# Patient Record
Sex: MALE | Race: WHITE | NOT HISPANIC OR LATINO | Employment: OTHER | URBAN - METROPOLITAN AREA
[De-identification: names, ages, dates, MRNs, and addresses within clinical notes are randomized per-mention and may not be internally consistent; named-entity substitution may affect disease eponyms.]

---

## 2017-05-16 ENCOUNTER — HOSPITAL ENCOUNTER (EMERGENCY)
Facility: HOSPITAL | Age: 67
Discharge: HOME | End: 2017-05-16
Attending: EMERGENCY MEDICINE
Payer: MEDICARE

## 2017-05-16 VITALS
SYSTOLIC BLOOD PRESSURE: 155 MMHG | HEART RATE: 80 BPM | RESPIRATION RATE: 12 BRPM | OXYGEN SATURATION: 97 % | TEMPERATURE: 100 F | DIASTOLIC BLOOD PRESSURE: 68 MMHG | BODY MASS INDEX: 23.4 KG/M2 | HEIGHT: 69 IN | WEIGHT: 158 LBS

## 2017-05-16 DIAGNOSIS — R07.81 RIB PAIN: ICD-10-CM

## 2017-05-16 DIAGNOSIS — R06.02 SHORTNESS OF BREATH: ICD-10-CM

## 2017-05-16 DIAGNOSIS — T14.90XA TRAUMA: ICD-10-CM

## 2017-05-16 PROCEDURE — 25000003 PHARM REV CODE 250: Performed by: EMERGENCY MEDICINE

## 2017-05-16 PROCEDURE — 99284 EMERGENCY DEPT VISIT MOD MDM: CPT

## 2017-05-16 RX ORDER — ACETAMINOPHEN 500 MG
1000 TABLET ORAL
Status: COMPLETED | OUTPATIENT
Start: 2017-05-16 | End: 2017-05-16

## 2017-05-16 RX ADMIN — ACETAMINOPHEN 1000 MG: 500 TABLET ORAL at 11:05

## 2017-05-16 NOTE — ED PROVIDER NOTES
SCRIBE #1 NOTE: I, Chepe Jarrell, am scribing for, and in the presence of, Clyde Haywood MD. I have scribed the entire note.        History      Chief Complaint   Patient presents with    Chest Pain     pt fell Sunday and now c/o pain to ribs on left side, unable to take a deep breath. + shortness of breath.        Review of patient's allergies indicates:   Allergen Reactions    Penicillins         HPI    5/16/2017, 11:26 AM   History obtained from the patient      History of Present Illness: Lionel Castillo is a 67 y.o. male patient who presents to the Emergency Department Evaluation after falling from losing balance which occurred 2 days ago. Pt states he hit his head. He c/o left posterior chest wall pain. Sx are constant and moderate in severity. Pain is worse with breathing. There are no other mitigating or exacerbating factors noted. Associated sx include abrasion to forehead.  Pt states he takes ASA regularly so he bruises easily. Pt denies any hip pain, knee pain, back pain, neck pain/stiffness, LOC, dizziness, abd pain, CP, SOB, weakness or numbness to extremities, and all other sx at this time. Pt is current smoker. No further complaints or concerns at this time.        Arrival mode: personal transportationEMS    PCP: Eddie Garcia MD       Past Medical History:  Past Medical History:   Diagnosis Date    Diabetes     HTN (hypertension)     Hyperlipidemia          Past Surgical History:  History reviewed. No pertinent surgical history.        Family History:  History reviewed. No pertinent family history.      Social History:  Social History     Social History Main Topics    Smoking status:  Smoker    Smokeless tobacco: unknown    Alcohol use No    Drug use: No    Sexual activity: Unknown         Review of Systems   Constitutional: Negative for chills and fever.        + fall   HENT: Negative for sore throat.    Eyes: Negative for visual disturbance.   Respiratory: Negative for  cough and shortness of breath.    Cardiovascular: Negative for leg swelling.   Gastrointestinal: Negative for abdominal pain, nausea and vomiting.   Genitourinary: Negative for dysuria.   Musculoskeletal: Negative for back pain, neck pain and neck stiffness.        - hip pain  - knee pain   Skin: Positive for wound (abrasion). Negative for rash.   Neurological: Negative for dizziness, syncope, speech difficulty, weakness, numbness and headaches.        - LOC  + head trauma   Hematological: Bruises/bleeds easily (ASA).   All other systems reviewed and are negative.      Physical Exam    Initial Vitals   BP Pulse Resp Temp SpO2   05/16/17 1115 05/16/17 1115 05/16/17 1115 05/16/17 1115 05/16/17 1115   145/76 114 24 99.8 °F (37.7 °C) 92 %     Physical Exam  Constitutional: Patient is in NAD. Awake and alert. Appropriate for age.   Head: superficial abrasion to forehead. Midface is stable. No Raccoon's eyes. No Mcgowan's sign.   Eyes: PERRL. EOM normal. Conjunctivae normal.   Ears: No hemotympanum.   Nose: No nasal deformity. No septal hematoma.   Mouth/Throat: Airway intact. No malocclusion. No dental trauma.   Neck: Trachea midline. No cervical bony tenderness, deformities, or step-offs.   Cardiovascular: Regular rate and rhythm. Heart sounds normal. Peripheral pulses are 2+ bilaterally in all extremities.   Pulmonary/Chest: Breath sounds are normal bilaterally. No decreased breath sounds. No respiratory distress. No external evidence of chest trauma based on inspection. Left posterior chest wall is tender to palpation. No crepitus. No asymmetric rise. No flail segment.   Abdominal: Soft and non-distended. No tenderness. No external evidence of abd trauma based on inspection.   Back: No midline bony tenderness, deformities, or step-offs of the T-spine or L-spine. No abrasions or ecchymosis.   Musculoskeletal: Full range of motion in bilateral extremities. Pelvis is stable. No obvious deformities.   Skin: Normal color.  "No abrasions. No lacerations.   Neurological: Alert and oriented x3. GCS 15. Strength is normal, equal, 5/5 in bilateral upper and lower extremities. Grossly NV intact. No sensory deficits to light touch. Non-focal neurological examination.   Psychiatric: Normal affect.      ED Course    Procedures  ED Vital Signs:  Vitals:    05/16/17 1115 05/16/17 1322   BP: (!) 145/76 (!) 155/68   Pulse: (!) 114 80   Resp: (!) 24 12   Temp: 99.8 °F (37.7 °C)    TempSrc: Oral    SpO2: (!) 92% 97%   Weight: 71.7 kg (158 lb)    Height: 5' 9" (1.753 m)        Abnormal Lab Results:  Labs Reviewed - No data to display       Imaging Results:  Imaging Results         CT Head Without Contrast (Final result) Result time:  05/16/17 12:35:02    Final result by Aylin Rosas MD (05/16/17 12:35:02)    Impression:       Mild to moderate generalized age-related atrophy. No acute findings.        All CT scans at this facility use dose modulation, iterative reconstruction and/or weight based dosing when appropriate to reduce radiation dose to as low as reasonably achievable.       Electronically signed by: AYLIN ROSAS MD  Date:     05/16/17  Time:    12:35     Narrative:    CT HEAD WITHOUT CONTRAST     History:  Head injury with head trauma with dizziness.  History of a fall.    Technique:  Noncontrast CT of the brain. No previous comparison.    Findings:  The ventricles are dilated consistent with generalized atrophy. Associated age-related white matter degeneration is present.   Prominent intracranial vascular calcification is noted.    No significant acute findings are noted.            X-Ray Chest Lateral Decubitus Left (Final result) Result time:  05/16/17 11:46:32    Final result by Aylin Rosas MD (05/16/17 11:46:32)    Impression:      Several right rib fractures.  Questionable minor right pleural effusion with minor right lung base volume loss.      Electronically signed by: AYLIN ROSAS MD  Date: "     05/16/17  Time:    11:46     Narrative:    Exam: XR CHEST LATERAL DECUBITUS LEFT,    Date:  05/16/17 11:28:28    History: Chest pain.    Comparison:  No prior relevant studies available    Findings: Heart size is normal.  The left lung is clear.  There are several right-sided rib fractures.  Age indeterminate.  Possible acute versus chronic rib fractures.    Volume loss with blunting of the right costophrenic angle may represent a minor right pleural effusion.  No pneumothorax.            X-Ray Chest AP Portable (Final result) Result time:  05/16/17 11:45:26    Final result by Aylin Rosas MD (05/16/17 11:45:26)    Impression:      No significant layering left pleural effusion.  Please see above.      Electronically signed by: AYLIN ROSAS MD  Date:     05/16/17  Time:    11:45     Narrative:    Exam: XR CHEST AP PORTABLE,    Date:  05/16/17 11:28:25    History: Chest pain    Comparison:  No prior relevant studies available    Findings:     Left side down decubitus chest x-ray.      Several right-sided rib fractures are noted.  Some of which appear chronic.  9th rib fracture may be acute to subacute.  Please correlate with clinical exam.  No pneumothorax.  Left side down decubitus view reveals no significant pleural effusion.                 The Emergency Provider reviewed the vital signs and test results, which are outlined above.    ED Discussions     1:16 PM: Reassessed pt at this time. Awake and alert. NAD. Hx long term smoking I feel O2 sat due to long term lung disease. No longer having pain with breathing. Pt states sx improved at this time. Discussed with pt all pertinent ED information and results. Discussed pt d and plan of tx. Gave pt all f/u and return to the ED instructions. All questions and concerns were addressed at this time. Pt expresses understanding of information and instructions, and is comfortable with plan to discharge. Pt is stable for discharge.      I have discussed with  patient and/or family/caretaker chest pain precautions, specifically to return for worsening chest pain, shortness of breath, fever, or any concern.  I have low suspicion for cardiopulmonary, vascular, infectious, respiratory, or other emergent medical condition based on my evaluation in the ED.         Medications given in the ED:  Medications   acetaminophen tablet 1,000 mg (1,000 mg Oral Given 5/16/17 1142)       Discharge Medications:  Discharge Medication List as of 5/16/2017  1:17 PM           Follow-up Instructions:        Medical Decision Making    Medical Decision Making:   Clinical Tests:   Radiological Study: Ordered and Reviewed           Scribe Attestation:   Scribe #1: I performed the above scribed service and the documentation accurately describes the services I performed. I attest to the accuracy of the note.    Attending:   Physician Attestation Statement for Scribe #1: I, Clyde Haywood MD, personally performed the services described in this documentation, as scribed by Chepe Jarrell in my presence, and it is both accurate and complete.          Clinical Impression       ICD-10-CM ICD-9-CM   1. Shortness of breath R06.02 786.05   2. Rib pain R07.81 786.50   3. Trauma T14.90 959.9       Disposition:   Disposition: Discharged  Condition: Stable         Clyde Haywood MD  05/16/17 2030

## 2017-05-16 NOTE — ED AVS SNAPSHOT
[facility]  [id number] [address] [facility] [facility] [id number]               Lionel Castillo   2017 11:16 AM   ED    Description:  Male : 1950   Department:  [facility]            Your Care was Coordinated By:     Provider Role From To    Lashanda Cabrera MD Attending Provider 17 1118 17 1120    Clyde Haywood MD Attending Provider 17 1120 --      Reason for Visit     Chest Pain           Diagnoses this Visit        Comments    Shortness of breath         Rib pain         Trauma           ED Disposition     None           To Do List           [facility]     [facility] Care [facility] -  Assistance  Unless otherwise directed by your provider, please contact [facility], our nurse care line that is available for  assistance.     Registered nurses in the [facility] provide: appointment scheduling, clinical advisement, health education, and other advisory services.  Call: [phone] (toll free)               Medications           Message regarding Medications     Verify the changes and/or additions to your medication regime listed below are the same as discussed with your clinician today.  If any of these changes or additions are incorrect, please notify your healthcare provider.        These medications were administered today        Dose Freq    acetaminophen tablet 1,000 mg 1,000 mg ED 1 Time    Sig: Take 2 tablets (1,000 mg total) by mouth ED 1 Time.    Class: Normal    Route: Oral           Verify that the below list of medications is an accurate representation of the medications you are currently taking.  If none reported, the list may be blank. If incorrect, please contact your healthcare provider. Carry this list with you in case of emergency.           Current Medications     amlodipine (NORVASC) 10 MG tablet Take 10 mg by mouth once daily.    atorvastatin (LIPITOR) 20 MG tablet Take  "20 mg by mouth once daily.    citalopram (CELEXA) 40 MG tablet Take 40 mg by mouth once daily.    losartan-hydrochlorothiazide 100-25 mg (HYZAAR) 100-25 mg per tablet Take 1 tablet by mouth once daily.    metformin (GLUCOPHAGE) 500 MG tablet Take 500 mg by mouth 2 (two) times daily with meals.           Clinical Reference Information           Your Vitals Were     BP Pulse Temp Resp Height Weight    145/76 (BP Location: Right arm, Patient Position: Sitting) 114 99.8 °F (37.7 °C) (Oral) 24 5' 9" (1.753 m) 71.7 kg (158 lb)    SpO2 BMI             92% 23.33 kg/m2         Allergies as of 5/16/2017        Reactions    Penicillins       Immunizations Administered on Date of Encounter - 5/16/2017     None      ED Micro, Lab, POCT     None      ED Imaging Orders     Start Ordered       Status Ordering Provider    05/16/17 1126 05/16/17 1125  CT Head Without Contrast  1 time imaging      Final result     05/16/17 1119 05/16/17 1119  X-Ray Chest AP Portable  1 time imaging      Final result     05/16/17 1119 05/16/17 1119  X-Ray Chest Lateral Decubitus Left  1 time imaging      Final result         Discharge Instructions         Noncardiac Chest Pain    Based on your visit today, the health care provider doesnt know what is causing your chest pain. In most cases, people who come to the emergency department with chest pain dont have a problem with their heart. Instead, the pain is caused by other conditions. These may be problems with the lungs, muscles, bones, digestive tract, nerves, or mental health.  Lung problems  · Inflammation around the lungs (pleurisy)  · Collapsed lung (pneumothorax)  · Fluid around the lungs (pleural effusion)  · Lung cancer. This is a rare cause of chest pain.  Muscle or bone problems  · Inflamed cartilage between the ribs (pleurisy)  · Fibromyalgia  · Rheumatoid arthritis  Digestive system problems  · Reflux  · Stomach ulcer  · Spasms of the esophagus  · Gall stones  · Gallbladder " inflammation  Mental health conditions  · Panic or anxiety attacks  · Emotional distress  Your condition doesnt seem serious and your pain doesnt appear to be coming from your heart. But sometimes the signs of a serious problem take more time to appear. Watch for the warning signs listed below.  Home care  Follow these guidelines when caring for yourself at home:  · Rest today and avoid strenuous activity.  · Take any prescribed medicine as directed.  Follow-up care  Follow up with your health care provider, or as advised, if you dont start to feel better within 24 hours.  When to seek medical advice  Call your health care provider right away if any of these occur:  · A change in the type of pain. Call if it feels different, becomes more serious, lasts longer, or begins to spread into your shoulder, arm, neck, jaw, or back.  · Shortness of breath  · You feel more pain when you breathe  · Cough with dark-colored mucus or blood  · Weakness, dizziness, or fainting  · Fever of 100.4ºF (38ºC) or higher, or as directed by your health care provider  · Swelling, pain, or redness in one leg  Date Last Reviewed: 11/24/2014  © 8318-1186 [employer]. [address] [place] [address]. All rights reserved. This information is not intended as a substitute for professional medical care. Always follow your healthcare professional's instructions.          Discharge References/Attachments     BONE BRUISE (BONE CONTUSION), TREATMENT FOR (ENGLISH)      [facility] Sign-Up     Activating your [facility] account is as easy as 1-2-3!     1) Visit [email or web address], select Sign Up Now, enter this activation code and your date of birth, then select Next.  [email or web address]  Expires: 6/30/2017  1:17 PM      2) Create a username and password to use when you visit [facility] in the future and select a security question in case you lose your password and select Next.    3) Enter your e-mail address and click Sign  Up!    Additional Information  If you have questions, please e-mail [email or web address] or call [phone] to talk to our [facility] staff. Remember, [facility] is NOT to be used for urgent needs. For medical emergencies, dial 911.          [facility] complies with applicable Federal civil rights laws and does not discriminate on the basis of race, color, national origin, age, disability, or sex.        Language Assistance Services     ATTENTION: Language assistance services are available, free of charge. Please call [phone].      ATENCIÓN: Si habla español, tiene a rodriguez disposición servicios gratuitos de asistencia lingüística. Llame al [phone].     CHÚ Ý: N?u b?n nói Ti?ng Vi?t, có các d?ch v? h? tr? ngôn ng? mi?n phí dành cho b?n. G?i s? [phone].

## 2017-05-16 NOTE — DISCHARGE INSTRUCTIONS
Noncardiac Chest Pain    Based on your visit today, the health care provider doesnt know what is causing your chest pain. In most cases, people who come to the emergency department with chest pain dont have a problem with their heart. Instead, the pain is caused by other conditions. These may be problems with the lungs, muscles, bones, digestive tract, nerves, or mental health.  Lung problems  · Inflammation around the lungs (pleurisy)  · Collapsed lung (pneumothorax)  · Fluid around the lungs (pleural effusion)  · Lung cancer. This is a rare cause of chest pain.  Muscle or bone problems  · Inflamed cartilage between the ribs (pleurisy)  · Fibromyalgia  · Rheumatoid arthritis  Digestive system problems  · Reflux  · Stomach ulcer  · Spasms of the esophagus  · Gall stones  · Gallbladder inflammation  Mental health conditions  · Panic or anxiety attacks  · Emotional distress  Your condition doesnt seem serious and your pain doesnt appear to be coming from your heart. But sometimes the signs of a serious problem take more time to appear. Watch for the warning signs listed below.  Home care  Follow these guidelines when caring for yourself at home:  · Rest today and avoid strenuous activity.  · Take any prescribed medicine as directed.  Follow-up care  Follow up with your health care provider, or as advised, if you dont start to feel better within 24 hours.  When to seek medical advice  Call your health care provider right away if any of these occur:  · A change in the type of pain. Call if it feels different, becomes more serious, lasts longer, or begins to spread into your shoulder, arm, neck, jaw, or back.  · Shortness of breath  · You feel more pain when you breathe  · Cough with dark-colored mucus or blood  · Weakness, dizziness, or fainting  · Fever of 100.4ºF (38ºC) or higher, or as directed by your health care provider  · Swelling, pain, or redness in one leg  Date Last Reviewed: 11/24/2014  © 4684-6286  [employer]. [address] [place] [address], [place] [address]. All rights reserved. This information is not intended as a substitute for professional medical care. Always follow your healthcare professional's instructions.

## 2017-06-05 ENCOUNTER — HOSPITAL ENCOUNTER (EMERGENCY)
Facility: HOSPITAL | Age: 67
Discharge: HOME | End: 2017-06-05
Payer: COMMERCIAL

## 2017-06-05 VITALS
TEMPERATURE: 98 F | SYSTOLIC BLOOD PRESSURE: 140 MMHG | WEIGHT: 155 LBS | DIASTOLIC BLOOD PRESSURE: 62 MMHG | RESPIRATION RATE: 16 BRPM | OXYGEN SATURATION: 95 % | BODY MASS INDEX: 22.96 KG/M2 | HEIGHT: 69 IN | HEART RATE: 90 BPM

## 2017-06-05 DIAGNOSIS — M25.512 ACUTE PAIN OF LEFT SHOULDER: ICD-10-CM

## 2017-06-05 DIAGNOSIS — R07.81 RIB PAIN ON LEFT SIDE: ICD-10-CM
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
## 2017-06-05 PROCEDURE — 25000003 PHARM REV CODE 250: Performed by: PHYSICIAN ASSISTANT

## 2017-06-05 PROCEDURE — 99283 EMERGENCY DEPT VISIT LOW MDM: CPT

## 2017-06-05 RX ORDER — NAPROXEN SODIUM 220 MG/1
81 TABLET, FILM COATED ORAL DAILY
COMMUNITY

## 2017-06-05 RX ORDER — ACETAMINOPHEN 500 MG
1000 TABLET ORAL
Status: COMPLETED | OUTPATIENT
Start: 2017-06-05 | End: 2017-06-05

## 2017-06-05 RX ORDER — MELOXICAM 7.5 MG/1
7.5 TABLET ORAL DAILY
Qty: 10 TABLET | Refills: 0 | Status: SHIPPED | OUTPATIENT
Start: 2017-06-05 | End: 2018-07-18

## 2017-06-05 RX ORDER — HYDROCODONE BITARTRATE AND ACETAMINOPHEN 10; 325 MG/1; MG/1
1 TABLET ORAL
Status: DISCONTINUED | OUTPATIENT
Start: 2017-06-05 | End: 2017-06-05

## 2017-06-05 RX ORDER — TEMAZEPAM 30 MG/1
30 CAPSULE ORAL NIGHTLY PRN
Status: ON HOLD | COMMUNITY
End: 2018-08-24 | Stop reason: SDUPTHER

## 2017-06-05 RX ADMIN — ACETAMINOPHEN 1000 MG: 500 TABLET ORAL at 12:06

## 2017-06-05 NOTE — ED PROVIDER NOTES
History      Chief Complaint   Patient presents with    Fall     fall friday and worsening left shoulder pain with left rib pain       Review of patient's allergies indicates:   Allergen Reactions    Penicillins         HPI   HPI    6/5/2017, 11:29 AM   History obtained from the patient      History of Present Illness: Lionel Castillo is a 67 y.o. male patient who presents to the Emergency Department for left shoulder pain and left rib pain that started after a fall three days ago.  He states that he fell getting out of bed.  He denies head injury, LOC, fever, vomiting, diarrhea.  He has been taking Tylenol PM at night and has sling on LUE.        Arrival mode: Personal vehicle    PCP: Eddie Garcia MD       Past Medical History:  Past Medical History:   Diagnosis Date    Diabetes     HTN (hypertension)     Hyperlipidemia        Past Surgical History:  Past Surgical History:   Procedure Laterality Date    ANKLE SURGERY Left     CHOLECYSTECTOMY           Family History:  Family History   Problem Relation Age of Onset    Heart disease Father     Miscarriages / Stillbirths Paternal Uncle     Heart disease Paternal Grandfather     Miscarriages / Stillbirths Paternal Grandfather        Social History:  Social History     Social History Main Topics    Smoking status: Current Every Day Smoker     Packs/day: 1.00    Smokeless tobacco: Not on file    Alcohol use Yes      Comment: 6 beers per day    Drug use: No    Sexual activity: Not on file       ROS   Review of Systems   Constitutional: Negative for chills and fever.   HENT: Negative for congestion and sneezing.    Respiratory: Negative for cough and wheezing.    Cardiovascular: Negative for palpitations and leg swelling. Chest pain: rib pain.   Gastrointestinal: Negative for diarrhea and vomiting.   Musculoskeletal: Positive for arthralgias and myalgias.       Physical Exam      Initial Vitals [06/05/17 1104]   BP Pulse Resp Temp SpO2   (!)  "156/84 109 18 97.8 °F (36.6 °C) 95 %      Physical Exam  Nursing Notes and Vital Signs Reviewed.  Constitutional: Patient is in no apparent distress. Awake and alert. Well-developed and well-nourished.  Head: Atraumatic. Normocephalic.  Eyes: PERRL. EOM intact. Conjunctivae are not pale. No scleral icterus.  ENT: Mucous membranes are moist. Oropharynx is clear and symmetric.    Neck: Supple. Full ROM. No lymphadenopathy.  Cardiovascular: Regular rate. Regular rhythm. No murmurs, rubs, or gallops.   Pulmonary/Chest: No respiratory distress. Clear to auscultation bilaterally. No wheezing, rales, or rhonchi.  TTP over left lateral ribs.  No bruising noted.   Abdominal: Soft and non-distended.  There is no tenderness.  No rebound, guarding, or rigidity. Good bowel sounds.  Musculoskeletal: Moves all extremities. No obvious deformities. No edema. No calf tenderness.  Left shoulder:  TTP superior left shoulder.  Pain with ROM, decreased ROM due to pain.    Skin: Warm and dry.  Neurological:  Alert, awake, and appropriate.  Normal speech.  No acute focal neurological deficits are appreciated.  Psychiatric: Normal affect. Good eye contact. Appropriate in content.    ED Course    Procedures  ED Vital Signs:  Vitals:    06/05/17 1104 06/05/17 1248   BP: (!) 156/84 (!) 140/62   Pulse: 109 90   Resp: 18 16   Temp: 97.8 °F (36.6 °C)    TempSrc: Oral    SpO2: 95% 95%   Weight: 70.3 kg (155 lb)    Height: 5' 9" (1.753 m)        Abnormal Lab Results:  Labs Reviewed - No data to display         Imaging Results:  Imaging Results          X-Ray Shoulder 2 or More Views Left (Final result)  Result time 06/05/17 12:00:30    Final result by RAFI Berkowitz Sr., MD (06/05/17 12:00:30)                 Impression:         Normal study.      Electronically signed by: RAFI BERKOWITZ MD  Date:     06/05/17  Time:    12:00              Narrative:    three-view x-ray of the left shoulder    Clinical History:     Pain in the left shoulder status " post fall    Findings:     There is no fracture. There is no dislocation.                             X-Ray Chest PA And Lateral (Final result)  Result time 06/05/17 12:02:54    Final result by Aylin Rosas MD (06/05/17 12:02:54)                 Impression:         No acute findings.  Old right rib fractures.  Probable right lung base atelectasis and/or scarring with pleural thickening/small pleural effusion.      Electronically signed by: AYLIN ROSAS MD  Date:     06/05/17  Time:    12:02              Narrative:    XR CHEST PA AND LATERAL, 06/05/17 11:51:25    Clinical indication: Chest pain,R07.81 Pleurodynia;    Findings:  Comparison with 05/16/2017.  Heart size is normal.  Aortic arch is calcified.    The left lung is clear.  There is probable scarring and pleural thickening at the right lateral lung base, similar to the previous exam.    Several right rib fractures are noted.                                      The Emergency Provider reviewed the vital signs and test results, which are outlined above.    ED Discussion     Discussed with pt all pertinent ED information and results. Discussed pt dx and plan of tx. Gave pt all f/u and return to the ED instructions. All questions and concerns were addressed at this time. Pt expresses understanding of information and instructions, and is comfortable with plan to discharge. Pt is stable for discharge.    Trauma precautions were discussed with patient and/or family/caretaker; I do not specifically detect any abdominal, thoracic, CNS, orthopedic, or other emergent or life threatening condition and that patient is safe to be discharged.  It was also discussed that despite an unrevealing examination and negative radiographic examination for serious or life threatening injury, these conditions may still exist.  As such, patient should return to ED immediately should they experience, severe or worsening pain, shortness of breath, abdominal pain, headache,  vomiting, or any other concern.  It was also discussed that not infrequently, injuries may not be diagnosed during the initial ED visit (such as fractures) and that if the patient discovers a new area of concern, a new area of injury that was not evaluated in the ED, they should return for evaluation as they may have an injury that requires treatment.    Pre-hypertension/Hypertension: The pt has been informed that they may have pre-hypertension or hypertension based on a blood pressure reading in the ED. I recommend that the pt call the PCP listed on their discharge instructions or a physician of their choice this week to arrange f/u for further evaluation of possible pre-hypertension or hypertension.       ED Medication(s):  Medications   acetaminophen tablet 1,000 mg (1,000 mg Oral Given 6/5/17 1210)       Discharge Medication List as of 6/5/2017 12:40 PM      START taking these medications    Details   meloxicam (MOBIC) 7.5 MG tablet Take 1 tablet (7.5 mg total) by mouth once daily., Starting Mon 6/5/2017, Print             Follow-up Information     Eddie Garcia MD in 2 days.    Specialty:  Internal Medicine  Contact information:  [address] [facility] [address] [phone]                     Medical Decision Making                  Clinical Impression       ICD-10-CM ICD-9-CM   1. Fall, initial encounter W19.XXXA E888.9   2. Rib pain on left side R07.81 786.50   3. Acute pain of left shoulder M25.512 719.41       Disposition:   Disposition: Discharged  Condition: Stable           Shira Pineda PA-C  06/05/17 5544

## 2017-06-05 NOTE — ED NOTES
Pt fell 4 days ago and injured his left arm. C/o pain behind left shoulder blade. Pt came from home with sling in place.

## 2017-08-21 ENCOUNTER — HOSPITAL ENCOUNTER (EMERGENCY)
Facility: HOSPITAL | Age: 67
Discharge: HOME | End: 2017-08-21
Attending: EMERGENCY MEDICINE
Payer: MEDICARE

## 2017-08-21 VITALS
WEIGHT: 145 LBS | OXYGEN SATURATION: 98 % | RESPIRATION RATE: 18 BRPM | HEART RATE: 116 BPM | SYSTOLIC BLOOD PRESSURE: 130 MMHG | BODY MASS INDEX: 21.98 KG/M2 | DIASTOLIC BLOOD PRESSURE: 69 MMHG | HEIGHT: 68 IN | TEMPERATURE: 99 F

## 2017-08-21 DIAGNOSIS — B95.8 STAPH SKIN INFECTION: Primary | ICD-10-CM

## 2017-08-21 DIAGNOSIS — L08.9 STAPH SKIN INFECTION: Primary | ICD-10-CM

## 2017-08-21 PROCEDURE — 99283 EMERGENCY DEPT VISIT LOW MDM: CPT

## 2017-08-21 RX ORDER — CLINDAMYCIN HYDROCHLORIDE 300 MG/1
300 CAPSULE ORAL 4 TIMES DAILY
Qty: 28 CAPSULE | Refills: 0 | Status: SHIPPED | OUTPATIENT
Start: 2017-08-21 | End: 2017-08-28

## 2017-08-21 RX ORDER — MUPIROCIN 20 MG/G
OINTMENT TOPICAL 3 TIMES DAILY
Qty: 30 G | Refills: 0 | Status: SHIPPED | OUTPATIENT
Start: 2017-08-21

## 2017-08-21 NOTE — ED PROVIDER NOTES
Encounter Date: 8/21/2017       History     Chief Complaint   Patient presents with    Abscess     abscess to left wrist     67 year old male with complaint of painful lesions to left arm X 10 days.  Reports worsening X 3 days.  No fever or chills. Mild pain.  Worse with touching.  No radiation of pain.  No alleviating factors.           Review of patient's allergies indicates:   Allergen Reactions    Penicillins      Past Medical History:   Diagnosis Date    Diabetes     HTN (hypertension)     Hyperlipidemia      Past Surgical History:   Procedure Laterality Date    ANKLE SURGERY Left     CHOLECYSTECTOMY       Family History   Problem Relation Age of Onset    Heart disease Father     Miscarriages / Stillbirths Paternal Uncle     Heart disease Paternal Grandfather     Miscarriages / Stillbirths Paternal Grandfather      Social History   Substance Use Topics    Smoking status: Current Every Day Smoker     Packs/day: 1.00    Smokeless tobacco: Not on file    Alcohol use Yes      Comment: 6 beers per day     Review of Systems   Constitutional: Negative for fever.   HENT: Negative for sore throat.    Respiratory: Negative for shortness of breath.    Cardiovascular: Negative for chest pain.   Gastrointestinal: Negative for nausea.   Genitourinary: Negative for dysuria.   Musculoskeletal: Negative for back pain.   Skin: Negative for rash.        Skin lesions   Neurological: Negative for weakness.   Hematological: Does not bruise/bleed easily.       Physical Exam     Initial Vitals [08/21/17 1036]   BP Pulse Resp Temp SpO2   130/69 (!) 116 18 99.2 °F (37.3 °C) 98 %      MAP       89.33         Physical Exam    Nursing note and vitals reviewed.  Constitutional: He appears well-developed and well-nourished.   HENT:   Head: Normocephalic and atraumatic.   Eyes: Conjunctivae are normal. Pupils are equal, round, and reactive to light.   Neck: Normal range of motion. Neck supple.   Cardiovascular: Normal rate,  regular rhythm, normal heart sounds and intact distal pulses.   Pulmonary/Chest: Breath sounds normal.   Abdominal: Soft. There is no rebound and no guarding.   Musculoskeletal: Normal range of motion.   Neurological: He is alert.   Skin: Skin is warm and dry.   3 mm area of erythema and induration left distal arm, 4 mm area of erythema and induration left mid and left proximal arm   Psychiatric: He has a normal mood and affect. His behavior is normal. Thought content normal.         ED Course   Procedures  Labs Reviewed - No data to display           10:52 AM  All areas of induration and erythema incised with 18 g needle with scant amount of pus drainage, no packing placed                 ED Course     Clinical Impression:   The encounter diagnosis was Staph skin infection.                           Robi Rothman NP  08/21/17 [id number]

## 2018-01-26 ENCOUNTER — HOSPITAL ENCOUNTER (EMERGENCY)
Facility: HOSPITAL | Age: 68
Discharge: HOME | End: 2018-01-26
Attending: EMERGENCY MEDICINE
Payer: COMMERCIAL

## 2018-01-26 VITALS
HEART RATE: 76 BPM | OXYGEN SATURATION: 96 % | RESPIRATION RATE: 18 BRPM | DIASTOLIC BLOOD PRESSURE: 91 MMHG | SYSTOLIC BLOOD PRESSURE: 204 MMHG | TEMPERATURE: 98 F
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
## 2018-01-26 DIAGNOSIS — S32.591A CLOSED FRACTURE OF MULTIPLE PUBIC RAMI, RIGHT, INITIAL ENCOUNTER: ICD-10-CM

## 2018-01-26 DIAGNOSIS — I10 ESSENTIAL HYPERTENSION: Primary | ICD-10-CM

## 2018-01-26 LAB
ALBUMIN SERPL BCP-MCNC: 2.9 G/DL
ALP SERPL-CCNC: 85 U/L
ALT SERPL W/O P-5'-P-CCNC: 9 U/L
ANION GAP SERPL CALC-SCNC: 13 MMOL/L
APTT BLDCRRT: 29.5 SEC
AST SERPL-CCNC: 18 U/L
BASOPHILS # BLD AUTO: 0.04 K/UL
BASOPHILS NFR BLD: 0.4 %
BILIRUB SERPL-MCNC: 0.8 MG/DL
BUN SERPL-MCNC: 30 MG/DL
CALCIUM SERPL-MCNC: 9.2 MG/DL
CHLORIDE SERPL-SCNC: 96 MMOL/L
CO2 SERPL-SCNC: 23 MMOL/L
CREAT SERPL-MCNC: 2.3 MG/DL
DIFFERENTIAL METHOD: ABNORMAL
EOSINOPHIL # BLD AUTO: 0 K/UL
EOSINOPHIL NFR BLD: 0.4 %
ERYTHROCYTE [DISTWIDTH] IN BLOOD BY AUTOMATED COUNT: 13.4 %
EST. GFR  (AFRICAN AMERICAN): 33 ML/MIN/1.73 M^2
EST. GFR  (NON AFRICAN AMERICAN): 29 ML/MIN/1.73 M^2
GLUCOSE SERPL-MCNC: 98 MG/DL
HCT VFR BLD AUTO: 36.1 %
HGB BLD-MCNC: 13 G/DL
INR PPP: 0.9
LYMPHOCYTES # BLD AUTO: 1 K/UL
LYMPHOCYTES NFR BLD: 11.1 %
MCH RBC QN AUTO: 35.4 PG
MCHC RBC AUTO-ENTMCNC: 36 G/DL
MCV RBC AUTO: 98 FL
MONOCYTES # BLD AUTO: 0.8 K/UL
MONOCYTES NFR BLD: 8.3 %
NEUTROPHILS # BLD AUTO: 7.2 K/UL
NEUTROPHILS NFR BLD: 79.8 %
PLATELET # BLD AUTO: 209 K/UL
PMV BLD AUTO: 8.6 FL
POTASSIUM SERPL-SCNC: 3 MMOL/L
PROT SERPL-MCNC: 6.5 G/DL
PROTHROMBIN TIME: 9.4 SEC
RBC # BLD AUTO: 3.67 M/UL
SODIUM SERPL-SCNC: 132 MMOL/L
WBC # BLD AUTO: 9.02 K/UL

## 2018-01-26 PROCEDURE — 80053 COMPREHEN METABOLIC PANEL: CPT

## 2018-01-26 PROCEDURE — 25000003 PHARM REV CODE 250: Performed by: EMERGENCY MEDICINE

## 2018-01-26 PROCEDURE — 85730 THROMBOPLASTIN TIME PARTIAL: CPT

## 2018-01-26 PROCEDURE — 85025 COMPLETE CBC W/AUTO DIFF WBC: CPT

## 2018-01-26 PROCEDURE — 99285 EMERGENCY DEPT VISIT HI MDM: CPT

## 2018-01-26 PROCEDURE — 85610 PROTHROMBIN TIME: CPT

## 2018-01-26 RX ORDER — POLYETHYLENE GLYCOL 3350 17 G/17G
17 POWDER, FOR SOLUTION ORAL DAILY
Qty: 289 G | Refills: 0 | Status: SHIPPED | OUTPATIENT
Start: 2018-01-26

## 2018-01-26 RX ORDER — DOCUSATE SODIUM 100 MG/1
100 CAPSULE, LIQUID FILLED ORAL 2 TIMES DAILY PRN
Qty: 30 CAPSULE | Refills: 0 | Status: SHIPPED | OUTPATIENT
Start: 2018-01-26

## 2018-01-26 RX ORDER — HYDROCODONE BITARTRATE AND ACETAMINOPHEN 10; 325 MG/1; MG/1
1 TABLET ORAL EVERY 6 HOURS PRN
Qty: 20 TABLET | Refills: 0 | Status: ON HOLD | OUTPATIENT
Start: 2018-01-26 | End: 2018-07-20

## 2018-01-26 RX ORDER — POTASSIUM CHLORIDE 20 MEQ/1
40 TABLET, EXTENDED RELEASE ORAL
Status: COMPLETED | OUTPATIENT
Start: 2018-01-26 | End: 2018-01-26

## 2018-01-26 RX ADMIN — POTASSIUM CHLORIDE 40 MEQ: 1500 TABLET, EXTENDED RELEASE ORAL at 10:01

## 2018-01-26 NOTE — ED PROVIDER NOTES
SCRIBE #1 NOTE: I, Mathew Dunham, am scribing for, and in the presence of, Daniel Gresham Jr., MD. I have scribed the entire note.      History      Chief Complaint   Patient presents with    Fall     right hip pain after fall       Review of patient's allergies indicates:   Allergen Reactions    Penicillins         HPI   HPI    1/26/2018, 9:13 AM   History obtained from the patient      History of Present Illness: Lionel Castillo is a 67 y.o. male patient who presents to the Emergency Department for R hip pain which onset suddenly yesterday after falling right outside his home. Symptoms are constant and moderate in severity. Sx are exacerbated with movement and relieved by nothing. Associated sxs include R groin pain, unable to bear weight on his R side, and R lower back pain. No other sxs reported. Patient denies any fever, N/V/D, chills, abd pain, weakness/numbness, saddle anesthesia, LOC, CP, SOB, lightheadedness, dizziness and all other sxs at this time. No further complaints or concerns at this time.     Arrival mode: [facility]    PCP: Eddie Garcia MD       Past Medical History:  Past Medical History:   Diagnosis Date    Diabetes     HTN (hypertension)     Hyperlipidemia        Past Surgical History:  Past Surgical History:   Procedure Laterality Date    ANKLE SURGERY Left     CHOLECYSTECTOMY           Family History:  Family History   Problem Relation Age of Onset    Heart disease Father     Miscarriages / Stillbirths Paternal Uncle     Heart disease Paternal Grandfather     Miscarriages / Stillbirths Paternal Grandfather        Social History:  Social History     Social History Main Topics    Smoking status: Current Every Day Smoker     Packs/day: 1.00    Smokeless tobacco: Not on file    Alcohol use Yes      Comment: 6 beers per day    Drug use: No    Sexual activity: Not on file       ROS   Review of Systems   Constitutional: Negative for chills and fever.   HENT: Negative for  congestion and sore throat.    Respiratory: Negative for chest tightness and shortness of breath.    Cardiovascular: Negative for chest pain.   Gastrointestinal: Negative for abdominal pain, nausea and vomiting.   Genitourinary: Negative for difficulty urinating and dysuria.   Musculoskeletal: Positive for arthralgias (R hip pain), back pain (R lower) and gait problem (unable to bear weight). Negative for neck pain.        (+)R groin pain   Skin: Negative for rash.   Neurological: Negative for dizziness, weakness, light-headedness, numbness and headaches.   Hematological: Does not bruise/bleed easily.   Psychiatric/Behavioral: Negative for agitation and confusion.   All other systems reviewed and are negative.      Physical Exam      Initial Vitals [01/26/18 0854]   BP Pulse Resp Temp SpO2   (!) 200/80 96 18 98.2 °F (36.8 °C) 99 %      MAP       120          Physical Exam  Nursing Notes and Vital Signs Reviewed.  Constitutional: Patient is in no apparent distress. Well-developed and well-nourished.  Head: Atraumatic. Normocephalic.  Eyes: PERRL. EOM intact. Conjunctivae are not pale. No scleral icterus.  ENT: Mucous membranes are moist. Oropharynx is clear and symmetric.    Neck: Supple. Full ROM. No lymphadenopathy.No cervical midline bony tenderness, deformities, or step-offs.   Cardiovascular: Regular rate. Regular rhythm. No murmurs, rubs, or gallops. Distal pulses are 2+ and symmetric.  Pulmonary/Chest: No respiratory distress. Clear to auscultation bilaterally. No wheezing or rales.  Abdominal: Soft and non-distended.  There is no tenderness.  No rebound, guarding, or rigidity. Good bowel sounds.  Musculoskeletal: Moves all extremities. No obvious deformities. No edema. No calf tenderness. Pain noted to the medial aspect of the R pelvis, able to lift R leg and R hip.   Back: No tenderness. No midline bony tenderness, deformities, or step-offs of the T-spine or L-spine. Skin appears normal without abrasions or  bruising. No erythema, induration, or fluctuance.   Skin: Warm and dry.  Neurological:  Alert, awake, and appropriate.  Normal speech.  No acute focal neurological deficits are appreciated.  Psychiatric: Normal affect. Good eye contact. Appropriate in content.    ED Course    Orthopedic Injury  Date/Time: 1/26/2018 10:36 AM  Performed by: JORGE DA SILVA JR  Authorized by: JORGE DA SILVA JR     Injury:     Injury location:  Pelvis (R SUPERIOR AND INFERIOR PUBIC RAMI)    Injury type:  Fracture    Fracture type: pelvic        Pre-procedure assessment:     Neurovascular status: Neurovascularly intact      Distal perfusion: normal      Neurological function: normal      Local anesthesia used?: No      Patient sedated?: No        Selections made in this section will also lock the Injury type section above.:     Manipulation performed?: No      MAST used?: No      Immobilization:  Crutches    Complications: No      Specimens: No      Implants: No    Post-procedure assessment:     Neurovascular status: Neurovascularly intact      Distal perfusion: normal      Neurological function: normal      Range of motion: unchanged      Patient tolerance:  Patient tolerated the procedure well with no immediate complications      ED Vital Signs:  Vitals:    01/26/18 0854 01/26/18 0909   BP: (!) 200/80 (!) 210/91   Pulse: 96 80   Resp: 18 18   Temp: 98.2 °F (36.8 °C)    TempSrc: Oral    SpO2: 99% 97%       Abnormal Lab Results:  Labs Reviewed   CBC W/ AUTO DIFFERENTIAL - Abnormal; Notable for the following:        Result Value    RBC 3.67 (*)     Hemoglobin 13.0 (*)     Hematocrit 36.1 (*)     MCH 35.4 (*)     MPV 8.6 (*)     Gran% 79.8 (*)     Lymph% 11.1 (*)     All other components within normal limits   COMPREHENSIVE METABOLIC PANEL - Abnormal; Notable for the following:     Sodium 132 (*)     Potassium 3.0 (*)     BUN, Bld 30 (*)     Creatinine 2.3 (*)     Albumin 2.9 (*)     ALT 9 (*)     eGFR if  33 (*)     eGFR if  non  29 (*)     All other components within normal limits   PROTIME-INR   APTT   URINALYSIS        All Lab Results:  Results for orders placed or performed during the hospital encounter of 01/26/18   CBC auto differential   Result Value Ref Range    WBC 9.02 3.90 - 12.70 K/uL    RBC 3.67 (L) 4.60 - 6.20 M/uL    Hemoglobin 13.0 (L) 14.0 - 18.0 g/dL    Hematocrit 36.1 (L) 40.0 - 54.0 %    MCV 98 82 - 98 fL    MCH 35.4 (H) 27.0 - 31.0 pg    MCHC 36.0 32.0 - 36.0 g/dL    RDW 13.4 11.5 - 14.5 %    Platelets 209 150 - 350 K/uL    MPV 8.6 (L) 9.2 - 12.9 fL    Gran # (ANC) 7.2 1.8 - 7.7 K/uL    Lymph # 1.0 1.0 - 4.8 K/uL    Mono # 0.8 0.3 - 1.0 K/uL    Eos # 0.0 0.0 - 0.5 K/uL    Baso # 0.04 0.00 - 0.20 K/uL    Gran% 79.8 (H) 38.0 - 73.0 %    Lymph% 11.1 (L) 18.0 - 48.0 %    Mono% 8.3 4.0 - 15.0 %    Eosinophil% 0.4 0.0 - 8.0 %    Basophil% 0.4 0.0 - 1.9 %    Differential Method Automated    Comprehensive metabolic panel   Result Value Ref Range    Sodium 132 (L) 136 - 145 mmol/L    Potassium 3.0 (L) 3.5 - 5.1 mmol/L    Chloride 96 95 - 110 mmol/L    CO2 23 23 - 29 mmol/L    Glucose 98 70 - 110 mg/dL    BUN, Bld 30 (H) 8 - 23 mg/dL    Creatinine 2.3 (H) 0.5 - 1.4 mg/dL    Calcium 9.2 8.7 - 10.5 mg/dL    Total Protein 6.5 6.0 - 8.4 g/dL    Albumin 2.9 (L) 3.5 - 5.2 g/dL    Total Bilirubin 0.8 0.1 - 1.0 mg/dL    Alkaline Phosphatase 85 55 - 135 U/L    AST 18 10 - 40 U/L    ALT 9 (L) 10 - 44 U/L    Anion Gap 13 8 - 16 mmol/L    eGFR if African American 33 (A) >60 mL/min/1.73 m^2    eGFR if non African American 29 (A) >60 mL/min/1.73 m^2         Imaging Results:  Imaging Results          X-Ray Hip 2 View Right (Final result)  Result time 01/26/18 09:31:02    Final result by Emeterio Vernon MD (01/26/18 09:31:02)                 Impression:     See above.      Electronically signed by: EMETERIO VERNON MD  Date:     01/26/18  Time:    09:31              Narrative:    Right hip xray: 3 views    History: Right hip pain.   Trauma related to fall,    Findings: There are fractures involving the right superior and inferior pubic rami.  Mild osteoarthritis involving hip joint.  Atherosclerotic calcifications.                                      The Emergency Provider reviewed the vital signs and test results, which are outlined above.    ED Discussion     9:49 AM: Dr. Gresham discussed the pt's case with Dr. Espinoza (Orthopedics) who recommends pt cam be discharged home with no weight bearing.    9:51 AM: Reassessed pt at this time.  Pt is laying comfortably in ED bed and in NAD. Pt is awake, alert, and oriented. Discussed with pt all pertinent ED information and results. Discussed pt dx and plan of tx. Gave pt all f/u and return to the ED instructions. All questions and concerns were addressed at this time. Pt expresses understanding of information and instructions, and is comfortable with plan to discharge. Pt is stable for discharge.        ED Medication(s):  Medications - No data to display    New Prescriptions    No medications on file             Medical Decision Making    Medical Decision Making:   Clinical Tests:   Lab Tests: Reviewed and Ordered  Radiological Study: Reviewed and Ordered           Scribe Attestation:   Scribe #1: I performed the above scribed service and the documentation accurately describes the services I performed. I attest to the accuracy of the note.    Attending:   Physician Attestation Statement for Scribe #1: I, Daniel Gresham Jr., MD, personally performed the services described in this documentation, as scribed by Mathew Dunham, in my presence, and it is both accurate and complete.          Clinical Impression       ICD-10-CM ICD-9-CM   1. Essential hypertension I10 401.9   2. Fall W19.XXXA E888.9   3. Closed fracture of multiple pubic rami, right, initial encounter S32.591A 808.2       Disposition:   Disposition: Discharged  Condition: Stable         Daniel Gresham Jr., MD  01/26/18 9513

## 2018-01-26 NOTE — ED NOTES
Patient complains of R hip pain. Symptoms have been present since yesterday after trip and fall at home. Patient denies loss of consciousness.    Level of Consciousness: The patient is awake, alert, and oriented   Appearance: Sitting up in ED stretcher with no acute distress noted. Clothing and hygiene are clean and worn appropriately.  Skin: Skin is intact; color consistent with ethnicity.    Musculoskeletal: Moves all extremities well and in full range of motion, except for R leg. Patient states he is unable to move leg. Mild swelling noted to R hip/pelvis area.  Respiratory: Airway open and patent, respirations spontaneous, even and unlabored. No accessory muscles in use.   Cardiac: Regular rate, no peripheral edema noted..  Abdomen:  No distention noted.  Neurologic: PERRLA, face exhibits symmetrical expression, reports normal sensation to all extremities and face.    Patient verbalized understanding of status and plan of care.

## 2018-01-26 NOTE — ED NOTES
Called [facility] to set up patient transportation to discharge home. Authorization number [id number]. Viviane to come  patient.

## 2018-07-18 ENCOUNTER — HOSPITAL ENCOUNTER (INPATIENT)
Facility: HOSPITAL | Age: 68
LOS: 2 days | Discharge: HOME | DRG: 563 | End: 2018-07-20
Attending: EMERGENCY MEDICINE | Admitting: INTERNAL MEDICINE
Payer: MEDICARE

## 2018-07-18 DIAGNOSIS — R55 SYNCOPE: ICD-10-CM

## 2018-07-18 DIAGNOSIS — N18.30 ACUTE RENAL FAILURE WITH ACUTE RENAL CORTICAL NECROSIS SUPERIMPOSED ON STAGE 3 CHRONIC KIDNEY DISEASE: ICD-10-CM

## 2018-07-18 DIAGNOSIS — E87.1 HYPONATREMIA: ICD-10-CM

## 2018-07-18 DIAGNOSIS — N17.1 ACUTE RENAL FAILURE WITH ACUTE RENAL CORTICAL NECROSIS SUPERIMPOSED ON STAGE 3 CHRONIC KIDNEY DISEASE: ICD-10-CM

## 2018-07-18 DIAGNOSIS — E87.6 HYPOKALEMIA: ICD-10-CM

## 2018-07-18 DIAGNOSIS — S42.214A CLOSED NONDISPLACED FRACTURE OF SURGICAL NECK OF RIGHT HUMERUS, UNSPECIFIED FRACTURE MORPHOLOGY, INITIAL ENCOUNTER: ICD-10-CM

## 2018-07-18 DIAGNOSIS — R55 SYNCOPE, UNSPECIFIED SYNCOPE TYPE: Primary | ICD-10-CM

## 2018-07-18 DIAGNOSIS — S42.201A CLOSED FRACTURE OF PROXIMAL END OF RIGHT HUMERUS, UNSPECIFIED FRACTURE MORPHOLOGY, INITIAL ENCOUNTER: ICD-10-CM

## 2018-07-18 DIAGNOSIS — M25.519 SHOULDER PAIN: ICD-10-CM

## 2018-07-18 PROBLEM — R79.89 ELEVATED TROPONIN: Status: ACTIVE | Noted: 2018-07-18

## 2018-07-18 PROBLEM — F10.10 ALCOHOL ABUSE: Chronic | Status: ACTIVE | Noted: 2018-07-18

## 2018-07-18 PROBLEM — N17.9 AKI (ACUTE KIDNEY INJURY): Status: ACTIVE | Noted: 2018-07-18

## 2018-07-18 PROBLEM — S42.301A RIGHT HUMERAL FRACTURE: Status: ACTIVE | Noted: 2018-07-18

## 2018-07-18 LAB
ALBUMIN SERPL BCP-MCNC: 2.3 G/DL
ALP SERPL-CCNC: 88 U/L
ALT SERPL W/O P-5'-P-CCNC: 11 U/L
ANION GAP SERPL CALC-SCNC: 17 MMOL/L
AST SERPL-CCNC: 15 U/L
BASOPHILS # BLD AUTO: 0.02 K/UL
BASOPHILS NFR BLD: 0.2 %
BILIRUB SERPL-MCNC: 0.4 MG/DL
BNP SERPL-MCNC: 242 PG/ML
BUN SERPL-MCNC: 26 MG/DL
CALCIUM SERPL-MCNC: 8.6 MG/DL
CHLORIDE SERPL-SCNC: 89 MMOL/L
CK SERPL-CCNC: 34 U/L
CO2 SERPL-SCNC: 18 MMOL/L
CREAT SERPL-MCNC: 2.4 MG/DL
DIFFERENTIAL METHOD: ABNORMAL
EOSINOPHIL # BLD AUTO: 0.1 K/UL
EOSINOPHIL NFR BLD: 0.6 %
ERYTHROCYTE [DISTWIDTH] IN BLOOD BY AUTOMATED COUNT: 13.5 %
EST. GFR  (AFRICAN AMERICAN): 31 ML/MIN/1.73 M^2
EST. GFR  (NON AFRICAN AMERICAN): 27 ML/MIN/1.73 M^2
ETHANOL SERPL-MCNC: 83 MG/DL
GLUCOSE SERPL-MCNC: 97 MG/DL
HCT VFR BLD AUTO: 33.7 %
HGB BLD-MCNC: 12.5 G/DL
LYMPHOCYTES # BLD AUTO: 0.9 K/UL
LYMPHOCYTES NFR BLD: 10.9 %
MAGNESIUM SERPL-MCNC: 1.7 MG/DL
MCH RBC QN AUTO: 36.3 PG
MCHC RBC AUTO-ENTMCNC: 37.1 G/DL
MCV RBC AUTO: 98 FL
MONOCYTES # BLD AUTO: 0.7 K/UL
MONOCYTES NFR BLD: 8.8 %
NEUTROPHILS # BLD AUTO: 6.4 K/UL
NEUTROPHILS NFR BLD: 79.9 %
PHOSPHATE SERPL-MCNC: 3.5 MG/DL
PLATELET # BLD AUTO: 232 K/UL
PMV BLD AUTO: 7.8 FL
POTASSIUM SERPL-SCNC: 2.9 MMOL/L
PROT SERPL-MCNC: 5.6 G/DL
RBC # BLD AUTO: 3.44 M/UL
SODIUM SERPL-SCNC: 124 MMOL/L
TROPONIN I SERPL DL<=0.01 NG/ML-MCNC: 0.03 NG/ML
TSH SERPL DL<=0.005 MIU/L-ACNC: 1.17 UIU/ML
WBC # BLD AUTO: 8.09 K/UL

## 2018-07-18 PROCEDURE — 96375 TX/PRO/DX INJ NEW DRUG ADDON: CPT

## 2018-07-18 PROCEDURE — 25000003 PHARM REV CODE 250: Performed by: NURSE PRACTITIONER

## 2018-07-18 PROCEDURE — 96372 THER/PROPH/DIAG INJ SC/IM: CPT

## 2018-07-18 PROCEDURE — 0PSCXZZ REPOSITION RIGHT HUMERAL HEAD, EXTERNAL APPROACH: ICD-10-PCS | Performed by: EMERGENCY MEDICINE

## 2018-07-18 PROCEDURE — 83735 ASSAY OF MAGNESIUM: CPT

## 2018-07-18 PROCEDURE — 99285 EMERGENCY DEPT VISIT HI MDM: CPT | Mod: 25

## 2018-07-18 PROCEDURE — 84100 ASSAY OF PHOSPHORUS: CPT

## 2018-07-18 PROCEDURE — 25000003 PHARM REV CODE 250: Performed by: EMERGENCY MEDICINE

## 2018-07-18 PROCEDURE — 93010 ELECTROCARDIOGRAM REPORT: CPT | Mod: ,,, | Performed by: INTERNAL MEDICINE

## 2018-07-18 PROCEDURE — 36415 COLL VENOUS BLD VENIPUNCTURE: CPT

## 2018-07-18 PROCEDURE — 84443 ASSAY THYROID STIM HORMONE: CPT

## 2018-07-18 PROCEDURE — 84484 ASSAY OF TROPONIN QUANT: CPT | Mod: 91

## 2018-07-18 PROCEDURE — 96376 TX/PRO/DX INJ SAME DRUG ADON: CPT

## 2018-07-18 PROCEDURE — 80307 DRUG TEST PRSMV CHEM ANLYZR: CPT

## 2018-07-18 PROCEDURE — 11000001 HC ACUTE MED/SURG PRIVATE ROOM

## 2018-07-18 PROCEDURE — 82550 ASSAY OF CK (CPK): CPT

## 2018-07-18 PROCEDURE — 83880 ASSAY OF NATRIURETIC PEPTIDE: CPT

## 2018-07-18 PROCEDURE — G0378 HOSPITAL OBSERVATION PER HR: HCPCS

## 2018-07-18 PROCEDURE — 80320 DRUG SCREEN QUANTALCOHOLS: CPT

## 2018-07-18 PROCEDURE — 81000 URINALYSIS NONAUTO W/SCOPE: CPT | Mod: 59

## 2018-07-18 PROCEDURE — 63600175 PHARM REV CODE 636 W HCPCS: Performed by: EMERGENCY MEDICINE

## 2018-07-18 PROCEDURE — 80053 COMPREHEN METABOLIC PANEL: CPT

## 2018-07-18 PROCEDURE — 85025 COMPLETE CBC W/AUTO DIFF WBC: CPT

## 2018-07-18 PROCEDURE — 96374 THER/PROPH/DIAG INJ IV PUSH: CPT

## 2018-07-18 PROCEDURE — 84484 ASSAY OF TROPONIN QUANT: CPT

## 2018-07-18 RX ORDER — ACETAMINOPHEN 325 MG/1
650 TABLET ORAL EVERY 8 HOURS PRN
Status: DISCONTINUED | OUTPATIENT
Start: 2018-07-18 | End: 2018-07-19

## 2018-07-18 RX ORDER — SODIUM CHLORIDE 9 MG/ML
INJECTION, SOLUTION INTRAVENOUS CONTINUOUS
Status: DISCONTINUED | OUTPATIENT
Start: 2018-07-18 | End: 2018-07-20 | Stop reason: HOSPADM

## 2018-07-18 RX ORDER — ONDANSETRON 2 MG/ML
4 INJECTION INTRAMUSCULAR; INTRAVENOUS EVERY 12 HOURS PRN
Status: DISCONTINUED | OUTPATIENT
Start: 2018-07-18 | End: 2018-07-19

## 2018-07-18 RX ORDER — POTASSIUM CHLORIDE 20 MEQ/1
40 TABLET, EXTENDED RELEASE ORAL EVERY 4 HOURS
Status: COMPLETED | OUTPATIENT
Start: 2018-07-18 | End: 2018-07-19

## 2018-07-18 RX ORDER — ATORVASTATIN CALCIUM 40 MG/1
40 TABLET, FILM COATED ORAL NIGHTLY
Status: DISCONTINUED | OUTPATIENT
Start: 2018-07-18 | End: 2018-07-20 | Stop reason: HOSPADM

## 2018-07-18 RX ORDER — FENTANYL CITRATE 50 UG/ML
50 INJECTION, SOLUTION INTRAMUSCULAR; INTRAVENOUS
Status: DISCONTINUED | OUTPATIENT
Start: 2018-07-18 | End: 2018-07-19

## 2018-07-18 RX ORDER — ASPIRIN 325 MG
325 TABLET, DELAYED RELEASE (ENTERIC COATED) ORAL DAILY
Status: DISCONTINUED | OUTPATIENT
Start: 2018-07-18 | End: 2018-07-19

## 2018-07-18 RX ORDER — FENTANYL CITRATE 50 UG/ML
50 INJECTION, SOLUTION INTRAMUSCULAR; INTRAVENOUS
Status: COMPLETED | OUTPATIENT
Start: 2018-07-18 | End: 2018-07-18

## 2018-07-18 RX ADMIN — FENTANYL CITRATE 50 MCG: 50 INJECTION, SOLUTION INTRAMUSCULAR; INTRAVENOUS at 06:07

## 2018-07-18 RX ADMIN — SODIUM CHLORIDE: 0.9 INJECTION, SOLUTION INTRAVENOUS at 07:07

## 2018-07-18 RX ADMIN — PROMETHAZINE HYDROCHLORIDE 6.25 MG: 25 INJECTION INTRAMUSCULAR; INTRAVENOUS at 08:07

## 2018-07-18 RX ADMIN — POTASSIUM CHLORIDE 40 MEQ: 1500 TABLET, EXTENDED RELEASE ORAL at 10:07

## 2018-07-18 RX ADMIN — THIAMINE HYDROCHLORIDE: 100 INJECTION, SOLUTION INTRAMUSCULAR; INTRAVENOUS at 10:07

## 2018-07-18 RX ADMIN — FENTANYL CITRATE 50 MCG: 50 INJECTION, SOLUTION INTRAMUSCULAR; INTRAVENOUS at 11:07

## 2018-07-18 RX ADMIN — ATORVASTATIN CALCIUM 40 MG: 40 TABLET, FILM COATED ORAL at 10:07

## 2018-07-18 RX ADMIN — ASPIRIN 325 MG: 325 TABLET, DELAYED RELEASE ORAL at 10:07

## 2018-07-18 RX ADMIN — FENTANYL CITRATE 50 MCG: 50 INJECTION, SOLUTION INTRAMUSCULAR; INTRAVENOUS at 08:07

## 2018-07-18 NOTE — ED PROVIDER NOTES
SCRIBE #1 NOTE: I, Lisa Lawrence, am scribing for, and in the presence of, Mike Morfin MD. I have scribed the entire note.      History      Chief Complaint   Patient presents with    Fall     shoulder pain after a fall PTA, given 70 mcg fentanyl in route       Review of patient's allergies indicates:   Allergen Reactions    Penicillins         HPI   HPI    7/18/2018, 5:15 PM   History obtained from the patient      History of Present Illness: Lionel Castillo is a 68 y.o. male patient with a PMHx of HTN and Diabetes who presents to the Emergency Department for an evaluation after a fall during a syncopal episode PTA. Pt received 70 mcg fentanyl en route. Pt reports head injury/trauma and is complaining of R shoulder pain which onset after the fall. Symptoms are constant and moderate in severity. No mitigating or exacerbating factors reported. No associated sxs reported. Patient denies any HA, dizziness, back pain, neck pain, abd pain, CP, SOB, weakness, and all other sxs at this time. No further complaints or concerns at this time.         Arrival mode:  EMS    PCP: Eddie Garcia MD       Past Medical History:  Past Medical History:   Diagnosis Date    Diabetes     HTN (hypertension)     Hyperlipidemia     Neuropathy        Past Surgical History:  Past Surgical History:   Procedure Laterality Date    ANKLE SURGERY Left     CHOLECYSTECTOMY           Family History:  Family History   Problem Relation Age of Onset    Heart disease Father     Miscarriages / Stillbirths Paternal Uncle     Heart disease Paternal Grandfather     Miscarriages / Stillbirths Paternal Grandfather        Social History:  Social History     Social History Main Topics    Smoking status: Current Every Day Smoker     Packs/day: 1.00    Smokeless tobacco: Unknown    Alcohol use Yes      Comment: 6 beers per day    Drug use: No    Sexual activity: Unknown       ROS   Review of Systems   Constitutional: Negative for  fever.   HENT: Negative for sore throat.    Respiratory: Negative for shortness of breath.    Cardiovascular: Negative for chest pain.   Gastrointestinal: Negative for abdominal pain and nausea.   Genitourinary: Negative for dysuria.   Musculoskeletal: Negative for back pain and neck pain.        + R shoulder pain   Skin: Negative for rash.   Neurological: Positive for syncope. Negative for dizziness, weakness and headaches.        +head injury/trauma   Hematological: Does not bruise/bleed easily.   All other systems reviewed and are negative.      Physical Exam      Initial Vitals [07/18/18 1708]   BP Pulse Resp Temp SpO2   131/64 80 16 -- 96 %      MAP       --          Physical Exam  Nursing Notes and Vital Signs Reviewed.  Constitutional: Patient is in no acute distress. Well-developed and well-nourished.  Head: Abrasion to forehead. Normocephalic.  Eyes: PERRL. EOM intact. Conjunctivae are not pale. No scleral icterus.  ENT: Mucous membranes are moist. Oropharynx is clear and symmetric.    Neck: Supple. Full ROM. No lymphadenopathy.  Cardiovascular: Regular rate. Regular rhythm. No murmurs, rubs, or gallops. Distal pulses are 2+ and symmetric.  Pulmonary/Chest: No respiratory distress. Clear to auscultation bilaterally. No wheezing or rales.  Abdominal: Soft and non-distended.  There is no tenderness.  No rebound, guarding, or rigidity. Pelvis stable.  Musculoskeletal: Moves all extremities. No obvious deformities. No edema. No calf tenderness.   RUE: Swelling and deformity noted to R shoulder. Positive for tenderness. ROM is limited secondary to pain. Cap refill distally is <2 seconds. Radial pulses are equal and 2+ bilaterally. No motor deficit. No distal sensory deficit.  Skin: Warm and dry.  Neurological:  Alert, awake, and appropriate.  Normal speech.  No acute focal neurological deficits are appreciated.  Psychiatric: Normal affect. Good eye contact. Appropriate in content.    ED Course    Orthopedic  "Injury  Date/Time: 7/18/2018 6:22 PM  Performed by: JADEN SINGH  Authorized by: JADEN SINGH     Injury:     Injury location:  Shoulder    Location details:  Right shoulder    Injury type: R humeral neck with slight varus angulation.      Pre-procedure assessment:     Neurovascular status: Neurovascularly intact      Distal perfusion: normal      Neurological function: normal      Range of motion: reduced      Local anesthesia used?: No      Patient sedated?: No        Selections made in this section will also lock the Injury type section above.:     Immobilization:  Sling    Complications: No      Implants: No    Post-procedure assessment:     Neurovascular status: Neurovascularly intact      Distal perfusion: normal      Neurological function: normal      Range of motion: unchanged      Patient tolerance:  Patient tolerated the procedure well with no immediate complications      ED Vital Signs:  Vitals:    07/18/18 1708   BP: 131/64   Pulse: 80   Resp: 16   TempSrc: Oral   SpO2: 96%   Height: 5' 8" (1.727 m)       Abnormal Lab Results:  Labs Reviewed   CBC W/ AUTO DIFFERENTIAL - Abnormal; Notable for the following:        Result Value    RBC 3.44 (*)     Hemoglobin 12.5 (*)     Hematocrit 33.7 (*)     MCH 36.3 (*)     MCHC 37.1 (*)     MPV 7.8 (*)     All other components within normal limits   COMPREHENSIVE METABOLIC PANEL - Abnormal; Notable for the following:     Sodium 124 (*)     Potassium 2.9 (*)     Chloride 89 (*)     CO2 18 (*)     BUN, Bld 26 (*)     Creatinine 2.4 (*)     Calcium 8.6 (*)     Total Protein 5.6 (*)     Albumin 2.3 (*)     Anion Gap 17 (*)     eGFR if  31 (*)     eGFR if non  27 (*)     All other components within normal limits   B-TYPE NATRIURETIC PEPTIDE - Abnormal; Notable for the following:      (*)     All other components within normal limits   TROPONIN I - Abnormal; Notable for the following:     Troponin I 0.027 (*)     All other " components within normal limits   CK   URINALYSIS, REFLEX TO URINE CULTURE        All Lab Results:  Results for orders placed or performed during the hospital encounter of 07/18/18   CBC auto differential   Result Value Ref Range    WBC 8.09 3.90 - 12.70 K/uL    RBC 3.44 (L) 4.60 - 6.20 M/uL    Hemoglobin 12.5 (L) 14.0 - 18.0 g/dL    Hematocrit 33.7 (L) 40.0 - 54.0 %    MCV 98 82 - 98 fL    MCH 36.3 (H) 27.0 - 31.0 pg    MCHC 37.1 (H) 32.0 - 36.0 g/dL    RDW 13.5 11.5 - 14.5 %    Platelets 232 150 - 350 K/uL    MPV 7.8 (L) 9.2 - 12.9 fL   Comprehensive metabolic panel   Result Value Ref Range    Sodium 124 (L) 136 - 145 mmol/L    Potassium 2.9 (L) 3.5 - 5.1 mmol/L    Chloride 89 (L) 95 - 110 mmol/L    CO2 18 (L) 23 - 29 mmol/L    Glucose 97 70 - 110 mg/dL    BUN, Bld 26 (H) 8 - 23 mg/dL    Creatinine 2.4 (H) 0.5 - 1.4 mg/dL    Calcium 8.6 (L) 8.7 - 10.5 mg/dL    Total Protein 5.6 (L) 6.0 - 8.4 g/dL    Albumin 2.3 (L) 3.5 - 5.2 g/dL    Total Bilirubin 0.4 0.1 - 1.0 mg/dL    Alkaline Phosphatase 88 55 - 135 U/L    AST 15 10 - 40 U/L    ALT 11 10 - 44 U/L    Anion Gap 17 (H) 8 - 16 mmol/L    eGFR if African American 31 (A) >60 mL/min/1.73 m^2    eGFR if non African American 27 (A) >60 mL/min/1.73 m^2   Brain natriuretic peptide   Result Value Ref Range     (H) 0 - 99 pg/mL   CK   Result Value Ref Range    CPK 34 20 - 200 U/L   Troponin I   Result Value Ref Range    Troponin I 0.027 (H) 0.000 - 0.026 ng/mL         Imaging Results:  Imaging Results          CT Head Without Contrast (Final result)  Result time 07/18/18 18:02:30    Final result by Junito Kee MD (07/18/18 18:02:30)                 Impression:      No acute findings.    All CT scans at this facility are performed  using dose modulation techniques as appropriate to performed exam including the following:  automated exposure control; adjustment of mA and/or kV according to the patients size (this includes techniques or standardized protocols for  targeted exams where dose is matched to indication/reason for exam: i.e. extremities or head);  iterative reconstruction technique.      Electronically signed by: Junito Kee MD  Date:    07/18/2018  Time:    18:02             Narrative:    EXAMINATION:  CT HEAD WITHOUT CONTRAST    CLINICAL HISTORY:  syncope;    COMPARISON:  05/16/2017.    FINDINGS:  No intracranial hemorrhage or acute findings are demonstrated.  Mild cerebral atrophy.  Low-density changes in the periventricular matter suggests chronic small vessel ischemic change.  The visualized paranasal sinuses are clear. The calvarium is intact.  Intracranial atherosclerosis.                               X-Ray Shoulder Trauma Right (Final result)  Result time 07/18/18 17:41:47    Final result by Junito Kee MD (07/18/18 17:41:47)                 Impression:      Comminuted right humeral neck fracture with slight angulation.      Electronically signed by: Junito Kee MD  Date:    07/18/2018  Time:    17:41             Narrative:    EXAMINATION:  XR SHOULDER TRAUMA 3 VIEW RIGHT    CLINICAL HISTORY:  Pain in unspecified shoulder    TECHNIQUE:  Standard radiography performed.    COMPARISON:  None    FINDINGS:  Comminuted fracture involving the right humeral neck with slight varus angulation.  Mild arthritic change involving the AC joint.                               X-Ray Chest AP Portable (Final result)  Result time 07/18/18 17:43:02    Final result by Junito Kee MD (07/18/18 17:43:02)                 Impression:      Multiple right-sided rib fractures which may be old with scarring or atelectasis in the right lung base along with a right pleural fluid collection unknown chronicity.  This finding was not identified on previous study 06/05/2017.      Electronically signed by: Junito Kee MD  Date:    07/18/2018  Time:    17:43             Narrative:    EXAMINATION:  XR CHEST AP PORTABLE    CLINICAL  HISTORY:  syncope;    COMPARISON:  06/05/2017    FINDINGS:  Scarring right lung base with multiple right-sided rib fractures.  Right pleural fluid collection may be the sequelae of prior trauma.  This was now however evident on prior study dated above.  The left lung appears clear.                               The EKG was ordered, reviewed, and independently interpreted by the ED provider.  Interpretation time: 1751  Rate: 80 BPM  Rhythm: normal sinus rhythm  Interpretation: Incomplete LBBB. Nonspecific T wave abnormality. Prolonged QT. No STEMI.               The Emergency Provider reviewed the vital signs and test results, which are outlined above.    ED Discussion     6:25 PM: Re-evaluated pt. I have discussed test results, shared treatment plan, and the need for admission with patient and family at bedside. Pt and family express understanding at this time and agree with all information. All questions answered. Pt and family have no further questions or concerns at this time. Pt is ready for admit.      6:31 PM: Discussed case with Esthela Wang NP (Hospital Medicine). Dr. Mueller agrees with current care and management of pt and accepts admission.   Admitting Service: Hospital medicine   Admitting Physician: Dr. Mueller  Admit to: Obs        ED Medication(s):  Medications   fentaNYL injection 50 mcg (50 mcg Intravenous Given 7/18/18 1811)       New Prescriptions    No medications on file             Medical Decision Making    Medical Decision Making:   Clinical Tests:   Lab Tests: Ordered and Reviewed  Radiological Study: Ordered and Reviewed  Medical Tests: Reviewed and Ordered           Scribe Attestation:   Scribe #1: I performed the above scribed service and the documentation accurately describes the services I performed. I attest to the accuracy of the note.    Attending:   Physician Attestation Statement for Scribe #1: I, Mike Morfin MD, personally performed the services described in this  documentation, as scribed by Lisa Lawrence, in my presence, and it is both accurate and complete.          Clinical Impression       ICD-10-CM ICD-9-CM   1. Syncope, unspecified syncope type R55 780.2   2. Shoulder pain M25.519 719.41   3. Hyponatremia E87.1 276.1   4. Hypokalemia E87.6 276.8   5. Closed nondisplaced fracture of surgical neck of right humerus, unspecified fracture morphology, initial encounter S42.214A 812.01       Disposition:   Disposition: Placed in Observation  Condition: Fair         Mike Morfin MD  07/18/18 2765

## 2018-07-19 PROBLEM — F19.10 POLYSUBSTANCE ABUSE: Status: ACTIVE | Noted: 2018-07-19

## 2018-07-19 PROBLEM — E11.9 TYPE 2 DIABETES MELLITUS, WITHOUT LONG-TERM CURRENT USE OF INSULIN: Chronic | Status: ACTIVE | Noted: 2018-07-19

## 2018-07-19 PROBLEM — N18.30 ACUTE RENAL FAILURE SUPERIMPOSED ON STAGE 3 CHRONIC KIDNEY DISEASE: Status: ACTIVE | Noted: 2018-07-18

## 2018-07-19 PROBLEM — Z72.0 TOBACCO USE: Chronic | Status: ACTIVE | Noted: 2018-07-19

## 2018-07-19 PROBLEM — I44.7 LBBB (LEFT BUNDLE BRANCH BLOCK): Status: ACTIVE | Noted: 2018-07-19

## 2018-07-19 PROBLEM — I65.23 BILATERAL CAROTID ARTERY STENOSIS: Status: ACTIVE | Noted: 2018-07-19

## 2018-07-19 PROBLEM — I10 ESSENTIAL HYPERTENSION: Chronic | Status: ACTIVE | Noted: 2018-07-19

## 2018-07-19 LAB
AMPHET+METHAMPHET UR QL: NEGATIVE
ANION GAP SERPL CALC-SCNC: 10 MMOL/L
ANION GAP SERPL CALC-SCNC: 12 MMOL/L
ANION GAP SERPL CALC-SCNC: 12 MMOL/L
ANION GAP SERPL CALC-SCNC: 9 MMOL/L
ANISOCYTOSIS BLD QL SMEAR: SLIGHT
AORTIC VALVE REGURGITATION: ABNORMAL
BACTERIA #/AREA URNS HPF: NORMAL /HPF
BARBITURATES UR QL SCN>200 NG/ML: NEGATIVE
BASOPHILS # BLD AUTO: 0.03 K/UL
BASOPHILS NFR BLD: 0.3 %
BENZODIAZ UR QL SCN>200 NG/ML: NORMAL
BILIRUB UR QL STRIP: NEGATIVE
BUN SERPL-MCNC: 24 MG/DL
BUN SERPL-MCNC: 25 MG/DL
BZE UR QL SCN: NEGATIVE
CALCIUM SERPL-MCNC: 8.4 MG/DL
CALCIUM SERPL-MCNC: 8.4 MG/DL
CALCIUM SERPL-MCNC: 8.6 MG/DL
CALCIUM SERPL-MCNC: 8.7 MG/DL
CANNABINOIDS UR QL SCN: NORMAL
CHLORIDE SERPL-SCNC: 92 MMOL/L
CHLORIDE SERPL-SCNC: 93 MMOL/L
CHLORIDE SERPL-SCNC: 94 MMOL/L
CHLORIDE SERPL-SCNC: 96 MMOL/L
CHOLEST SERPL-MCNC: 198 MG/DL
CHOLEST/HDLC SERPL: 1.5 {RATIO}
CLARITY UR: CLEAR
CO2 SERPL-SCNC: 19 MMOL/L
CO2 SERPL-SCNC: 20 MMOL/L
CO2 SERPL-SCNC: 22 MMOL/L
CO2 SERPL-SCNC: 23 MMOL/L
COLOR UR: YELLOW
CREAT SERPL-MCNC: 1.9 MG/DL
CREAT SERPL-MCNC: 1.9 MG/DL
CREAT SERPL-MCNC: 2 MG/DL
CREAT SERPL-MCNC: 2.1 MG/DL
CREAT UR-MCNC: 66.2 MG/DL
DIFFERENTIAL METHOD: ABNORMAL
EOSINOPHIL # BLD AUTO: 0 K/UL
EOSINOPHIL NFR BLD: 0.3 %
ERYTHROCYTE [DISTWIDTH] IN BLOOD BY AUTOMATED COUNT: 13.7 %
EST. GFR  (AFRICAN AMERICAN): 37 ML/MIN/1.73 M^2
EST. GFR  (AFRICAN AMERICAN): 39 ML/MIN/1.73 M^2
EST. GFR  (AFRICAN AMERICAN): 42 ML/MIN/1.73 M^2
EST. GFR  (AFRICAN AMERICAN): 42 ML/MIN/1.73 M^2
EST. GFR  (NON AFRICAN AMERICAN): 32 ML/MIN/1.73 M^2
EST. GFR  (NON AFRICAN AMERICAN): 34 ML/MIN/1.73 M^2
EST. GFR  (NON AFRICAN AMERICAN): 36 ML/MIN/1.73 M^2
EST. GFR  (NON AFRICAN AMERICAN): 36 ML/MIN/1.73 M^2
ESTIMATED AVG GLUCOSE: 91 MG/DL
ESTIMATED PA SYSTOLIC PRESSURE: 17.44
GLUCOSE SERPL-MCNC: 106 MG/DL
GLUCOSE SERPL-MCNC: 108 MG/DL
GLUCOSE SERPL-MCNC: 116 MG/DL
GLUCOSE SERPL-MCNC: 99 MG/DL
GLUCOSE UR QL STRIP: NEGATIVE
HBA1C MFR BLD HPLC: 4.8 %
HCT VFR BLD AUTO: 35 %
HDLC SERPL-MCNC: 128 MG/DL
HDLC SERPL: 64.6 %
HGB BLD-MCNC: 12.9 G/DL
HGB UR QL STRIP: ABNORMAL
HYALINE CASTS #/AREA URNS LPF: 0 /LPF
INR PPP: 0.9
KETONES UR QL STRIP: NEGATIVE
LDLC SERPL CALC-MCNC: 59.4 MG/DL
LEUKOCYTE ESTERASE UR QL STRIP: NEGATIVE
LYMPHOCYTES # BLD AUTO: 1.4 K/UL
LYMPHOCYTES NFR BLD: 15.8 %
MCH RBC QN AUTO: 36.6 PG
MCHC RBC AUTO-ENTMCNC: 36.9 G/DL
MCV RBC AUTO: 99 FL
METHADONE UR QL SCN>300 NG/ML: NEGATIVE
MICROSCOPIC COMMENT: NORMAL
MONOCYTES # BLD AUTO: 0.9 K/UL
MONOCYTES NFR BLD: 10.3 %
NEUTROPHILS # BLD AUTO: 6.4 K/UL
NEUTROPHILS NFR BLD: 73.5 %
NITRITE UR QL STRIP: NEGATIVE
NONHDLC SERPL-MCNC: 70 MG/DL
OPIATES UR QL SCN: NEGATIVE
PCP UR QL SCN>25 NG/ML: NEGATIVE
PH UR STRIP: 6 [PH] (ref 5–8)
PLATELET # BLD AUTO: 263 K/UL
PLATELET BLD QL SMEAR: ABNORMAL
PMV BLD AUTO: 8.2 FL
POCT GLUCOSE: 111 MG/DL (ref 70–110)
POCT GLUCOSE: 112 MG/DL (ref 70–110)
POCT GLUCOSE: 133 MG/DL (ref 70–110)
POCT GLUCOSE: 198 MG/DL (ref 70–110)
POIKILOCYTOSIS BLD QL SMEAR: SLIGHT
POLYCHROMASIA BLD QL SMEAR: ABNORMAL
POTASSIUM SERPL-SCNC: 3.4 MMOL/L
POTASSIUM SERPL-SCNC: 3.7 MMOL/L
POTASSIUM SERPL-SCNC: 4.1 MMOL/L
POTASSIUM SERPL-SCNC: 4.8 MMOL/L
PROCALCITONIN SERPL IA-MCNC: 0.12 NG/ML
PROT UR QL STRIP: ABNORMAL
PROTHROMBIN TIME: 9.4 SEC
RBC # BLD AUTO: 3.52 M/UL
RBC #/AREA URNS HPF: 2 /HPF (ref 0–4)
RETIRED EF AND QEF - SEE NOTES: 55 (ref 55–65)
SODIUM SERPL-SCNC: 125 MMOL/L
SODIUM SERPL-SCNC: 125 MMOL/L
SODIUM SERPL-SCNC: 126 MMOL/L
SODIUM SERPL-SCNC: 126 MMOL/L
SP GR UR STRIP: 1.02 (ref 1–1.03)
TOXICOLOGY INFORMATION: NORMAL
TRIGL SERPL-MCNC: 53 MG/DL
TROPONIN I SERPL DL<=0.01 NG/ML-MCNC: 0.02 NG/ML
TROPONIN I SERPL DL<=0.01 NG/ML-MCNC: 0.02 NG/ML
URN SPEC COLLECT METH UR: ABNORMAL
UROBILINOGEN UR STRIP-ACNC: NEGATIVE EU/DL
WBC # BLD AUTO: 8.77 K/UL
WBC #/AREA URNS HPF: 0 /HPF (ref 0–5)

## 2018-07-19 PROCEDURE — 85025 COMPLETE CBC W/AUTO DIFF WBC: CPT

## 2018-07-19 PROCEDURE — 85610 PROTHROMBIN TIME: CPT

## 2018-07-19 PROCEDURE — 80048 BASIC METABOLIC PNL TOTAL CA: CPT | Mod: 91

## 2018-07-19 PROCEDURE — 36415 COLL VENOUS BLD VENIPUNCTURE: CPT

## 2018-07-19 PROCEDURE — 84484 ASSAY OF TROPONIN QUANT: CPT

## 2018-07-19 PROCEDURE — 99223 1ST HOSP IP/OBS HIGH 75: CPT | Mod: ,,, | Performed by: INTERNAL MEDICINE

## 2018-07-19 PROCEDURE — 11000001 HC ACUTE MED/SURG PRIVATE ROOM

## 2018-07-19 PROCEDURE — 84145 PROCALCITONIN (PCT): CPT

## 2018-07-19 PROCEDURE — 25000003 PHARM REV CODE 250: Performed by: NURSE PRACTITIONER

## 2018-07-19 PROCEDURE — 63600175 PHARM REV CODE 636 W HCPCS: Performed by: HOSPITALIST

## 2018-07-19 PROCEDURE — 80061 LIPID PANEL: CPT

## 2018-07-19 PROCEDURE — 25000003 PHARM REV CODE 250: Performed by: EMERGENCY MEDICINE

## 2018-07-19 PROCEDURE — 63600175 PHARM REV CODE 636 W HCPCS: Performed by: NURSE PRACTITIONER

## 2018-07-19 PROCEDURE — S4991 NICOTINE PATCH NONLEGEND: HCPCS | Performed by: NURSE PRACTITIONER

## 2018-07-19 PROCEDURE — 83036 HEMOGLOBIN GLYCOSYLATED A1C: CPT

## 2018-07-19 PROCEDURE — 25000003 PHARM REV CODE 250: Performed by: HOSPITALIST

## 2018-07-19 PROCEDURE — 93306 TTE W/DOPPLER COMPLETE: CPT | Mod: 26,,, | Performed by: INTERNAL MEDICINE

## 2018-07-19 PROCEDURE — 93306 TTE W/DOPPLER COMPLETE: CPT

## 2018-07-19 RX ORDER — CITALOPRAM 20 MG/1
40 TABLET, FILM COATED ORAL DAILY
Status: DISCONTINUED | OUTPATIENT
Start: 2018-07-19 | End: 2018-07-20 | Stop reason: HOSPADM

## 2018-07-19 RX ORDER — LABETALOL HYDROCHLORIDE 5 MG/ML
10 INJECTION, SOLUTION INTRAVENOUS EVERY 4 HOURS PRN
Status: DISCONTINUED | OUTPATIENT
Start: 2018-07-19 | End: 2018-07-19

## 2018-07-19 RX ORDER — PANTOPRAZOLE SODIUM 40 MG/1
40 TABLET, DELAYED RELEASE ORAL DAILY
Status: DISCONTINUED | OUTPATIENT
Start: 2018-07-19 | End: 2018-07-20 | Stop reason: HOSPADM

## 2018-07-19 RX ORDER — MEPERIDINE HYDROCHLORIDE 25 MG/ML
25 INJECTION INTRAMUSCULAR; INTRAVENOUS; SUBCUTANEOUS EVERY 4 HOURS PRN
Status: DISCONTINUED | OUTPATIENT
Start: 2018-07-19 | End: 2018-07-20 | Stop reason: HOSPADM

## 2018-07-19 RX ORDER — IBUPROFEN 200 MG
16 TABLET ORAL
Status: DISCONTINUED | OUTPATIENT
Start: 2018-07-19 | End: 2018-07-20 | Stop reason: HOSPADM

## 2018-07-19 RX ORDER — ACETAMINOPHEN 325 MG/1
650 TABLET ORAL EVERY 6 HOURS PRN
Status: DISCONTINUED | OUTPATIENT
Start: 2018-07-19 | End: 2018-07-20 | Stop reason: HOSPADM

## 2018-07-19 RX ORDER — ASPIRIN 81 MG/1
81 TABLET ORAL DAILY
Status: DISCONTINUED | OUTPATIENT
Start: 2018-07-20 | End: 2018-07-20 | Stop reason: HOSPADM

## 2018-07-19 RX ORDER — INSULIN ASPART 100 [IU]/ML
0-5 INJECTION, SOLUTION INTRAVENOUS; SUBCUTANEOUS
Status: DISCONTINUED | OUTPATIENT
Start: 2018-07-19 | End: 2018-07-20 | Stop reason: HOSPADM

## 2018-07-19 RX ORDER — DIPHENHYDRAMINE HCL 50 MG
50 CAPSULE ORAL EVERY 6 HOURS PRN
Status: DISCONTINUED | OUTPATIENT
Start: 2018-07-19 | End: 2018-07-20 | Stop reason: HOSPADM

## 2018-07-19 RX ORDER — RAMELTEON 8 MG/1
8 TABLET ORAL NIGHTLY PRN
Status: DISCONTINUED | OUTPATIENT
Start: 2018-07-19 | End: 2018-07-20 | Stop reason: HOSPADM

## 2018-07-19 RX ORDER — HYDRALAZINE HYDROCHLORIDE 20 MG/ML
10 INJECTION INTRAMUSCULAR; INTRAVENOUS EVERY 6 HOURS PRN
Status: DISCONTINUED | OUTPATIENT
Start: 2018-07-19 | End: 2018-07-20 | Stop reason: HOSPADM

## 2018-07-19 RX ORDER — CHLORDIAZEPOXIDE HYDROCHLORIDE 25 MG/1
25 CAPSULE, GELATIN COATED ORAL 3 TIMES DAILY PRN
Status: DISCONTINUED | OUTPATIENT
Start: 2018-07-19 | End: 2018-07-19

## 2018-07-19 RX ORDER — HYDROCODONE BITARTRATE AND ACETAMINOPHEN 5; 325 MG/1; MG/1
1 TABLET ORAL EVERY 6 HOURS PRN
Status: DISCONTINUED | OUTPATIENT
Start: 2018-07-19 | End: 2018-07-20 | Stop reason: HOSPADM

## 2018-07-19 RX ORDER — ONDANSETRON 2 MG/ML
4 INJECTION INTRAMUSCULAR; INTRAVENOUS EVERY 6 HOURS PRN
Status: DISCONTINUED | OUTPATIENT
Start: 2018-07-19 | End: 2018-07-20 | Stop reason: HOSPADM

## 2018-07-19 RX ORDER — FOLIC ACID 1 MG/1
1 TABLET ORAL DAILY
Status: DISCONTINUED | OUTPATIENT
Start: 2018-07-19 | End: 2018-07-20 | Stop reason: HOSPADM

## 2018-07-19 RX ORDER — AMLODIPINE BESYLATE 10 MG/1
10 TABLET ORAL DAILY
Status: DISCONTINUED | OUTPATIENT
Start: 2018-07-19 | End: 2018-07-20 | Stop reason: HOSPADM

## 2018-07-19 RX ORDER — HEPARIN SODIUM 5000 [USP'U]/ML
5000 INJECTION, SOLUTION INTRAVENOUS; SUBCUTANEOUS EVERY 8 HOURS
Status: DISCONTINUED | OUTPATIENT
Start: 2018-07-19 | End: 2018-07-20 | Stop reason: HOSPADM

## 2018-07-19 RX ORDER — METOPROLOL TARTRATE 50 MG/1
50 TABLET ORAL 2 TIMES DAILY
Status: DISCONTINUED | OUTPATIENT
Start: 2018-07-19 | End: 2018-07-20 | Stop reason: HOSPADM

## 2018-07-19 RX ORDER — IBUPROFEN 200 MG
24 TABLET ORAL
Status: DISCONTINUED | OUTPATIENT
Start: 2018-07-19 | End: 2018-07-20 | Stop reason: HOSPADM

## 2018-07-19 RX ORDER — GLUCAGON 1 MG
1 KIT INJECTION
Status: DISCONTINUED | OUTPATIENT
Start: 2018-07-19 | End: 2018-07-20 | Stop reason: HOSPADM

## 2018-07-19 RX ORDER — IBUPROFEN 200 MG
1 TABLET ORAL DAILY
Status: DISCONTINUED | OUTPATIENT
Start: 2018-07-19 | End: 2018-07-20 | Stop reason: HOSPADM

## 2018-07-19 RX ORDER — CHLORDIAZEPOXIDE HYDROCHLORIDE 10 MG/1
10 CAPSULE, GELATIN COATED ORAL 3 TIMES DAILY
Status: DISCONTINUED | OUTPATIENT
Start: 2018-07-19 | End: 2018-07-20 | Stop reason: HOSPADM

## 2018-07-19 RX ORDER — THIAMINE HCL 100 MG
100 TABLET ORAL DAILY
Status: DISCONTINUED | OUTPATIENT
Start: 2018-07-19 | End: 2018-07-20 | Stop reason: HOSPADM

## 2018-07-19 RX ADMIN — HEPARIN SODIUM 5000 UNITS: 5000 INJECTION, SOLUTION INTRAVENOUS; SUBCUTANEOUS at 09:07

## 2018-07-19 RX ADMIN — CHLORDIAZEPOXIDE HYDROCHLORIDE 10 MG: 10 CAPSULE ORAL at 09:07

## 2018-07-19 RX ADMIN — MEPERIDINE HYDROCHLORIDE 25 MG: 25 INJECTION INTRAMUSCULAR; INTRAVENOUS; SUBCUTANEOUS at 12:07

## 2018-07-19 RX ADMIN — POTASSIUM CHLORIDE 40 MEQ: 1500 TABLET, EXTENDED RELEASE ORAL at 05:07

## 2018-07-19 RX ADMIN — MEPERIDINE HYDROCHLORIDE 25 MG: 25 INJECTION INTRAMUSCULAR; INTRAVENOUS; SUBCUTANEOUS at 11:07

## 2018-07-19 RX ADMIN — MEPERIDINE HYDROCHLORIDE 25 MG: 25 INJECTION INTRAMUSCULAR; INTRAVENOUS; SUBCUTANEOUS at 01:07

## 2018-07-19 RX ADMIN — POTASSIUM CHLORIDE 40 MEQ: 1500 TABLET, EXTENDED RELEASE ORAL at 09:07

## 2018-07-19 RX ADMIN — POTASSIUM CHLORIDE 40 MEQ: 1500 TABLET, EXTENDED RELEASE ORAL at 01:07

## 2018-07-19 RX ADMIN — HEPARIN SODIUM 5000 UNITS: 5000 INJECTION, SOLUTION INTRAVENOUS; SUBCUTANEOUS at 05:07

## 2018-07-19 RX ADMIN — DIPHENHYDRAMINE HYDROCHLORIDE 50 MG: 50 CAPSULE ORAL at 06:07

## 2018-07-19 RX ADMIN — AMLODIPINE BESYLATE 10 MG: 10 TABLET ORAL at 12:07

## 2018-07-19 RX ADMIN — HYDROCODONE BITARTRATE AND ACETAMINOPHEN 1 TABLET: 5; 325 TABLET ORAL at 03:07

## 2018-07-19 RX ADMIN — METOPROLOL TARTRATE 50 MG: 50 TABLET ORAL at 08:07

## 2018-07-19 RX ADMIN — SODIUM CHLORIDE 75 ML/HR: 0.9 INJECTION, SOLUTION INTRAVENOUS at 11:07

## 2018-07-19 RX ADMIN — Medication 100 MG: at 08:07

## 2018-07-19 RX ADMIN — CHLORDIAZEPOXIDE HYDROCHLORIDE 10 MG: 10 CAPSULE ORAL at 08:07

## 2018-07-19 RX ADMIN — NICOTINE 1 PATCH: 21 PATCH, EXTENDED RELEASE TRANSDERMAL at 08:07

## 2018-07-19 RX ADMIN — CITALOPRAM HYDROBROMIDE 40 MG: 20 TABLET ORAL at 08:07

## 2018-07-19 RX ADMIN — RAMELTEON 8 MG: 8 TABLET, FILM COATED ORAL at 12:07

## 2018-07-19 RX ADMIN — MEPERIDINE HYDROCHLORIDE 25 MG: 25 INJECTION INTRAMUSCULAR; INTRAVENOUS; SUBCUTANEOUS at 08:07

## 2018-07-19 RX ADMIN — SODIUM CHLORIDE: 0.9 INJECTION, SOLUTION INTRAVENOUS at 09:07

## 2018-07-19 RX ADMIN — HYDRALAZINE HYDROCHLORIDE 10 MG: 20 INJECTION INTRAMUSCULAR; INTRAVENOUS at 01:07

## 2018-07-19 RX ADMIN — ATORVASTATIN CALCIUM 40 MG: 40 TABLET, FILM COATED ORAL at 09:07

## 2018-07-19 RX ADMIN — METOPROLOL TARTRATE 50 MG: 50 TABLET ORAL at 09:07

## 2018-07-19 RX ADMIN — FOLIC ACID 1 MG: 1 TABLET ORAL at 08:07

## 2018-07-19 RX ADMIN — METOPROLOL TARTRATE 50 MG: 50 TABLET ORAL at 01:07

## 2018-07-19 RX ADMIN — MEPERIDINE HYDROCHLORIDE 25 MG: 25 INJECTION INTRAMUSCULAR; INTRAVENOUS; SUBCUTANEOUS at 06:07

## 2018-07-19 RX ADMIN — PANTOPRAZOLE SODIUM 40 MG: 40 TABLET, DELAYED RELEASE ORAL at 08:07

## 2018-07-19 RX ADMIN — CHLORDIAZEPOXIDE HYDROCHLORIDE 10 MG: 10 CAPSULE ORAL at 02:07

## 2018-07-19 RX ADMIN — HEPARIN SODIUM 5000 UNITS: 5000 INJECTION, SOLUTION INTRAVENOUS; SUBCUTANEOUS at 02:07

## 2018-07-19 RX ADMIN — ASPIRIN 325 MG: 325 TABLET, DELAYED RELEASE ORAL at 08:07

## 2018-07-19 RX ADMIN — AMLODIPINE BESYLATE 10 MG: 10 TABLET ORAL at 09:07

## 2018-07-19 NOTE — ASSESSMENT & PLAN NOTE
- Initial cr. 2.4 (baseline 2), likely secondary to IVVD.  - IV hydration as above.  - Avoid nephrotoxic agents.  Hold HCTZ, ARB, and metformin for now.  - Follow BMP.

## 2018-07-19 NOTE — ASSESSMENT & PLAN NOTE
- Counseled on cessation.  - Thiamine and folic acid supplementation.  - Monitor for withdrawal/DT's; Ativan PRN.  - Fall, seizure, aspiration precautions.

## 2018-07-19 NOTE — ASSESSMENT & PLAN NOTE
-Monitor BP trend  -Continue Amlodipine, BB  -Hold ACEI/ARB due to elevated Creatinine.   -PRN Hydralazine as needed.

## 2018-07-19 NOTE — ASSESSMENT & PLAN NOTE
-Syncope event after alcohol use last PM  -Reports daily alcohol use and frequent falls  -2D echo pending.

## 2018-07-19 NOTE — PLAN OF CARE
Problem: Patient Care Overview  Goal: Plan of Care Review  Outcome: Ongoing (interventions implemented as appropriate)  Pt in bed AAOx4. Plan of Care reviewed, Verbalized understanding.  Patient remained free from falls, fall precautions in place.   Pt is NSR-SB on monitor. VSS.   See MAR for PRN meds given.  PIV CDI with NS running at 100ml/hr.   Call bell and personal belongings within reach. Hourly rounding complete. Reminded to call for assistance.   No complaints at this time. Will continue to monitor.

## 2018-07-19 NOTE — ED NOTES
Pt sitting up in bed awake and alert; respirations even and unlabored, no distress noted. Dried blood noted to nose and hands.  Pt currently wearing sling, instructed by Dr. Morfin not to apply swath. Pt rates pain 10/10 at this time. Waiting on pain med orders from Dr. Morfin. Pt also informed that we need a urine specimen from him.

## 2018-07-19 NOTE — HPI
"Mr. Castillo is a 68 year old male with a PMHx of HTN, HLP, DM2, CKD, and ETOH abuse who presented to ED with right shoulder pain after a syncopal event at home. He reports that he stood from chair, walked a "few feet" and then remembered waking up on the floor with right shoulder pain. He states that he did hit his head during the fall. Denies chest pain, palpitations, lightheadedness, dizziness, SOB, KATZ, edema. He admits to heavy daily alcohol use and marijuana use on a regular basis. ED workup revealed Na 124, K+ 2.9, BUN 26, Cr 2.4, Serum ETOH 83, UDS + benzos and THC. ECG NSR with nonspecific T wave abnormality. CT head negative for acute process. Right shoulder XR revealed comminuted right humeral neck fracture with slight angulation. Hospital medicine called for admission, admitted to Telemetry. Cardiology consulted to assist with management of syncopal event. Patient seen and examined in room this PM, denies any acute issues or problems at time of exam. Denies chest pain, palpitations, SOB, KATZ, leg swelling. Reports that he has falls on a regular basis but is unsure of his last fall. Labs reviewed, K+ 4.8, Cr 1.9, Na 125, Mag 1.7, , A1C 4.8%, Troponin .027>.024>.024. 2D echo pending. Carotid U/S revealed >79% BRADLY stenosis, 50-69% LICA stenosis. Further recs to follow.        "

## 2018-07-19 NOTE — ASSESSMENT & PLAN NOTE
- CT head negative for acute process.  - Will obtain TTE and carotid U/S.  - FLP.  - Monitor telemetry for tachy/britni arrhythmias.  - Neuro checks.  Fall precautions.  - Check orthostatic BP Q shift.

## 2018-07-19 NOTE — CONSULTS
"[facility]  Cardiology  Consult Note    Patient Name: Lionel Castillo  MRN: [id number]  Admission Date: 7/18/2018  Hospital Length of Stay: 0 days  Code Status: Full Code   Attending Provider: Kit Cohn MD   Consulting Provider: Quiana Bonner NP  Primary Care Physician: Eddie Garcia MD  Principal Problem:Syncope    Patient information was obtained from patient, past medical records and ER records.     Inpatient consult to Cardiology  Consult performed by: QUIANA BONNER  Consult ordered by: AMMON GOODMAN        Subjective:     Chief Complaint:  syncope     HPI:   Mr. Castillo is a 68 year old male with a PMHx of HTN, HLP, DM2, CKD, and ETOH abuse who presented to ED with right shoulder pain after a syncopal event at home. He reports that he stood from chair, walked a "few feet" and then remembered waking up on the floor with right shoulder pain. He states that he did hit his head during the fall. Denies chest pain, palpitations, lightheadedness, dizziness, SOB, KATZ, edema. He admits to heavy daily alcohol use and marijuana use on a regular basis. ED workup revealed Na 124, K+ 2.9, BUN 26, Cr 2.4, Serum ETOH 83, UDS + benzos and THC. ECG NSR with nonspecific T wave abnormality. CT head negative for acute process. Right shoulder XR revealed comminuted right humeral neck fracture with slight angulation. Hospital medicine called for admission, admitted to Telemetry. Cardiology consulted to assist with management of syncopal event. Patient seen and examined in room this PM, denies any acute issues or problems at time of exam. Denies chest pain, palpitations, SOB, KATZ, leg swelling. Reports that he has falls on a regular basis but is unsure of his last fall. Labs reviewed, K+ 4.8, Cr 1.9, Na 125, Mag 1.7, , A1C 4.8%, Troponin .027>.024>.024. 2D echo pending. Carotid U/S revealed >79% BRADLY stenosis, 50-69% LICA stenosis. Further recs to follow.          Past Medical History:   Diagnosis " Date    Arthritis     Diabetes     Foot fracture     Hand fracture, left     Hand fracture, right     HTN (hypertension)     Humerus fracture 07/18/2018    Hyperlipidemia     Neuropathy     Renal disorder        Past Surgical History:   Procedure Laterality Date    ANKLE SURGERY Left     CHOLECYSTECTOMY         Review of patient's allergies indicates:   Allergen Reactions    Penicillins        No current facility-administered medications on file prior to encounter.      Current Outpatient Prescriptions on File Prior to Encounter   Medication Sig    amlodipine (NORVASC) 10 MG tablet Take 10 mg by mouth once daily.    aspirin 81 MG Chew Take 81 mg by mouth once daily.    atorvastatin (LIPITOR) 20 MG tablet Take 20 mg by mouth once daily.    citalopram (CELEXA) 40 MG tablet Take 40 mg by mouth once daily.    losartan-hydrochlorothiazide 100-25 mg (HYZAAR) 100-25 mg per tablet Take 1 tablet by mouth once daily.    metformin (GLUCOPHAGE) 500 MG tablet Take 500 mg by mouth 2 (two) times daily with meals.    temazepam (RESTORIL) 30 mg capsule Take 30 mg by mouth nightly as needed for Insomnia.    docusate sodium (COLACE) 100 MG capsule Take 1 capsule (100 mg total) by mouth 2 (two) times daily as needed for Constipation.    hydrocodone-acetaminophen 10-325mg (NORCO)  mg Tab Take 1 tablet by mouth every 6 (six) hours as needed for Pain.    mupirocin (BACTROBAN) 2 % ointment Apply topically 3 (three) times daily.    polyethylene glycol (GLYCOLAX) 17 gram/dose powder Take 17 g by mouth once daily. Take daily to keep your bowel movements regular while taking pain medicines     Family History     Problem Relation (Age of Onset)    Heart disease Father, Paternal Grandfather    Miscarriages / Stillbirths Paternal Uncle, Paternal Grandfather        Social History Main Topics    Smoking status: Current Every Day Smoker     Packs/day: 1.00    Smokeless tobacco: Not on file    Alcohol use Yes       Comment: 6 beers per day    Drug use: No    Sexual activity: Not on file     Review of Systems   Constitution: Positive for malaise/fatigue. Negative for weakness.   HENT: Negative for hearing loss and hoarse voice.    Eyes: Negative for blurred vision and visual disturbance.   Cardiovascular: Positive for syncope. Negative for chest pain, claudication, dyspnea on exertion, irregular heartbeat, leg swelling, near-syncope, orthopnea, palpitations and paroxysmal nocturnal dyspnea.   Respiratory: Negative for cough, hemoptysis, shortness of breath, sleep disturbances due to breathing, snoring and wheezing.    Endocrine: Negative for cold intolerance and heat intolerance.   Hematologic/Lymphatic: Does not bruise/bleed easily.        Hx of daily alcohol use.    Skin: Negative for color change, dry skin and nail changes.   Musculoskeletal: Positive for arthritis, back pain and joint pain. Negative for myalgias.        Right shoulder comminuted fracture   Gastrointestinal: Negative for bloating, abdominal pain, constipation, nausea and vomiting.   Genitourinary: Negative for dysuria, flank pain, hematuria and hesitancy.   Neurological: Positive for dizziness and light-headedness. Negative for headaches, loss of balance, numbness and paresthesias.        Hx of recent falls  Hx of daily alcohol use.    Psychiatric/Behavioral: Negative for altered mental status.   Allergic/Immunologic: Negative for environmental allergies.     Objective:     Vital Signs (Most Recent):  Temp: 96.7 °F (35.9 °C) (07/19/18 1110)  Pulse: (!) 55 (07/19/18 1358)  Resp: 18 (07/19/18 1110)  BP: (!) 155/70 (07/19/18 1110)  SpO2: (!) 91 % (07/19/18 1110) Vital Signs (24h Range):  Temp:  [96.7 °F (35.9 °C)-98.8 °F (37.1 °C)] 96.7 °F (35.9 °C)  Pulse:  [55-94] 55  Resp:  [16-45] 18  SpO2:  [91 %-96 %] 91 %  BP: (131-214)/() 155/70     Weight: 64.9 kg (143 lb)  Body mass index is 21.12 kg/m².    SpO2: (!) 91 %  O2 Device (Oxygen Therapy): nasal  cannula      Intake/Output Summary (Last 24 hours) at 07/19/18 1502  Last data filed at 07/19/18 1200   Gross per 24 hour   Intake          1741.25 ml   Output              785 ml   Net           956.25 ml       Lines/Drains/Airways     Peripheral Intravenous Line                 Peripheral IV - Single Lumen 07/18/18 Left Forearm 1 day                Physical Exam   Constitutional: He is oriented to person, place, and time. He appears well-developed and well-nourished. He appears ill. No distress.   HENT:   Head: Normocephalic and atraumatic.   Eyes: Pupils are equal, round, and reactive to light.   Neck: Normal range of motion and full passive range of motion without pain. Neck supple. No JVD present.   Cardiovascular: Normal rate, regular rhythm, S1 normal, S2 normal and intact distal pulses.  PMI is not displaced.  Exam reveals no distant heart sounds.    No murmur heard.  Pulses:       Radial pulses are 2+ on the right side, and 2+ on the left side.        Dorsalis pedis pulses are 2+ on the right side, and 2+ on the left side.   Pulmonary/Chest: Effort normal and breath sounds normal. No accessory muscle usage. No respiratory distress. He has no wheezes.   Abdominal: Soft. Bowel sounds are normal. He exhibits no distension. There is no tenderness.   Musculoskeletal: Normal range of motion. He exhibits no edema.        Right ankle: He exhibits swelling.        Left ankle: He exhibits swelling.   Right shoulder +comminuted fracture  +sling in place   Neurological: He is alert and oriented to person, place, and time.   Skin: Skin is warm and dry. Bruising and ecchymosis noted. He is not diaphoretic. No cyanosis or erythema. Nails show no clubbing.   Psychiatric: He has a normal mood and affect. His speech is normal and behavior is normal. Judgment and thought content normal. Cognition and memory are normal.   Nursing note and vitals reviewed.      Significant Labs:   All pertinent lab results from the last 24  hours have been reviewed. and   Recent Lab Results       07/19/18  1218 07/19/18  1123 07/19/18  1101 07/19/18  0820 07/19/18  0604      Benzodiazepines          Methadone metabolites          Phencyclidine          Procalcitonin    0.12  Comment:  Please re-baseline procalcitonin if a patient is transferred from  other facilities to [facility].  A concentration < 0.25 ng/mL represents a low risk bacterial   infection.  Procalcitonin may not be accurate among patients with localized   infection, recent trauma or major surgery, immunosuppressed state,   invasive fungal infection, renal dysfunction. Decisions regarding   initiation or continuation of antibiotic therapy should not be based   solely on procalcitonin levels.        Albumin          Alcohol, Medical, Serum          Alkaline Phosphatase          ALT          Amphetamine Screen, Ur          Anion Gap 10   9      Aniso    Slight      Appearance, UA          AST          Aortic Valve Regurgitation  MODERATE(A)        Bacteria, UA          Barbiturate Screen, Ur          Baso #    0.03      Basophil%    0.3      Bilirubin (UA)          Total Bilirubin          BNP          BUN, Bld 24(H)   25(H)      Calcium 8.6(L)   8.7      Chloride 96   94(L)      Cholesterol          CO2 19(L)   23      Cocaine (Metab.)          Color, UA          CPK          Creatinine 1.9(H)   1.9(H)      Creatinine, Random Ur          Differential Method    Automated      EF  55        eGFR if  42(A)   42(A)      eGFR if non  36  Comment:  Calculation used to obtain the estimated glomerular filtration  rate (eGFR) is the CKD-EPI equation.   (A)   36  Comment:  Calculation used to obtain the estimated glomerular filtration  rate (eGFR) is the CKD-EPI equation.   (A)      Eos #    0.0      Eosinophil%    0.3      Estimated Avg Glucose          Glucose 99   108      Glucose, UA          Gran # (ANC)    6.4      Gran%    73.5(H)      HDL           HDL/Chol Ratio          Hematocrit    35.0(L)      Hemoglobin    12.9(L)      Hemoglobin A1C          Hyaline Casts, UA          Coumadin Monitoring INR          Ketones, UA          LDL Cholesterol          Leukocytes, UA          Lymph #    1.4      Lymph%    15.8(L)      Magnesium          MCH    36.6(H)      MCHC    36.9(H)      MCV    99(H)      Microscopic Comment          Mono #    0.9      Mono%    10.3      MPV    8.2(L)      Nitrite, UA          Non-HDL Cholesterol          Occult Blood UA          Opiate Scrn, Ur          Est. PA Systolic Pressure  17.44        pH, UA          Phosphorus          Platelet Estimate    Appears normal      Platelets    263      POCT Glucose   111(H)  133(H)     Poik    Slight      Poly    Occasional      Potassium 4.8   4.1      Total Protein          Protein, UA          Protime          RBC    3.52(L)      RBC, UA          RDW    13.7      Sodium 125(L)   126(L)      Specific [place], UA          Specimen UA          Marijuana (THC) Metabolite          Total Cholesterol/HDL Ratio          Toxicology Information          Triglycerides          Troponin I          TSH          Urobilinogen, UA          WBC, UA          WBC    8.77                  07/19/18  0435 07/19/18  0044 07/18/18  2347 07/18/18  2344 07/18/18  2343      Benzodiazepines   Presumptive Positive       Methadone metabolites   Negative       Phencyclidine   Negative       Procalcitonin          Albumin          Alcohol, Medical, Serum          Alkaline Phosphatase          ALT          Amphetamine Screen, Ur   Negative       Anion Gap 12 12        Aniso          Appearance, UA     Clear     AST          Aortic Valve Regurgitation          Bacteria, UA     Rare     Barbiturate Screen, Ur   Negative       Baso #          Basophil%          Bilirubin (UA)     Negative     Total Bilirubin          BNP          BUN, Bld 24(H) 24(H)        Calcium 8.4(L) 8.4(L)        Chloride 93(L) 92(L)        Cholesterol  198  Comment:  The National Cholesterol Education Program (NCEP) has set the  following guidelines (reference ranges) for Cholesterol:  Optimal.....................<200 mg/dL  Borderline High.............200-239 mg/dL  High........................> or = 240 mg/dL           CO2 20(L) 22(L)        Cocaine (Metab.)   Negative       Color, UA     Yellow     CPK          Creatinine 2.0(H) 2.1(H)        Creatinine, Random Ur   66.2  Comment:  The random urine reference ranges provided were established   for 24 hour urine collections.  No reference ranges exist for  random urine specimens.  Correlate clinically.         Differential Method          EF          eGFR if  39(A) 37(A)        eGFR if non  34  Comment:  Calculation used to obtain the estimated glomerular filtration  rate (eGFR) is the CKD-EPI equation.   (A) 32  Comment:  Calculation used to obtain the estimated glomerular filtration  rate (eGFR) is the CKD-EPI equation.   (A)        Eos #          Eosinophil%          Estimated Avg Glucose 91         Glucose 116(H) 106        Glucose, UA     Negative     Gran # (ANC)          Gran%            Comment:  The National Cholesterol Education Program (NCEP) has set the  following guidelines (reference values) for HDL Cholesterol:  Low...............<40 mg/dL  Optimal...........>60 mg/dL  (H)         HDL/Chol Ratio 64.6(H)         Hematocrit          Hemoglobin          Hemoglobin A1C 4.8  Comment:  ADA Screening Guidelines:  5.7-6.4%  Consistent with prediabetes  >or=6.5%  Consistent with diabetes  High levels of fetal hemoglobin interfere with the HbA1C  assay. Heterozygous hemoglobin variants (HbS, HgC, etc)do  not significantly interfere with this assay.   However, presence of multiple variants may affect accuracy.           Hyaline Casts, UA     0     Coumadin Monitoring INR  0.9  Comment:  Coumadin Therapy:  2.0 - 3.0 for INR for all indicators except mechanical heart  valves  and antiphospholipid syndromes which should use 2.5 - 3.5.          Ketones, UA     Negative     LDL Cholesterol 59.4  Comment:  The National Cholesterol Education Program (NCEP) has set the  following guidelines (reference values) for LDL Cholesterol:  Optimal.......................<130 mg/dL  Borderline High...............130-159 mg/dL  High..........................160-189 mg/dL  Very High.....................>190 mg/dL  (L)         Leukocytes, UA     Negative     Lymph #          Lymph%          Magnesium          MCH          MCHC          MCV          Microscopic Comment     SEE COMMENT  Comment:  Other formed elements not mentioned in the report are not   present in the microscopic examination.        Mono #          Mono%          MPV          Nitrite, UA     Negative     Non-HDL Cholesterol 70  Comment:  Risk category and Non-HDL cholesterol goals:  Coronary heart disease (CHD)or equivalent (10-year risk of CHD >20%):  Non-HDL cholesterol goal     <130 mg/dL  Two or more CHD risk factors and 10-year risk of CHD <= 20%:  Non-HDL cholesterol goal     <160 mg/dL  0 to 1 CHD risk factor:  Non-HDL cholesterol goal     <190 mg/dL           Occult Blood UA     Trace(A)     Opiate Scrn, Ur   Negative       Est. PA Systolic Pressure          pH, UA     6.0     Phosphorus          Platelet Estimate          Platelets          POCT Glucose          Poik          Poly          Potassium 3.7 3.4(L)        Total Protein          Protein, UA     3+  Comment:  Recommend a 24 hour urine protein or a urine   protein/creatinine ratio if globulin induced proteinuria is  clinically suspected.  (A)     Protime  9.4        RBC          RBC, UA     2     RDW          Sodium 125(L) 126(L)        Specific [place], UA     1.025     Specimen UA     Urine, Clean Catch     Marijuana (THC) Metabolite   Presumptive Positive       Total Cholesterol/HDL Ratio 1.5(L)         Toxicology Information   SEE COMMENT  Comment:  This screen  includes the following classes of drugs at the   listed cut-off:  Benzodiazepines                  200 ng/ml  Methadone                        300 ng/ml  Cocaine metabolite               300 ng/ml  Opiates                          300 ng/ml  Barbiturates                     200 ng/ml  Amphetamines                    1000 ng/ml  Marijuana metabs (THC)            50 ng/ml  Phencyclidine (PCP)               25 ng/ml  High concentrations of Diphenhydramine may cross-react with  Phencyclidine PCP screening immunoassay giving a false   positive result.  High concentrations of Methylenedioxymethamphetamine (MDMA aka  Ectasy) and other structurally similar compounds may cross-   react with the Amphetamine/Methamphetamine screening   immunoassay giving a false positive result.  A metabolite of the anti-HIV drug Sustiva () may cause  false positive results in the Marijuana metabolite (THC)   screening assay.  Note: This exception list includes only more common   interferants in toxicology screen testing.  Because of many   cross-reactantspositive results on toxicology drug screens   should be confirmed whenever results do not correlate with   clinical presentation.  This report is intended for use in clinical monitoring and  management of patients. It is not intended for use in   employment related drug testing.  Because of any cross-reactants, positive results on toxicology  drug screens should be confirmed whenever results do not  correlate with clinical presentation.  Presumptive positive results are unconfirmed and may be used   only for medical purposes.         Triglycerides 53  Comment:  The National Cholesterol Education Program (NCEP) has set the  following guidelines (reference values) for triglycerides:  Normal......................<150 mg/dL  Borderline High.............150-199 mg/dL  High........................200-499 mg/dL           Troponin I 0.024  Comment:  The reference interval for Troponin I  represents the 99th percentile   cutoff   for our facility and is consistent with 3rd generation assay   performance.     0.024  Comment:  The reference interval for Troponin I represents the 99th percentile   cutoff   for our facility and is consistent with 3rd generation assay   performance.        TSH          Urobilinogen, UA     Negative     WBC, UA     0     WBC                      07/18/18  1721      Benzodiazepines      Methadone metabolites      Phencyclidine      Procalcitonin      Albumin 2.3(L)     Alcohol, Medical, Serum 83(H)     Alkaline Phosphatase 88     ALT 11     Amphetamine Screen, Ur      Anion Gap 17(H)     Aniso      Appearance, UA      AST 15     Aortic Valve Regurgitation      Bacteria, UA      Barbiturate Screen, Ur      Baso # 0.02     Basophil% 0.2     Bilirubin (UA)      Total Bilirubin 0.4  Comment:  For infants and newborns, interpretation of results should be based  on gestational age, weight and in agreement with clinical  observations.  Premature Infant recommended reference ranges:  Up to 24 hours.............<8.0 mg/dL  Up to 48 hours............<12.0 mg/dL  3-5 days..................<15.0 mg/dL  6-29 days.................<15.0 mg/dL         Comment:  Values of less than 100 pg/ml are consistent with non-CHF populations.(H)     BUN, Bld 26(H)     Calcium 8.6(L)     Chloride 89(L)     Cholesterol      CO2 18(L)     Cocaine (Metab.)      Color, UA      CPK 34     Creatinine 2.4(H)     Creatinine, Random Ur      Differential Method Automated     EF      eGFR if  31(A)     eGFR if non  27  Comment:  Calculation used to obtain the estimated glomerular filtration  rate (eGFR) is the CKD-EPI equation.   (A)     Eos # 0.1     Eosinophil% 0.6     Estimated Avg Glucose      Glucose 97     Glucose, UA      Gran # (ANC) 6.4     Gran% 79.9(H)     HDL      HDL/Chol Ratio      Hematocrit 33.7(L)     Hemoglobin 12.5(L)     Hemoglobin A1C      Hyaline Casts,  UA      Coumadin Monitoring INR      Ketones, UA      LDL Cholesterol      Leukocytes, UA      Lymph # 0.9(L)     Lymph% 10.9(L)     Magnesium 1.7     MCH 36.3(H)     MCHC 37.1(H)     MCV 98     Microscopic Comment      Mono # 0.7     Mono% 8.8     MPV 7.8(L)     Nitrite, UA      Non-HDL Cholesterol      Occult Blood UA      Opiate Scrn, Ur      Est. PA Systolic Pressure      pH, UA      Phosphorus 3.5     Platelet Estimate      Platelets 232     POCT Glucose      Poik      Poly      Potassium 2.9(L)     Total Protein 5.6(L)     Protein, UA      Protime      RBC 3.44(L)     RBC, UA      RDW 13.5     Sodium 124(L)     Specific [place], UA      Specimen UA      Marijuana (THC) Metabolite      Total Cholesterol/HDL Ratio      Toxicology Information      Triglycerides      Troponin I 0.027  Comment:  The reference interval for Troponin I represents the 99th percentile   cutoff   for our facility and is consistent with 3rd generation assay   performance.  (H)     TSH 1.165     Urobilinogen, UA      WBC, UA      WBC 8.09           Significant Imaging: Echocardiogram:   2D echo with color flow doppler:   Results for orders placed or performed during the hospital encounter of 07/18/18   2D echo with color flow doppler   Result Value Ref Range    EF 55 55 - 65    Aortic Valve Regurgitation MODERATE (A)     Est. PA Systolic Pressure 17.44     and X-Ray: CXR: X-Ray Chest 1 View (CXR): No results found for this visit on 07/18/18. and X-Ray Chest PA and Lateral (CXR): No results found for this visit on 07/18/18.    Assessment and Plan:     * Syncope    -Syncope event after alcohol use last PM  -Reports daily alcohol use and frequent falls  -2D echo pending.  -Continue ASA, Amlodipine, Statin, BB  -Elevated cardiac risk due to noncompliance, poor family support and daily alcohol and cigarette use.   -No stress test at this time per Dr. Krishnan.         Polysubstance abuse    -recommend cessation        Tobacco use    -encourage  cessation        Type 2 diabetes mellitus, without long-term current use of insulin    -A1C 4.8%  -Per primary team        Essential hypertension    -Monitor BP trend  -Continue Amlodipine, BB  -Hold ACEI/ARB due to elevated Creatinine.   -PRN Hydralazine as needed.         Acute renal failure superimposed on stage 3 chronic kidney disease    -Cr 1.9 today  -Monitor  -Avoid Nephrotoxic agents  -Avoid ACEI/ARB for now.         Alcohol abuse    -Encourage cessation            VTE Risk Mitigation         Ordered     heparin (porcine) injection 5,000 Units  Every 8 hours      07/19/18 0013     IP VTE LOW RISK PATIENT  Once      07/18/18 1934     Place sequential compression device  Until discontinued      07/18/18 1934          Thank you for your consult. I will follow-up with patient. Please contact us if you have any additional questions.    Argelia López NP  Cardiology   [facility]

## 2018-07-19 NOTE — PROGRESS NOTES
Attempted orthostatic blood pressure. Patient unable to get to sitting position due to pain from fracture in right arm.

## 2018-07-19 NOTE — PLAN OF CARE
07/19/18 1046   JOSHUA Message   Medicare Outpatient and Observation Notification regarding financial responsibility Given to patient/caregiver;Explained to patient/caregiver;Signed/date by patient/caregiver   Date JOSHUA was signed 07/19/18   Time JOSHUA was signed 1045

## 2018-07-19 NOTE — HOSPITAL COURSE
The pt is a 69 yo male with alcohol abuse, DM, and CKD presented to ED with syncope. + serum alcohol and THC. Pt has a Comminuted right humeral neck fracture with slight angulation. Labs showed hyponatremia, ARF/CKD3, hypokalemia, CXR showed possible right pleural effusion. CT head showed nothing acute. Carotid U/S showed greater than 79% stenosis of the proximal right internal carotid artery and 50-69% stenosis involving the left internal carotid artery. Vascular surgery will evaluate pt. Orthopedics has been consulted.   EKG showed incomplete LBB and prolonged QT. Trop 0.027> 0.024>0.024. Cardiac echo pending. Cardiology consulted regarding syncope with abnormal EKG.   Overall, the pt complains of pain to right shoulder. He is unable to get out of bed for orthostatics due to the pain and generalized weakness . Patient evaluated by Orthopedics who stated patient fractures are not pathologic and will heal without surgical intervention. Patient was offered post acute care at SNF as well as Home health - Patient declined these resources. Vascular Surgery evaluated patient and patient was referred for outpatient followup for carotids. Patient was found to have  A large R Lung Mass on CT concerning for malignancy. Patient case was discussed with pulmonology who will see patient as O/P for workup. These appointments have been scheduled. Patient to follow with Dr Daniel with Ortho for his Humeral fracture. His office will contact the patient to arrange. Patient seen and examined today prior to his discharge to home. He was counseled on the need for ETOH cessation as well as smoking cessation. Patient vowed to follow up with Dr Garcia as scheduled and with Pulmonary for the lung mass.

## 2018-07-19 NOTE — ASSESSMENT & PLAN NOTE
- Hypertensive in ED, likely due to pain.  - Continue CCB and BB.  ARB and diuretic on hold as above.  - Pain control.  - Will monitor BP trends and optimize regimen if BP remains elevated once pain adequately controlled.

## 2018-07-19 NOTE — PLAN OF CARE
"CM met with patient at bedside. Pt lives alone and was independent prior to admit. Positive ETOH and THC. Admits to drinking daily but declined information/resources for alcohol and substance abuse. Feels he "can stop whenever he wants" and states he plans to quit. Reports recent frequent falls primarily due to dizziness, even when not drinking. PT eval on hold due to inability to assess because of pain due to fractured humorous. DC plan: SNF vs Home. CM assessment, consult, and JOSHUA completed signed, placed in chart and copy given to patient. CM provided a transitional care folder, information on advanced directives, information on pharmacy bedside delivery, and discharge planning begins on admission with contact information for any needs/questions.    *[employer] is primary. Medicare is secondary.        07/19/18 1046   Discharge Assessment   Assessment Type Discharge Planning Assessment   Confirmed/corrected address and phone number on facesheet? Yes   Assessment information obtained from? Patient   Prior to hospitilization cognitive status: Alert/Oriented   Prior to hospitalization functional status: Independent   Current cognitive status: Alert/Oriented   Current Functional Status: Independent   Lives With alone   Able to Return to Prior Arrangements yes   Is patient able to care for self after discharge? Yes   Who are your caregiver(s) and their phone number(s)? (Mathew Castillo, son, [phone])   Patient's perception of discharge disposition skilled nursing facility;home or selfcare  (SNF vs home)   Readmission Within The Last 30 Days no previous admission in last 30 days   Patient currently being followed by outpatient case management? No   Patient currently receives any other outside agency services? No   Equipment Currently Used at Home walker, rolling   Do you have any problems affording any of your prescribed medications? No   Is the patient taking medications as prescribed? yes   Does the " patient have transportation home? Yes   Transportation Available family or friend will provide   Does the patient receive services at the [facility]? No   Discharge Plan A Skilled Nursing Facility   Discharge Plan B Home   Patient/Family In Agreement With Plan yes

## 2018-07-19 NOTE — ASSESSMENT & PLAN NOTE
- Initial K 2.9, likely secondary to EtOH abuse + dehydration.  - Replete to keep K >/=4.  - Monitor telemetry for arrhythmias.  - Follow BMP.

## 2018-07-19 NOTE — ASSESSMENT & PLAN NOTE
- Likely secondary to EtOH abuse +/- HCTZ.  - 0.9% NS @ 75mL/hr for gradual Na correction.  - Hold HCTZ.  - Strict I&O's.  - Neuro checks.  - Follow serial BMP.

## 2018-07-19 NOTE — SUBJECTIVE & OBJECTIVE
Past Medical History:   Diagnosis Date    Arthritis     Diabetes     Foot fracture     Hand fracture, left     Hand fracture, right     HTN (hypertension)     Humerus fracture 07/18/2018    Hyperlipidemia     Neuropathy     Renal disorder        Past Surgical History:   Procedure Laterality Date    ANKLE SURGERY Left     CHOLECYSTECTOMY         Review of patient's allergies indicates:   Allergen Reactions    Penicillins        No current facility-administered medications on file prior to encounter.      Current Outpatient Prescriptions on File Prior to Encounter   Medication Sig    amlodipine (NORVASC) 10 MG tablet Take 10 mg by mouth once daily.    aspirin 81 MG Chew Take 81 mg by mouth once daily.    atorvastatin (LIPITOR) 20 MG tablet Take 20 mg by mouth once daily.    citalopram (CELEXA) 40 MG tablet Take 40 mg by mouth once daily.    losartan-hydrochlorothiazide 100-25 mg (HYZAAR) 100-25 mg per tablet Take 1 tablet by mouth once daily.    metformin (GLUCOPHAGE) 500 MG tablet Take 500 mg by mouth 2 (two) times daily with meals.    temazepam (RESTORIL) 30 mg capsule Take 30 mg by mouth nightly as needed for Insomnia.    docusate sodium (COLACE) 100 MG capsule Take 1 capsule (100 mg total) by mouth 2 (two) times daily as needed for Constipation.    hydrocodone-acetaminophen 10-325mg (NORCO)  mg Tab Take 1 tablet by mouth every 6 (six) hours as needed for Pain.    mupirocin (BACTROBAN) 2 % ointment Apply topically 3 (three) times daily.    polyethylene glycol (GLYCOLAX) 17 gram/dose powder Take 17 g by mouth once daily. Take daily to keep your bowel movements regular while taking pain medicines     Family History     Problem Relation (Age of Onset)    Heart disease Father, Paternal Grandfather    Miscarriages / Stillbirths Paternal Uncle, Paternal Grandfather        Social History Main Topics    Smoking status: Current Every Day Smoker     Packs/day: 1.00    Smokeless tobacco: Not on  file    Alcohol use Yes      Comment: 6 beers per day    Drug use: No    Sexual activity: Not on file     Review of Systems   Constitution: Positive for malaise/fatigue. Negative for weakness.   HENT: Negative for hearing loss and hoarse voice.    Eyes: Negative for blurred vision and visual disturbance.   Cardiovascular: Positive for syncope. Negative for chest pain, claudication, dyspnea on exertion, irregular heartbeat, leg swelling, near-syncope, orthopnea, palpitations and paroxysmal nocturnal dyspnea.   Respiratory: Negative for cough, hemoptysis, shortness of breath, sleep disturbances due to breathing, snoring and wheezing.    Endocrine: Negative for cold intolerance and heat intolerance.   Hematologic/Lymphatic: Does not bruise/bleed easily.        Hx of daily alcohol use.    Skin: Negative for color change, dry skin and nail changes.   Musculoskeletal: Positive for arthritis, back pain and joint pain. Negative for myalgias.        Right shoulder comminuted fracture   Gastrointestinal: Negative for bloating, abdominal pain, constipation, nausea and vomiting.   Genitourinary: Negative for dysuria, flank pain, hematuria and hesitancy.   Neurological: Positive for dizziness and light-headedness. Negative for headaches, loss of balance, numbness and paresthesias.        Hx of recent falls  Hx of daily alcohol use.    Psychiatric/Behavioral: Negative for altered mental status.   Allergic/Immunologic: Negative for environmental allergies.     Objective:     Vital Signs (Most Recent):  Temp: 96.7 °F (35.9 °C) (07/19/18 1110)  Pulse: (!) 55 (07/19/18 1358)  Resp: 18 (07/19/18 1110)  BP: (!) 155/70 (07/19/18 1110)  SpO2: (!) 91 % (07/19/18 1110) Vital Signs (24h Range):  Temp:  [96.7 °F (35.9 °C)-98.8 °F (37.1 °C)] 96.7 °F (35.9 °C)  Pulse:  [55-94] 55  Resp:  [16-45] 18  SpO2:  [91 %-96 %] 91 %  BP: (131-214)/() 155/70     Weight: 64.9 kg (143 lb)  Body mass index is 21.12 kg/m².    SpO2: (!) 91 %  O2  Device (Oxygen Therapy): nasal cannula      Intake/Output Summary (Last 24 hours) at 07/19/18 1502  Last data filed at 07/19/18 1200   Gross per 24 hour   Intake          1741.25 ml   Output              785 ml   Net           956.25 ml       Lines/Drains/Airways     Peripheral Intravenous Line                 Peripheral IV - Single Lumen 07/18/18 Left Forearm 1 day                Physical Exam   Constitutional: He is oriented to person, place, and time. He appears well-developed and well-nourished. He appears ill. No distress.   HENT:   Head: Normocephalic and atraumatic.   Eyes: Pupils are equal, round, and reactive to light.   Neck: Normal range of motion and full passive range of motion without pain. Neck supple. No JVD present.   Cardiovascular: Normal rate, regular rhythm, S1 normal, S2 normal and intact distal pulses.  PMI is not displaced.  Exam reveals no distant heart sounds.    No murmur heard.  Pulses:       Radial pulses are 2+ on the right side, and 2+ on the left side.        Dorsalis pedis pulses are 2+ on the right side, and 2+ on the left side.   Pulmonary/Chest: Effort normal and breath sounds normal. No accessory muscle usage. No respiratory distress. He has no wheezes.   Abdominal: Soft. Bowel sounds are normal. He exhibits no distension. There is no tenderness.   Musculoskeletal: Normal range of motion. He exhibits no edema.        Right ankle: He exhibits swelling.        Left ankle: He exhibits swelling.   Right shoulder +comminuted fracture  +sling in place   Neurological: He is alert and oriented to person, place, and time.   Skin: Skin is warm and dry. Bruising and ecchymosis noted. He is not diaphoretic. No cyanosis or erythema. Nails show no clubbing.   Psychiatric: He has a normal mood and affect. His speech is normal and behavior is normal. Judgment and thought content normal. Cognition and memory are normal.   Nursing note and vitals reviewed.      Significant Labs:   All pertinent  lab results from the last 24 hours have been reviewed. and   Recent Lab Results       07/19/18  1218 07/19/18  1123 07/19/18  1101 07/19/18  0820 07/19/18  0604      Benzodiazepines          Methadone metabolites          Phencyclidine          Procalcitonin    0.12  Comment:  Please re-baseline procalcitonin if a patient is transferred from  other facilities to [facility].  A concentration < 0.25 ng/mL represents a low risk bacterial   infection.  Procalcitonin may not be accurate among patients with localized   infection, recent trauma or major surgery, immunosuppressed state,   invasive fungal infection, renal dysfunction. Decisions regarding   initiation or continuation of antibiotic therapy should not be based   solely on procalcitonin levels.        Albumin          Alcohol, Medical, Serum          Alkaline Phosphatase          ALT          Amphetamine Screen, Ur          Anion Gap 10   9      Aniso    Slight      Appearance, UA          AST          Aortic Valve Regurgitation  MODERATE(A)        Bacteria, UA          Barbiturate Screen, Ur          Baso #    0.03      Basophil%    0.3      Bilirubin (UA)          Total Bilirubin          BNP          BUN, Bld 24(H)   25(H)      Calcium 8.6(L)   8.7      Chloride 96   94(L)      Cholesterol          CO2 19(L)   23      Cocaine (Metab.)          Color, UA          CPK          Creatinine 1.9(H)   1.9(H)      Creatinine, Random Ur          Differential Method    Automated      EF  55        eGFR if  42(A)   42(A)      eGFR if non  36  Comment:  Calculation used to obtain the estimated glomerular filtration  rate (eGFR) is the CKD-EPI equation.   (A)   36  Comment:  Calculation used to obtain the estimated glomerular filtration  rate (eGFR) is the CKD-EPI equation.   (A)      Eos #    0.0      Eosinophil%    0.3      Estimated Avg Glucose          Glucose 99   108      Glucose, UA          Gran # (ANC)    6.4      Gran%     73.5(H)      HDL          HDL/Chol Ratio          Hematocrit    35.0(L)      Hemoglobin    12.9(L)      Hemoglobin A1C          Hyaline Casts, UA          Coumadin Monitoring INR          Ketones, UA          LDL Cholesterol          Leukocytes, UA          Lymph #    1.4      Lymph%    15.8(L)      Magnesium          MCH    36.6(H)      MCHC    36.9(H)      MCV    99(H)      Microscopic Comment          Mono #    0.9      Mono%    10.3      MPV    8.2(L)      Nitrite, UA          Non-HDL Cholesterol          Occult Blood UA          Opiate Scrn, Ur          Est. PA Systolic Pressure  17.44        pH, UA          Phosphorus          Platelet Estimate    Appears normal      Platelets    263      POCT Glucose   111(H)  133(H)     Poik    Slight      Poly    Occasional      Potassium 4.8   4.1      Total Protein          Protein, UA          Protime          RBC    3.52(L)      RBC, UA          RDW    13.7      Sodium 125(L)   126(L)      Specific [place], UA          Specimen UA          Marijuana (THC) Metabolite          Total Cholesterol/HDL Ratio          Toxicology Information          Triglycerides          Troponin I          TSH          Urobilinogen, UA          WBC, UA          WBC    8.77                  07/19/18  0435 07/19/18  0044 07/18/18  2347 07/18/18  2344 07/18/18  2343      Benzodiazepines   Presumptive Positive       Methadone metabolites   Negative       Phencyclidine   Negative       Procalcitonin          Albumin          Alcohol, Medical, Serum          Alkaline Phosphatase          ALT          Amphetamine Screen, Ur   Negative       Anion Gap 12 12        Aniso          Appearance, UA     Clear     AST          Aortic Valve Regurgitation          Bacteria, UA     Rare     Barbiturate Screen, Ur   Negative       Baso #          Basophil%          Bilirubin (UA)     Negative     Total Bilirubin          BNP          BUN, Bld 24(H) 24(H)        Calcium 8.4(L) 8.4(L)        Chloride 93(L) 92(L)         Cholesterol 198  Comment:  The National Cholesterol Education Program (NCEP) has set the  following guidelines (reference ranges) for Cholesterol:  Optimal.....................<200 mg/dL  Borderline High.............200-239 mg/dL  High........................> or = 240 mg/dL           CO2 20(L) 22(L)        Cocaine (Metab.)   Negative       Color, UA     Yellow     CPK          Creatinine 2.0(H) 2.1(H)        Creatinine, Random Ur   66.2  Comment:  The random urine reference ranges provided were established   for 24 hour urine collections.  No reference ranges exist for  random urine specimens.  Correlate clinically.         Differential Method          EF          eGFR if  39(A) 37(A)        eGFR if non  34  Comment:  Calculation used to obtain the estimated glomerular filtration  rate (eGFR) is the CKD-EPI equation.   (A) 32  Comment:  Calculation used to obtain the estimated glomerular filtration  rate (eGFR) is the CKD-EPI equation.   (A)        Eos #          Eosinophil%          Estimated Avg Glucose 91         Glucose 116(H) 106        Glucose, UA     Negative     Gran # (ANC)          Gran%            Comment:  The National Cholesterol Education Program (NCEP) has set the  following guidelines (reference values) for HDL Cholesterol:  Low...............<40 mg/dL  Optimal...........>60 mg/dL  (H)         HDL/Chol Ratio 64.6(H)         Hematocrit          Hemoglobin          Hemoglobin A1C 4.8  Comment:  ADA Screening Guidelines:  5.7-6.4%  Consistent with prediabetes  >or=6.5%  Consistent with diabetes  High levels of fetal hemoglobin interfere with the HbA1C  assay. Heterozygous hemoglobin variants (HbS, HgC, etc)do  not significantly interfere with this assay.   However, presence of multiple variants may affect accuracy.           Hyaline Casts, UA     0     Coumadin Monitoring INR  0.9  Comment:  Coumadin Therapy:  2.0 - 3.0 for INR for all indicators except  mechanical heart valves  and antiphospholipid syndromes which should use 2.5 - 3.5.          Ketones, UA     Negative     LDL Cholesterol 59.4  Comment:  The National Cholesterol Education Program (NCEP) has set the  following guidelines (reference values) for LDL Cholesterol:  Optimal.......................<130 mg/dL  Borderline High...............130-159 mg/dL  High..........................160-189 mg/dL  Very High.....................>190 mg/dL  (L)         Leukocytes, UA     Negative     Lymph #          Lymph%          Magnesium          MCH          MCHC          MCV          Microscopic Comment     SEE COMMENT  Comment:  Other formed elements not mentioned in the report are not   present in the microscopic examination.        Mono #          Mono%          MPV          Nitrite, UA     Negative     Non-HDL Cholesterol 70  Comment:  Risk category and Non-HDL cholesterol goals:  Coronary heart disease (CHD)or equivalent (10-year risk of CHD >20%):  Non-HDL cholesterol goal     <130 mg/dL  Two or more CHD risk factors and 10-year risk of CHD <= 20%:  Non-HDL cholesterol goal     <160 mg/dL  0 to 1 CHD risk factor:  Non-HDL cholesterol goal     <190 mg/dL           Occult Blood UA     Trace(A)     Opiate Scrn, Ur   Negative       Est. PA Systolic Pressure          pH, UA     6.0     Phosphorus          Platelet Estimate          Platelets          POCT Glucose          Poik          Poly          Potassium 3.7 3.4(L)        Total Protein          Protein, UA     3+  Comment:  Recommend a 24 hour urine protein or a urine   protein/creatinine ratio if globulin induced proteinuria is  clinically suspected.  (A)     Protime  9.4        RBC          RBC, UA     2     RDW          Sodium 125(L) 126(L)        Specific [place], UA     1.025     Specimen UA     Urine, Clean Catch     Marijuana (THC) Metabolite   Presumptive Positive       Total Cholesterol/HDL Ratio 1.5(L)         Toxicology Information   SEE  COMMENT  Comment:  This screen includes the following classes of drugs at the   listed cut-off:  Benzodiazepines                  200 ng/ml  Methadone                        300 ng/ml  Cocaine metabolite               300 ng/ml  Opiates                          300 ng/ml  Barbiturates                     200 ng/ml  Amphetamines                    1000 ng/ml  Marijuana metabs (THC)            50 ng/ml  Phencyclidine (PCP)               25 ng/ml  High concentrations of Diphenhydramine may cross-react with  Phencyclidine PCP screening immunoassay giving a false   positive result.  High concentrations of Methylenedioxymethamphetamine (MDMA aka  Ectasy) and other structurally similar compounds may cross-   react with the Amphetamine/Methamphetamine screening   immunoassay giving a false positive result.  A metabolite of the anti-HIV drug Sustiva () may cause  false positive results in the Marijuana metabolite (THC)   screening assay.  Note: This exception list includes only more common   interferants in toxicology screen testing.  Because of many   cross-reactantspositive results on toxicology drug screens   should be confirmed whenever results do not correlate with   clinical presentation.  This report is intended for use in clinical monitoring and  management of patients. It is not intended for use in   employment related drug testing.  Because of any cross-reactants, positive results on toxicology  drug screens should be confirmed whenever results do not  correlate with clinical presentation.  Presumptive positive results are unconfirmed and may be used   only for medical purposes.         Triglycerides 53  Comment:  The National Cholesterol Education Program (NCEP) has set the  following guidelines (reference values) for triglycerides:  Normal......................<150 mg/dL  Borderline High.............150-199 mg/dL  High........................200-499 mg/dL           Troponin I 0.024  Comment:  The reference  interval for Troponin I represents the 99th percentile   cutoff   for our facility and is consistent with 3rd generation assay   performance.     0.024  Comment:  The reference interval for Troponin I represents the 99th percentile   cutoff   for our facility and is consistent with 3rd generation assay   performance.        TSH          Urobilinogen, UA     Negative     WBC, UA     0     WBC                      07/18/18  1721      Benzodiazepines      Methadone metabolites      Phencyclidine      Procalcitonin      Albumin 2.3(L)     Alcohol, Medical, Serum 83(H)     Alkaline Phosphatase 88     ALT 11     Amphetamine Screen, Ur      Anion Gap 17(H)     Aniso      Appearance, UA      AST 15     Aortic Valve Regurgitation      Bacteria, UA      Barbiturate Screen, Ur      Baso # 0.02     Basophil% 0.2     Bilirubin (UA)      Total Bilirubin 0.4  Comment:  For infants and newborns, interpretation of results should be based  on gestational age, weight and in agreement with clinical  observations.  Premature Infant recommended reference ranges:  Up to 24 hours.............<8.0 mg/dL  Up to 48 hours............<12.0 mg/dL  3-5 days..................<15.0 mg/dL  6-29 days.................<15.0 mg/dL         Comment:  Values of less than 100 pg/ml are consistent with non-CHF populations.(H)     BUN, Bld 26(H)     Calcium 8.6(L)     Chloride 89(L)     Cholesterol      CO2 18(L)     Cocaine (Metab.)      Color, UA      CPK 34     Creatinine 2.4(H)     Creatinine, Random Ur      Differential Method Automated     EF      eGFR if  31(A)     eGFR if non  27  Comment:  Calculation used to obtain the estimated glomerular filtration  rate (eGFR) is the CKD-EPI equation.   (A)     Eos # 0.1     Eosinophil% 0.6     Estimated Avg Glucose      Glucose 97     Glucose, UA      Gran # (ANC) 6.4     Gran% 79.9(H)     HDL      HDL/Chol Ratio      Hematocrit 33.7(L)     Hemoglobin 12.5(L)     Hemoglobin  A1C      Hyaline Casts, UA      Coumadin Monitoring INR      Ketones, UA      LDL Cholesterol      Leukocytes, UA      Lymph # 0.9(L)     Lymph% 10.9(L)     Magnesium 1.7     MCH 36.3(H)     MCHC 37.1(H)     MCV 98     Microscopic Comment      Mono # 0.7     Mono% 8.8     MPV 7.8(L)     Nitrite, UA      Non-HDL Cholesterol      Occult Blood UA      Opiate Scrn, Ur      Est. PA Systolic Pressure      pH, UA      Phosphorus 3.5     Platelet Estimate      Platelets 232     POCT Glucose      Poik      Poly      Potassium 2.9(L)     Total Protein 5.6(L)     Protein, UA      Protime      RBC 3.44(L)     RBC, UA      RDW 13.5     Sodium 124(L)     Specific [place], UA      Specimen UA      Marijuana (THC) Metabolite      Total Cholesterol/HDL Ratio      Toxicology Information      Triglycerides      Troponin I 0.027  Comment:  The reference interval for Troponin I represents the 99th percentile   cutoff   for our facility and is consistent with 3rd generation assay   performance.  (H)     TSH 1.165     Urobilinogen, UA      WBC, UA      WBC 8.09           Significant Imaging: Echocardiogram:   2D echo with color flow doppler:   Results for orders placed or performed during the hospital encounter of 07/18/18   2D echo with color flow doppler   Result Value Ref Range    EF 55 55 - 65    Aortic Valve Regurgitation MODERATE (A)     Est. PA Systolic Pressure 17.44     and X-Ray: CXR: X-Ray Chest 1 View (CXR): No results found for this visit on 07/18/18. and X-Ray Chest PA and Lateral (CXR): No results found for this visit on 07/18/18.

## 2018-07-19 NOTE — PROGRESS NOTES
Notified by ultrasound tech of carotid U/S results: 80-99% stenosis of right ICA with peak systolic velocity ~409 cm/sec, and 50-69% stenosis of left ICA with peak systolic velocity ~213 cm/sec.  Patient currently asymptomatic.  Added ASA and statin.  Discussed case with Dr. Au (Vascular Surgery), who recommends OP f/u for elective CEA in near future.          JAYESH SortoP

## 2018-07-19 NOTE — PLAN OF CARE
Problem: Patient Care Overview  Goal: Plan of Care Review  Outcome: Ongoing (interventions implemented as appropriate)  Pt AAO x4.  VSS.  Pt remained afebrile throughout this shift.   IV fluids administered per order.   Pt remained free of falls this shift.   Pt complained of pain rating 8-10/10 this shift to the right shoulder and arm.  Plan of care reviewed. Patient verbalizes understanding.   Pain meds administered as ordered.   Pt moving/turing with assistance.   Patient NSR/SB on monitor.   Bed low, side rails up x 2, wheels locked, call light in reach.   Patient instructed to call for assistance.   Hourly rounding completed.   Will continue to monitor.

## 2018-07-19 NOTE — H&P
"[facility] Hospital Medicine  History & Physical    Patient Name: Lionel Castillo  MRN: [id number]  Admission Date: 7/18/2018  Attending Physician: Jian Mueller MD   Primary Care Provider: Eddie Garcia MD         Patient information was obtained from patient, past medical records and ER records.     Subjective:     Principal Problem:Syncope    Chief Complaint:   Chief Complaint   Patient presents with    Fall     shoulder pain after a fall PTA, given 70 mcg fentanyl in route        HPI: Mr. Castillo is a 67yo male with a PMHx of HTN, HLD, DM II, CKD, and EtOH abuse, who presented to the ED with c/o right shoulder pain after suffering a syncopal event PTA.  Patient states he stood from chair, walked "a few feet", and next thing he remembers is waking up on floor with right shoulder pain.  He reports hitting his head on the floor during fall.  No associated symptoms.  No aggravating or alleviating factors.  Denies any HA, AMS, visual disturbance, lightheadedness/dizziness, focal deficits, palpitations, CP, SOB, KATZ, edema, ABD pain, N/V/D, dysuria, fever, chills, or diaphoresis.  Patient admits to drinking alcohol and smoking marijuana prior to event.  Initial work-up in ED resulted Na 124, K 2.9, BUN 26, cr. 2.4, serum alcohol 83, UDS (+) for benzo and THC, UA negative.  EKG unrevealing.  CT head negative for acute process.  Right shoulder XR showed comminuted right humeral neck fracture with slight angulation.  Hospital Medicine was called for admission.        Past Medical History:   Diagnosis Date    Arthritis     Diabetes     Foot fracture     Hand fracture, left     Hand fracture, right     HTN (hypertension)     Humerus fracture 07/18/2018    Hyperlipidemia     Neuropathy     Renal disorder        Past Surgical History:   Procedure Laterality Date    ANKLE SURGERY Left     CHOLECYSTECTOMY         Review of patient's allergies indicates:   Allergen Reactions    Penicillins  "       No current facility-administered medications on file prior to encounter.      Current Outpatient Prescriptions on File Prior to Encounter   Medication Sig    amlodipine (NORVASC) 10 MG tablet Take 10 mg by mouth once daily.    aspirin 81 MG Chew Take 81 mg by mouth once daily.    atorvastatin (LIPITOR) 20 MG tablet Take 20 mg by mouth once daily.    citalopram (CELEXA) 40 MG tablet Take 40 mg by mouth once daily.    losartan-hydrochlorothiazide 100-25 mg (HYZAAR) 100-25 mg per tablet Take 1 tablet by mouth once daily.    metformin (GLUCOPHAGE) 500 MG tablet Take 500 mg by mouth 2 (two) times daily with meals.    temazepam (RESTORIL) 30 mg capsule Take 30 mg by mouth nightly as needed for Insomnia.    docusate sodium (COLACE) 100 MG capsule Take 1 capsule (100 mg total) by mouth 2 (two) times daily as needed for Constipation.    hydrocodone-acetaminophen 10-325mg (NORCO)  mg Tab Take 1 tablet by mouth every 6 (six) hours as needed for Pain.    mupirocin (BACTROBAN) 2 % ointment Apply topically 3 (three) times daily.    polyethylene glycol (GLYCOLAX) 17 gram/dose powder Take 17 g by mouth once daily. Take daily to keep your bowel movements regular while taking pain medicines    [DISCONTINUED] meloxicam (MOBIC) 7.5 MG tablet Take 1 tablet (7.5 mg total) by mouth once daily.     Family History     Problem Relation (Age of Onset)    Heart disease Father, Paternal Grandfather    Miscarriages / Stillbirths Paternal Uncle, Paternal Grandfather        Social History Main Topics    Smoking status: Current Every Day Smoker     Packs/day: 1.00    Smokeless tobacco: No    Alcohol use Yes      Comment: 6 beers per day    Drug use: No    Sexual activity: Not on file     Review of Systems   Constitutional: Negative for chills, diaphoresis, fatigue, fever and unexpected weight change.   HENT: Negative for congestion, sore throat and trouble swallowing.         Head trauma   Eyes: Negative for visual  disturbance.   Respiratory: Negative for cough, chest tightness, shortness of breath and wheezing.    Cardiovascular: Negative for chest pain, palpitations and leg swelling.   Gastrointestinal: Negative for abdominal distention, abdominal pain, blood in stool, constipation, diarrhea, nausea and vomiting.   Genitourinary: Negative for decreased urine volume, difficulty urinating, dysuria, frequency, hematuria and urgency.   Musculoskeletal: Positive for arthralgias (right shoulder). Negative for back pain, gait problem, joint swelling and myalgias.   Skin: Negative for pallor, rash and wound.   Neurological: Positive for syncope. Negative for dizziness, tremors, seizures, facial asymmetry, speech difficulty, weakness, light-headedness, numbness and headaches.   Psychiatric/Behavioral: Negative for confusion. The patient is not nervous/anxious.    All other systems reviewed and are negative.    Objective:     Vital Signs (Most Recent):  Temp: 98.8 °F (37.1 °C) (07/18/18 1940)  Pulse: 89 (07/18/18 1932)  Resp: 19 (07/18/18 1932)  BP: (!) 183/82 (07/18/18 1932)  SpO2: 96 % (07/18/18 1932) Vital Signs (24h Range):  Temp:  [98.8 °F (37.1 °C)] 98.8 °F (37.1 °C)  Pulse:  [80-90] 89  Resp:  [16-19] 19  SpO2:  [95 %-96 %] 96 %  BP: (131-186)/(64-82) 183/82     Weight: 64.9 kg (143 lb)  Body mass index is 21.12 kg/m².    Physical Exam   Constitutional: He is oriented to person, place, and time. He appears well-developed and well-nourished. No distress.   HENT:   Head: Normocephalic and atraumatic.   Eyes: Conjunctivae are normal.   PERRL; EOM intact.   Neck: Normal range of motion. Neck supple.   Cardiovascular: Normal rate, regular rhythm, S1 normal, S2 normal and intact distal pulses.   No extrasystoles are present. Exam reveals no gallop.    No murmur heard.  Pulses:       Radial pulses are 2+ on the right side, and 2+ on the left side.        Dorsalis pedis pulses are 2+ on the right side, and 2+ on the left side.         Posterior tibial pulses are 2+ on the right side, and 2+ on the left side.   Pulmonary/Chest: Effort normal and breath sounds normal. No accessory muscle usage. No tachypnea. No respiratory distress. He has no wheezes. He has no rhonchi. He has no rales.   Abdominal: Soft. Bowel sounds are normal. He exhibits no distension. There is no tenderness. There is no rebound, no guarding and no CVA tenderness.   Musculoskeletal: He exhibits no edema or deformity.        Right shoulder: He exhibits decreased range of motion, tenderness, bony tenderness, swelling and pain. He exhibits normal pulse.   RUE in sling.  Normal sensation and pulses to RUE.   Neurological: He is alert and oriented to person, place, and time. He has normal strength. No cranial nerve deficit or sensory deficit. GCS eye subscore is 4. GCS verbal subscore is 5. GCS motor subscore is 6.   Skin: Skin is warm, dry and intact. Capillary refill takes less than 2 seconds. No rash noted. He is not diaphoretic. No cyanosis or erythema.   Psychiatric: He has a normal mood and affect. His speech is normal and behavior is normal. Cognition and memory are normal.   Nursing note and vitals reviewed.          Significant Labs:   Results for orders placed or performed during the hospital encounter of 07/18/18   CBC auto differential   Result Value Ref Range    WBC 8.09 3.90 - 12.70 K/uL    RBC 3.44 (L) 4.60 - 6.20 M/uL    Hemoglobin 12.5 (L) 14.0 - 18.0 g/dL    Hematocrit 33.7 (L) 40.0 - 54.0 %    MCV 98 82 - 98 fL    MCH 36.3 (H) 27.0 - 31.0 pg    MCHC 37.1 (H) 32.0 - 36.0 g/dL    RDW 13.5 11.5 - 14.5 %    Platelets 232 150 - 350 K/uL    MPV 7.8 (L) 9.2 - 12.9 fL    Gran # (ANC) 6.4 1.8 - 7.7 K/uL    Lymph # 0.9 (L) 1.0 - 4.8 K/uL    Mono # 0.7 0.3 - 1.0 K/uL    Eos # 0.1 0.0 - 0.5 K/uL    Baso # 0.02 0.00 - 0.20 K/uL    Gran% 79.9 (H) 38.0 - 73.0 %    Lymph% 10.9 (L) 18.0 - 48.0 %    Mono% 8.8 4.0 - 15.0 %    Eosinophil% 0.6 0.0 - 8.0 %    Basophil% 0.2 0.0 - 1.9 %     Differential Method Automated    Comprehensive metabolic panel   Result Value Ref Range    Sodium 124 (L) 136 - 145 mmol/L    Potassium 2.9 (L) 3.5 - 5.1 mmol/L    Chloride 89 (L) 95 - 110 mmol/L    CO2 18 (L) 23 - 29 mmol/L    Glucose 97 70 - 110 mg/dL    BUN, Bld 26 (H) 8 - 23 mg/dL    Creatinine 2.4 (H) 0.5 - 1.4 mg/dL    Calcium 8.6 (L) 8.7 - 10.5 mg/dL    Total Protein 5.6 (L) 6.0 - 8.4 g/dL    Albumin 2.3 (L) 3.5 - 5.2 g/dL    Total Bilirubin 0.4 0.1 - 1.0 mg/dL    Alkaline Phosphatase 88 55 - 135 U/L    AST 15 10 - 40 U/L    ALT 11 10 - 44 U/L    Anion Gap 17 (H) 8 - 16 mmol/L    eGFR if African American 31 (A) >60 mL/min/1.73 m^2    eGFR if non African American 27 (A) >60 mL/min/1.73 m^2   Brain natriuretic peptide   Result Value Ref Range     (H) 0 - 99 pg/mL   CK   Result Value Ref Range    CPK 34 20 - 200 U/L   Troponin I   Result Value Ref Range    Troponin I 0.027 (H) 0.000 - 0.026 ng/mL   Magnesium   Result Value Ref Range    Magnesium 1.7 1.6 - 2.6 mg/dL   TSH   Result Value Ref Range    TSH 1.165 0.400 - 4.000 uIU/mL   Phosphorus   Result Value Ref Range    Phosphorus 3.5 2.7 - 4.5 mg/dL   Ethanol   Result Value Ref Range    Alcohol, Medical, Serum 83 (H) <10 mg/dL      All pertinent labs within the past 24 hours have been reviewed.    Significant Imaging:   Imaging Results          US Carotid Bilateral (Final result)  Result time 07/18/18 21:04:56    Final result by Junito Kee MD (07/18/18 21:04:56)                 Impression:      Marked atherosclerotic plaque noted bilaterally.  Findings consistent with a greater than 79% stenosis of the proximal right internal carotid artery and 50-69% stenosis involving the left internal carotid artery.      Electronically signed by: Junito Kee MD  Date:    07/18/2018  Time:    21:04             Narrative:    EXAMINATION:  US CAROTID BILATERAL    CLINICAL HISTORY:  Syncope and collapse    COMPARISON:  None    FINDINGS:  Sonographic evaluation of  the carotid systems was performed.    Marked atherosclerotic plaquing noted the carotid bulb and proximal internal carotid arteries bilaterally.    The peak systolic velocity in the right internal carotid artery was approximately 409 cm/sec.    The peak systolic velocity in the left internal carotid artery was [id number] cm/sec.    Antegrade flow noted in both vertebral arteries.    Stenosis percentage validated velocity measurements with angiographic measurements, velocity criteria are extrapolated from diameter data as defined by the Society of Radiologists in Ultrasound Consensus Conference Radiology 2003; 229;340-346.                               CT Head Without Contrast (Final result)  Result time 07/18/18 18:02:30    Final result by Junito Kee MD (07/18/18 18:02:30)                 Impression:      No acute findings.    All CT scans at this facility are performed  using dose modulation techniques as appropriate to performed exam including the following:  automated exposure control; adjustment of mA and/or kV according to the patients size (this includes techniques or standardized protocols for targeted exams where dose is matched to indication/reason for exam: i.e. extremities or head);  iterative reconstruction technique.      Electronically signed by: Junito Kee MD  Date:    07/18/2018  Time:    18:02             Narrative:    EXAMINATION:  CT HEAD WITHOUT CONTRAST    CLINICAL HISTORY:  syncope;    COMPARISON:  05/16/2017.    FINDINGS:  No intracranial hemorrhage or acute findings are demonstrated.  Mild cerebral atrophy.  Low-density changes in the periventricular matter suggests chronic small vessel ischemic change.  The visualized paranasal sinuses are clear. The calvarium is intact.  Intracranial atherosclerosis.                               X-Ray Shoulder Trauma Right (Final result)  Result time 07/18/18 17:41:47    Final result by Junito Kee MD (07/18/18 17:41:47)                  Impression:      Comminuted right humeral neck fracture with slight angulation.      Electronically signed by: Junito Kee MD  Date:    07/18/2018  Time:    17:41             Narrative:    EXAMINATION:  XR SHOULDER TRAUMA 3 VIEW RIGHT    CLINICAL HISTORY:  Pain in unspecified shoulder    TECHNIQUE:  Standard radiography performed.    COMPARISON:  None    FINDINGS:  Comminuted fracture involving the right humeral neck with slight varus angulation.  Mild arthritic change involving the AC joint.                               X-Ray Chest AP Portable (Final result)  Result time 07/18/18 17:43:02    Final result by Junito Kee MD (07/18/18 17:43:02)                 Impression:      Multiple right-sided rib fractures which may be old with scarring or atelectasis in the right lung base along with a right pleural fluid collection unknown chronicity.  This finding was not identified on previous study 06/05/2017.      Electronically signed by: Junito Kee MD  Date:    07/18/2018  Time:    17:43             Narrative:    EXAMINATION:  XR CHEST AP PORTABLE    CLINICAL HISTORY:  syncope;    COMPARISON:  06/05/2017    FINDINGS:  Scarring right lung base with multiple right-sided rib fractures.  Right pleural fluid collection may be the sequelae of prior trauma.  This was now however evident on prior study dated above.  The left lung appears clear.                               I have reviewed all pertinent imaging results/findings within the past 24 hours.     EKG: (personally reviewed)  Normal sinus rhythm, incomplete LBBB, nonspecific T wave abnormality.  No acute ischemic ST-T abnormalities.  No previous to compare.          Assessment/Plan:     * Syncope    - CT head negative for acute process.  - Will obtain TTE and carotid U/S.  - FLP.  - Monitor telemetry for tachy/britni arrhythmias.  - Neuro checks.  Fall precautions.  - Check orthostatic BP Q shift.        Hyponatremia    - Likely secondary to EtOH abuse +/- HCTZ.  -  0.9% NS @ 75mL/hr for gradual Na correction.  - Hold HCTZ.  - Strict I&O's.  - Neuro checks.  - Follow serial BMP.        Hypokalemia    - Initial K 2.9, likely secondary to EtOH abuse + dehydration.  - Replete to keep K >/=4.  - Check Mg.  - Monitor telemetry for arrhythmias.  - Follow BMP.        KATRIN (acute kidney injury) on CKD stage III    - Initial cr. 2.4 (baseline 2), likely secondary to IVVD.  - IV hydration as above.  - Avoid nephrotoxic agents.  Hold HCTZ, ARB, and metformin for now.  - Follow BMP.        Right humeral fracture    - Continue sling + swathe.  - Analgesics PRN.        Type 2 diabetes mellitus, without long-term current use of insulin    - Hold metformin, will resume at DC.  - Accuchecks with SSI.  - Diabetic diet.  - Check HbA1c.        Essential hypertension    - Hypertensive in ED, likely due to pain.  - Continue CCB and BB.  ARB and diuretic on hold as above.  - Pain control.  - Will monitor BP trends and optimize regimen if BP remains elevated once pain adequately controlled.        Alcohol abuse    - Counseled on cessation.  - Thiamine and folic acid supplementation.  - Monitor for withdrawal/DT's; Ativan PRN.  - Fall, seizure, aspiration precautions.           VTE Risk Mitigation         Ordered     heparin (porcine) injection 5,000 Units  Every 8 hours      07/19/18 0013     IP VTE LOW RISK PATIENT  Once      07/18/18 1934     Place sequential compression device  Until discontinued      07/18/18 1934             ADAM Sorto  Department of Hospital Medicine   [facility]

## 2018-07-19 NOTE — ASSESSMENT & PLAN NOTE
- Initial K 2.9, likely secondary to EtOH abuse + dehydration.  - Replete to keep K >/=4.  - Check Mg.  - Monitor telemetry for arrhythmias.  - Follow BMP.

## 2018-07-19 NOTE — HPI
"Mr. Castillo is a 69yo male with a PMHx of HTN, HLD, DM II, CKD, and EtOH abuse, who presented to the ED with c/o right shoulder pain after suffering a syncopal event PTA.  Patient states he stood from chair, walked "a few feet", and next thing he remembers is waking up on floor with right shoulder pain.  He reports hitting his head on the floor during fall.  No associated symptoms.  No aggravating or alleviating factors.  Denies any HA, AMS, visual disturbance, lightheadedness/dizziness, focal deficits, palpitations, CP, SOB, KATZ, edema, ABD pain, N/V/D, dysuria, fever, chills, or diaphoresis.  Patient admits to drinking alcohol and smoking marijuana prior to event.  Initial work-up in ED resulted Na 124, K 2.9, BUN 26, cr. 2.4, serum alcohol 83, UDS (+) for benzo and THC, UA negative.  EKG unrevealing.  CT head negative for acute process.  Right shoulder XR showed comminuted right humeral neck fracture with slight angulation.  Hospital Medicine was called for admission.    "

## 2018-07-19 NOTE — ASSESSMENT & PLAN NOTE
Syncope likely multifactorial  Pt has abnormal EKG with LBB and prolonged QT, Carotid stenosis, Hyponatremia, alcohol and polysubstance abuse, dehydration/ARF  Await cardiology and vascular surgery consultations   Pt will liekly need both a right shoulder surgery and carotid surgery in the near future.   Cont IV hydration   DT ppx

## 2018-07-19 NOTE — CONSULTS
[facility]  Vascular Surgery  Consult Note    Consults  Subjective:     Chief Complaint/Reason for Admission: dizziness and syncope - better now; fracture of arm    History of Present Illness: see above - hx of ETOH abuse    Prescriptions Prior to Admission   Medication Sig Dispense Refill Last Dose    amlodipine (NORVASC) 10 MG tablet Take 10 mg by mouth once daily.   7/17/2018    aspirin 81 MG Chew Take 81 mg by mouth once daily.   7/17/2018    atorvastatin (LIPITOR) 20 MG tablet Take 20 mg by mouth once daily.   7/18/2018    citalopram (CELEXA) 40 MG tablet Take 40 mg by mouth once daily.   7/18/2018    losartan-hydrochlorothiazide 100-25 mg (HYZAAR) 100-25 mg per tablet Take 1 tablet by mouth once daily.   7/18/2018    metformin (GLUCOPHAGE) 500 MG tablet Take 500 mg by mouth 2 (two) times daily with meals.   7/18/2018    temazepam (RESTORIL) 30 mg capsule Take 30 mg by mouth nightly as needed for Insomnia.   7/17/2018    docusate sodium (COLACE) 100 MG capsule Take 1 capsule (100 mg total) by mouth 2 (two) times daily as needed for Constipation. 30 capsule 0     hydrocodone-acetaminophen 10-325mg (NORCO)  mg Tab Take 1 tablet by mouth every 6 (six) hours as needed for Pain. 20 tablet 0     mupirocin (BACTROBAN) 2 % ointment Apply topically 3 (three) times daily. 30 g 0     polyethylene glycol (GLYCOLAX) 17 gram/dose powder Take 17 g by mouth once daily. Take daily to keep your bowel movements regular while taking pain medicines 289 g 0        Review of patient's allergies indicates:   Allergen Reactions    Penicillins        Past Medical History:   Diagnosis Date    Arthritis     Diabetes     Foot fracture     Hand fracture, left     Hand fracture, right     HTN (hypertension)     Humerus fracture 07/18/2018    Hyperlipidemia     Neuropathy     Renal disorder      Past Surgical History:   Procedure Laterality Date    ANKLE SURGERY Left     CHOLECYSTECTOMY        Family History     Problem Relation (Age of Onset)    Heart disease Father, Paternal Grandfather    Miscarriages / Stillbirths Paternal Uncle, Paternal Grandfather        Social History Main Topics    Smoking status: Current Every Day Smoker     Packs/day: 1.00    Smokeless tobacco: Not on file    Alcohol use Yes      Comment: 6 beers per day    Drug use: No    Sexual activity: Not on file     Review of Systems  Objective:     Vital Signs (Most Recent):  Temp: 97.9 °F (36.6 °C) (07/19/18 1541)  Pulse: (!) 54 (07/19/18 1541)  Resp: 18 (07/19/18 1541)  BP: (!) 159/70 (07/19/18 1541)  SpO2: (!) 93 % (07/19/18 1541) Vital Signs (24h Range):  Temp:  [96.7 °F (35.9 °C)-98.8 °F (37.1 °C)] 97.9 °F (36.6 °C)  Pulse:  [54-94] 54  Resp:  [16-45] 18  SpO2:  [91 %-96 %] 93 %  BP: (131-214)/() 159/70     Weight: 64.9 kg (143 lb)  Body mass index is 21.12 kg/m².      Date 07/19/18 0700 - 07/20/18 0659   Shift 1141-9081 2992-1923 8460-2891 24 Hour Total   I  N  T  A  K  E   P.O. 120   120    I.V.  (mL/kg) 450  (6.9)   450  (6.9)    Shift Total  (mL/kg) 570  (8.8)   570  (8.8)   O  U  T  P  U  T   Urine  (mL/kg/hr) 225  (0.4)   225    Shift Total  (mL/kg) 225  (3.5)   225  (3.5)   Weight (kg) 64.9 64.9 64.9 64.9       Physical Exam  No neurologic deficits  Significant Labs:  All pertinent labs from the last 24 hours have been reviewed.    Significant Diagnostics:  I have reviewed and interpreted all pertinent imaging results/findings within the past 24 hours.  Significant carotid occlusive disease  Assessment/Plan:   Carotid disease not cause of syncope - no intervention now - can follow up in office after discharge  Active Diagnoses:    Diagnosis Date Noted POA    PRINCIPAL PROBLEM:  Syncope [R55] 07/18/2018 Yes    Essential hypertension [I10] 07/19/2018 Yes     Chronic    Type 2 diabetes mellitus, without long-term current use of insulin [E11.9] 07/19/2018 Yes     Chronic    Tobacco use [Z72.0] 07/19/2018 Yes      Chronic    Carotid stenosis, bilateral [I65.23] 07/19/2018 Yes    LBBB (left bundle branch block) [I44.7] 07/19/2018 Yes    Polysubstance abuse [F19.10] 07/19/2018 Yes    Alcohol abuse [F10.10] 07/18/2018 Yes     Chronic    Hyponatremia [E87.1] 07/18/2018 Yes    Hypokalemia [E87.6] 07/18/2018 Yes    Acute renal failure superimposed on stage 3 chronic kidney disease [N17.9, N18.3] 07/18/2018 Yes    Right humeral fracture [S42.301A] 07/18/2018 Yes      Problems Resolved During this Admission:    Diagnosis Date Noted Date Resolved POA       Thank you for your consult. I will follow-up with patient. Please contact us if you have any additional questions.    Manuel Zarco MD  Vascular Surgery  [facility]

## 2018-07-19 NOTE — SUBJECTIVE & OBJECTIVE
Review of Systems   Constitutional: Positive for activity change. Negative for appetite change, chills, diaphoresis, fatigue and fever.   HENT: Negative for congestion, nosebleeds, sore throat and trouble swallowing.    Eyes: Negative for pain, discharge and visual disturbance.   Respiratory: Negative for apnea, cough, chest tightness, shortness of breath, wheezing and stridor.    Cardiovascular: Negative for chest pain, palpitations and leg swelling.   Gastrointestinal: Negative for abdominal distention, abdominal pain, blood in stool, constipation, diarrhea, nausea and vomiting.   Endocrine: Negative for cold intolerance and heat intolerance.   Genitourinary: Negative for difficulty urinating, dysuria, flank pain, frequency and urgency.   Musculoskeletal: Positive for arthralgias and gait problem (Frequent falls ). Negative for back pain, joint swelling, myalgias, neck pain and neck stiffness.   Skin: Negative for rash and wound.   Allergic/Immunologic: Negative for food allergies and immunocompromised state.   Neurological: Positive for dizziness and syncope. Negative for seizures, facial asymmetry, weakness, light-headedness and headaches.   Hematological: Negative for adenopathy.   Psychiatric/Behavioral: Negative for agitation, behavioral problems and confusion. The patient is not nervous/anxious.      Objective:     Vital Signs (Most Recent):  Temp: 97.3 °F (36.3 °C) (07/19/18 0747)  Pulse: (!) 55 (07/19/18 0925)  Resp: 18 (07/19/18 0747)  BP: (!) 174/78 (07/19/18 0827)  SpO2: (!) 92 % (07/19/18 0747) Vital Signs (24h Range):  Temp:  [97.3 °F (36.3 °C)-98.8 °F (37.1 °C)] 97.3 °F (36.3 °C)  Pulse:  [55-94] 55  Resp:  [16-45] 18  SpO2:  [92 %-96 %] 92 %  BP: (131-214)/() 174/78     Weight: 64.9 kg (143 lb)  Body mass index is 21.12 kg/m².    Intake/Output Summary (Last 24 hours) at 07/19/18 1026  Last data filed at 07/19/18 0800   Gross per 24 hour   Intake          1291.25 ml   Output               660 ml   Net           631.25 ml      Physical Exam   Constitutional: He is oriented to person, place, and time. No distress.   Thin, frail appearing    HENT:   Head: Normocephalic.   Nose: Nose normal.   Mouth/Throat: Oropharynx is clear and moist.   Superficial abrasion to forehead and nose    Eyes: Conjunctivae and EOM are normal. No scleral icterus.   Neck: Normal range of motion. Neck supple.   Cardiovascular: Normal rate, regular rhythm, normal heart sounds and intact distal pulses.  Exam reveals no gallop and no friction rub.    No murmur heard.  Pulmonary/Chest: Effort normal and breath sounds normal. No stridor. No respiratory distress. He has no wheezes. He has no rales. He exhibits no tenderness.   Abdominal: Soft. Bowel sounds are normal. He exhibits no distension. There is no tenderness. There is no rebound and no guarding.   Musculoskeletal: He exhibits tenderness. He exhibits no edema or deformity.   Pain to right upper arm with movement, decreased ROM  Neurovascular check intact to distal right fingers   Sling in place    Neurological: He is alert and oriented to person, place, and time. He has normal reflexes. No cranial nerve deficit. He exhibits normal muscle tone. Coordination normal.   +fine tremors    Skin: Skin is warm and dry. Capillary refill takes less than 2 seconds. No rash noted. He is not diaphoretic. No erythema. No pallor.   Multiple ecchymotic area at different stages of healing to arms    Psychiatric: He has a normal mood and affect. His behavior is normal. Thought content normal.   Nursing note and vitals reviewed.      Significant Labs:   BMP:   Recent Labs  Lab 07/18/18  1721  07/19/18  0820   GLU 97  < > 108   *  < > 126*   K 2.9*  < > 4.1   CL 89*  < > 94*   CO2 18*  < > 23   BUN 26*  < > 25*   CREATININE 2.4*  < > 1.9*   CALCIUM 8.6*  < > 8.7   MG 1.7  --   --    < > = values in this interval not displayed.  CBC:   Recent Labs  Lab 07/18/18  1721 07/19/18  0820   WBC  8.09 8.77   HGB 12.5* 12.9*   HCT 33.7* 35.0*    263     CMP:   Recent Labs  Lab 07/18/18  1721 07/19/18  0044 07/19/18  0435 07/19/18  0820   * 126* 125* 126*   K 2.9* 3.4* 3.7 4.1   CL 89* 92* 93* 94*   CO2 18* 22* 20* 23   GLU 97 106 116* 108   BUN 26* 24* 24* 25*   CREATININE 2.4* 2.1* 2.0* 1.9*   CALCIUM 8.6* 8.4* 8.4* 8.7   PROT 5.6*  --   --   --    ALBUMIN 2.3*  --   --   --    BILITOT 0.4  --   --   --    ALKPHOS 88  --   --   --    AST 15  --   --   --    ALT 11  --   --   --    ANIONGAP 17* 12 12 9   EGFRNONAA 27* 32* 34* 36*     All pertinent labs within the past 24 hours have been reviewed.    Significant Imaging: \  Imaging Results          US Carotid Bilateral (Final result)  Result time 07/18/18 21:04:56    Final result by Junito Kee MD (07/18/18 21:04:56)                 Impression:      Marked atherosclerotic plaque noted bilaterally.  Findings consistent with a greater than 79% stenosis of the proximal right internal carotid artery and 50-69% stenosis involving the left internal carotid artery.      Electronically signed by: Junito Kee MD  Date:    07/18/2018  Time:    21:04             Narrative:    EXAMINATION:  US CAROTID BILATERAL    CLINICAL HISTORY:  Syncope and collapse    COMPARISON:  None    FINDINGS:  Sonographic evaluation of the carotid systems was performed.    Marked atherosclerotic plaquing noted the carotid bulb and proximal internal carotid arteries bilaterally.    The peak systolic velocity in the right internal carotid artery was approximately 409 cm/sec.    The peak systolic velocity in the left internal carotid artery was [id number] cm/sec.    Antegrade flow noted in both vertebral arteries.    Stenosis percentage validated velocity measurements with angiographic measurements, velocity criteria are extrapolated from diameter data as defined by the Society of Radiologists in Ultrasound Consensus Conference Radiology 2003; 229;340-346.                                CT Head Without Contrast (Final result)  Result time 07/18/18 18:02:30    Final result by Junito Kee MD (07/18/18 18:02:30)                 Impression:      No acute findings.    All CT scans at this facility are performed  using dose modulation techniques as appropriate to performed exam including the following:  automated exposure control; adjustment of mA and/or kV according to the patients size (this includes techniques or standardized protocols for targeted exams where dose is matched to indication/reason for exam: i.e. extremities or head);  iterative reconstruction technique.      Electronically signed by: Junito Kee MD  Date:    07/18/2018  Time:    18:02             Narrative:    EXAMINATION:  CT HEAD WITHOUT CONTRAST    CLINICAL HISTORY:  syncope;    COMPARISON:  05/16/2017.    FINDINGS:  No intracranial hemorrhage or acute findings are demonstrated.  Mild cerebral atrophy.  Low-density changes in the periventricular matter suggests chronic small vessel ischemic change.  The visualized paranasal sinuses are clear. The calvarium is intact.  Intracranial atherosclerosis.                               X-Ray Shoulder Trauma Right (Final result)  Result time 07/18/18 17:41:47    Final result by Junito Kee MD (07/18/18 17:41:47)                 Impression:      Comminuted right humeral neck fracture with slight angulation.      Electronically signed by: Junito Kee MD  Date:    07/18/2018  Time:    17:41             Narrative:    EXAMINATION:  XR SHOULDER TRAUMA 3 VIEW RIGHT    CLINICAL HISTORY:  Pain in unspecified shoulder    TECHNIQUE:  Standard radiography performed.    COMPARISON:  None    FINDINGS:  Comminuted fracture involving the right humeral neck with slight varus angulation.  Mild arthritic change involving the AC joint.                               X-Ray Chest AP Portable (Final result)  Result time 07/18/18 17:43:02    Final result by Junito Kee MD (07/18/18 17:43:02)                  Impression:      Multiple right-sided rib fractures which may be old with scarring or atelectasis in the right lung base along with a right pleural fluid collection unknown chronicity.  This finding was not identified on previous study 06/05/2017.      Electronically signed by: Junito Kee MD  Date:    07/18/2018  Time:    17:43             Narrative:    EXAMINATION:  XR CHEST AP PORTABLE    CLINICAL HISTORY:  syncope;    COMPARISON:  06/05/2017    FINDINGS:  Scarring right lung base with multiple right-sided rib fractures.  Right pleural fluid collection may be the sequelae of prior trauma.  This was now however evident on prior study dated above.  The left lung appears clear.

## 2018-07-20 ENCOUNTER — TELEPHONE (OUTPATIENT)
Dept: PULMONOLOGY | Facility: CLINIC | Age: 68
End: 2018-07-20

## 2018-07-20 VITALS
SYSTOLIC BLOOD PRESSURE: 146 MMHG | HEART RATE: 63 BPM | WEIGHT: 143.31 LBS | BODY MASS INDEX: 21.22 KG/M2 | DIASTOLIC BLOOD PRESSURE: 67 MMHG | TEMPERATURE: 98 F | RESPIRATION RATE: 18 BRPM | OXYGEN SATURATION: 95 % | HEIGHT: 69 IN

## 2018-07-20 PROBLEM — R55 SYNCOPE: Status: RESOLVED | Noted: 2018-07-18 | Resolved: 2018-07-20

## 2018-07-20 PROBLEM — R91.8 MASS OF RIGHT LUNG: Status: ACTIVE | Noted: 2018-07-20

## 2018-07-20 PROBLEM — S42.294A: Status: ACTIVE | Noted: 2018-07-20

## 2018-07-20 PROBLEM — E87.6 HYPOKALEMIA: Status: RESOLVED | Noted: 2018-07-18 | Resolved: 2018-07-20

## 2018-07-20 LAB
ANION GAP SERPL CALC-SCNC: 10 MMOL/L
BASOPHILS # BLD AUTO: 0.03 K/UL
BASOPHILS NFR BLD: 0.3 %
BUN SERPL-MCNC: 24 MG/DL
CALCIUM SERPL-MCNC: 8.9 MG/DL
CHLORIDE SERPL-SCNC: 99 MMOL/L
CO2 SERPL-SCNC: 18 MMOL/L
CREAT SERPL-MCNC: 1.7 MG/DL
DIFFERENTIAL METHOD: ABNORMAL
EOSINOPHIL # BLD AUTO: 0 K/UL
EOSINOPHIL NFR BLD: 0.2 %
ERYTHROCYTE [DISTWIDTH] IN BLOOD BY AUTOMATED COUNT: 14.1 %
EST. GFR  (AFRICAN AMERICAN): 48 ML/MIN/1.73 M^2
EST. GFR  (NON AFRICAN AMERICAN): 41 ML/MIN/1.73 M^2
GLUCOSE SERPL-MCNC: 120 MG/DL
HCT VFR BLD AUTO: 36.3 %
HGB BLD-MCNC: 13.2 G/DL
LYMPHOCYTES # BLD AUTO: 0.9 K/UL
LYMPHOCYTES NFR BLD: 7.3 %
MCH RBC QN AUTO: 37.1 PG
MCHC RBC AUTO-ENTMCNC: 36.4 G/DL
MCV RBC AUTO: 102 FL
MONOCYTES # BLD AUTO: 0.9 K/UL
MONOCYTES NFR BLD: 7.8 %
NEUTROPHILS # BLD AUTO: 10.1 K/UL
NEUTROPHILS NFR BLD: 84.4 %
PLATELET # BLD AUTO: 256 K/UL
PMV BLD AUTO: 8.4 FL
POCT GLUCOSE: 130 MG/DL (ref 70–110)
POCT GLUCOSE: 131 MG/DL (ref 70–110)
POTASSIUM SERPL-SCNC: 4 MMOL/L
RBC # BLD AUTO: 3.56 M/UL
SODIUM SERPL-SCNC: 127 MMOL/L
WBC # BLD AUTO: 11.98 K/UL

## 2018-07-20 PROCEDURE — G8978 MOBILITY CURRENT STATUS: HCPCS | Mod: CL

## 2018-07-20 PROCEDURE — 25000003 PHARM REV CODE 250: Performed by: NURSE PRACTITIONER

## 2018-07-20 PROCEDURE — 36415 COLL VENOUS BLD VENIPUNCTURE: CPT

## 2018-07-20 PROCEDURE — G8987 SELF CARE CURRENT STATUS: HCPCS | Mod: CM

## 2018-07-20 PROCEDURE — 85025 COMPLETE CBC W/AUTO DIFF WBC: CPT

## 2018-07-20 PROCEDURE — 97535 SELF CARE MNGMENT TRAINING: CPT

## 2018-07-20 PROCEDURE — G8979 MOBILITY GOAL STATUS: HCPCS | Mod: CK

## 2018-07-20 PROCEDURE — 97162 PT EVAL MOD COMPLEX 30 MIN: CPT

## 2018-07-20 PROCEDURE — 63600175 PHARM REV CODE 636 W HCPCS: Performed by: HOSPITALIST

## 2018-07-20 PROCEDURE — 97167 OT EVAL HIGH COMPLEX 60 MIN: CPT

## 2018-07-20 PROCEDURE — G8988 SELF CARE GOAL STATUS: HCPCS | Mod: CK

## 2018-07-20 PROCEDURE — S4991 NICOTINE PATCH NONLEGEND: HCPCS | Performed by: NURSE PRACTITIONER

## 2018-07-20 PROCEDURE — 97530 THERAPEUTIC ACTIVITIES: CPT

## 2018-07-20 PROCEDURE — 25000003 PHARM REV CODE 250: Performed by: HOSPITALIST

## 2018-07-20 PROCEDURE — 80048 BASIC METABOLIC PNL TOTAL CA: CPT

## 2018-07-20 PROCEDURE — 63600175 PHARM REV CODE 636 W HCPCS: Performed by: NURSE PRACTITIONER

## 2018-07-20 RX ORDER — FOLIC ACID 1 MG/1
1 TABLET ORAL DAILY
Qty: 30 TABLET | Refills: 0 | Status: SHIPPED | OUTPATIENT
Start: 2018-07-21 | End: 2019-07-21

## 2018-07-20 RX ORDER — METOPROLOL TARTRATE 50 MG/1
50 TABLET ORAL 2 TIMES DAILY
Qty: 60 TABLET | Refills: 11 | Status: SHIPPED | OUTPATIENT
Start: 2018-07-20 | End: 2019-07-20

## 2018-07-20 RX ORDER — FOLIC ACID 1 MG/1
1 TABLET ORAL DAILY
Qty: 30 TABLET | Refills: 0 | Status: SHIPPED | OUTPATIENT
Start: 2018-07-21 | End: 2018-07-20

## 2018-07-20 RX ORDER — HYDROCODONE BITARTRATE AND ACETAMINOPHEN 10; 325 MG/1; MG/1
1 TABLET ORAL EVERY 6 HOURS PRN
Qty: 20 TABLET | Refills: 0 | Status: ON HOLD | OUTPATIENT
Start: 2018-07-20 | End: 2018-08-22 | Stop reason: HOSPADM

## 2018-07-20 RX ORDER — LANOLIN ALCOHOL/MO/W.PET/CERES
100 CREAM (GRAM) TOPICAL DAILY
COMMUNITY
Start: 2018-07-21

## 2018-07-20 RX ADMIN — HEPARIN SODIUM 5000 UNITS: 5000 INJECTION, SOLUTION INTRAVENOUS; SUBCUTANEOUS at 06:07

## 2018-07-20 RX ADMIN — MEPERIDINE HYDROCHLORIDE 25 MG: 25 INJECTION INTRAMUSCULAR; INTRAVENOUS; SUBCUTANEOUS at 10:07

## 2018-07-20 RX ADMIN — Medication 100 MG: at 08:07

## 2018-07-20 RX ADMIN — ASPIRIN 81 MG: 81 TABLET, COATED ORAL at 08:07

## 2018-07-20 RX ADMIN — MEPERIDINE HYDROCHLORIDE 25 MG: 25 INJECTION INTRAMUSCULAR; INTRAVENOUS; SUBCUTANEOUS at 06:07

## 2018-07-20 RX ADMIN — FOLIC ACID 1 MG: 1 TABLET ORAL at 08:07

## 2018-07-20 RX ADMIN — HYDRALAZINE HYDROCHLORIDE 10 MG: 20 INJECTION INTRAMUSCULAR; INTRAVENOUS at 04:07

## 2018-07-20 RX ADMIN — NICOTINE 1 PATCH: 21 PATCH, EXTENDED RELEASE TRANSDERMAL at 08:07

## 2018-07-20 RX ADMIN — RAMELTEON 8 MG: 8 TABLET, FILM COATED ORAL at 12:07

## 2018-07-20 RX ADMIN — METOPROLOL TARTRATE 50 MG: 50 TABLET ORAL at 08:07

## 2018-07-20 RX ADMIN — PANTOPRAZOLE SODIUM 40 MG: 40 TABLET, DELAYED RELEASE ORAL at 08:07

## 2018-07-20 RX ADMIN — CITALOPRAM HYDROBROMIDE 40 MG: 20 TABLET ORAL at 08:07

## 2018-07-20 RX ADMIN — CHLORDIAZEPOXIDE HYDROCHLORIDE 10 MG: 10 CAPSULE ORAL at 08:07

## 2018-07-20 NOTE — PT/OT/SLP EVAL
Physical Therapy Evaluation    Patient Name:  Lionel Castillo   MRN:  [id number]    Recommendations:     Discharge Recommendations:  nursing facility, skilled   Discharge Equipment Recommendations: bedside commode, walker, devin   Barriers to discharge: Inaccessible home and Decreased caregiver support    Assessment:     Lionel Castillo is a 68 y.o. male admitted with a medical diagnosis of Syncope.  He presents with the following impairments/functional limitations:  weakness, impaired endurance, impaired self care skills, impaired functional mobilty, gait instability, impaired balance, decreased safety awareness, decreased upper extremity function, decreased lower extremity function, pain, orthopedic precautions.    Rehab Prognosis:  FAIR; patient would benefit from acute skilled PT services to address these deficits and reach maximum level of function.      Recent Surgery: * No surgery found *      Plan:     During this hospitalization, patient to be seen 5 x/week to address the above listed problems via gait training, therapeutic activities, therapeutic exercises  · Plan of Care Expires:  07/27/18   Plan of Care Reviewed with: patient    Subjective     Communicated with EPIC and nurse (Naseem) prior to session.  Patient found supine in bed upon PT entry to room, agreeable to evaluation.      Chief Complaint: Pain  Patient comments/goals: To go home  Pain/Comfort:  · Pain Rating 1: 6/10  · Location - Side 1: Right  · Location - Orientation 1: upper  · Location 1: arm  · Pain Addressed 1: Pre-medicate for activity, Reposition, Distraction, Cessation of Activity    Patients cultural, spiritual, Adventist conflicts given the current situation: none    Living Environment:  Patient lives home alone; however, is son is staying with him temporarily.  Prior to admission, patients level of function was indep with ambulation and ADLs.  Patient has the following equipment: rollator, shower chair.  DME owned (not  currently used): none.  Upon discharge, patient will have assistance from his son.    Objective:     Patient found with: peripheral IV, telemetry     General Precautions: Standard, fall   Orthopedic Precautions:RUE non weight bearing   Braces:  (sling and swathe)     Exams:  · Cognitive Exam:  Patient is oriented to Person, Place and Situation and follows 100% of one-step commands   · Gross Motor Coordination:  Impaired  · Postural Exam:  Patient presented with the following abnormalities:    · -       Rounded shoulders  · -       Forward head  · RLE ROM: WFL except tight hamstrings/decreased knee extension  · RLE Strength: Grossly 4/5  · LLE ROM: WFL except tight hamstrings/decreased knee extension  · LLE Strength: Grossly 4/5    Functional Mobility:  · Bed Mobility:     · Rolling Right: maximal assistance  · Scooting: maximal assistance  · Supine to Sit: maximal assistance  · Sit to Supine: maximal assistance  · Transfers:     · Sit to Stand:  maximal assistance with hand-held assist  · Toilet Transfer: maximal assistance with  hand-held assist  using  Stand Pivot  · Gait: Patient ambulated to/from bathroom with HHA/holding onto IV pole with max assist x 2 for balance and safety.  · Balance: Good static sitting balance; Poor standing balance    AM-PAC 6 CLICK MOBILITY  Total Score:11       Therapeutic Activities and Exercises:   Patient participated in transfer training from bed and toilet.  Max cueing for hand placement and safe, effective technique.      Patient left with bed in chair position with all lines intact, call button in reach and bed alarm on.    GOALS:    Physical Therapy Goals        Problem: Physical Therapy Goal    Goal Priority Disciplines Outcome Goal Variances Interventions   Physical Therapy Goal     PT/OT, PT      Description:  LTGs to be met within 7 days (7/27/18):  1.  Patient will perform bed mobility with min assist  2.  Patient will perform functional transfers with appropriate AD with  min assist  3.  Patient will ambulate 50' with appropriate AD with mod assist  4.  Patient will demo Fair dynamic standing balance                    History:     Past Medical History:   Diagnosis Date    Arthritis     Diabetes     Foot fracture     Hand fracture, left     Hand fracture, right     HTN (hypertension)     Humerus fracture 07/18/2018    Hyperlipidemia     Neuropathy     Renal disorder        Past Surgical History:   Procedure Laterality Date    ANKLE SURGERY Left     CHOLECYSTECTOMY         Clinical Decision Making:     History  Co-morbidities and personal factors that may impact the plan of care Examination  Body Structures and Functions, activity limitations and participation restrictions that may impact the plan of care Clinical Presentation   Decision Making/ Complexity Score   Co-morbidities:   [] Time since onset of injury / illness / exacerbation  [] Status of current condition  []Patient's cognitive status and safety concerns    [] Multiple Medical Problems (see med hx)  Personal Factors:   [] Patient's age  [] Prior Level of function   [] Patient's home situation (environment and family support)  [] Patient's level of motivation  [] Expected progression of patient      HISTORY:(criteria)    [] 57712 - no personal factors/history    [] 98696 - has 1-2 personal factor/comorbidity     [] 71258 - has >3 personal factor/comorbidity     Body Regions:  [] Objective examination findings  [] Head     []  Neck  [] Trunk   [] Upper Extremity  [] Lower Extremity    Body Systems:  [] For communication ability, affect, cognition, language, and learning style: the assessment of the ability to make needs known, consciousness, orientation (person, place, and time), expected emotional /behavioral responses, and learning preferences (eg, learning barriers, education  needs)  [] For the neuromuscular system: a general assessment of gross coordinated movement (eg, balance, gait, locomotion, transfers,  and transitions) and motor function  (motor control and motor learning)  [] For the musculoskeletal system: the assessment of gross symmetry, gross range of motion, gross strength, height, and weight  [] For the integumentary system: the assessment of pliability(texture), presence of scar formation, skin color, and skin integrity  [] For cardiovascular/pulmonary system: the assessment of heart rate, respiratory rate, blood pressure, and edema     Activity limitations:    [] Patient's cognitive status and saf ety concerns          [] Status of current condition      [] Weight bearing restriction  [] Cardiopulmunary Restriction    Participation Restrictions:   [] Goals and goal agreement with the patient     [] Rehab potential (prognosis) and probable outcome      Examination of Body System: (criteria)    [] 91940 - addressing 1-2 elements    [] 08663 - addressing a total of 3 or more elements     [] 73635 -  Addressing a total of 4 or more elements         Clinical Presentation: (criteria)  Choose one     On examination of body system using standardized tests and measures patient presents with (CHOOSE ONE) elements from any of the following: body structures and functions, activity limitations, and/or participation restrictions.  Leading to a clinical presentation that is considered (CHOOSE ONE)                              Clinical Decision Making  (Eval Complexity):  Choose One     Time Tracking:     PT Received On: 07/20/18  PT Start Time: 1005     PT Stop Time: 1031  PT Total Time (min): 26 min     Billable Minutes: Evaluation 16 mins and Therapeutic Activity 10 mins      Jill Epps, PT, DPT  07/20/2018

## 2018-07-20 NOTE — SUBJECTIVE & OBJECTIVE
"Principal Problem:Syncope    Principal Orthopedic Problem: right humerus fracture    Patient resting comfortably in bed with no new complaints. Patient to undergo CT of humor today    Interval History:   Review of patient's allergies indicates:   Allergen Reactions    Penicillins        Current Facility-Administered Medications   Medication    0.9%  NaCl infusion    acetaminophen tablet 650 mg    amLODIPine tablet 10 mg    aspirin EC tablet 81 mg    atorvastatin tablet 40 mg    chlordiazepoxide capsule 10 mg    citalopram tablet 40 mg    dextrose 50% injection 12.5 g    dextrose 50% injection 25 g    diphenhydrAMINE capsule 50 mg    folic acid tablet 1 mg    glucagon (human recombinant) injection 1 mg    glucose chewable tablet 16 g    glucose chewable tablet 24 g    heparin (porcine) injection 5,000 Units    hydrALAZINE injection 10 mg    HYDROcodone-acetaminophen 5-325 mg per tablet 1 tablet    insulin aspart U-100 pen 0-5 Units    lorazepam (ATIVAN) injection 1 mg    meperidine (PF) injection 25 mg    metoprolol tartrate (LOPRESSOR) tablet 50 mg    nicotine 21 mg/24 hr 1 patch    ondansetron injection 4 mg    pantoprazole EC tablet 40 mg    pneumoc 13-lisseth conj-dip cr(PF) 0.5 mL    promethazine (PHENERGAN) 12.5 mg in dextrose 5 % 50 mL IVPB    ramelteon tablet 8 mg    thiamine tablet 100 mg     Objective:     Vital Signs (Most Recent):  Temp: 97.9 °F (36.6 °C) (07/20/18 0720)  Pulse: 66 (07/20/18 0720)  Resp: 18 (07/20/18 0720)  BP: (!) 171/74 (07/20/18 0720)  SpO2: (!) 93 % (07/20/18 0720) Vital Signs (24h Range):  Temp:  [96 °F (35.6 °C)-97.9 °F (36.6 °C)] 97.9 °F (36.6 °C)  Pulse:  [54-67] 66  Resp:  [18] 18  SpO2:  [86 %-93 %] 93 %  BP: (155-192)/(70-98) 171/74     Weight: 65 kg (143 lb 4.8 oz)  Height: 5' 9" (175.3 cm)  Body mass index is 21.16 kg/m².      Intake/Output Summary (Last 24 hours) at 07/20/18 0917  Last data filed at 07/20/18 0600   Gross per 24 hour   Intake          "    1800 ml   Output              750 ml   Net             1050 ml       Ortho/SPM Exam     AAOx3, no acute distress  No neurovascular changes    Significant Labs:   BMP:   Recent Labs  Lab 07/18/18  1721  07/20/18  0729   GLU 97  < > 120*   *  < > 127*   K 2.9*  < > 4.0   CL 89*  < > 99   CO2 18*  < > 18*   BUN 26*  < > 24*   CREATININE 2.4*  < > 1.7*   CALCIUM 8.6*  < > 8.9   MG 1.7  --   --    < > = values in this interval not displayed.  CBC:   Recent Labs  Lab 07/18/18  1721 07/19/18  0820 07/20/18  0729   WBC 8.09 8.77 11.98   HGB 12.5* 12.9* 13.2*   HCT 33.7* 35.0* 36.3*    263 256     CMP:   Recent Labs  Lab 07/18/18  1721 07/19/18  0820 07/19/18  1218 07/20/18  0729   *  < > 126* 125* 127*   K 2.9*  < > 4.1 4.8 4.0   CL 89*  < > 94* 96 99   CO2 18*  < > 23 19* 18*   GLU 97  < > 108 99 120*   BUN 26*  < > 25* 24* 24*   CREATININE 2.4*  < > 1.9* 1.9* 1.7*   CALCIUM 8.6*  < > 8.7 8.6* 8.9   PROT 5.6*  --   --   --   --    ALBUMIN 2.3*  --   --   --   --    BILITOT 0.4  --   --   --   --    ALKPHOS 88  --   --   --   --    AST 15  --   --   --   --    ALT 11  --   --   --   --    ANIONGAP 17*  < > 9 10 10   EGFRNONAA 27*  < > 36* 36* 41*   < > = values in this interval not displayed.  Coagulation:   Recent Labs  Lab 07/19/18  0044   LABPROT 9.4   INR 0.9     CRP: No results for input(s): CRP in the last 48 hours.  Lactic Acid: No results for input(s): LACTATE in the last 48 hours.  All pertinent labs within the past 24 hours have been reviewed.    68-year-old male with right humerus fracture, stable  - Patient undergo CAT scan today a right humerus we will follow up  - patient care per primary team  - continue sling  - will discuss with Dr. Powell for the recommendations after review of CAT scan  Significant Imaging: will revieew upcoming CT SCAN

## 2018-07-20 NOTE — DISCHARGE SUMMARY
"[facility] Hospital Medicine  Discharge Summary      Patient Name: Lionel Castillo  MRN: [id number]  Admission Date: 7/18/2018  Hospital Length of Stay: 1 days  Discharge Date and Time: No discharge date for patient encounter.  Attending Physician: Kit Cohn MD   Discharging Provider: Kit Cohn MD  Primary Care Provider: Eddie Garcia MD      HPI:   Mr. Castillo is a 67yo male with a PMHx of HTN, HLD, DM II, CKD, and EtOH abuse, who presented to the ED with c/o right shoulder pain after suffering a syncopal event PTA.  Patient states he stood from chair, walked "a few feet", and next thing he remembers is waking up on floor with right shoulder pain.  He reports hitting his head on the floor during fall.  No associated symptoms.  No aggravating or alleviating factors.  Denies any HA, AMS, visual disturbance, lightheadedness/dizziness, focal deficits, palpitations, CP, SOB, KATZ, edema, ABD pain, N/V/D, dysuria, fever, chills, or diaphoresis.  Patient admits to drinking alcohol and smoking marijuana prior to event.  Initial work-up in ED resulted Na 124, K 2.9, BUN 26, cr. 2.4, serum alcohol 83, UDS (+) for benzo and THC, UA negative.  EKG unrevealing.  CT head negative for acute process.  Right shoulder XR showed comminuted right humeral neck fracture with slight angulation.  Hospital Medicine was called for admission.      * No surgery found *      Hospital Course:   The pt is a 69 yo male with alcohol abuse, DM, and CKD presented to ED with syncope. + serum alcohol and THC. Pt has a Comminuted right humeral neck fracture with slight angulation. Labs showed hyponatremia, ARF/CKD3, hypokalemia, CXR showed possible right pleural effusion. CT head showed nothing acute. Carotid U/S showed greater than 79% stenosis of the proximal right internal carotid artery and 50-69% stenosis involving the left internal carotid artery. Vascular surgery will evaluate pt. Orthopedics has been " consulted.   EKG showed incomplete LBB and prolonged QT. Trop 0.027> 0.024>0.024. Cardiac echo pending. Cardiology consulted regarding syncope with abnormal EKG.   Overall, the pt complains of pain to right shoulder. He is unable to get out of bed for orthostatics due to the pain and generalized weakness . Patient evaluated by Orthopedics who stated patient fractures are not pathologic and will heal without surgical intervention. Patient was offered post acute care at SNF as well as Home health - Patient declined these resources. Vascular Surgery evaluated patient and patient was referred for outpatient followup for carotids. Patient was found to have  A large R Lung Mass on CT concerning for malignancy. Patient case was discussed with pulmonology who will see patient as O/P for workup. These appointments have been scheduled. Patient to follow with Dr Daniel with Ortho for his Humeral fracture. His office will contact the patient to arrange. Patient seen and examined today prior to his discharge to home. He was counseled on the need for ETOH cessation as well as smoking cessation. Patient vowed to follow up with Dr Garcia as scheduled and with Pulmonary for the lung mass.      Consults:   Consults         Status Ordering Provider     Inpatient consult to Cardiology  Once     Provider:  Elmo Krishnan MD    Completed AMMON GOODMAN     Inpatient consult to Orthopedic Surgery  Once     Provider:  CHRISTIANO Powell MD    Completed AMMON GOODMAN     Inpatient consult to Social Work  Once     Provider:  (Not yet assigned)    Completed AMMON GOODMAN     Inpatient consult to Vascular Surgery  Once     Provider:  Charlie Au MD    Acknowledged TRUDY RAIN          Mass of right lung    Pulmonary Follow Up as O/p          Other nondisplaced fracture of upper end of right humerus, initial encounter for closed fracture    Ortho Follow up          Polysubstance abuse    Counseled          Carotid stenosis, bilateral     Await vascular surgery eval  Likely op surgery           Tobacco use    - Counseled on cessation.  - Nicotine patch.        Type 2 diabetes mellitus, without long-term current use of insulin    - Hold metformin, will resume at DC.  - Accuchecks with SSI.  - Diabetic diet.  - Check HbA1c.        Essential hypertension    - Hypertensive in ED, likely due to pain.  - Continue CCB and BB.  ARB and diuretic on hold as above.  - Pain control.  - Will monitor BP trends and optimize regimen if BP remains elevated once pain adequately controlled.        Right humeral fracture    - Continue sling + swathe.  - Analgesics PRN.  Orthopedic evaluation         Acute renal failure superimposed on stage 3 chronic kidney disease    - Initial cr. 2.4 (baseline 2), likely secondary to IVVD.  - IV hydration as above.  - Avoid nephrotoxic agents.  Hold HCTZ, ARB, and metformin for now.  - Follow BMP.        Hyponatremia    - Likely secondary to EtOH abuse +/- HCTZ.  - 0.9% NS @ 75mL/hr for gradual Na correction.  - Hold HCTZ.  - Strict I&O's.  - Neuro checks.  - Follow serial BMP.        Alcohol abuse    - Counseled on cessation.  - Thiamine and folic acid supplementation.  - Monitor for withdrawal/DT's; Ativan PRN.  - Fall, seizure, aspiration precautions.           Final Active Diagnoses:    Diagnosis Date Noted POA    Other nondisplaced fracture of upper end of right humerus, initial encounter for closed fracture [S42.294A] 07/20/2018 Yes    Mass of right lung [R91.8] 07/20/2018 Yes    Essential hypertension [I10] 07/19/2018 Yes     Chronic    Type 2 diabetes mellitus, without long-term current use of insulin [E11.9] 07/19/2018 Yes     Chronic    Tobacco use [Z72.0] 07/19/2018 Yes     Chronic    Carotid stenosis, bilateral [I65.23] 07/19/2018 Yes    Polysubstance abuse [F19.10] 07/19/2018 Yes    Alcohol abuse [F10.10] 07/18/2018 Yes     Chronic    Hyponatremia [E87.1] 07/18/2018 Yes    Acute renal failure superimposed on  stage 3 chronic kidney disease [N17.9, N18.3] 07/18/2018 Yes    Right humeral fracture [S42.301A] 07/18/2018 Yes      Problems Resolved During this Admission:    Diagnosis Date Noted Date Resolved POA    PRINCIPAL PROBLEM:  Syncope [R55] 07/18/2018 07/20/2018 Yes    Hypokalemia [E87.6] 07/18/2018 07/20/2018 Yes       Discharged Condition: stable    Disposition: Home or Self Care    Follow Up:  Follow-up Information     Eddie Garica MD In 3 days.    Specialty:  Internal Medicine  Contact information:  [address] [facility] [address] [phone]             José Miguel Daniel MD. Schedule an appointment as soon as possible for a visit in 1 week.    Specialty:  Orthopedic Surgery  Why:  Dr Daniel office will contact you for an appointment  Contact information:  [address] [facility].  [address]  [facility] [address] [phone]             Kurt Canseco MD In 1 week.    Specialty:  Pulmonary Disease  Contact information:  [address]  [place] [address] [phone]                 Patient Instructions:     Diet renal     Lifting restrictions   Order Comments: Keep R Arm in Sling. No lifting until cleared to do so from Orthopedics Dr Jean.   His office will contact you for an appointment.         Significant Diagnostic Studies: Labs:   BMP:   Recent Labs  Lab 07/18/18  1721  07/19/18  0820 07/19/18  1218 07/20/18  0729   GLU 97  < > 108 99 120*   *  < > 126* 125* 127*   K 2.9*  < > 4.1 4.8 4.0   CL 89*  < > 94* 96 99   CO2 18*  < > 23 19* 18*   BUN 26*  < > 25* 24* 24*   CREATININE 2.4*  < > 1.9* 1.9* 1.7*   CALCIUM 8.6*  < > 8.7 8.6* 8.9   MG 1.7  --   --   --   --    < > = values in this interval not displayed., CMP   Recent Labs  Lab 07/18/18  1721  07/19/18  0820 07/19/18  1218 07/20/18  0729   *  < > 126* 125* 127*   K 2.9*  < > 4.1 4.8 4.0   CL 89*  < > 94* 96 99   CO2 18*  < > 23 19* 18*   GLU 97  < > 108 99 120*   BUN 26*  < > 25* 24* 24*    CREATININE 2.4*  < > 1.9* 1.9* 1.7*   CALCIUM 8.6*  < > 8.7 8.6* 8.9   PROT 5.6*  --   --   --   --    ALBUMIN 2.3*  --   --   --   --    BILITOT 0.4  --   --   --   --    ALKPHOS 88  --   --   --   --    AST 15  --   --   --   --    ALT 11  --   --   --   --    ANIONGAP 17*  < > 9 10 10   ESTGFRAFRICA 31*  < > 42* 42* 48*   EGFRNONAA 27*  < > 36* 36* 41*   < > = values in this interval not displayed., CBC   Recent Labs  Lab 07/18/18  1721 07/19/18  0820 07/20/18  0729   WBC 8.09 8.77 11.98   HGB 12.5* 12.9* 13.2*   HCT 33.7* 35.0* 36.3*    263 256    and All labs within the past 24 hours have been reviewed    Pending Diagnostic Studies:     None         Medications:  Reconciled Home Medications:      Medication List      START taking these medications    folic acid 1 MG tablet  Commonly known as:  FOLVITE  Take 1 tablet (1 mg total) by mouth once daily.  Start taking on:  7/21/2018     metoprolol tartrate 50 MG tablet  Commonly known as:  LOPRESSOR  Take 1 tablet (50 mg total) by mouth 2 (two) times daily.     thiamine 100 MG tablet  Take 1 tablet (100 mg total) by mouth once daily.  Start taking on:  7/21/2018        CONTINUE taking these medications    amLODIPine 10 MG tablet  Commonly known as:  NORVASC  Take 10 mg by mouth once daily.     aspirin 81 MG Chew  Take 81 mg by mouth once daily.     atorvastatin 20 MG tablet  Commonly known as:  LIPITOR  Take 20 mg by mouth once daily.     citalopram 40 MG tablet  Commonly known as:  CELEXA  Take 40 mg by mouth once daily.     docusate sodium 100 MG capsule  Commonly known as:  COLACE  Take 1 capsule (100 mg total) by mouth 2 (two) times daily as needed for Constipation.     HYDROcodone-acetaminophen  mg per tablet  Commonly known as:  NORCO  Take 1 tablet by mouth every 6 (six) hours as needed for Pain.     losartan-hydrochlorothiazide 100-25 mg 100-25 mg per tablet  Commonly known as:  HYZAAR  Take 1 tablet by mouth once daily.     metFORMIN 500 MG  tablet  Commonly known as:  GLUCOPHAGE  Take 500 mg by mouth 2 (two) times daily with meals.     mupirocin 2 % ointment  Commonly known as:  BACTROBAN  Apply topically 3 (three) times daily.     polyethylene glycol 17 gram/dose powder  Commonly known as:  GLYCOLAX  Take 17 g by mouth once daily. Take daily to keep your bowel movements regular while taking pain medicines     temazepam 30 mg capsule  Commonly known as:  RESTORIL  Take 30 mg by mouth nightly as needed for Insomnia.            Indwelling Lines/Drains at time of discharge:   Lines/Drains/Airways          No matching active lines, drains, or airways          Time spent on the discharge of patient: 35 minutes  Patient was seen and examined on the date of discharge and determined to be suitable for discharge.         Kit Cohn MD  Department of Hospital Medicine  [facility]

## 2018-07-20 NOTE — CONSULTS
[facility]  Orthopedics  Consult Note    Patient Name: Lionel Castillo  MRN: [id number]  Admission Date: 7/18/2018  Hospital Length of Stay: 1 days  Attending Provider: Kit Cohn MD  Primary Care Provider: Eddie Garcia MD    Patient information was obtained from patient and ER records.     Inpatient consult to Orthopedic Surgery  Consult performed by: BRENNA CEBALLOS  Consult ordered by: AMMON GOODMAN        Subjective:     Principal Problem:Syncope    Chief Complaint:   Chief Complaint   Patient presents with    Fall     shoulder pain after a fall PTA, given 70 mcg fentanyl in route        HPI: Consult for right humerus fracture, the patient suffered this injury secondary to fall. Did report +LOC and struck head.  XR revealed fracture and patient was admitted to hospital medicine service. Denies and loss of sensation or pain out of proportion. Does report soreness to upper right arm    Past Medical History:   Diagnosis Date    Arthritis     Diabetes     Foot fracture     Hand fracture, left     Hand fracture, right     HTN (hypertension)     Humerus fracture 07/18/2018    Hyperlipidemia     Neuropathy     Renal disorder        Past Surgical History:   Procedure Laterality Date    ANKLE SURGERY Left     CHOLECYSTECTOMY         Review of patient's allergies indicates:   Allergen Reactions    Penicillins        Current Facility-Administered Medications   Medication    0.9%  NaCl infusion    acetaminophen tablet 650 mg    amLODIPine tablet 10 mg    aspirin EC tablet 81 mg    atorvastatin tablet 40 mg    chlordiazepoxide capsule 10 mg    citalopram tablet 40 mg    dextrose 50% injection 12.5 g    dextrose 50% injection 25 g    diphenhydrAMINE capsule 50 mg    folic acid tablet 1 mg    glucagon (human recombinant) injection 1 mg    glucose chewable tablet 16 g    glucose chewable tablet 24 g    heparin (porcine) injection 5,000 Units    hydrALAZINE  injection 10 mg    HYDROcodone-acetaminophen 5-325 mg per tablet 1 tablet    insulin aspart U-100 pen 0-5 Units    lorazepam (ATIVAN) injection 1 mg    meperidine (PF) injection 25 mg    metoprolol tartrate (LOPRESSOR) tablet 50 mg    nicotine 21 mg/24 hr 1 patch    ondansetron injection 4 mg    pantoprazole EC tablet 40 mg    promethazine (PHENERGAN) 12.5 mg in dextrose 5 % 50 mL IVPB    ramelteon tablet 8 mg    thiamine tablet 100 mg     Family History     Problem Relation (Age of Onset)    Heart disease Father, Paternal Grandfather    Miscarriages / Stillbirths Paternal Uncle, Paternal Grandfather        Social History Main Topics    Smoking status: Current Every Day Smoker     Packs/day: 1.00    Smokeless tobacco: Not on file    Alcohol use Yes      Comment: 6 beers per day    Drug use: No    Sexual activity: Not on file     Review of Systems   Constitution: Negative for chills, decreased appetite, diaphoresis and fever.   HENT: Negative for congestion, ear discharge and ear pain.    Eyes: Negative for blurred vision and double vision.   Cardiovascular: Negative for chest pain and irregular heartbeat.   Respiratory: Negative for cough, hemoptysis and shortness of breath.    Musculoskeletal: Positive for joint pain and joint swelling.   Gastrointestinal: Negative for abdominal pain, constipation and diarrhea.   Genitourinary: Negative for bladder incontinence, decreased libido and dysuria.   Neurological: Negative for dizziness and focal weakness.   Psychiatric/Behavioral: Negative for memory loss. The patient does not have insomnia.      Objective:     Vital Signs (Most Recent):  Temp: 97.4 °F (36.3 °C) (07/19/18 1942)  Pulse: (!) 59 (07/19/18 2314)  Resp: 18 (07/19/18 1541)  BP: (!) 165/98 (07/19/18 2314)  SpO2: (!) 88 % (07/19/18 2314) Vital Signs (24h Range):  Temp:  [96.7 °F (35.9 °C)-98.1 °F (36.7 °C)] 97.4 °F (36.3 °C)  Pulse:  [54-92] 59  Resp:  [18-22] 18  SpO2:  [88 %-94 %] 88 %  BP:  "(155-200)/(70-98) 165/98     Weight: 64.9 kg (143 lb)  Height: 5' 9" (175.3 cm)  Body mass index is 21.12 kg/m².      Intake/Output Summary (Last 24 hours) at 07/20/18 0018  Last data filed at 07/19/18 1541   Gross per 24 hour   Intake          1741.25 ml   Output              910 ml   Net           831.25 ml       General    Vitals reviewed.  Constitutional: He is oriented to person, place, and time. He appears well-developed and well-nourished. No distress.   HENT:   Head: Atraumatic.   Eyes: EOM are normal. Pupils are equal, round, and reactive to light.   Neck: No JVD present. No tracheal deviation present.   Cardiovascular: Regular rhythm.    bradycardia   Pulmonary/Chest: Effort normal and breath sounds normal. No respiratory distress. He has no wheezes.   Abdominal: Soft. Bowel sounds are normal. He exhibits no distension. There is no tenderness.   Neurological: He is alert and oriented to person, place, and time. He has normal reflexes. He displays normal reflexes. No cranial nerve deficit.   Psychiatric: He has a normal mood and affect.         Right Shoulder Exam     Inspection/Observation   Swelling: present  Bruising: present  Deformity: present    Tenderness   The patient is tender to palpation of the greater tuberosity.    Range of Motion   Active Abduction: abnormal     Painful rom of right shoulder, unable move right should due to pain, sling in place    Intact WE/WF/EPL/FPL/G/P, 2+RP, SILT R/M/U    Imaging Results          US Carotid Bilateral (Final result)  Result time 07/18/18 21:04:56    Final result by Junito Kee MD (07/18/18 21:04:56)                 Impression:      Marked atherosclerotic plaque noted bilaterally.  Findings consistent with a greater than 79% stenosis of the proximal right internal carotid artery and 50-69% stenosis involving the left internal carotid artery.      Electronically signed by: Junito Kee MD  Date:    07/18/2018  Time:    21:04             Narrative:    " EXAMINATION:  US CAROTID BILATERAL    CLINICAL HISTORY:  Syncope and collapse    COMPARISON:  None    FINDINGS:  Sonographic evaluation of the carotid systems was performed.    Marked atherosclerotic plaquing noted the carotid bulb and proximal internal carotid arteries bilaterally.    The peak systolic velocity in the right internal carotid artery was approximately 409 cm/sec.    The peak systolic velocity in the left internal carotid artery was [id number] cm/sec.    Antegrade flow noted in both vertebral arteries.    Stenosis percentage validated velocity measurements with angiographic measurements, velocity criteria are extrapolated from diameter data as defined by the Society of Radiologists in Ultrasound Consensus Conference Radiology 2003; 229;340-346.                               CT Head Without Contrast (Final result)  Result time 07/18/18 18:02:30    Final result by Junito Kee MD (07/18/18 18:02:30)                 Impression:      No acute findings.    All CT scans at this facility are performed  using dose modulation techniques as appropriate to performed exam including the following:  automated exposure control; adjustment of mA and/or kV according to the patients size (this includes techniques or standardized protocols for targeted exams where dose is matched to indication/reason for exam: i.e. extremities or head);  iterative reconstruction technique.      Electronically signed by: Junito Kee MD  Date:    07/18/2018  Time:    18:02             Narrative:    EXAMINATION:  CT HEAD WITHOUT CONTRAST    CLINICAL HISTORY:  syncope;    COMPARISON:  05/16/2017.    FINDINGS:  No intracranial hemorrhage or acute findings are demonstrated.  Mild cerebral atrophy.  Low-density changes in the periventricular matter suggests chronic small vessel ischemic change.  The visualized paranasal sinuses are clear. The calvarium is intact.  Intracranial atherosclerosis.                               X-Ray  Shoulder Trauma Right (Final result)  Result time 07/18/18 17:41:47    Final result by Junito Kee MD (07/18/18 17:41:47)                 Impression:      Comminuted right humeral neck fracture with slight angulation.      Electronically signed by: Junito Kee MD  Date:    07/18/2018  Time:    17:41             Narrative:    EXAMINATION:  XR SHOULDER TRAUMA 3 VIEW RIGHT    CLINICAL HISTORY:  Pain in unspecified shoulder    TECHNIQUE:  Standard radiography performed.    COMPARISON:  None    FINDINGS:  Comminuted fracture involving the right humeral neck with slight varus angulation.  Mild arthritic change involving the AC joint.                               X-Ray Chest AP Portable (Final result)  Result time 07/18/18 17:43:02    Final result by Junito Kee MD (07/18/18 17:43:02)                 Impression:      Multiple right-sided rib fractures which may be old with scarring or atelectasis in the right lung base along with a right pleural fluid collection unknown chronicity.  This finding was not identified on previous study 06/05/2017.      Electronically signed by: Junito Kee MD  Date:    07/18/2018  Time:    17:43             Narrative:    EXAMINATION:  XR CHEST AP PORTABLE    CLINICAL HISTORY:  syncope;    COMPARISON:  06/05/2017    FINDINGS:  Scarring right lung base with multiple right-sided rib fractures.  Right pleural fluid collection may be the sequelae of prior trauma.  This was now however evident on prior study dated above.  The left lung appears clear.                              Significant Labs:   Blood Culture: No results for input(s): LABBLOO in the last 48 hours.  BMP:   Recent Labs  Lab 07/18/18  1721  07/19/18  1218   GLU 97  < > 99   *  < > 125*   K 2.9*  < > 4.8   CL 89*  < > 96   CO2 18*  < > 19*   BUN 26*  < > 24*   CREATININE 2.4*  < > 1.9*   CALCIUM 8.6*  < > 8.6*   MG 1.7  --   --    < > = values in this interval not displayed.  CBC:   Recent Labs  Lab 07/18/18  1721  07/19/18  0820   WBC 8.09 8.77   HGB 12.5* 12.9*   HCT 33.7* 35.0*    263     CMP:   Recent Labs  Lab 07/18/18  1721  07/19/18  0435 07/19/18  0820 07/19/18  1218   *  < > 125* 126* 125*   K 2.9*  < > 3.7 4.1 4.8   CL 89*  < > 93* 94* 96   CO2 18*  < > 20* 23 19*   GLU 97  < > 116* 108 99   BUN 26*  < > 24* 25* 24*   CREATININE 2.4*  < > 2.0* 1.9* 1.9*   CALCIUM 8.6*  < > 8.4* 8.7 8.6*   PROT 5.6*  --   --   --   --    ALBUMIN 2.3*  --   --   --   --    BILITOT 0.4  --   --   --   --    ALKPHOS 88  --   --   --   --    AST 15  --   --   --   --    ALT 11  --   --   --   --    ANIONGAP 17*  < > 12 9 10   EGFRNONAA 27*  < > 34* 36* 36*   < > = values in this interval not displayed.  Coagulation:   Recent Labs  Lab 07/19/18  0044   LABPROT 9.4   INR 0.9     CRP: No results for input(s): CRP in the last 48 hours.  Lactic Acid: No results for input(s): LACTATE in the last 48 hours.  Pathology Results  (Last 10 years)    None        POCT Glucose:   Recent Labs  Lab 07/19/18  1101 07/19/18  1633 07/19/18  2159   POCTGLUCOSE 111* 112* 198*     Prealbumin: No results for input(s): PREALBUMIN in the last 48 hours.  Troponin:   Recent Labs  Lab 07/18/18  1721 07/18/18  2344 07/19/18  0435   TROPONINI 0.027* 0.024 0.024     Urine Culture: No results for input(s): LABURIN in the last 48 hours.  Urine Studies:   Recent Labs  Lab 07/18/18  2343   COLORU Yellow   APPEARANCEUA Clear   PHUR 6.0   SPECGRAV 1.025   PROTEINUA 3+*   GLUCUA Negative   KETONESU Negative   BILIRUBINUA Negative   OCCULTUA Trace*   NITRITE Negative   UROBILINOGEN Negative   LEUKOCYTESUR Negative   RBCUA 2   WBCUA 0   BACTERIA Rare   HYALINECASTS 0     All pertinent labs within the past 24 hours have been reviewed.    Significant Imaging: I have reviewed all pertinent imaging results/findings.    Assessment/Plan:     Active Diagnoses:    Diagnosis Date Noted POA    PRINCIPAL PROBLEM:  Syncope [R55] 07/18/2018 Yes    Essential hypertension [I10]  07/19/2018 Yes     Chronic    Type 2 diabetes mellitus, without long-term current use of insulin [E11.9] 07/19/2018 Yes     Chronic    Tobacco use [Z72.0] 07/19/2018 Yes     Chronic    Carotid stenosis, bilateral [I65.23] 07/19/2018 Yes    LBBB (left bundle branch block) [I44.7] 07/19/2018 Yes    Polysubstance abuse [F19.10] 07/19/2018 Yes    Alcohol abuse [F10.10] 07/18/2018 Yes     Chronic    Hyponatremia [E87.1] 07/18/2018 Yes    Hypokalemia [E87.6] 07/18/2018 Yes    Acute renal failure superimposed on stage 3 chronic kidney disease [N17.9, N18.3] 07/18/2018 Yes    Right humeral fracture [S42.301A] 07/18/2018 Yes      Problems Resolved During this Admission:    Diagnosis Date Noted Date Resolved POA       Thank you for your consult. I will follow-up with patient. Please contact us if you have any additional questions.    69 y/o with right humerus fracture, stable  1.) continue sling  2.) TEDs/SCDs  3.) care per [facility]  4.) plan for CT right humerus in am  5.) will discuss with Dr. Powell for further reccomendations  Bill Baird PA-C  Orthopedics  [facility]

## 2018-07-20 NOTE — PLAN OF CARE
Problem: Patient Care Overview  Goal: Plan of Care Review  Outcome: Ongoing (interventions implemented as appropriate)  Patient awake, alert and oriented. NSR/SB on the monitor. Neuro checks performed every 4 hours. Patient reports right upper arm pain, PRN Demerol administered as ordered. Right arm sling in place, right arm elevated on pillow. Patient voiding per bedside urinal, UOP adequate. VSS. Patient repositions with assistance. NS gtt continued. CT of right arm scheduled this AM. Call light in reach. Plan of care discussed with patient, will continue to monitor.

## 2018-07-20 NOTE — PROGRESS NOTES
Went over discharge instructions with patient.   Stressed importance of making and keeping all follow ups. Patient to schedule follow-up appointment with Dr. Garcia.  Patient and son verbalized understanding and has no questions in regards to discharge.  IV removed, catheter intact.  Telemetry box removed.  Patient taken to front of hospital by wheelchair.

## 2018-07-20 NOTE — PLAN OF CARE
CM met with patient at the bedside to discuss discharge plans.  CM provided a list of in network SNF providers, however patient states that he will not be going to a facility. CM asked about home health and he was unsure if he would accept home health.  CM inquired about help at home and he states that his son Mathew is in town.  CM requested that patient contact CM when his son arrives at hospital to further discuss discharge needs.

## 2018-07-20 NOTE — ASSESSMENT & PLAN NOTE
68-year-old male with right humerus fracture, stable  - Patient undergo CAT scan today a right humerus we will follow up  - patient care per primary team  -  continue sling  - will discuss with Dr. Powell for the recommendations after review of CAT scan

## 2018-07-20 NOTE — TELEPHONE ENCOUNTER
----- Message from Kurt Canseco MD sent at 7/20/2018 12:38 PM CDT -----  appt Monday last patient: CXR    Kurt Canseco MD

## 2018-07-20 NOTE — PROGRESS NOTES
[facility]  Orthopedics  Progress Note    Patient Name: Lionel Castillo  MRN: [id number]  Admission Date: 7/18/2018  Hospital Length of Stay: 1 days  Attending Provider: Kit Cohn MD  Primary Care Provider: Eddie Garcia MD    Subjective:     Principal Problem:Syncope    Principal Orthopedic Problem: right humerus fracture    Patient resting comfortably in bed with no new complaints. Patient to undergo CT of humor today    Interval History:   Review of patient's allergies indicates:   Allergen Reactions    Penicillins        Current Facility-Administered Medications   Medication    0.9%  NaCl infusion    acetaminophen tablet 650 mg    amLODIPine tablet 10 mg    aspirin EC tablet 81 mg    atorvastatin tablet 40 mg    chlordiazepoxide capsule 10 mg    citalopram tablet 40 mg    dextrose 50% injection 12.5 g    dextrose 50% injection 25 g    diphenhydrAMINE capsule 50 mg    folic acid tablet 1 mg    glucagon (human recombinant) injection 1 mg    glucose chewable tablet 16 g    glucose chewable tablet 24 g    heparin (porcine) injection 5,000 Units    hydrALAZINE injection 10 mg    HYDROcodone-acetaminophen 5-325 mg per tablet 1 tablet    insulin aspart U-100 pen 0-5 Units    lorazepam (ATIVAN) injection 1 mg    meperidine (PF) injection 25 mg    metoprolol tartrate (LOPRESSOR) tablet 50 mg    nicotine 21 mg/24 hr 1 patch    ondansetron injection 4 mg    pantoprazole EC tablet 40 mg    pneumoc 13-lisseth conj-dip cr(PF) 0.5 mL    promethazine (PHENERGAN) 12.5 mg in dextrose 5 % 50 mL IVPB    ramelteon tablet 8 mg    thiamine tablet 100 mg     Objective:     Vital Signs (Most Recent):  Temp: 97.9 °F (36.6 °C) (07/20/18 0720)  Pulse: 66 (07/20/18 0720)  Resp: 18 (07/20/18 0720)  BP: (!) 171/74 (07/20/18 0720)  SpO2: (!) 93 % (07/20/18 0720) Vital Signs (24h Range):  Temp:  [96 °F (35.6 °C)-97.9 °F (36.6 °C)] 97.9 °F (36.6 °C)  Pulse:  [54-67] 66  Resp:  [18]  "18  SpO2:  [86 %-93 %] 93 %  BP: (155-192)/(70-98) 171/74     Weight: 65 kg (143 lb 4.8 oz)  Height: 5' 9" (175.3 cm)  Body mass index is 21.16 kg/m².      Intake/Output Summary (Last 24 hours) at 07/20/18 0917  Last data filed at 07/20/18 0600   Gross per 24 hour   Intake             1800 ml   Output              750 ml   Net             1050 ml       Ortho/SPM Exam     AAOx3, no acute distress  No neurovascular changes    Significant Labs:   BMP:   Recent Labs  Lab 07/18/18  1721  07/20/18  0729   GLU 97  < > 120*   *  < > 127*   K 2.9*  < > 4.0   CL 89*  < > 99   CO2 18*  < > 18*   BUN 26*  < > 24*   CREATININE 2.4*  < > 1.7*   CALCIUM 8.6*  < > 8.9   MG 1.7  --   --    < > = values in this interval not displayed.  CBC:   Recent Labs  Lab 07/18/18  1721 07/19/18  0820 07/20/18  0729   WBC 8.09 8.77 11.98   HGB 12.5* 12.9* 13.2*   HCT 33.7* 35.0* 36.3*    263 256     CMP:   Recent Labs  Lab 07/18/18  1721  07/19/18  0820 07/19/18  1218 07/20/18  0729   *  < > 126* 125* 127*   K 2.9*  < > 4.1 4.8 4.0   CL 89*  < > 94* 96 99   CO2 18*  < > 23 19* 18*   GLU 97  < > 108 99 120*   BUN 26*  < > 25* 24* 24*   CREATININE 2.4*  < > 1.9* 1.9* 1.7*   CALCIUM 8.6*  < > 8.7 8.6* 8.9   PROT 5.6*  --   --   --   --    ALBUMIN 2.3*  --   --   --   --    BILITOT 0.4  --   --   --   --    ALKPHOS 88  --   --   --   --    AST 15  --   --   --   --    ALT 11  --   --   --   --    ANIONGAP 17*  < > 9 10 10   EGFRNONAA 27*  < > 36* 36* 41*   < > = values in this interval not displayed.  Coagulation:   Recent Labs  Lab 07/19/18  0044   LABPROT 9.4   INR 0.9     CRP: No results for input(s): CRP in the last 48 hours.  Lactic Acid: No results for input(s): LACTATE in the last 48 hours.  All pertinent labs within the past 24 hours have been reviewed.    68-year-old male with right humerus fracture, stable  - Patient undergo CAT scan today a right humerus we will follow up  - patient care per primary team  - continue " sling  - will discuss with Dr. Powell for the recommendations after review of CAT scan  Significant Imaging: will revieew upcoming CT SCAN    Assessment/Plan:     Other nondisplaced fracture of upper end of right humerus, initial encounter for closed fracture    68-year-old male with right humerus fracture, stable  - Patient undergo CAT scan today a right humerus we will follow up  - patient care per primary team  Dash continue sling  - will discuss with Dr. Powell for the recommendations after review of CAT scan              Bill Baird PA-C  Orthopedics  [facility]

## 2018-07-20 NOTE — PT/OT/SLP EVAL
Occupational Therapy   Evaluation    Name: Lionel Castillo  MRN: [id number]  Admitting Diagnosis:  Syncope      Recommendations:     Discharge Recommendations: nursing facility, skilled  Discharge Equipment Recommendations:  bedside commode, walker, rolling  Barriers to discharge:  Decreased caregiver support, Inaccessible home environment    History:     Occupational Profile:  Living Environment: LIVES ALONE IN 1 STORY HOUSE   Previous level of function: (I) WITH ADL'S MOD (I) WITH FUNCTIONAL MOBILITY  Roles and Routines: OCCUPATIONAL THERAPY  Equipment Owned:  rollator, shower chair (mini bike)  Assistance upon Discharge:     Past Medical History:   Diagnosis Date    Arthritis     Diabetes     Foot fracture     Hand fracture, left     Hand fracture, right     HTN (hypertension)     Humerus fracture 07/18/2018    Hyperlipidemia     Neuropathy     Renal disorder        Past Surgical History:   Procedure Laterality Date    ANKLE SURGERY Left     CHOLECYSTECTOMY         Subjective     Chief Complaint: DEBILITY AND GENERALIZED WEAKNESS  Patient/Family stated goals:   Communicated with: NURSE  AND EPIC CHART REVIEW prior to session.  Pain/Comfort:  · Pain Rating 1: 6/10  · Location - Side 1: Right  · Location - Orientation 1: upper  · Location 1: arm    Patients cultural, spiritual, [employer]al conflicts given the current situation:      Objective:     Patient found with: peripheral IV    General Precautions: Standard, fall   Orthopedic Precautions:RUE non weight bearing   Braces: Sling and swathe     Occupational Performance:    Bed Mobility:    · Patient completed Rolling/Turning to Left with  maximal assistance and 2 persons  · Patient completed Rolling/Turning to Right with maximal assistance and 2 persons  · Patient completed Scooting/Bridging with maximal assistance and 2 persons  · Patient completed Supine to Sit with maximal assistance and 2 persons  · Patient completed Sit to Supine with maximal  "assistance and 2 persons    Functional Mobility/Transfers:  · Patient completed Sit <> Stand Transfer with maximal assistance and of 2 persons  with  WITH AE   · Patient completed Toilet Transfer Stand Pivot technique with maximal assistance with  WITH AE  · Functional Mobility:     Activities of Daily Living:  · Upper Body Dressing: maximal assistance X1  · Lower Body Dressing: maximal assistance X1    Cognitive/Visual Perceptual:  Cognitive/Psychosocial Skills:     -       Oriented to: Person, Place, Time and Situation   -       Follows Commands/attention:Follows two-step commands  -       Communication: clear/fluent  -       Memory: No Deficits noted  -       Safety awareness/insight to disability: impaired   Visual/Perceptual:      -Intact    Physical Exam:  Upper Extremity Range of Motion:     -       Right Upper Extremity: NOT TESTED  Upper Extremity Strength:    -       Right Upper Extremity: NOT TESTED  -       Left Upper Extremity: MMT: 3/5 GROSSLY   Strength:    -       Right Upper Extremity: NOT TESTED  -       Left Upper Extremity: MMT: 3/5 GROSSLY    Patient left HOB elevated with all lines intact, call button in reach, bed alarm on and NURSE GEOFFREY notified AND NURSE GEOFFREY PRESENT    [facility] 6 Click:  [facility] Total Score: 12    Treatment & Education:    Education:    Assessment:     Lionel Castillo is a 68 y.o. male with a medical diagnosis of Syncope.  He presents with Performance deficits affecting function are weakness, impaired functional mobilty, decreased safety awareness, impaired endurance, impaired balance, impaired self care skills, decreased lower extremity function.      Rehab Prognosis:  FAIR+; patient would benefit from acute skilled OT services to address these deficits and reach maximum level of function.         Clinical Decision Making:     3.  OT High:  "Pt evaluation falls under high complexity for evaluation category due to 5+ performance deficits identified with comprehensive " "assessments and significant modifications/assistance required. An expanded review of history and occupational profile completed in addition to expanded review of physical, cognitive and psychosocial history. Several treatment options considered in care."     Plan:     Patient to be seen 3 x/week to address the above listed problems via self-care/home management, therapeutic exercises, therapeutic activities  · Plan of Care Expires: 07/27/18  · Plan of Care Reviewed with: patient    This Plan of care has been discussed with the patient who was involved in its development and understands and is in agreement with the identified goals and treatment plan    GOALS:    Occupational Therapy Goals        Problem: Occupational Therapy Goal    Goal Priority Disciplines Outcome Interventions   Occupational Therapy Goal     OT, PT/OT     Description:  Goals to be met by: 7-27-18    Patient will increase functional independence with ADLs by performing:    UE Dressing with MIN A   LE Dressing with Minimal Assistance and Moderate Assistance.  Toilet transfer to toilet with Moderate Assistance.                      Time Tracking:     OT Date of Treatment: 07/20/18  OT Start Time: 1001  OT Stop Time: 1025  OT Total Time (min): 24 min    Billable Minutes:Evaluation 10 MINUTES  Self Care/Home Management 14 MINUTES    Freda Marrero OT  7/20/2018    "

## 2018-07-23 ENCOUNTER — TELEPHONE (OUTPATIENT)
Dept: PULMONOLOGY | Facility: CLINIC | Age: 68
End: 2018-07-23

## 2018-07-23 NOTE — TELEPHONE ENCOUNTER
----- Message from Mariel Cuadra sent at 7/23/2018  9:05 AM CDT -----  Contact: PT   Pt request call back to see if the doctor's nurse needed to speak to him. Pt rescheduled his appointment. .[phone] (home)

## 2018-07-25 ENCOUNTER — HOSPITAL ENCOUNTER (EMERGENCY)
Facility: HOSPITAL | Age: 68
Discharge: HOME | End: 2018-07-26
Attending: EMERGENCY MEDICINE | Admitting: EMERGENCY MEDICINE
Payer: MEDICARE

## 2018-07-25 ENCOUNTER — HOSPITAL ENCOUNTER (EMERGENCY)
Facility: HOSPITAL | Age: 68
Discharge: HOME | End: 2018-07-25
Attending: EMERGENCY MEDICINE
Payer: COMMERCIAL

## 2018-07-25 VITALS
DIASTOLIC BLOOD PRESSURE: 78 MMHG | OXYGEN SATURATION: 96 % | HEIGHT: 69 IN | HEART RATE: 65 BPM | SYSTOLIC BLOOD PRESSURE: 164 MMHG | TEMPERATURE: 98 F | RESPIRATION RATE: 16 BRPM

## 2018-07-25 DIAGNOSIS — S42.351A CLOSED DISPLACED COMMINUTED FRACTURE OF SHAFT OF RIGHT HUMERUS, INITIAL ENCOUNTER: ICD-10-CM

## 2018-07-25 DIAGNOSIS — Z72.0 TOBACCO ABUSE: ICD-10-CM

## 2018-07-25 DIAGNOSIS — Z59.82 LACK OF ACCESS TO TRANSPORTATION: ICD-10-CM

## 2018-07-25 DIAGNOSIS — Z74.09 IMMOBILITY: Primary | ICD-10-CM

## 2018-07-25 DIAGNOSIS — Z78.9 DIFFICULTY WITH ACTIVITIES OF DAILY LIVING: ICD-10-CM

## 2018-07-25 DIAGNOSIS — M79.601 PAIN OF RIGHT UPPER EXTREMITY: Primary | ICD-10-CM

## 2018-07-25 PROCEDURE — 25000003 PHARM REV CODE 250: Performed by: NURSE PRACTITIONER

## 2018-07-25 PROCEDURE — 99284 EMERGENCY DEPT VISIT MOD MDM: CPT

## 2018-07-25 PROCEDURE — 99282 EMERGENCY DEPT VISIT SF MDM: CPT | Mod: 27

## 2018-07-25 RX ORDER — CYCLOBENZAPRINE HCL 10 MG
10 TABLET ORAL
Status: COMPLETED | OUTPATIENT
Start: 2018-07-25 | End: 2018-07-25

## 2018-07-25 RX ORDER — HYDROCODONE BITARTRATE AND ACETAMINOPHEN 10; 325 MG/1; MG/1
1 TABLET ORAL
Status: COMPLETED | OUTPATIENT
Start: 2018-07-25 | End: 2018-07-25

## 2018-07-25 RX ADMIN — HYDROCODONE BITARTRATE AND ACETAMINOPHEN 1 TABLET: 10; 325 TABLET ORAL at 07:07

## 2018-07-25 RX ADMIN — CYCLOBENZAPRINE HYDROCHLORIDE 10 MG: 10 TABLET, FILM COATED ORAL at 07:07

## 2018-07-25 SDOH — SOCIAL DETERMINANTS OF HEALTH (SDOH): TRANSPORTATION INSECURITY: Z59.82

## 2018-07-25 NOTE — ED PROVIDER NOTES
SCRIBE #1 NOTE: I, Daria Aragon, am scribing for, and in the presence of, Pranav Carr NP. I have scribed the entire note.      History      Chief Complaint   Patient presents with    Arm Injury     pt fell 5 days ago for fall and dx with a fx. Pt placed in sling and given ortho follow up which he missed twice. pt is complaining of arm pain.       Review of patient's allergies indicates:   Allergen Reactions    Penicillins         HPI   HPI    7/25/2018, 6:58 PM   History obtained from the patient      History of Present Illness: Lionel Castillo is a 68 y.o. male patient who presents to the Emergency Department for R arm pain. Symptoms are constant and moderate in severity. Pt reports coming to ED 5 days ago after fall. Pt reports fracturing his R arm and was placed in a sling. Pt was supposed to f/u with ortho but missed appointment twice because he could not get up to go. Pt reports his son is in charge of his pain medication and he read the label wrong and has only been giving pt 1 pill a day instead of 1 pill every 6 hours. No mitigating or exacerbating factors reported. No associated sxs at this time. Patient denies any CP, SOB, fever, chills, n/v/d, abd pain, numbness/weakness, and all other sxs at this time. No further complaints or concerns at this time.         Arrival mode:  EMS     PCP: Eddie Garcia MD       Past Medical History:  Past Medical History:   Diagnosis Date    Arthritis     Diabetes     Foot fracture     Hand fracture, left     Hand fracture, right     HTN (hypertension)     Humerus fracture 07/18/2018    Hyperlipidemia     Neuropathy     Renal disorder        Past Surgical History:  Past Surgical History:   Procedure Laterality Date    ANKLE SURGERY Left     CHOLECYSTECTOMY           Family History:  Family History   Problem Relation Age of Onset    Heart disease Father     Miscarriages / Stillbirths Paternal Uncle     Heart disease Paternal Grandfather      Miscarriages / Stillbirths Paternal Grandfather        Social History:  Social History     Social History Main Topics    Smoking status: Current Every Day Smoker     Packs/day: 1.00    Smokeless tobacco: Not given    Alcohol use Yes      Comment: 6 beers per day    Drug use: No    Sexual activity: Not given       ROS   Review of Systems   Constitutional: Negative for chills and fever.   HENT: Negative for sore throat.    Respiratory: Negative for shortness of breath.    Cardiovascular: Negative for chest pain.   Gastrointestinal: Negative for abdominal pain, diarrhea, nausea and vomiting.   Genitourinary: Negative for dysuria.   Musculoskeletal: Negative for back pain.        (+) R arm pain   Skin: Negative for rash.   Neurological: Negative for dizziness, syncope, facial asymmetry, weakness, numbness and headaches.   Hematological: Does not bruise/bleed easily.   All other systems reviewed and are negative.      Physical Exam      Initial Vitals [07/25/18 1801]   BP Pulse Resp Temp SpO2   (!) 164/78 65 16 97.9 °F (36.6 °C) 96 %      MAP       --          Physical Exam  Nursing Notes and Vital Signs Reviewed.  Constitutional: Patient is in no acute distress. Well-developed and well-nourished.  Head: Atraumatic. Normocephalic.  Eyes: PERRL. EOM intact. Conjunctivae are not pale. No scleral icterus.  ENT: Mucous membranes are moist. Oropharynx is clear and symmetric.    Neck: Supple. Full ROM. No lymphadenopathy.  Cardiovascular: Regular rate. Regular rhythm. No murmurs, rubs, or gallops. Distal pulses are 2+ and symmetric.  Pulmonary/Chest: No respiratory distress. Clear to auscultation bilaterally. No wheezing or rales.  Abdominal: Soft and non-distended.  There is no tenderness.  No rebound, guarding, or rigidity. Good bowel sounds.  Genitourinary: No CVA tenderness  Musculoskeletal: Moves all extremities. No obvious deformities. No edema. No calf tenderness.  RUE: has no evident deformity. Bruising and  "ecchymosis noted to R upper extremity with tenderness. Cap refill distally is <2 seconds. Radial pulses are equal and 2+ bilaterally. No motor deficit. No distal sensory deficit.  Skin: Warm and dry.  Neurological:  Alert, awake, and appropriate.  Normal speech.  No acute focal neurological deficits are appreciated.  Psychiatric: Normal affect. Good eye contact. Appropriate in content.    ED Course    Procedures  ED Vital Signs:  Vitals:    07/25/18 1801 07/25/18 1922   BP: (!) 164/78    Pulse: 65    Resp: 16    Temp: 97.9 °F (36.6 °C) 98.3 °F (36.8 °C)   TempSrc: Oral Oral   SpO2: 96%    Height: 5' 9" (1.753 m)               The Emergency Provider reviewed the vital signs and test results, which are outlined above.    ED Discussion     7:07 PM: Discussed with pt all pertinent ED information and results. Discussed pt dx and plan of tx. Gave pt all f/u and return to the ED instructions. All questions and concerns were addressed at this time. Pt expresses understanding of information and instructions, and is comfortable with plan to discharge. Pt is stable for discharge.        ED Medication(s):  Medications   HYDROcodone-acetaminophen  mg per tablet 1 tablet (1 tablet Oral Given 7/25/18 1926)   cyclobenzaprine tablet 10 mg (10 mg Oral Given 7/25/18 1926)       Discharge Medication List as of 7/25/2018  7:05 PM          Follow-up Information     [facility]. Schedule an appointment as soon as possible for a visit in 1 day.    Specialty:  Orthopedics  Contact information:  [address] [facility] [facility] [place] [address] [phone]  Additional information:  (off [facility]) 1st floor           [facility].    Specialty:  Emergency Medicine  Why:  As needed, If symptoms worsen  Contact information:  [address] [facility] [place] [address] [phone]                   Medical Decision Making              Scribe Attestation:   Scribe #1: I performed the " above scribed service and the documentation accurately describes the services I performed. I attest to the accuracy of the note.    Attending:   Physician Attestation Statement for Scribe #1: I, Pranav Carr NP, personally performed the services described in this documentation, as scribed by Daria Aragon, in my presence, and it is both accurate and complete.          Clinical Impression       ICD-10-CM ICD-9-CM   1. Pain of right upper extremity M79.601 729.5       Disposition:   Disposition: Discharged  Condition: Stable         Pranav Carr NP  07/25/18 2034

## 2018-07-26 VITALS
RESPIRATION RATE: 16 BRPM | BODY MASS INDEX: 21.16 KG/M2 | HEART RATE: 62 BPM | OXYGEN SATURATION: 95 % | DIASTOLIC BLOOD PRESSURE: 83 MMHG | SYSTOLIC BLOOD PRESSURE: 155 MMHG | WEIGHT: 143.31 LBS | TEMPERATURE: 98 F

## 2018-07-26 NOTE — ED NOTES
Pts son, Phuc, present in ED. Son was questioning what the plan was for the patient. Explained to son that the MD is wanting a  consult this morning to see what services are available for assistance to the patient. Son agreed and reports that both he and the patient need resources such as home health or rehab information. Informed son we will inform him when  is present in ED. Son verbalized understanding.

## 2018-07-26 NOTE — ED NOTES
Pt's status remains unchanged. NAD noted. RR e/u, airway open and patent. Will continue to monitor.

## 2018-07-26 NOTE — ED NOTES
Report received from Pearl CLEMENTS. Pt resting in bed. NAD noted. RR e/u, airway open and patent. AAO x 3. Will continue to monitor.

## 2018-07-26 NOTE — ED NOTES
Pt sleeping in the bed at this time. NAD noted. RR even and unlabored. Vitals stable. Pt awakens to verbal stimuli. Oriented x 3. Denies needs at this time. Pt awaiting  consult this morning. Will continue to monitor.

## 2018-07-26 NOTE — ED NOTES
Son stepped outside to smoke, informed that this is nonsmoking campus. States that he will be back to speak with ED doc.

## 2018-07-26 NOTE — ED NOTES
Stefano, , had extensive conversation with pts son and informed him we are actively working on both outpatient and inpatient resource options through rehab and home health. Plan of care developed with son and reports he will take patient home at this time and  will be in touch with son after hearing back from the rehab and home health services. Pt and family verbalized understanding. Pt discharged home at this time.
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
## 2018-07-26 NOTE — DISCHARGE INSTRUCTIONS
Patient will be contacted by [facility]  in am to establish HH (PT, OT, skilled nurse) as well as outpatient transportation for followup with orthopedics and PCP.

## 2018-07-26 NOTE — ED NOTES
[facility] called at [phone] and informed that patient has [facility] which does not qualify for transportation.

## 2018-07-26 NOTE — ED PROVIDER NOTES
SCRIBE #1 NOTE: I, Nicol Jenkins, am scribing for, and in the presence of, Shahbaz Omalley MD. I have scribed the entire note.      History      Chief Complaint   Patient presents with    Psychiatric Evaluation     son wants father to get help regarding care at home. Pt has not been f/u with ortho as ordered. Son concerned with self care       Review of patient's allergies indicates:   Allergen Reactions    Penicillins         HPI   HPI    7/25/2018, 10:46 PM   History obtained from the patient      History of Present Illness: Lionel Castillo is a 68 y.o. male patient who presents to the Emergency Department for evaluation. Patient was brought to the ED by his son who is concerned with lack of self care. Patient fractured his R humerus 5 days ago and has not been able to f/u with orthopedic surgeon secondary to what he describes as difficulty with mobility.  Patient states he uses a cane to help ambulate but is chronically weak and with the recent injury has been unable to get down his stairs at home.  Patient states he lives alone and his son lives in [place] but has recently come down to assist him. Patient states he is also having difficulty opening pill bottles and conducting ADL such as bathing. Patient reports attempting to contact the [place] [facility], but they have not contacted him back. Symptoms are constant and moderate in severity. No mitigating or exacerbating factors reported. Patient c/o R arm pain secondary to hx of fracture to R humerus. Patient denies any SI, HI, hallucinations, fever, chills, HA, CP, SOB, abd pain, N/V/D, extremity weakness/numbness, and all other sxs at this time. No further complaints or concerns at this time.     Arrival mode: Personal vehicle     PCP: Eddie Garcia MD       Past Medical History:  Past Medical History:   Diagnosis Date    Arthritis     Diabetes     Foot fracture     Hand fracture, left     Hand fracture, right     HTN (hypertension)      Humerus fracture 07/18/2018    Hyperlipidemia     Neuropathy     Renal disorder        Past Surgical History:  Past Surgical History:   Procedure Laterality Date    ANKLE SURGERY Left     CHOLECYSTECTOMY           Family History:  Family History   Problem Relation Age of Onset    Heart disease Father     Miscarriages / Stillbirths Paternal Uncle     Heart disease Paternal Grandfather     Miscarriages / Stillbirths Paternal Grandfather        Social History:  Social History     Social History Main Topics    Smoking status: Current Every Day Smoker     Packs/day: 1.00    Smokeless tobacco: Never Used    Alcohol use Yes      Comment: 6 beers per day    Drug use: No    Sexual activity: Unknown       ROS   Review of Systems   Constitutional: Negative for chills and fever.        (+) evaluation   HENT: Negative for sore throat.    Respiratory: Negative for shortness of breath.    Cardiovascular: Negative for chest pain.   Gastrointestinal: Negative for abdominal pain, diarrhea, nausea and vomiting.   Genitourinary: Negative for dysuria.   Musculoskeletal: Negative for back pain.        (+) R arm pain   Skin: Negative for rash.   Neurological: Negative for weakness, numbness and headaches.   Hematological: Does not bruise/bleed easily.   Psychiatric/Behavioral: Negative for hallucinations and suicidal ideas.        (-) HI   All other systems reviewed and are negative.      Physical Exam      Initial Vitals [07/25/18 2040]   BP Pulse Resp Temp SpO2   123/65 66 20 98.4 °F (36.9 °C) (!) 93 %      MAP       --          Physical Exam  Nursing Notes and Vital Signs Reviewed.  Constitutional: Patient is in no acute distress. Well-developed and well-nourished.  Head: Atraumatic. Normocephalic.  Eyes: PERRL. EOM intact. Conjunctivae are not pale. No scleral icterus.  ENT: Mucous membranes are moist. Oropharynx is clear and symmetric.    Neck: Supple. Full ROM. No lymphadenopathy.  Cardiovascular: Regular rate. Regular  rhythm. No murmurs, rubs, or gallops. Distal pulses are 2+ and symmetric.  Pulmonary/Chest: No respiratory distress. Clear to auscultation bilaterally. No wheezing or rales.  Abdominal: Soft and non-distended.  There is no tenderness.  No rebound, guarding, or rigidity. Good bowel sounds.  Genitourinary: No CVA tenderness  Musculoskeletal: No obvious deformities. No edema. No calf tenderness. RUE in sling. Substantial ecchymosis along R upper arm with appropriate tenderness, no lacerations. RUE neurovascularly intact.  Skin: Warm and dry.  Neurological:  Alert, awake, and appropriate. Oriented x3. Normal speech.  No acute focal neurological deficits are appreciated.  Psychiatric: Normal affect. Good eye contact. Appropriate in content. No SI. No HI. No hallucinations.    ED Course    Procedures  ED Vital Signs:  Vitals:    07/25/18 2040 07/25/18 2240   BP: 123/65 (!) 150/63   Pulse: 66 60   Resp: 20 17   Temp: 98.4 °F (36.9 °C) 97.8 °F (36.6 °C)   TempSrc: Oral Oral   SpO2: (!) 93% 96%   Weight: 65 kg (143 lb 4.8 oz)                 The Emergency Provider reviewed the vital signs and test results, which are outlined above.    ED Discussion     11:40 PM: Reassessed pt at this time. Discussed with pt all pertinent ED information and results. Discussed pt dx and plan of tx. Gave pt all f/u and return to the ED instructions. All questions and concerns were addressed at this time. Pt expresses understanding of information and instructions, and is comfortable with plan to discharge. Pt is stable for discharge.  Consults have been placed to social work and home health. Patient will need PT, OT, and skilled nurse along with transportation for f/u appointments.     I discussed with patient and/or family/caretaker that evaluation in the ED does not suggest any emergent or life threatening medical conditions requiring immediate intervention beyond what was provided in the ED, and I believe patient is safe for discharge.   Regardless, an unremarkable evaluation in the ED does not preclude the development or presence of a serious of life threatening condition. As such, patient was instructed to return immediately for any worsening or change in current symptoms.      ED Medication(s):  Medications - No data to display    New Prescriptions    No medications on file       Follow-up Information     Eddie Garcia MD. Schedule an appointment as soon as possible for a visit in 2 days.    Specialty:  Internal Medicine  Why:  for reassessment  Contact information:  [address] [place] [id number] [phone]             Orthopedics (per prior encounter). Schedule an appointment as soon as possible for a visit in 2 days.    Why:  for FU of humerus fracture                   Medical Decision Making              Scribe Attestation:   Scribe #1: I performed the above scribed service and the documentation accurately describes the services I performed. I attest to the accuracy of the note.    Attending:   Physician Attestation Statement for Scribe #1: I, Shahbaz Omalley MD, personally performed the services described in this documentation, as scribed by Nicol Jenkins, in my presence, and it is both accurate and complete.          Clinical Impression       ICD-10-CM ICD-9-CM   1. Immobility Z74.09 799.89   2. Closed displaced comminuted fracture of shaft of right humerus, initial encounter S42.351A 812.21   3. Tobacco abuse Z72.0 305.1   4. Difficulty with activities of daily living R53.81 799.3   5. Lack of access to transportation Z91.89 V15.89       Disposition:   Disposition: Discharged  Condition: Stable         Shahbaz Omalley MD  07/26/18 0521

## 2018-07-26 NOTE — ED NOTES
"Phcu Castillo, patient's son, called at ([phone]) and informed of father's discharge. Son left campus at 2140 without informing staff or patient and is refusing to  father. He began to yell over the phone because he is overwhelmed by the care of his father and would not allow the nurse to explain outpatient POC. Phuc asked that we "keep the patient for eight hours" so that he can sleep and eventually hung up the phone after ranting about the state of [employer] healthcare.  "

## 2018-07-26 NOTE — ED NOTES
[facility] [employer] called at [phone] concerning transportation services, customer service is only available during business hours.

## 2018-07-26 NOTE — ED NOTES
SW discussed with patient son and possible treatment options. Patient son was wanting inpatient rehab. [facility] and she is currently waiting for [facility] to make a determination. If inpatient rehab is not approved SW will seek home health services . Patient and his son is in agreement with plan.

## 2018-07-27 NOTE — ED NOTES
Patient was not approved for inpatient rehab.  JADIEL spoke with [facility] and was informed that the order for home health was cancelled (they were told that patient was going to rehab).  JADIEL spoke with patient son Phuc [phone]. JADIEL discussed with Phuc that the previous order for home health needs to be reinstated by the ordering provider.  Patient son stated that he would contact Dr. Garcia's office. JADIEL also informed Dr Garcia's office of the need to reinstate/resubmit the home health order.

## 2018-08-04 ENCOUNTER — HOSPITAL ENCOUNTER (INPATIENT)
Facility: HOSPITAL | Age: 68
LOS: 6 days | Discharge: NURSING, REHAB OR LONG-TERM CARE | DRG: 291 | End: 2018-08-10
Attending: EMERGENCY MEDICINE | Admitting: INTERNAL MEDICINE
Payer: MEDICARE

## 2018-08-04 DIAGNOSIS — I50.33 ACUTE ON CHRONIC DIASTOLIC CONGESTIVE HEART FAILURE: ICD-10-CM

## 2018-08-04 DIAGNOSIS — I50.9 ACUTE ON CHRONIC CONGESTIVE HEART FAILURE, UNSPECIFIED HEART FAILURE TYPE: Primary | ICD-10-CM

## 2018-08-04 DIAGNOSIS — R06.02 SOB (SHORTNESS OF BREATH): ICD-10-CM

## 2018-08-04 DIAGNOSIS — R09.02 HYPOXIA: ICD-10-CM

## 2018-08-04 PROBLEM — J96.01 ACUTE RESPIRATORY FAILURE WITH HYPOXIA: Status: ACTIVE | Noted: 2018-08-04

## 2018-08-04 LAB
ALBUMIN SERPL BCP-MCNC: 2 G/DL
ALLENS TEST: ABNORMAL
ALP SERPL-CCNC: 186 U/L
ALT SERPL W/O P-5'-P-CCNC: 10 U/L
ANION GAP SERPL CALC-SCNC: 14 MMOL/L
AST SERPL-CCNC: 26 U/L
BASOPHILS # BLD AUTO: 0.04 K/UL
BASOPHILS NFR BLD: 0.4 %
BILIRUB SERPL-MCNC: 0.6 MG/DL
BNP SERPL-MCNC: 3963 PG/ML
BUN SERPL-MCNC: 43 MG/DL
CALCIUM SERPL-MCNC: 9.7 MG/DL
CHLORIDE SERPL-SCNC: 97 MMOL/L
CO2 SERPL-SCNC: 20 MMOL/L
CREAT SERPL-MCNC: 2.8 MG/DL
DELSYS: ABNORMAL
DIFFERENTIAL METHOD: ABNORMAL
EOSINOPHIL # BLD AUTO: 0.1 K/UL
EOSINOPHIL NFR BLD: 1.1 %
ERYTHROCYTE [DISTWIDTH] IN BLOOD BY AUTOMATED COUNT: 14.2 %
EST. GFR  (AFRICAN AMERICAN): 26 ML/MIN/1.73 M^2
EST. GFR  (NON AFRICAN AMERICAN): 22 ML/MIN/1.73 M^2
FIO2: 36
FLOW: 4
GLUCOSE SERPL-MCNC: 134 MG/DL
GLUCOSE SERPL-MCNC: 138 MG/DL (ref 70–110)
HCO3 UR-SCNC: 20.7 MMOL/L (ref 24–28)
HCT VFR BLD AUTO: 33.5 %
HCT VFR BLD CALC: 30 %PCV (ref 36–54)
HGB BLD-MCNC: 12 G/DL
LACTATE SERPL-SCNC: 1.2 MMOL/L
LYMPHOCYTES # BLD AUTO: 1 K/UL
LYMPHOCYTES NFR BLD: 9 %
MCH RBC QN AUTO: 36.1 PG
MCHC RBC AUTO-ENTMCNC: 35.8 G/DL
MCV RBC AUTO: 101 FL
MODE: ABNORMAL
MONOCYTES # BLD AUTO: 0.7 K/UL
MONOCYTES NFR BLD: 6.1 %
NEUTROPHILS # BLD AUTO: 9.5 K/UL
NEUTROPHILS NFR BLD: 83.4 %
PCO2 BLDA: 27.7 MMHG (ref 35–45)
PH SMN: 7.48 [PH] (ref 7.35–7.45)
PLATELET # BLD AUTO: 461 K/UL
PMV BLD AUTO: 8.3 FL
PO2 BLDA: 53 MMHG (ref 80–100)
POC BE: -3 MMOL/L
POC IONIZED CALCIUM: 1.16 MMOL/L (ref 1.06–1.42)
POC SATURATED O2: 90 % (ref 95–100)
POCT GLUCOSE: 151 MG/DL (ref 70–110)
POCT GLUCOSE: 158 MG/DL (ref 70–110)
POCT GLUCOSE: 169 MG/DL (ref 70–110)
POTASSIUM BLD-SCNC: 3.2 MMOL/L (ref 3.5–5.1)
POTASSIUM SERPL-SCNC: 3.3 MMOL/L
PROT SERPL-MCNC: 6.4 G/DL
RBC # BLD AUTO: 3.32 M/UL
SAMPLE: ABNORMAL
SITE: ABNORMAL
SODIUM BLD-SCNC: 128 MMOL/L (ref 136–145)
SODIUM SERPL-SCNC: 131 MMOL/L
TROPONIN I SERPL DL<=0.01 NG/ML-MCNC: 0.01 NG/ML
TROPONIN I SERPL DL<=0.01 NG/ML-MCNC: 0.02 NG/ML
WBC # BLD AUTO: 11.39 K/UL

## 2018-08-04 PROCEDURE — 99285 EMERGENCY DEPT VISIT HI MDM: CPT | Mod: 25

## 2018-08-04 PROCEDURE — 96372 THER/PROPH/DIAG INJ SC/IM: CPT

## 2018-08-04 PROCEDURE — 82962 GLUCOSE BLOOD TEST: CPT

## 2018-08-04 PROCEDURE — 21400001 HC TELEMETRY ROOM

## 2018-08-04 PROCEDURE — 84132 ASSAY OF SERUM POTASSIUM: CPT

## 2018-08-04 PROCEDURE — 63600175 PHARM REV CODE 636 W HCPCS: Performed by: EMERGENCY MEDICINE

## 2018-08-04 PROCEDURE — 99900035 HC TECH TIME PER 15 MIN (STAT)

## 2018-08-04 PROCEDURE — 82330 ASSAY OF CALCIUM: CPT

## 2018-08-04 PROCEDURE — 25000003 PHARM REV CODE 250: Performed by: NURSE PRACTITIONER

## 2018-08-04 PROCEDURE — 96374 THER/PROPH/DIAG INJ IV PUSH: CPT

## 2018-08-04 PROCEDURE — 94640 AIRWAY INHALATION TREATMENT: CPT

## 2018-08-04 PROCEDURE — 99291 CRITICAL CARE FIRST HOUR: CPT | Mod: ,,, | Performed by: INTERNAL MEDICINE

## 2018-08-04 PROCEDURE — 82565 ASSAY OF CREATININE: CPT

## 2018-08-04 PROCEDURE — 87040 BLOOD CULTURE FOR BACTERIA: CPT

## 2018-08-04 PROCEDURE — 25000242 PHARM REV CODE 250 ALT 637 W/ HCPCS: Performed by: INTERNAL MEDICINE

## 2018-08-04 PROCEDURE — 83605 ASSAY OF LACTIC ACID: CPT

## 2018-08-04 PROCEDURE — 85025 COMPLETE CBC W/AUTO DIFF WBC: CPT

## 2018-08-04 PROCEDURE — 85014 HEMATOCRIT: CPT

## 2018-08-04 PROCEDURE — 94761 N-INVAS EAR/PLS OXIMETRY MLT: CPT

## 2018-08-04 PROCEDURE — 84295 ASSAY OF SERUM SODIUM: CPT

## 2018-08-04 PROCEDURE — 94760 N-INVAS EAR/PLS OXIMETRY 1: CPT

## 2018-08-04 PROCEDURE — 93010 ELECTROCARDIOGRAM REPORT: CPT | Mod: ,,, | Performed by: INTERNAL MEDICINE

## 2018-08-04 PROCEDURE — 27000190 HC CPAP FULL FACE MASK W/VALVE

## 2018-08-04 PROCEDURE — S4991 NICOTINE PATCH NONLEGEND: HCPCS | Performed by: NURSE PRACTITIONER

## 2018-08-04 PROCEDURE — 25000242 PHARM REV CODE 250 ALT 637 W/ HCPCS: Performed by: EMERGENCY MEDICINE

## 2018-08-04 PROCEDURE — 84484 ASSAY OF TROPONIN QUANT: CPT

## 2018-08-04 PROCEDURE — 80053 COMPREHEN METABOLIC PANEL: CPT

## 2018-08-04 PROCEDURE — 27000221 HC OXYGEN, UP TO 24 HOURS

## 2018-08-04 PROCEDURE — 82803 BLOOD GASES ANY COMBINATION: CPT

## 2018-08-04 PROCEDURE — 36600 WITHDRAWAL OF ARTERIAL BLOOD: CPT

## 2018-08-04 PROCEDURE — 94660 CPAP INITIATION&MGMT: CPT

## 2018-08-04 PROCEDURE — 63600175 PHARM REV CODE 636 W HCPCS: Performed by: NURSE PRACTITIONER

## 2018-08-04 PROCEDURE — 83880 ASSAY OF NATRIURETIC PEPTIDE: CPT

## 2018-08-04 RX ORDER — POTASSIUM CHLORIDE 20 MEQ/1
40 TABLET, EXTENDED RELEASE ORAL ONCE
Status: COMPLETED | OUTPATIENT
Start: 2018-08-04 | End: 2018-08-04

## 2018-08-04 RX ORDER — POLYETHYLENE GLYCOL 3350 17 G/17G
17 POWDER, FOR SOLUTION ORAL 2 TIMES DAILY PRN
Status: DISCONTINUED | OUTPATIENT
Start: 2018-08-04 | End: 2018-08-10 | Stop reason: HOSPADM

## 2018-08-04 RX ORDER — GLUCAGON 1 MG
1 KIT INJECTION
Status: DISCONTINUED | OUTPATIENT
Start: 2018-08-04 | End: 2018-08-10 | Stop reason: HOSPADM

## 2018-08-04 RX ORDER — POTASSIUM CHLORIDE 20 MEQ/1
40 TABLET, EXTENDED RELEASE ORAL ONCE
Status: DISCONTINUED | OUTPATIENT
Start: 2018-08-04 | End: 2018-08-04

## 2018-08-04 RX ORDER — PANTOPRAZOLE SODIUM 40 MG/1
40 TABLET, DELAYED RELEASE ORAL DAILY
COMMUNITY

## 2018-08-04 RX ORDER — IBUPROFEN 200 MG
24 TABLET ORAL
Status: DISCONTINUED | OUTPATIENT
Start: 2018-08-04 | End: 2018-08-10 | Stop reason: HOSPADM

## 2018-08-04 RX ORDER — METOPROLOL TARTRATE 50 MG/1
50 TABLET ORAL 2 TIMES DAILY
Status: DISCONTINUED | OUTPATIENT
Start: 2018-08-04 | End: 2018-08-10 | Stop reason: HOSPADM

## 2018-08-04 RX ORDER — INSULIN ASPART 100 [IU]/ML
0-5 INJECTION, SOLUTION INTRAVENOUS; SUBCUTANEOUS
Status: DISCONTINUED | OUTPATIENT
Start: 2018-08-04 | End: 2018-08-10 | Stop reason: HOSPADM

## 2018-08-04 RX ORDER — ONDANSETRON 2 MG/ML
4 INJECTION INTRAMUSCULAR; INTRAVENOUS EVERY 12 HOURS PRN
Status: DISCONTINUED | OUTPATIENT
Start: 2018-08-04 | End: 2018-08-10 | Stop reason: HOSPADM

## 2018-08-04 RX ORDER — ENOXAPARIN SODIUM 100 MG/ML
30 INJECTION SUBCUTANEOUS EVERY 24 HOURS
Status: DISCONTINUED | OUTPATIENT
Start: 2018-08-04 | End: 2018-08-10 | Stop reason: HOSPADM

## 2018-08-04 RX ORDER — HYDROCODONE BITARTRATE AND ACETAMINOPHEN 10; 325 MG/1; MG/1
1 TABLET ORAL EVERY 6 HOURS PRN
Status: DISCONTINUED | OUTPATIENT
Start: 2018-08-04 | End: 2018-08-10 | Stop reason: HOSPADM

## 2018-08-04 RX ORDER — PANTOPRAZOLE SODIUM 40 MG/1
40 TABLET, DELAYED RELEASE ORAL DAILY
Status: DISCONTINUED | OUTPATIENT
Start: 2018-08-04 | End: 2018-08-10 | Stop reason: HOSPADM

## 2018-08-04 RX ORDER — NAPROXEN SODIUM 220 MG/1
81 TABLET, FILM COATED ORAL DAILY
Status: DISCONTINUED | OUTPATIENT
Start: 2018-08-04 | End: 2018-08-10 | Stop reason: HOSPADM

## 2018-08-04 RX ORDER — FUROSEMIDE 10 MG/ML
60 INJECTION INTRAMUSCULAR; INTRAVENOUS 2 TIMES DAILY
Status: DISCONTINUED | OUTPATIENT
Start: 2018-08-04 | End: 2018-08-10 | Stop reason: HOSPADM

## 2018-08-04 RX ORDER — FUROSEMIDE 10 MG/ML
60 INJECTION INTRAMUSCULAR; INTRAVENOUS
Status: COMPLETED | OUTPATIENT
Start: 2018-08-04 | End: 2018-08-04

## 2018-08-04 RX ORDER — AMLODIPINE BESYLATE 10 MG/1
10 TABLET ORAL DAILY
Status: DISCONTINUED | OUTPATIENT
Start: 2018-08-04 | End: 2018-08-10 | Stop reason: HOSPADM

## 2018-08-04 RX ORDER — ATORVASTATIN CALCIUM 10 MG/1
20 TABLET, FILM COATED ORAL DAILY
Status: DISCONTINUED | OUTPATIENT
Start: 2018-08-04 | End: 2018-08-06

## 2018-08-04 RX ORDER — ACETAMINOPHEN 325 MG/1
650 TABLET ORAL EVERY 8 HOURS PRN
Status: DISCONTINUED | OUTPATIENT
Start: 2018-08-04 | End: 2018-08-10 | Stop reason: HOSPADM

## 2018-08-04 RX ORDER — ENOXAPARIN SODIUM 150 MG/ML
40 INJECTION SUBCUTANEOUS
Status: ON HOLD | COMMUNITY
End: 2018-08-10 | Stop reason: HOSPADM

## 2018-08-04 RX ORDER — IBUPROFEN 200 MG
1 TABLET ORAL
COMMUNITY

## 2018-08-04 RX ORDER — IBUPROFEN 200 MG
16 TABLET ORAL
Status: DISCONTINUED | OUTPATIENT
Start: 2018-08-04 | End: 2018-08-10 | Stop reason: HOSPADM

## 2018-08-04 RX ORDER — CITALOPRAM 20 MG/1
20 TABLET, FILM COATED ORAL DAILY
Status: DISCONTINUED | OUTPATIENT
Start: 2018-08-04 | End: 2018-08-10 | Stop reason: HOSPADM

## 2018-08-04 RX ORDER — RAMELTEON 8 MG/1
8 TABLET ORAL NIGHTLY PRN
Status: DISCONTINUED | OUTPATIENT
Start: 2018-08-04 | End: 2018-08-10 | Stop reason: HOSPADM

## 2018-08-04 RX ORDER — IPRATROPIUM BROMIDE AND ALBUTEROL SULFATE 2.5; .5 MG/3ML; MG/3ML
3 SOLUTION RESPIRATORY (INHALATION)
Status: COMPLETED | OUTPATIENT
Start: 2018-08-04 | End: 2018-08-04

## 2018-08-04 RX ORDER — IPRATROPIUM BROMIDE AND ALBUTEROL SULFATE 2.5; .5 MG/3ML; MG/3ML
3 SOLUTION RESPIRATORY (INHALATION) EVERY 6 HOURS
Status: DISCONTINUED | OUTPATIENT
Start: 2018-08-04 | End: 2018-08-10 | Stop reason: HOSPADM

## 2018-08-04 RX ORDER — IBUPROFEN 200 MG
1 TABLET ORAL
Status: DISCONTINUED | OUTPATIENT
Start: 2018-08-04 | End: 2018-08-10 | Stop reason: HOSPADM

## 2018-08-04 RX ADMIN — AMLODIPINE BESYLATE 10 MG: 10 TABLET ORAL at 10:08

## 2018-08-04 RX ADMIN — IPRATROPIUM BROMIDE AND ALBUTEROL SULFATE 3 ML: .5; 3 SOLUTION RESPIRATORY (INHALATION) at 08:08

## 2018-08-04 RX ADMIN — IPRATROPIUM BROMIDE AND ALBUTEROL SULFATE 3 ML: .5; 3 SOLUTION RESPIRATORY (INHALATION) at 12:08

## 2018-08-04 RX ADMIN — POLYETHYLENE GLYCOL (3350) 17 G: 17 POWDER, FOR SOLUTION ORAL at 09:08

## 2018-08-04 RX ADMIN — FUROSEMIDE 60 MG: 10 INJECTION, SOLUTION INTRAVENOUS at 05:08

## 2018-08-04 RX ADMIN — ATORVASTATIN CALCIUM 20 MG: 10 TABLET, FILM COATED ORAL at 10:08

## 2018-08-04 RX ADMIN — HYDROCODONE BITARTRATE AND ACETAMINOPHEN 1 TABLET: 10; 325 TABLET ORAL at 05:08

## 2018-08-04 RX ADMIN — IPRATROPIUM BROMIDE AND ALBUTEROL SULFATE 3 ML: .5; 3 SOLUTION RESPIRATORY (INHALATION) at 06:08

## 2018-08-04 RX ADMIN — FUROSEMIDE 60 MG: 10 INJECTION, SOLUTION INTRAMUSCULAR; INTRAVENOUS at 07:08

## 2018-08-04 RX ADMIN — ASPIRIN 81 MG 81 MG: 81 TABLET ORAL at 10:08

## 2018-08-04 RX ADMIN — CITALOPRAM HYDROBROMIDE 20 MG: 20 TABLET ORAL at 10:08

## 2018-08-04 RX ADMIN — RAMELTEON 8 MG: 8 TABLET, FILM COATED ORAL at 09:08

## 2018-08-04 RX ADMIN — NICOTINE 1 PATCH: 21 PATCH, EXTENDED RELEASE TRANSDERMAL at 10:08

## 2018-08-04 RX ADMIN — PANTOPRAZOLE SODIUM 40 MG: 40 TABLET, DELAYED RELEASE ORAL at 10:08

## 2018-08-04 RX ADMIN — METOPROLOL TARTRATE 50 MG: 50 TABLET, FILM COATED ORAL at 10:08

## 2018-08-04 RX ADMIN — METOPROLOL TARTRATE 50 MG: 50 TABLET, FILM COATED ORAL at 09:08

## 2018-08-04 RX ADMIN — ENOXAPARIN SODIUM 30 MG: 100 INJECTION SUBCUTANEOUS at 05:08

## 2018-08-04 RX ADMIN — HYDROCODONE BITARTRATE AND ACETAMINOPHEN 1 TABLET: 10; 325 TABLET ORAL at 11:08

## 2018-08-04 RX ADMIN — POTASSIUM CHLORIDE 40 MEQ: 1500 TABLET, EXTENDED RELEASE ORAL at 10:08

## 2018-08-04 NOTE — ED PROVIDER NOTES
SCRIBE #1 NOTE: I, Nicol Jenkins, am scribing for, and in the presence of, Buster Mccracken Jr., MD. I have scribed the HPI, ROS, and PEx.     SCRIBE #2 NOTE: I, Charline Boggs, am scribing for, and in the presence of,  Mike Morfin MD. I have scribed the remaining portions of the note not scribed by Scribe #1.     History      Chief Complaint   Patient presents with    Shortness of Breath     Pt from  Rehab. Reports SpO2 AT 80%. New onset of SOB. Pt does not wear home oxygen.       Review of patient's allergies indicates:   Allergen Reactions    Penicillins         HPI   HPI    8/4/2018, 5:35 AM   History obtained from the EMS and patient      History of Present Illness: Lionel Castillo is a 68 y.o. male patient who presents to the Emergency Department for SOB which onset gradually a few days ago. EMS reports patient was placed on non-rebreather mask on scene then switched to nasal cannula en route and was able to maintain SpO2 95%. Symptoms are constant and moderate in severity. No mitigating or exacerbating factors reported. No associated sxs included. Prior tx includes 40mg Lasix and duoneb at 2:30am. Patient denies any fever, chills, CP, leg swelling, dizziness, cough, N/V, extremity weakness/numbness, recent travel, long car rides, and all other sxs at this time. Patient currently resides at [facility] for R femoral fracture. No further complaints or concerns at this time.     Arrival mode:  [facility]    PCP: Eddie Garcia MD       Past Medical History:  Past Medical History:   Diagnosis Date    Arthritis     Diabetes     Foot fracture     Hand fracture, left     Hand fracture, right     HTN (hypertension)     Humerus fracture 07/18/2018    Hyperlipidemia     Neuropathy     Renal disorder        Past Surgical History:  Past Surgical History:   Procedure Laterality Date    ANKLE SURGERY Left     CHOLECYSTECTOMY      HUMERUS FRACTURE SURGERY           Family History:  Family History    Problem Relation Age of Onset    Heart disease Father     Miscarriages / Stillbirths Paternal Uncle     Heart disease Paternal Grandfather     Miscarriages / Stillbirths Paternal Grandfather        Social History:  Social History     Social History Main Topics    Smoking status: Current Every Day Smoker     Packs/day: 1.00    Smokeless tobacco: Never Used    Alcohol use Yes      Comment: 6 beers per day    Drug use: No    Sexual activity: Not on file       ROS   Review of Systems   Constitutional: Negative for chills and fever.   HENT: Negative for sore throat.    Respiratory: Positive for shortness of breath. Negative for cough.    Cardiovascular: Negative for chest pain and leg swelling.   Gastrointestinal: Negative for nausea and vomiting.   Genitourinary: Negative for dysuria.   Musculoskeletal: Negative for back pain.   Skin: Negative for rash.   Neurological: Negative for dizziness and weakness.   Hematological: Does not bruise/bleed easily.   All other systems reviewed and are negative.    Physical Exam      Initial Vitals [08/04/18 0532]   BP Pulse Resp Temp SpO2   (!) 170/73 68 16 97.8 °F (36.6 °C) (!) 87 %      MAP       --          Physical Exam  Nursing Notes and Vital Signs Reviewed.  Constitutional: Patient is in no acute distress. Well-developed and well-nourished.  Head: Atraumatic. Normocephalic.  Eyes: PERRL. EOM intact. Conjunctivae are not pale. No scleral icterus.  ENT: Mucous membranes are moist. Oropharynx is clear and symmetric.    Neck: Supple. Full ROM. No lymphadenopathy.  Cardiovascular: Regular rate. Regular rhythm. No murmurs, rubs, or gallops. Distal pulses are 2+ and symmetric.  Pulmonary/Chest: No respiratory distress. Crackles on R side.  Abdominal: Soft and non-distended.  There is no tenderness.  No rebound, guarding, or rigidity. Good bowel sounds.  Genitourinary: No CVA tenderness  Musculoskeletal: Moves all extremities. No obvious deformities. No edema. No calf  "tenderness.  Skin: Warm and dry.  Neurological:  Alert, awake, and appropriate.  Normal speech.  No acute focal neurological deficits are appreciated.  Psychiatric: Normal affect. Good eye contact. Appropriate in content.    ED Course    Critical Care  Date/Time: 8/4/2018 7:32 AM  Performed by: JADEN SINGH  Authorized by: JADEN SINGH   Direct patient critical care time: 13 minutes  Additional history critical care time: 8 minutes  Ordering / reviewing critical care time: 8 minutes  Documentation critical care time: 8 minutes  Consulting other physicians critical care time: 8 minutes  Total critical care time (exclusive of procedural time) : 45 minutes  Critical care time was exclusive of separately billable procedures and treating other patients.  Critical care was necessary to treat or prevent imminent or life-threatening deterioration of the following conditions: respiratory failure.  Critical care was time spent personally by me on the following activities: blood draw for specimens, development of treatment plan with patient or surrogate, discussions with consultants, interpretation of cardiac output measurements, examination of patient, evaluation of patient's response to treatment, obtaining history from patient or surrogate, ordering and performing treatments and interventions, ordering and review of laboratory studies, ordering and review of radiographic studies, pulse oximetry, re-evaluation of patient's condition and review of old charts.        ED Vital Signs:  Vitals:    08/04/18 0532 08/04/18 0536 08/04/18 0607 08/04/18 0618   BP: (!) 170/73      Pulse: 68 67 65    Resp: 16  20    Temp: 97.8 °F (36.6 °C)      TempSrc: Oral      SpO2: (!) 87%      Weight:    71.9 kg (158 lb 7 oz)   Height: 5' 9" (1.753 m)       08/04/18 0625 08/04/18 0645 08/04/18 0701 08/04/18 0715   BP: (!) 157/71 (!) 166/74 (!) 158/70    Pulse: 61 72 63    Resp: 20 20 (!) 100    Temp:    96.8 °F (36 °C)   TempSrc:    " Axillary   SpO2: 97% 96% 98%    Weight:       Height:           Abnormal Lab Results:  Labs Reviewed   CBC W/ AUTO DIFFERENTIAL - Abnormal; Notable for the following:        Result Value    RBC 3.32 (*)     Hemoglobin 12.0 (*)     Hematocrit 33.5 (*)      (*)     MCH 36.1 (*)     Platelets 461 (*)     MPV 8.3 (*)     Gran # (ANC) 9.5 (*)     Gran% 83.4 (*)     Lymph% 9.0 (*)     All other components within normal limits   COMPREHENSIVE METABOLIC PANEL - Abnormal; Notable for the following:     Sodium 131 (*)     Potassium 3.3 (*)     CO2 20 (*)     Glucose 134 (*)     BUN, Bld 43 (*)     Creatinine 2.8 (*)     Albumin 2.0 (*)     Alkaline Phosphatase 186 (*)     eGFR if  26 (*)     eGFR if non  22 (*)     All other components within normal limits   B-TYPE NATRIURETIC PEPTIDE - Abnormal; Notable for the following:     BNP 3,963 (*)     All other components within normal limits   POCT GLUCOSE - Abnormal; Notable for the following:     POCT Glucose 158 (*)     All other components within normal limits   ISTAT PROCEDURE - Abnormal; Notable for the following:     POC PH 7.481 (*)     POC PCO2 27.7 (*)     POC PO2 53 (*)     POC HCO3 20.7 (*)     POC SATURATED O2 90 (*)     POC Glucose 138 (*)     POC Sodium 128 (*)     POC Potassium 3.2 (*)     POC Hematocrit 30 (*)     All other components within normal limits   CULTURE, BLOOD   CULTURE, BLOOD   TROPONIN I   LACTIC ACID, PLASMA   TROPONIN I   POCT GLUCOSE MONITORING CONTINUOUS        All Lab Results:  Results for orders placed or performed during the hospital encounter of 08/04/18   CBC auto differential   Result Value Ref Range    WBC 11.39 3.90 - 12.70 K/uL    RBC 3.32 (L) 4.60 - 6.20 M/uL    Hemoglobin 12.0 (L) 14.0 - 18.0 g/dL    Hematocrit 33.5 (L) 40.0 - 54.0 %     (H) 82 - 98 fL    MCH 36.1 (H) 27.0 - 31.0 pg    MCHC 35.8 32.0 - 36.0 g/dL    RDW 14.2 11.5 - 14.5 %    Platelets 461 (H) 150 - 350 K/uL    MPV 8.3 (L) 9.2  - 12.9 fL    Gran # (ANC) 9.5 (H) 1.8 - 7.7 K/uL    Lymph # 1.0 1.0 - 4.8 K/uL    Mono # 0.7 0.3 - 1.0 K/uL    Eos # 0.1 0.0 - 0.5 K/uL    Baso # 0.04 0.00 - 0.20 K/uL    Gran% 83.4 (H) 38.0 - 73.0 %    Lymph% 9.0 (L) 18.0 - 48.0 %    Mono% 6.1 4.0 - 15.0 %    Eosinophil% 1.1 0.0 - 8.0 %    Basophil% 0.4 0.0 - 1.9 %    Differential Method Automated    Comprehensive metabolic panel   Result Value Ref Range    Sodium 131 (L) 136 - 145 mmol/L    Potassium 3.3 (L) 3.5 - 5.1 mmol/L    Chloride 97 95 - 110 mmol/L    CO2 20 (L) 23 - 29 mmol/L    Glucose 134 (H) 70 - 110 mg/dL    BUN, Bld 43 (H) 8 - 23 mg/dL    Creatinine 2.8 (H) 0.5 - 1.4 mg/dL    Calcium 9.7 8.7 - 10.5 mg/dL    Total Protein 6.4 6.0 - 8.4 g/dL    Albumin 2.0 (L) 3.5 - 5.2 g/dL    Total Bilirubin 0.6 0.1 - 1.0 mg/dL    Alkaline Phosphatase 186 (H) 55 - 135 U/L    AST 26 10 - 40 U/L    ALT 10 10 - 44 U/L    Anion Gap 14 8 - 16 mmol/L    eGFR if African American 26 (A) >60 mL/min/1.73 m^2    eGFR if non African American 22 (A) >60 mL/min/1.73 m^2   Troponin I #1   Result Value Ref Range    Troponin I 0.014 0.000 - 0.026 ng/mL   B-Type natriuretic peptide (BNP)   Result Value Ref Range    BNP 3,963 (H) 0 - 99 pg/mL   Lactic acid, plasma   Result Value Ref Range    Lactate (Lactic Acid) 1.2 0.5 - 2.2 mmol/L   POCT glucose   Result Value Ref Range    POCT Glucose 158 (H) 70 - 110 mg/dL   ISTAT PROCEDURE   Result Value Ref Range    POC PH 7.481 (H) 7.35 - 7.45    POC PCO2 27.7 (LL) 35 - 45 mmHg    POC PO2 53 (LL) 80 - 100 mmHg    POC HCO3 20.7 (L) 24 - 28 mmol/L    POC BE -3 -2 to 2 mmol/L    POC SATURATED O2 90 (L) 95 - 100 %    POC Glucose 138 (H) 70 - 110 mg/dL    POC Sodium 128 (L) 136 - 145 mmol/L    POC Potassium 3.2 (L) 3.5 - 5.1 mmol/L    POC Ionized Calcium 1.16 1.06 - 1.42 mmol/L    POC Hematocrit 30 (L) 36 - 54 %PCV    Sample ARTERIAL     Site LR     Allens Test Pass     DelSys Nasal Can     Mode SPONT     Flow 4     FiO2 36        Imaging  Results:  Imaging Results          X-Ray Chest AP Portable (In process)                Per ED physician, pt's CXR results in large R pleural effusion and bilateral pulmonary edema.    The EKG was ordered, reviewed, and independently interpreted by the ED provider.  Interpretation time: 0536  Rate: 67 BPM  Rhythm: Sinus rhythm with short SD  Interpretation: Septal infarct, age undetermined. Prolonged QT. Abnormal ECG. No STEMI.         The Emergency Provider reviewed the vital signs and test results, which are outlined above.    ED Discussion     5:59 AM: Dr. Mccracken transfers care of pt to Dr. Morfin, pending lab and imaging results.    7:28 AM: Discussed case with Dr. Huntley ([facility]). Dr. Moody agrees with current care and management of pt and accepts admission.   Admitting Service: Hospital medicine   Admitting Physician: Dr. Moody  Admit to: ICU    7:34 AM: Re-evaluated pt. I have discussed test results, shared treatment plan, and the need for admission with patient and family at bedside. Pt and family express understanding at this time and agree with all information. All questions answered. Pt and family have no further questions or concerns at this time. Pt is ready for admit.    ED Medication(s):  Medications   furosemide injection 60 mg (not administered)   albuterol-ipratropium 2.5 mg-0.5 mg/3 mL nebulizer solution 3 mL (3 mLs Nebulization Given 8/4/18 0607)       New Prescriptions    No medications on file             Medical Decision Making    Medical Decision Making:   Clinical Tests:   Lab Tests: Reviewed and Ordered  Radiological Study: Reviewed and Ordered  Medical Tests: Reviewed and Ordered           Scribe Attestation:   Scribe #1: I performed the above scribed service and the documentation accurately describes the services I performed. I attest to the accuracy of the note.    Attending:   Physician Attestation Statement for Scribe #1: I, Buster Mccracken Jr., MD, personally performed the  services described in this documentation, as scribed by Nicol Jenkins, in my presence, and it is both accurate and complete.       Scribe Attestation:   Scribe #2: I performed the above scribed service and the documentation accurately describes the services I performed. I attest to the accuracy of the note.    Attending Attestation:           Physician Attestation for Scribe:    Physician Attestation Statement for Scribe #2: I, Mike Morfin MD, reviewed documentation, as scribed by Charline Boggs in my presence, and it is both accurate and complete. I also acknowledge and confirm the content of the note done by Scribe #1.          Clinical Impression       ICD-10-CM ICD-9-CM   1. Acute on chronic congestive heart failure, unspecified heart failure type I50.9 428.0   2. SOB (shortness of breath) R06.02 786.05   3. Hypoxia R09.02 799.02       Disposition:   Disposition: Admitted  Condition: Serious         Mike Morfin MD  08/04/18 0735

## 2018-08-04 NOTE — H&P
[facility] Hospital Medicine  History & Physical    Patient Name: Lionel Castillo  MRN: [id number]  Admission Date: 8/4/2018  Attending Physician: Sejal Moody MD  Primary Care Provider: Eddie Garcia MD         Patient information was obtained from patient and ER records.     Subjective:     Principal Problem:Acute on chronic diastolic congestive heart failure    Chief Complaint:   Chief Complaint   Patient presents with    Shortness of Breath     Pt from  Rehab. Reports SpO2 AT 80%. New onset of SOB. Pt does not wear home oxygen.        HPI: Mr Castillo is a 68 year old male with PMHx of DM, diastolic CHF, carotid artery disease, Rt humerus fracture, right lower lobe lung mass, tobacco and ETOL abuse. He presented to [facility] Emergency Room with complaints increase shortness of breath that started about 2 am this morning. Denies associated symptoms of chest pain, palpitations, syncope, dizziness, fever, chill, nausea or vomiting. Patient was admitted to [facility] on 7/18/2018 after experiencing syncope episode with fall, was also noted to have right lower lobe lung mass during that admission as well.  [facility] Right humerus fractures. 2 D Echo on 7/19/2018 showed, normal left ventricular systolic function (EF 55-60%) and indeterminate LV diastolic function with moderate Aortic regurgitation.  BNP 3963. P O2 53. Troponin negative. He has received Lasix 60 mg IV in the Emergency Room. He is currently on BiPap to maintain stable O 2 sats. Patient reports he already scheduled to follow up with Orthopedic in clinic for Rt humerus. He is scheduled to see Dr Bailey on 8/10/2018 for Rt lower lower lobe lung mass.    Past Medical History:   Diagnosis Date    Arthritis     Diabetes     Foot fracture     Hand fracture, left     Hand fracture, right     HTN (hypertension)     Humerus fracture 07/18/2018    Hyperlipidemia     Neuropathy     Renal disorder         Past Surgical History:   Procedure Laterality Date    ANKLE SURGERY Left     CHOLECYSTECTOMY      HUMERUS FRACTURE SURGERY         Review of patient's allergies indicates:   Allergen Reactions    Penicillins        No current facility-administered medications on file prior to encounter.      Current Outpatient Prescriptions on File Prior to Encounter   Medication Sig    amlodipine (NORVASC) 10 MG tablet Take 10 mg by mouth once daily.    aspirin 81 MG Chew Take 81 mg by mouth once daily.    atorvastatin (LIPITOR) 20 MG tablet Take 20 mg by mouth once daily.    citalopram (CELEXA) 40 MG tablet Take 40 mg by mouth once daily.    docusate sodium (COLACE) 100 MG capsule Take 1 capsule (100 mg total) by mouth 2 (two) times daily as needed for Constipation.    folic acid (FOLVITE) 1 MG tablet Take 1 tablet (1 mg total) by mouth once daily.    HYDROcodone-acetaminophen (NORCO)  mg per tablet Take 1 tablet by mouth every 6 (six) hours as needed for Pain.    metoprolol tartrate (LOPRESSOR) 50 MG tablet Take 1 tablet (50 mg total) by mouth 2 (two) times daily.    temazepam (RESTORIL) 30 mg capsule Take 30 mg by mouth nightly as needed for Insomnia.    thiamine 100 MG tablet Take 1 tablet (100 mg total) by mouth once daily.    losartan-hydrochlorothiazide 100-25 mg (HYZAAR) 100-25 mg per tablet Take 1 tablet by mouth once daily.    metformin (GLUCOPHAGE) 500 MG tablet Take 500 mg by mouth 2 (two) times daily with meals.    mupirocin (BACTROBAN) 2 % ointment Apply topically 3 (three) times daily.    polyethylene glycol (GLYCOLAX) 17 gram/dose powder Take 17 g by mouth once daily. Take daily to keep your bowel movements regular while taking pain medicines     Family History     Problem Relation (Age of Onset)    Heart disease Father, Paternal Grandfather    Miscarriages / Stillbirths Paternal Uncle, Paternal Grandfather        Social History Main Topics    Smoking status: Current Every Day Smoker      Packs/day: 1.00    Smokeless tobacco: Never Used    Alcohol use Yes      Comment: 6 beers per day    Drug use: No    Sexual activity: Not on file     Review of Systems   Constitutional: Positive for fatigue. Negative for chills, diaphoresis and fever.   HENT: Negative for congestion, ear discharge, rhinorrhea, sinus pressure, sore throat and trouble swallowing.    Eyes: Negative for discharge and visual disturbance.   Respiratory: Positive for cough and shortness of breath. Negative for apnea, choking, chest tightness, wheezing and stridor.    Cardiovascular: Negative for chest pain, palpitations and leg swelling.   Gastrointestinal: Negative for abdominal distention, abdominal pain, diarrhea, nausea and vomiting.   Endocrine: Negative for cold intolerance and heat intolerance.   Genitourinary: Negative for dysuria, frequency and hematuria.   Musculoskeletal: Negative for arthralgias, back pain, myalgias and neck pain.   Skin: Negative for pallor and rash.   Neurological: Positive for weakness. Negative for dizziness, seizures, syncope and headaches.   Psychiatric/Behavioral: Negative for agitation, confusion and sleep disturbance.     Objective:     Vital Signs (Most Recent):  Temp: 96.8 °F (36 °C) (08/04/18 0715)  Pulse: 68 (08/04/18 0919)  Resp: (!) 29 (08/04/18 0919)  BP: (!) 173/76 (08/04/18 0900)  SpO2: 95 % (08/04/18 0919) Vital Signs (24h Range):  Temp:  [96.8 °F (36 °C)-97.8 °F (36.6 °C)] 96.8 °F (36 °C)  Pulse:  [61-72] 68  Resp:  [] 29  SpO2:  [87 %-98 %] 95 %  BP: (157-173)/(70-76) 173/76     Weight: 71.9 kg (158 lb 7 oz)  Body mass index is 23.4 kg/m².    Physical Exam   Constitutional: He is oriented to person, place, and time. No distress.   Eyes: Right eye exhibits no discharge. Left eye exhibits no discharge.   Neck: JVD present.   Cardiovascular: Exam reveals no gallop and no friction rub.    No murmur heard.  Pulmonary/Chest: No respiratory distress. He has decreased breath sounds in  the right lower field. He has no wheezes. He has no rales. He exhibits no tenderness.   Abdominal: Soft. Bowel sounds are normal. He exhibits no distension and no mass. There is no tenderness. There is no rebound and no guarding. No hernia.   Musculoskeletal: He exhibits no edema or deformity.   Rt upper extremely in sling, limit ROM  Fracture humerus from previous admission     Neurological: He is alert and oriented to person, place, and time.   Skin: Skin is warm and dry. Capillary refill takes less than 2 seconds. He is not diaphoretic.   Psychiatric: He has a normal mood and affect. His behavior is normal. Judgment and thought content normal.   Nursing note and vitals reviewed.          Significant Labs:   ABGs:   Recent Labs  Lab 08/04/18 0543   PH 7.481*   PCO2 27.7*   HCO3 20.7*   POCSATURATED 90*   BE -3     CBC:   Recent Labs  Lab 08/04/18 0543 08/04/18  0548   WBC  --  11.39   HGB  --  12.0*   HCT 30* 33.5*   PLT  --  461*     CMP:   Recent Labs  Lab 08/04/18  0548   *   K 3.3*   CL 97   CO2 20*   *   BUN 43*   CREATININE 2.8*   CALCIUM 9.7   PROT 6.4   ALBUMIN 2.0*   BILITOT 0.6   ALKPHOS 186*   AST 26   ALT 10   ANIONGAP 14   EGFRNONAA 22*     Cardiac Markers:   Recent Labs  Lab 08/04/18  0548   BNP 3,963*     Troponin:   Recent Labs  Lab 08/04/18  0548   TROPONINI 0.014       Significant Imaging:     Imaging Results          X-Ray Chest AP Portable (Final result)  Result time 08/04/18 09:28:51    Final result by Paris Trejo MD (08/04/18 09:28:51)                 Impression:      New small right pleural effusion.    Stable right lower lung opacity.      Electronically signed by: Paris Trejo MD  Date:    08/04/2018  Time:    09:28             Narrative:    EXAMINATION:  XR CHEST AP PORTABLE    CLINICAL HISTORY:  sob;    COMPARISON:  July 2018    FINDINGS:  Small right pleural effusion with a stable round 3cm right lower lung  opacity.  Left lung is clear.   Cardiomediastinal silhouette is within normal limits. No new osseous findings.                              Assessment/Plan:     * Acute on chronic diastolic congestive heart failure    Admit to Inpatient   Lasix 60 mg IV X 1 given in ED  Lasix 60 mg IV BID   Strict I & O  Daily weights  Wean BiPap as tolerated           Acute respiratory failure with hypoxia    Wean off BiPap as tolerated   O 2 keep sats greater than 92 %  Pulmonary Consult   Lasix 60 mg IV BID   Duo neb every 6 hours               Mass of right lung    Pulmonary Consulted          Carotid stenosis, bilateral        Follow up with Vascular Surgeon, Dr Zarco as outpatient  He was evaluated by Vascular surgeon during hospital admit 2 weeks ago and instructed to follow up as outpatient.           Tobacco use      Smoking cessation strongly encouraged        Type 2 diabetes mellitus, without long-term current use of insulin      Accu checks ACHS with low dose SS        Essential hypertension      Continue home Blood pressure medications         Right humeral fracture      Follow up with Orthopedic as outpatient  He was evaluated by Orthopedic surgeon during hospital admit 2 weeks ago and instructed to follow up as outpatient.             Alcohol abuse      ETOH cessation strongly advised           VTE Risk Mitigation         Ordered     enoxaparin injection 40 mg  Daily      08/04/18 1048             Juancarlos Stout NP  Department of Hospital Medicine   [facility]

## 2018-08-04 NOTE — ED NOTES
IV to left forearm, 22 ga. in place prior to arrival. Sling in place to right arm. CM in place showing SR rate of 62. Bipap in place, pt tolerating well. Bed in low position, call bell in reach, side rails up x 2.

## 2018-08-04 NOTE — HPI
Mr Castillo is a 68 year old male with PMHx of DM, diastolic CHF, carotid artery disease, Rt humerus fracture, right lower lobe lung mass, tobacco and ETOL abuse. He presented to [facility] Emergency Room with complaints increase shortness of breath that started about 2 am this morning. Denies associated symptoms of chest pain, palpitations, syncope, dizziness, fever, chill, nausea or vomiting. Patient was admitted to [facility] on 7/18/2018 after experiencing syncope episode with fall, was also noted to have right lower lobe lung mass during that admission as well.  [facility] resides [facility] hum[facility]. 2 D Echo on 7/19/2018 showed, normal left ventricular systolic function (EF 55-60%) and indeterminate LV diastolic function with moderate Aortic regurgitation.  BNP 3963. P O2 53. Troponin negative. He has received Lasix 60 mg IV in the Emergency Room. He is currently on BiPap to maintain stable O 2 sats. Patient reports he already scheduled to follow up with Orthopedic in clinic for Rt humerus. He is scheduled to see Dr Bailey on 8/10/2018 for Rt lower lower lobe lung mass.

## 2018-08-04 NOTE — PROGRESS NOTES
Patient is currently still at nasal cannula 5ltiers sats have now increased to 95%. Patient is sitting on the side of the bed eating and no current shortness of breath or distress noted at this time. Annalee CLEMENTS aware and RT will continue to monitor.

## 2018-08-04 NOTE — PLAN OF CARE
CM met with pt to begin discharge plan. Pt currently is being treated at [facility]. Lives at home alone and has support from neighbors. Son Mathew Castillo [phone]. Pt has hx of falls,     08/04/18 1638   Discharge Assessment   Assessment Type Discharge Planning Assessment   Confirmed/corrected address and phone number on facesheet? Yes   Assessment information obtained from? Patient   Expected Length of Stay (days) (TBD)   Communicated expected length of stay with patient/caregiver yes   Prior to hospitilization cognitive status: Alert/Oriented   Prior to hospitalization functional status: Assistive Equipment   Current cognitive status: Alert/Oriented   Current Functional Status: Assistive Equipment   Facility Arrived From: ([facility])   Lives With alone   Able to Return to Prior Arrangements yes   Is patient able to care for self after discharge? Yes   Who are your caregiver(s) and their phone number(s)? (Mathew Castillo (son) [phone])   Patient's perception of discharge disposition ( Rehab)   Readmission Within The Last 30 Days no previous admission in last 30 days   Patient currently being followed by outpatient case management? No   Patient currently receives any other outside agency services? Yes   Name and contact number of agency or person providing outside services ([facility] )   Equipment Currently Used at Home cane, straight;walker, rolling;rollator   Do you have any problems affording any of your prescribed medications? No   Is the patient taking medications as prescribed? yes   Does the patient have transportation home? Yes   Transportation Available family or friend will provide   Dialysis Name and Scheduled days (n/a)   Does the patient receive services at the [facility]mad[facility]? No   Discharge Plan A Home with family   Discharge Plan B Home   Patient/Family In Agreement With Plan yes    uses a cane, rolling walker and rollator. Denies having home oxygen. Had HH two years ago but unsure of  company name.   Discharge plan, Advance directive and [facility] discussed and information placed in transitional folder.  Choice received to return to  Rehab after discharge. Copy to pt and original placed in chart. Faxed via shana.  Payor: [employer]

## 2018-08-04 NOTE — PROGRESS NOTES
Took patient off of bipap to see how patient would tolerate a nasal cannula. Patient is currently at 4liters nasal cannula at sats are between 89-90%. Notified shahriar CLEMENTS of patients current status. Patients nasal cannula was then increased to 5liters.

## 2018-08-04 NOTE — HPI
67 y/o smoker with Hx of right lung mass presents with 1-2 day history of worsening shortness of breath. No fever or chills or sputum production. Past Hx of hypertension, recent humerus FX 7/18/2018 and renal insufficiency.No chest pain. No peripheral edema. Hx of multiple rib fractures on the right with abnormal Chest X Ray and chronic pleural thickening

## 2018-08-04 NOTE — ASSESSMENT & PLAN NOTE
Admit to Inpatient   Lasix 60 mg IV X 1 given in ED  Lasix 60 mg IV BID   Strict I & O  Daily weights  Wean BiPap as tolerated

## 2018-08-04 NOTE — PROGRESS NOTES
Pharmacist Renal Dose Adjustment Note    Lionel Castillo is a 68 y.o. male being treated with the medication Enoxaparin.     Patient Data:  Vital Signs (Most Recent):  Temp: 96.8 °F (36 °C) (08/04/18 0715)  Pulse: 70 (08/04/18 1031)  Resp: (!) 24 (08/04/18 1031)  BP: (!) 171/74 (08/04/18 1031)  SpO2: 99 % (08/04/18 1031)   Vital Signs (72h Range):  Temp:  [96.8 °F (36 °C)-97.8 °F (36.6 °C)]   Pulse:  [61-74]   Resp:  []   BP: (157-182)/(70-94)   SpO2:  [87 %-99 %]        Recent Labs     Lab 08/04/18  0548   CREATININE 2.8*     Serum creatinine: 2.8 mg/dL (H) 08/04/18 0548  Estimated creatinine clearance: 26 mL/min (A)    Medication dose will be changed to enoxaparin 30 mg SQ Q24H.     Pharmacist's Name: Bhavya March  Pharmacist's Extension: [phone]

## 2018-08-04 NOTE — CONSULTS
[facility]   Critical Care Medicine  Consult Note    Patient Name: Lionel Castillo  MRN: [id number]  Admission Date: 8/4/2018  Hospital Length of Stay: 0 days  Code Status: Full Code  Attending Physician: Sejal Moody MD   Primary Care Provider: Eddie Garcia MD   Principal Problem: Acute on chronic diastolic congestive heart failure      Subjective: improved after diuretics     HPI:  69 y/o smoker with Hx of right lung mass presents with 1-2 day history of worsening shortness of breath. No fever or chills or sputum production. Past Hx of hypertension, recent humerus FX 7/18/2018 and renal insufficiency.No chest pain. No peripheral edema. Hx of multiple rib fractures on the right with abnormal Chest X Ray and chronic pleural thickening  Seen in Emergency Room on Noninvasive Positive Pressure Ventilation and evaluated for admit to ICU    Hospital/ICU Course:  8/4 Improved in Emergency Room with diuretics and Noninvasive Positive Pressure Ventilation     Past Medical History:   Diagnosis Date    Arthritis     Diabetes     Foot fracture     Hand fracture, left     Hand fracture, right     HTN (hypertension)     Humerus fracture 07/18/2018    Hyperlipidemia     Neuropathy     Renal disorder        Past Surgical History:   Procedure Laterality Date    ANKLE SURGERY Left     CHOLECYSTECTOMY      HUMERUS FRACTURE SURGERY         Review of patient's allergies indicates:   Allergen Reactions    Penicillins        Family History     Problem Relation (Age of Onset)    Heart disease Father, Paternal Grandfather    Miscarriages / Stillbirths Paternal Uncle, Paternal Grandfather        Social History Main Topics    Smoking status: Current Every Day Smoker     Packs/day: 1.00    Smokeless tobacco: Never Used    Alcohol use Yes      Comment: 6 beers per day    Drug use: No    Sexual activity: Not on file         Review of Systems   Constitutional: Positive for diaphoresis.   HENT:  Positive for postnasal drip and rhinorrhea.    Respiratory: Positive for cough, shortness of breath and wheezing.    Cardiovascular: Positive for palpitations.   Gastrointestinal: Negative.    Endocrine: Negative.    Genitourinary: Negative.    Musculoskeletal: Positive for arthralgias.   Skin: Negative.    Allergic/Immunologic: Negative.    Neurological: Positive for weakness.   Hematological: Negative.      Objective:     Vital Signs (Most Recent):  Temp: 96.8 °F (36 °C) (08/04/18 0715)  Pulse: 74 (08/04/18 1016)  Resp: (!) 35 (08/04/18 1016)  BP: (!) 182/94 (08/04/18 1016)  SpO2: 99 % (08/04/18 1016) Vital Signs (24h Range):  Temp:  [96.8 °F (36 °C)-97.8 °F (36.6 °C)] 96.8 °F (36 °C)  Pulse:  [61-74] 74  Resp:  [] 35  SpO2:  [87 %-99 %] 99 %  BP: (157-182)/(70-94) 182/94     Weight: 71.9 kg (158 lb 7 oz)  Body mass index is 23.4 kg/m².      Intake/Output Summary (Last 24 hours) at 08/04/18 1029  Last data filed at 08/04/18 1018   Gross per 24 hour   Intake                0 ml   Output              675 ml   Net             -675 ml       Physical Exam   Constitutional: He is oriented to person, place, and time. He appears well-developed and well-nourished.   HENT:   Head: Normocephalic and atraumatic.   Eyes: Conjunctivae are normal. Pupils are equal, round, and reactive to light.   Neck: Neck supple. JVD present. No tracheal deviation present. No thyromegaly present.   Cardiovascular: Normal rate.  A regularly irregular rhythm present. PMI is displaced.    Murmur heard.   Systolic murmur is present with a grade of 2/6   Pulmonary/Chest: Effort normal. He has decreased breath sounds in the right lower field. He has rales in the right lower field and the left lower field.   Abdominal: Soft.   Musculoskeletal: He exhibits no edema.   Right arm in sling   Lymphadenopathy:     He has no cervical adenopathy.   Neurological: He is alert and oriented to person, place, and time.   Skin: Skin is warm and dry.    Nursing note and vitals reviewed.      Vents:  Oxygen Concentration (%): (S) 36 (08/04/18 0939)    Lines/Drains/Airways     Peripheral Intravenous Line                 Peripheral IV - Single Lumen 08/04/18 0536 Left Forearm less than 1 day                Significant Labs:    CBC/Anemia Profile:    Recent Labs  Lab 08/04/18  0543 08/04/18  0548   WBC  --  11.39   HGB  --  12.0*   HCT 30* 33.5*   PLT  --  461*   MCV  --  101*   RDW  --  14.2        Chemistries:    Recent Labs  Lab 08/04/18  0548   *   K 3.3*   CL 97   CO2 20*   BUN 43*   CREATININE 2.8*   CALCIUM 9.7   ALBUMIN 2.0*   PROT 6.4   BILITOT 0.6   ALKPHOS 186*   ALT 10   AST 26       BMP:   Recent Labs  Lab 08/04/18  0548   *   *   K 3.3*   CL 97   CO2 20*   BUN 43*   CREATININE 2.8*   CALCIUM 9.7     CMP:   Recent Labs  Lab 08/04/18  0548   *   K 3.3*   CL 97   CO2 20*   *   BUN 43*   CREATININE 2.8*   CALCIUM 9.7   PROT 6.4   ALBUMIN 2.0*   BILITOT 0.6   ALKPHOS 186*   AST 26   ALT 10   ANIONGAP 14   EGFRNONAA 22*     Cardiac Markers: No results for input(s): CKMB, TROPONINT, MYOGLOBIN in the last 48 hours.  All pertinent labs within the past 24 hours have been reviewed.    Significant Imaging:   CXR: I have reviewed all pertinent results/findings within the past 24 hours and my personal findings are:  right plerual effuison and lung mass, pulmonary edema    Assessment/Plan:     Pulmonary   Acute respiratory failure with hypoxia    8/4 Hold Noninvasive Positive Pressure Ventilation and switch to oxygen via nasal cannula        Mass of right lung    8/4 follow up an review of prior studies, ? Rounded atelectasis? Chronic pleural thickening on the right and associated old rib fractures        Cardiac/Vascular   * Acute on chronic diastolic congestive heart failure    8/4 continue diuretics, start b blocker           Chronic Obstructive Pulmonary Disease:   Jet nebs and monitor O2 sat. Noninvasive Positive Pressure  Ventilation at night if needed         Critical Care Daily Checklist:    A: Awake: RASS Goal/Actual Goal:    Actual:     B: Spontaneous Breathing Trial Performed?     C: SAT & SBT Coordinated?  na                      D: Delirium: CAM-ICU     E: Early Mobility Performed? No   F: Feeding Goal:    Status:     Current Diet Order   Procedures    Diet Low Sodium, 2gm [facility]; Cardiac (Low Na/Chol); Fluid - 1500mL     Order Specific Question:   Indicate patient location for additional diet options:     Answer:   [facility]     Order Specific Question:   Additional Diet Options:     Answer:   Cardiac (Low Na/Chol)     Order Specific Question:   Fluid restriction:     Answer:   Fluid - 1500mL      AS: Analgesia/Sedation adequate   T: Thromboembolic Prophylaxis yes   H: HOB > 300 Yes   U: Stress Ulcer Prophylaxis (if needed) y   G: Glucose Control adeuqate   B: Bowel Function Stool Occurrence: 0   I: Indwelling Catheter (Lines & Lay) Necessity needed   D: De-escalation of Antimicrobials/Pharmacotherapies na    Plan for the day/ETD Originally planned to go to ICU but could be managed on telemetry    Code Status:  Family/Goals of Care: Full Code  discusssed     Critical Care Time: 45 minutes  Critical secondary to Patient has a condition that poses threat to life and bodily function: Severe Respiratory Distress and diastolic Congestive Heart failure       Critical care was time spent personally by me on the following activities: development of treatment plan with patient or surrogate and bedside caregivers, discussions with consultants, evaluation of patient's response to treatment, examination of patient, ordering and performing treatments and interventions, ordering and review of laboratory studies, ordering and review of radiographic studies, pulse oximetry, re-evaluation of patient's condition. This critical care time did not overlap with that of any other provider or involve time for any  procedures.    Thank you for your consult. I will follow-up with patient. Please contact us if you have any additional questions.     Jelani Bailey MD  Critical Care Medicine  [facility]

## 2018-08-04 NOTE — ASSESSMENT & PLAN NOTE
8/4 follow up an review of prior studies, ? Rounded atelectasis? Chronic pleural thickening on the right and associated old rib fractures

## 2018-08-04 NOTE — HOSPITAL COURSE
8/4 Improved in Emergency Room with diuretics and Noninvasive Positive Pressure Ventilation   8/7 patient seen and examined.  Sitting on the edge of the bed.  On 7 L oxygen O2 sat 92%.  Trying to participate with physical therapy.  8/8 patient seen and examined, sitting in bed comfortable, today he is having nosebleed.  He is on 6 L O2 O2 sat 94%.  8/9 patient seen and examined today comfortable in bed no nosebleed today, remains on 6 L O2, O2 sat 94%  8/10 patient seen and examined, comfortable in bed, again nosebleed resolved, on 3 L oxygen O2 sat 93-94%.  No shortness of Breath no chest pain

## 2018-08-04 NOTE — PROGRESS NOTES
Patient is currently back in the bed at this time and 02 saturation decreased slighty to 90% but now is back to 96% on 5liters. And patient is not in distress.

## 2018-08-04 NOTE — PROGRESS NOTES
Decreased nasal cannula from 4liters to 3liters, and will continue to monitor.patient is tolerating well and no distress noted

## 2018-08-04 NOTE — PLAN OF CARE
Problem: Patient Care Overview  Goal: Individualization & Mutuality  Outcome: Ongoing (interventions implemented as appropriate)  Pt AAO  SR on tele  RUE in sling and swathe   Strict I&O   Daily weight   02 3 LPM saturating 89-95%  Bipap @HS  BG AC and HS   Fall precautions in place

## 2018-08-04 NOTE — PROGRESS NOTES
"Pt arrived to floor from ED. Placed to tele monitor box number [id number]. SR on tele BP (!) 179/81   Pulse 70   Temp 96.8 °F (36 °C) (Axillary) Comment (Src): bipap in place  Resp 18   Ht 5' 9" (1.753 m)   Wt 71.9 kg (158 lb 7 oz)   SpO2 96%   BMI 23.40 kg/m²   02 3 LPM   AAO  Urinal with in reach   RUE in sling and swathe per humerus fx.  Strict I&O   Daily weights   Bipap at HS   Fall precautions in place    "

## 2018-08-04 NOTE — SUBJECTIVE & OBJECTIVE
Past Medical History:   Diagnosis Date    Arthritis     Diabetes     Foot fracture     Hand fracture, left     Hand fracture, right     HTN (hypertension)     Humerus fracture 07/18/2018    Hyperlipidemia     Neuropathy     Renal disorder        Past Surgical History:   Procedure Laterality Date    ANKLE SURGERY Left     CHOLECYSTECTOMY      HUMERUS FRACTURE SURGERY         Review of patient's allergies indicates:   Allergen Reactions    Penicillins        Family History     Problem Relation (Age of Onset)    Heart disease Father, Paternal Grandfather    Miscarriages / Stillbirths Paternal Uncle, Paternal Grandfather        Social History Main Topics    Smoking status: Current Every Day Smoker     Packs/day: 1.00    Smokeless tobacco: Never Used    Alcohol use Yes      Comment: 6 beers per day    Drug use: No    Sexual activity: Not on file         Review of Systems   Constitutional: Positive for diaphoresis.   HENT: Positive for postnasal drip and rhinorrhea.    Respiratory: Positive for cough, shortness of breath and wheezing.    Cardiovascular: Positive for palpitations.   Gastrointestinal: Negative.    Endocrine: Negative.    Genitourinary: Negative.    Musculoskeletal: Positive for arthralgias.   Skin: Negative.    Allergic/Immunologic: Negative.    Neurological: Positive for weakness.   Hematological: Negative.      Objective:     Vital Signs (Most Recent):  Temp: 96.8 °F (36 °C) (08/04/18 0715)  Pulse: 74 (08/04/18 1016)  Resp: (!) 35 (08/04/18 1016)  BP: (!) 182/94 (08/04/18 1016)  SpO2: 99 % (08/04/18 1016) Vital Signs (24h Range):  Temp:  [96.8 °F (36 °C)-97.8 °F (36.6 °C)] 96.8 °F (36 °C)  Pulse:  [61-74] 74  Resp:  [] 35  SpO2:  [87 %-99 %] 99 %  BP: (157-182)/(70-94) 182/94     Weight: 71.9 kg (158 lb 7 oz)  Body mass index is 23.4 kg/m².      Intake/Output Summary (Last 24 hours) at 08/04/18 1029  Last data filed at 08/04/18 1018   Gross per 24 hour   Intake                0 ml    Output              675 ml   Net             -675 ml       Physical Exam   Constitutional: He is oriented to person, place, and time. He appears well-developed and well-nourished.   HENT:   Head: Normocephalic and atraumatic.   Eyes: Conjunctivae are normal. Pupils are equal, round, and reactive to light.   Neck: Neck supple. JVD present. No tracheal deviation present. No thyromegaly present.   Cardiovascular: Normal rate.  A regularly irregular rhythm present. PMI is displaced.    Murmur heard.   Systolic murmur is present with a grade of 2/6   Pulmonary/Chest: Effort normal. He has decreased breath sounds in the right lower field. He has rales in the right lower field and the left lower field.   Abdominal: Soft.   Musculoskeletal: He exhibits no edema.   Right arm in sling   Lymphadenopathy:     He has no cervical adenopathy.   Neurological: He is alert and oriented to person, place, and time.   Skin: Skin is warm and dry.   Nursing note and vitals reviewed.      Vents:  Oxygen Concentration (%): (S) 36 (08/04/18 0939)    Lines/Drains/Airways     Peripheral Intravenous Line                 Peripheral IV - Single Lumen 08/04/18 0536 Left Forearm less than 1 day                Significant Labs:    CBC/Anemia Profile:    Recent Labs  Lab 08/04/18  0543 08/04/18  0548   WBC  --  11.39   HGB  --  12.0*   HCT 30* 33.5*   PLT  --  461*   MCV  --  101*   RDW  --  14.2        Chemistries:    Recent Labs  Lab 08/04/18  0548   *   K 3.3*   CL 97   CO2 20*   BUN 43*   CREATININE 2.8*   CALCIUM 9.7   ALBUMIN 2.0*   PROT 6.4   BILITOT 0.6   ALKPHOS 186*   ALT 10   AST 26       BMP:   Recent Labs  Lab 08/04/18  0548   *   *   K 3.3*   CL 97   CO2 20*   BUN 43*   CREATININE 2.8*   CALCIUM 9.7     CMP:   Recent Labs  Lab 08/04/18  0548   *   K 3.3*   CL 97   CO2 20*   *   BUN 43*   CREATININE 2.8*   CALCIUM 9.7   PROT 6.4   ALBUMIN 2.0*   BILITOT 0.6   ALKPHOS 186*   AST 26   ALT 10   ANIONGAP 14    EGFRNONAA 22*     Cardiac Markers: No results for input(s): CKMB, TROPONINT, MYOGLOBIN in the last 48 hours.  All pertinent labs within the past 24 hours have been reviewed.    Significant Imaging:   CXR: I have reviewed all pertinent results/findings within the past 24 hours and my personal findings are:  right plerual effuison and lung mass, pulmonary edema

## 2018-08-04 NOTE — ASSESSMENT & PLAN NOTE
Follow up with Orthopedic as outpatient  He was evaluated by Orthopedic surgeon during hospital admit 2 weeks ago and instructed to follow up as outpatient.

## 2018-08-04 NOTE — SUBJECTIVE & OBJECTIVE
Past Medical History:   Diagnosis Date    Arthritis     Diabetes     Foot fracture     Hand fracture, left     Hand fracture, right     HTN (hypertension)     Humerus fracture 07/18/2018    Hyperlipidemia     Neuropathy     Renal disorder        Past Surgical History:   Procedure Laterality Date    ANKLE SURGERY Left     CHOLECYSTECTOMY      HUMERUS FRACTURE SURGERY         Review of patient's allergies indicates:   Allergen Reactions    Penicillins        No current facility-administered medications on file prior to encounter.      Current Outpatient Prescriptions on File Prior to Encounter   Medication Sig    amlodipine (NORVASC) 10 MG tablet Take 10 mg by mouth once daily.    aspirin 81 MG Chew Take 81 mg by mouth once daily.    atorvastatin (LIPITOR) 20 MG tablet Take 20 mg by mouth once daily.    citalopram (CELEXA) 40 MG tablet Take 40 mg by mouth once daily.    docusate sodium (COLACE) 100 MG capsule Take 1 capsule (100 mg total) by mouth 2 (two) times daily as needed for Constipation.    folic acid (FOLVITE) 1 MG tablet Take 1 tablet (1 mg total) by mouth once daily.    HYDROcodone-acetaminophen (NORCO)  mg per tablet Take 1 tablet by mouth every 6 (six) hours as needed for Pain.    metoprolol tartrate (LOPRESSOR) 50 MG tablet Take 1 tablet (50 mg total) by mouth 2 (two) times daily.    temazepam (RESTORIL) 30 mg capsule Take 30 mg by mouth nightly as needed for Insomnia.    thiamine 100 MG tablet Take 1 tablet (100 mg total) by mouth once daily.    losartan-hydrochlorothiazide 100-25 mg (HYZAAR) 100-25 mg per tablet Take 1 tablet by mouth once daily.    metformin (GLUCOPHAGE) 500 MG tablet Take 500 mg by mouth 2 (two) times daily with meals.    mupirocin (BACTROBAN) 2 % ointment Apply topically 3 (three) times daily.    polyethylene glycol (GLYCOLAX) 17 gram/dose powder Take 17 g by mouth once daily. Take daily to keep your bowel movements regular while taking pain  medicines     Family History     Problem Relation (Age of Onset)    Heart disease Father, Paternal Grandfather    Miscarriages / Stillbirths Paternal Uncle, Paternal Grandfather        Social History Main Topics    Smoking status: Current Every Day Smoker     Packs/day: 1.00    Smokeless tobacco: Never Used    Alcohol use Yes      Comment: 6 beers per day    Drug use: No    Sexual activity: Not on file     Review of Systems   Constitutional: Positive for fatigue. Negative for chills, diaphoresis and fever.   HENT: Negative for congestion, ear discharge, rhinorrhea, sinus pressure, sore throat and trouble swallowing.    Eyes: Negative for discharge and visual disturbance.   Respiratory: Positive for cough and shortness of breath. Negative for apnea, choking, chest tightness, wheezing and stridor.    Cardiovascular: Negative for chest pain, palpitations and leg swelling.   Gastrointestinal: Negative for abdominal distention, abdominal pain, diarrhea, nausea and vomiting.   Endocrine: Negative for cold intolerance and heat intolerance.   Genitourinary: Negative for dysuria, frequency and hematuria.   Musculoskeletal: Negative for arthralgias, back pain, myalgias and neck pain.   Skin: Negative for pallor and rash.   Neurological: Positive for weakness. Negative for dizziness, seizures, syncope and headaches.   Psychiatric/Behavioral: Negative for agitation, confusion and sleep disturbance.     Objective:     Vital Signs (Most Recent):  Temp: 96.8 °F (36 °C) (08/04/18 0715)  Pulse: 68 (08/04/18 0919)  Resp: (!) 29 (08/04/18 0919)  BP: (!) 173/76 (08/04/18 0900)  SpO2: 95 % (08/04/18 0919) Vital Signs (24h Range):  Temp:  [96.8 °F (36 °C)-97.8 °F (36.6 °C)] 96.8 °F (36 °C)  Pulse:  [61-72] 68  Resp:  [] 29  SpO2:  [87 %-98 %] 95 %  BP: (157-173)/(70-76) 173/76     Weight: 71.9 kg (158 lb 7 oz)  Body mass index is 23.4 kg/m².    Physical Exam   Constitutional: He is oriented to person, place, and time. No  distress.   Eyes: Right eye exhibits no discharge. Left eye exhibits no discharge.   Neck: JVD present.   Cardiovascular: Exam reveals no gallop and no friction rub.    No murmur heard.  Pulmonary/Chest: No respiratory distress. He has decreased breath sounds in the right lower field. He has no wheezes. He has no rales. He exhibits no tenderness.   Abdominal: Soft. Bowel sounds are normal. He exhibits no distension and no mass. There is no tenderness. There is no rebound and no guarding. No hernia.   Musculoskeletal: He exhibits no edema or deformity.   Rt upper extremely in sling, limit ROM  Fracture humerus from previous admission     Neurological: He is alert and oriented to person, place, and time.   Skin: Skin is warm and dry. Capillary refill takes less than 2 seconds. He is not diaphoretic.   Psychiatric: He has a normal mood and affect. His behavior is normal. Judgment and thought content normal.   Nursing note and vitals reviewed.          Significant Labs:   ABGs:   Recent Labs  Lab 08/04/18 0543   PH 7.481*   PCO2 27.7*   HCO3 20.7*   POCSATURATED 90*   BE -3     CBC:   Recent Labs  Lab 08/04/18 0543 08/04/18 0548   WBC  --  11.39   HGB  --  12.0*   HCT 30* 33.5*   PLT  --  461*     CMP:   Recent Labs  Lab 08/04/18 0548   *   K 3.3*   CL 97   CO2 20*   *   BUN 43*   CREATININE 2.8*   CALCIUM 9.7   PROT 6.4   ALBUMIN 2.0*   BILITOT 0.6   ALKPHOS 186*   AST 26   ALT 10   ANIONGAP 14   EGFRNONAA 22*     Cardiac Markers:   Recent Labs  Lab 08/04/18 0548   BNP 3,963*     Troponin:   Recent Labs  Lab 08/04/18 0548   TROPONINI 0.014       Significant Imaging:     Imaging Results          X-Ray Chest AP Portable (Final result)  Result time 08/04/18 09:28:51    Final result by Paris Trejo MD (08/04/18 09:28:51)                 Impression:      New small right pleural effusion.    Stable right lower lung opacity.      Electronically signed by: Paris Trejo  MD  Date:    08/04/2018  Time:    09:28             Narrative:    EXAMINATION:  XR CHEST AP PORTABLE    CLINICAL HISTORY:  sob;    COMPARISON:  July 2018    FINDINGS:  Small right pleural effusion with a stable round 3cm right lower lung  opacity.  Left lung is clear.  Cardiomediastinal silhouette is within normal limits. No new osseous findings.

## 2018-08-04 NOTE — ASSESSMENT & PLAN NOTE
Wean off BiPap as tolerated   O 2 keep sats greater than 92 %  Pulmonary Consult   Lasix 60 mg IV BID   Duo neb every 6 hours

## 2018-08-05 LAB
ALBUMIN SERPL BCP-MCNC: 1.9 G/DL
ALP SERPL-CCNC: 196 U/L
ALT SERPL W/O P-5'-P-CCNC: 11 U/L
ANION GAP SERPL CALC-SCNC: 13 MMOL/L
AST SERPL-CCNC: 15 U/L
BASOPHILS # BLD AUTO: 0.04 K/UL
BASOPHILS NFR BLD: 0.5 %
BILIRUB SERPL-MCNC: 0.4 MG/DL
BUN SERPL-MCNC: 42 MG/DL
CALCIUM SERPL-MCNC: 9.3 MG/DL
CHLORIDE SERPL-SCNC: 98 MMOL/L
CO2 SERPL-SCNC: 22 MMOL/L
CREAT SERPL-MCNC: 2.8 MG/DL
DIFFERENTIAL METHOD: ABNORMAL
EOSINOPHIL # BLD AUTO: 0.1 K/UL
EOSINOPHIL NFR BLD: 1.5 %
ERYTHROCYTE [DISTWIDTH] IN BLOOD BY AUTOMATED COUNT: 14.1 %
EST. GFR  (AFRICAN AMERICAN): 26 ML/MIN/1.73 M^2
EST. GFR  (NON AFRICAN AMERICAN): 22 ML/MIN/1.73 M^2
GLUCOSE SERPL-MCNC: 123 MG/DL
HCT VFR BLD AUTO: 32.4 %
HGB BLD-MCNC: 11.5 G/DL
LYMPHOCYTES # BLD AUTO: 1.5 K/UL
LYMPHOCYTES NFR BLD: 17.6 %
MCH RBC QN AUTO: 36.1 PG
MCHC RBC AUTO-ENTMCNC: 35.5 G/DL
MCV RBC AUTO: 102 FL
MONOCYTES # BLD AUTO: 0.7 K/UL
MONOCYTES NFR BLD: 8.2 %
NEUTROPHILS # BLD AUTO: 6 K/UL
NEUTROPHILS NFR BLD: 72.2 %
PLATELET # BLD AUTO: 458 K/UL
PMV BLD AUTO: 8.4 FL
POCT GLUCOSE: 120 MG/DL (ref 70–110)
POCT GLUCOSE: 132 MG/DL (ref 70–110)
POCT GLUCOSE: 174 MG/DL (ref 70–110)
POCT GLUCOSE: 219 MG/DL (ref 70–110)
POTASSIUM SERPL-SCNC: 3.8 MMOL/L
PROT SERPL-MCNC: 6.1 G/DL
RBC # BLD AUTO: 3.19 M/UL
SODIUM SERPL-SCNC: 133 MMOL/L
WBC # BLD AUTO: 8.26 K/UL

## 2018-08-05 PROCEDURE — 94640 AIRWAY INHALATION TREATMENT: CPT

## 2018-08-05 PROCEDURE — 25000242 PHARM REV CODE 250 ALT 637 W/ HCPCS: Performed by: INTERNAL MEDICINE

## 2018-08-05 PROCEDURE — 63600175 PHARM REV CODE 636 W HCPCS: Performed by: EMERGENCY MEDICINE

## 2018-08-05 PROCEDURE — 36415 COLL VENOUS BLD VENIPUNCTURE: CPT

## 2018-08-05 PROCEDURE — 94760 N-INVAS EAR/PLS OXIMETRY 1: CPT

## 2018-08-05 PROCEDURE — 63600175 PHARM REV CODE 636 W HCPCS: Performed by: NURSE PRACTITIONER

## 2018-08-05 PROCEDURE — 80053 COMPREHEN METABOLIC PANEL: CPT

## 2018-08-05 PROCEDURE — 85025 COMPLETE CBC W/AUTO DIFF WBC: CPT

## 2018-08-05 PROCEDURE — 25000003 PHARM REV CODE 250: Performed by: NURSE PRACTITIONER

## 2018-08-05 PROCEDURE — 21400001 HC TELEMETRY ROOM

## 2018-08-05 PROCEDURE — 94761 N-INVAS EAR/PLS OXIMETRY MLT: CPT

## 2018-08-05 PROCEDURE — 27000221 HC OXYGEN, UP TO 24 HOURS

## 2018-08-05 PROCEDURE — S4991 NICOTINE PATCH NONLEGEND: HCPCS | Performed by: NURSE PRACTITIONER

## 2018-08-05 RX ADMIN — IPRATROPIUM BROMIDE AND ALBUTEROL SULFATE 3 ML: .5; 3 SOLUTION RESPIRATORY (INHALATION) at 09:08

## 2018-08-05 RX ADMIN — ENOXAPARIN SODIUM 30 MG: 100 INJECTION SUBCUTANEOUS at 06:08

## 2018-08-05 RX ADMIN — METOPROLOL TARTRATE 50 MG: 50 TABLET, FILM COATED ORAL at 09:08

## 2018-08-05 RX ADMIN — FUROSEMIDE 60 MG: 10 INJECTION, SOLUTION INTRAVENOUS at 09:08

## 2018-08-05 RX ADMIN — IPRATROPIUM BROMIDE AND ALBUTEROL SULFATE 3 ML: .5; 3 SOLUTION RESPIRATORY (INHALATION) at 12:08

## 2018-08-05 RX ADMIN — INSULIN ASPART 2 UNITS: 100 INJECTION, SOLUTION INTRAVENOUS; SUBCUTANEOUS at 11:08

## 2018-08-05 RX ADMIN — NICOTINE 1 PATCH: 21 PATCH, EXTENDED RELEASE TRANSDERMAL at 09:08

## 2018-08-05 RX ADMIN — FUROSEMIDE 60 MG: 10 INJECTION, SOLUTION INTRAVENOUS at 06:08

## 2018-08-05 RX ADMIN — RAMELTEON 8 MG: 8 TABLET, FILM COATED ORAL at 09:08

## 2018-08-05 RX ADMIN — CITALOPRAM HYDROBROMIDE 20 MG: 20 TABLET ORAL at 09:08

## 2018-08-05 RX ADMIN — HYDROCODONE BITARTRATE AND ACETAMINOPHEN 1 TABLET: 10; 325 TABLET ORAL at 06:08

## 2018-08-05 RX ADMIN — AMLODIPINE BESYLATE 10 MG: 10 TABLET ORAL at 09:08

## 2018-08-05 RX ADMIN — PANTOPRAZOLE SODIUM 40 MG: 40 TABLET, DELAYED RELEASE ORAL at 09:08

## 2018-08-05 RX ADMIN — ATORVASTATIN CALCIUM 20 MG: 10 TABLET, FILM COATED ORAL at 09:08

## 2018-08-05 RX ADMIN — POLYETHYLENE GLYCOL (3350) 17 G: 17 POWDER, FOR SOLUTION ORAL at 12:08

## 2018-08-05 RX ADMIN — HYDROCODONE BITARTRATE AND ACETAMINOPHEN 1 TABLET: 10; 325 TABLET ORAL at 12:08

## 2018-08-05 RX ADMIN — ASPIRIN 81 MG 81 MG: 81 TABLET ORAL at 09:08

## 2018-08-05 RX ADMIN — IPRATROPIUM BROMIDE AND ALBUTEROL SULFATE 3 ML: .5; 3 SOLUTION RESPIRATORY (INHALATION) at 05:08

## 2018-08-05 RX ADMIN — HYDROCODONE BITARTRATE AND ACETAMINOPHEN 1 TABLET: 10; 325 TABLET ORAL at 05:08

## 2018-08-05 NOTE — PROGRESS NOTES
[facility] Hospital Medicine  Progress Note    Patient Name: Lionel Castillo  MRN: [id number]  Patient Class: IP- Inpatient   Admission Date: 8/4/2018  Length of Stay: 1 days  Attending Physician: Sejal Moody MD  Primary Care Provider: Eddie Garcia MD        Subjective:     Principal Problem:Acute on chronic diastolic congestive heart failure    HPI:  Mr Castillo is a 68 year old male with PMHx of DM, diastolic CHF, carotid artery disease, Rt humerus fracture, right lower lobe lung mass, tobacco and ETOL abuse. He presented to [facility] Emergency Room with complaints increase shortness of breath that started about 2 am this morning. Denies associated symptoms of chest pain, palpitations, syncope, dizziness, fever, chill, nausea or vomiting. Patient was admitted to [facility] on 7/18/2018 after experiencing syncope episode with fall, was also noted to have right lower lobe lung mass during that admission as well.  [facility] currently resides at [facility] for Right humerus fractures. 2 D Echo on 7/19/2018 showed, normal left ventricular systolic function (EF 55-60%) and indeterminate LV diastolic function with moderate Aortic regurgitation.  BNP 3963. P O2 53. Troponin negative. He has received Lasix 60 mg IV in the Emergency Room. He is currently on BiPap to maintain stable O 2 sats. Patient reports he already scheduled to follow up with Orthopedic in clinic for Rt humerus. He is scheduled to see Dr Bailey on 8/10/2018 for Rt lower lower lobe lung mass.    Hospital Course:  Pt admitted to Telemetry Unit for acute on chronic diastolic congestive heart failure.  Pt treated with IV Lasix and supplemental oxygen.  Chest xray showed moderate amount of pleural thickening versus pleural fluid throughout the right hemithorax with stable mixed interstitial and alveolar opacities throughout the lungs, which can be associated with pneumonia, pulmonary edema or ARDS.  Hyponatremia improved.  H/H stable.   BUN/Creatinine 42/2.8.  Pt counseled on smoking cessation with understanding verbalized.      Interval History: pt stable overnight with mild improvement verbalized.  Current plan of care continued.  Pt afebrile and vital signs stable.  Echo results reviewed.  Pulmonology following.    Review of Systems   Constitutional: Positive for fatigue. Negative for chills, diaphoresis and fever.   HENT: Negative for congestion, ear discharge, rhinorrhea, sinus pressure, sore throat and trouble swallowing.    Eyes: Negative for discharge and visual disturbance.   Respiratory: Positive for cough and shortness of breath. Negative for apnea, choking, chest tightness, wheezing and stridor.    Cardiovascular: Negative for chest pain, palpitations and leg swelling.   Gastrointestinal: Negative for abdominal distention, abdominal pain, diarrhea, nausea and vomiting.   Endocrine: Negative for cold intolerance and heat intolerance.   Genitourinary: Negative for dysuria, frequency and hematuria.   Musculoskeletal: Negative for arthralgias, back pain, myalgias and neck pain.   Skin: Negative for pallor and rash.   Neurological: Positive for weakness. Negative for dizziness, seizures, syncope and headaches.   Psychiatric/Behavioral: Negative for agitation, confusion and sleep disturbance.     Objective:     Vital Signs (Most Recent):  Temp: 98 °F (36.7 °C) (08/05/18 1136)  Pulse: 73 (08/05/18 1339)  Resp: 20 (08/05/18 1252)  BP: (!) 163/70 (08/05/18 1136)  SpO2: 96 % (unable to obatin) (08/05/18 1252) Vital Signs (24h Range):  Temp:  [96.9 °F (36.1 °C)-98 °F (36.7 °C)] 98 °F (36.7 °C)  Pulse:  [63-80] 73  Resp:  [18-24] 20  SpO2:  [87 %-96 %] 96 %  BP: (140-171)/(65-76) 163/70     Weight: 64.9 kg (143 lb 1.3 oz)  Body mass index is 21.13 kg/m².    Intake/Output Summary (Last 24 hours) at 08/05/18 1530  Last data filed at 08/05/18 1400   Gross per 24 hour   Intake             1030 ml   Output             1600 ml   Net             -570 ml       Physical Exam   Constitutional: He is oriented to person, place, and time. No distress.   HENT:   Head: Normocephalic.   Mouth/Throat: Oropharynx is clear and moist.   Eyes: Conjunctivae are normal. Right eye exhibits no discharge. Left eye exhibits no discharge.   Neck: Normal range of motion. JVD present.   Cardiovascular: Normal rate, regular rhythm, normal heart sounds and intact distal pulses.  Exam reveals no gallop and no friction rub.    No murmur heard.  Pulmonary/Chest: No respiratory distress. He has decreased breath sounds in the right middle field and the right lower field. He has no wheezes. He has no rales. He exhibits no tenderness.   Abdominal: Soft. Bowel sounds are normal. He exhibits no distension and no mass. There is no tenderness. There is no rebound and no guarding. No hernia.   Genitourinary:   Genitourinary Comments: Deferred   Musculoskeletal: He exhibits no edema or deformity.   Rt upper extremely in sling, limit ROM  Fracture humerus from previous admission     Neurological: He is alert and oriented to person, place, and time.   Skin: Skin is warm and dry. Capillary refill takes less than 2 seconds. He is not diaphoretic.   Psychiatric: He has a normal mood and affect. His behavior is normal. Judgment and thought content normal.   Nursing note and vitals reviewed.      Significant Labs:   CBC:   Recent Labs  Lab 08/04/18  0543 08/04/18  0548 08/05/18  0406   WBC  --  11.39 8.26   HGB  --  12.0* 11.5*   HCT 30* 33.5* 32.4*   PLT  --  461* 458*     CMP:   Recent Labs  Lab 08/04/18  0548 08/05/18  0406   * 133*   K 3.3* 3.8   CL 97 98   CO2 20* 22*   * 123*   BUN 43* 42*   CREATININE 2.8* 2.8*   CALCIUM 9.7 9.3   PROT 6.4 6.1   ALBUMIN 2.0* 1.9*   BILITOT 0.6 0.4   ALKPHOS 186* 196*   AST 26 15   ALT 10 11   ANIONGAP 14 13   EGFRNONAA 22* 22*     Troponin:   Recent Labs  Lab 08/04/18  0548 08/04/18  0915   TROPONINI 0.014 0.016       Significant Imaging:   Imaging Results           X-Ray Chest AP Portable (Final result)  Result time 08/04/18 09:28:51    Final result by Paris Trejo MD (08/04/18 09:28:51)                 Impression:      New small right pleural effusion.    Stable right lower lung opacity.      Electronically signed by: Paris Trejo MD  Date:    08/04/2018  Time:    09:28             Narrative:    EXAMINATION:  XR CHEST AP PORTABLE    CLINICAL HISTORY:  sob;    COMPARISON:  July 2018    FINDINGS:  Small right pleural effusion with a stable round 3cm right lower lung  opacity.  Left lung is clear.  Cardiomediastinal silhouette is within normal limits. No new osseous findings.                              Assessment/Plan:      * Acute on chronic diastolic congestive heart failure    Admit to Inpatient   Lasix 60 mg IV X 1 given in ED  Lasix 60 mg IV BID   Strict I & O  Daily weights  Wean BiPap as tolerated   -Echo results reviewed          Acute respiratory failure with hypoxia    Wean off BiPap as tolerated   O 2 keep sats greater than 92 %  Pulmonary Consult   Lasix 60 mg IV BID   Duo neb every 6 hours               Mass of right lung    Pulmonary Consulted          Carotid stenosis, bilateral    Follow up with Vascular Surgeon, Dr Zarco as outpatient  He was evaluated by Vascular surgeon during hospital admit 2 weeks ago and instructed to follow up as outpatient.           Tobacco use    Smoking cessation strongly encouraged        Type 2 diabetes mellitus, without long-term current use of insulin    Accu checks ACHS with low dose SS        Essential hypertension    Continue home Blood pressure medications         Right humeral fracture    -present upon admission  Follow up with Orthopedic as outpatient  He was evaluated by Orthopedic surgeon during hospital admit 2 weeks ago and instructed to follow up as outpatient.             Alcohol abuse    ETOH cessation strongly advised           VTE Risk Mitigation         Ordered     enoxaparin  injection 30 mg  Daily      08/04/18 1048              Kandace Michel NP  Department of Hospital Medicine   [facility]

## 2018-08-05 NOTE — ASSESSMENT & PLAN NOTE
Follow up with Vascular Surgeon, Dr Zarco as outpatient  He was evaluated by Vascular surgeon during hospital admit 2 weeks ago and instructed to follow up as outpatient.

## 2018-08-05 NOTE — SUBJECTIVE & OBJECTIVE
Interval History: pt stable overnight with mild improvement verbalized.  Current plan of care continued.  Pt afebrile and vital signs stable.  Echo results reviewed.      Review of Systems   Constitutional: Positive for fatigue. Negative for chills, diaphoresis and fever.   HENT: Negative for congestion, ear discharge, rhinorrhea, sinus pressure, sore throat and trouble swallowing.    Eyes: Negative for discharge and visual disturbance.   Respiratory: Positive for cough and shortness of breath. Negative for apnea, choking, chest tightness, wheezing and stridor.    Cardiovascular: Negative for chest pain, palpitations and leg swelling.   Gastrointestinal: Negative for abdominal distention, abdominal pain, diarrhea, nausea and vomiting.   Endocrine: Negative for cold intolerance and heat intolerance.   Genitourinary: Negative for dysuria, frequency and hematuria.   Musculoskeletal: Negative for arthralgias, back pain, myalgias and neck pain.   Skin: Negative for pallor and rash.   Neurological: Positive for weakness. Negative for dizziness, seizures, syncope and headaches.   Psychiatric/Behavioral: Negative for agitation, confusion and sleep disturbance.     Objective:     Vital Signs (Most Recent):  Temp: 98 °F (36.7 °C) (08/05/18 1136)  Pulse: 73 (08/05/18 1339)  Resp: 20 (08/05/18 1252)  BP: (!) 163/70 (08/05/18 1136)  SpO2: 96 % (unable to obatin) (08/05/18 1252) Vital Signs (24h Range):  Temp:  [96.9 °F (36.1 °C)-98 °F (36.7 °C)] 98 °F (36.7 °C)  Pulse:  [63-80] 73  Resp:  [18-24] 20  SpO2:  [87 %-96 %] 96 %  BP: (140-171)/(65-76) 163/70     Weight: 64.9 kg (143 lb 1.3 oz)  Body mass index is 21.13 kg/m².    Intake/Output Summary (Last 24 hours) at 08/05/18 1530  Last data filed at 08/05/18 1400   Gross per 24 hour   Intake             1030 ml   Output             1600 ml   Net             -570 ml      Physical Exam   Constitutional: He is oriented to person, place, and time. No distress.   HENT:   Head:  Normocephalic.   Mouth/Throat: Oropharynx is clear and moist.   Eyes: Conjunctivae are normal. Right eye exhibits no discharge. Left eye exhibits no discharge.   Neck: Normal range of motion. JVD present.   Cardiovascular: Normal rate, regular rhythm, normal heart sounds and intact distal pulses.  Exam reveals no gallop and no friction rub.    No murmur heard.  Pulmonary/Chest: No respiratory distress. He has decreased breath sounds in the right middle field and the right lower field. He has no wheezes. He has no rales. He exhibits no tenderness.   Abdominal: Soft. Bowel sounds are normal. He exhibits no distension and no mass. There is no tenderness. There is no rebound and no guarding. No hernia.   Genitourinary:   Genitourinary Comments: Deferred   Musculoskeletal: He exhibits no edema or deformity.   Rt upper extremely in sling, limit ROM  Fracture humerus from previous admission     Neurological: He is alert and oriented to person, place, and time.   Skin: Skin is warm and dry. Capillary refill takes less than 2 seconds. He is not diaphoretic.   Psychiatric: He has a normal mood and affect. His behavior is normal. Judgment and thought content normal.   Nursing note and vitals reviewed.      Significant Labs:   CBC:   Recent Labs  Lab 08/04/18  0543 08/04/18  0548 08/05/18  0406   WBC  --  11.39 8.26   HGB  --  12.0* 11.5*   HCT 30* 33.5* 32.4*   PLT  --  461* 458*     CMP:   Recent Labs  Lab 08/04/18  0548 08/05/18  0406   * 133*   K 3.3* 3.8   CL 97 98   CO2 20* 22*   * 123*   BUN 43* 42*   CREATININE 2.8* 2.8*   CALCIUM 9.7 9.3   PROT 6.4 6.1   ALBUMIN 2.0* 1.9*   BILITOT 0.6 0.4   ALKPHOS 186* 196*   AST 26 15   ALT 10 11   ANIONGAP 14 13   EGFRNONAA 22* 22*     Troponin:   Recent Labs  Lab 08/04/18  0548 08/04/18  0915   TROPONINI 0.014 0.016       Significant Imaging:   Imaging Results          X-Ray Chest AP Portable (Final result)  Result time 08/04/18 09:28:51    Final result by Paris JULIEN  MD Maya (08/04/18 09:28:51)                 Impression:      New small right pleural effusion.    Stable right lower lung opacity.      Electronically signed by: Paris Trejo MD  Date:    08/04/2018  Time:    09:28             Narrative:    EXAMINATION:  XR CHEST AP PORTABLE    CLINICAL HISTORY:  sob;    COMPARISON:  July 2018    FINDINGS:  Small right pleural effusion with a stable round 3cm right lower lung  opacity.  Left lung is clear.  Cardiomediastinal silhouette is within normal limits. No new osseous findings.

## 2018-08-05 NOTE — PLAN OF CARE
Problem: Patient Care Overview  Goal: Individualization & Mutuality  Outcome: Ongoing (interventions implemented as appropriate)  Pt AAO  SR on tele  RUE in sling and swathe   1500 ml fluid restriction   CHF education provided   Strict I&O   Daily weight   02 3 LPM saturating 89-95%  Bipap @HS  BG AC and HS   Fall precautions in place

## 2018-08-05 NOTE — HOSPITAL COURSE
Pt admitted to Telemetry Unit for acute on chronic diastolic congestive heart failure.  Pt treated with IV Lasix and supplemental oxygen.  Chest xray showed moderate amount of pleural thickening versus pleural fluid throughout the right hemithorax with stable mixed interstitial and alveolar opacities throughout the lungs, which can be associated with pneumonia, pulmonary edema or ARDS.  Hyponatremia improved.  H/H stable.  BUN/Creatinine monitored with downward trend noted.  Potassium monitored.  Elevated LFTs and bilirubin noted likely due to hepatic congestion with spontaneous resolution.  Repeat xray shows no significant change.  Home oxygen evaluation results reviewed and patient qualifies for oxygen.  PT evaluation performed and Rehab facility recommended.  Case management consulted for discharge planning and placement.  Pt counseled on smoking cessation with understanding verbalized.  Repeat chest xray stable.  Pt verbalized symptom improvement and eager for discharge.  Pt seen and examined on the date of discharge and deemed suitable for discharge to  Rehab facility.  Current medications resumed with Lasix and Potassium prescribed.  ARB and Metformin stopped due to decreased kidney function.  Pt instructed to follow up with PCP, Nephrology, Vascular Surgery, Cardiology, and to keep previously scheduled appointment with Pulmonology on 8/20/18.

## 2018-08-05 NOTE — PLAN OF CARE
Problem: Patient Care Overview  Goal: Plan of Care Review  Outcome: Ongoing (interventions implemented as appropriate)  Patient AAOx4.  Vitals stable. Pain controlled with PRN pain meds. . No complaints of SOB.  On 3L nasal canula. Patient refused BiPAP.  No complaints of CP on shift. Patient has been NSR on the Monitor.  BP stable. Tolerating diet well. No complaints of abdominal discomfort, nausea, vomiting, or diarrhea.   Adequate UOP noted.  Skin remains intact.  R arm in sling. Repositioning patient Q2H to prevent skin breakdown. Bed alarm activated.  No falls on shift.  [place] encouraged.  POC reviewed with patient.

## 2018-08-05 NOTE — ASSESSMENT & PLAN NOTE
Admit to Inpatient   Lasix 60 mg IV X 1 given in ED  Lasix 60 mg IV BID   Strict I & O  Daily weights  Wean BiPap as tolerated   -Echo results reviewed

## 2018-08-05 NOTE — PROGRESS NOTES
Patient complaining of constipation and is asking for something to help him sleep.  Notified NP Catalina.  Advised to administer Rozerem and Miralax.  Will continue to monitor.

## 2018-08-05 NOTE — ASSESSMENT & PLAN NOTE
-present upon admission  Follow up with Orthopedic as outpatient  He was evaluated by Orthopedic surgeon during hospital admit 2 weeks ago and instructed to follow up as outpatient.

## 2018-08-06 PROBLEM — R74.01 TRANSAMINITIS: Status: ACTIVE | Noted: 2018-08-06

## 2018-08-06 LAB
ALBUMIN SERPL BCP-MCNC: 2 G/DL
ALP SERPL-CCNC: 560 U/L
ALT SERPL W/O P-5'-P-CCNC: 45 U/L
ANION GAP SERPL CALC-SCNC: 12 MMOL/L
AST SERPL-CCNC: 173 U/L
BASOPHILS # BLD AUTO: 0.03 K/UL
BASOPHILS NFR BLD: 0.4 %
BILIRUB SERPL-MCNC: 1.3 MG/DL
BUN SERPL-MCNC: 45 MG/DL
CALCIUM SERPL-MCNC: 9.4 MG/DL
CHLORIDE SERPL-SCNC: 94 MMOL/L
CO2 SERPL-SCNC: 26 MMOL/L
CREAT SERPL-MCNC: 2.7 MG/DL
DIFFERENTIAL METHOD: ABNORMAL
EOSINOPHIL # BLD AUTO: 0.2 K/UL
EOSINOPHIL NFR BLD: 2.2 %
ERYTHROCYTE [DISTWIDTH] IN BLOOD BY AUTOMATED COUNT: 14.3 %
EST. GFR  (AFRICAN AMERICAN): 27 ML/MIN/1.73 M^2
EST. GFR  (NON AFRICAN AMERICAN): 23 ML/MIN/1.73 M^2
GLUCOSE SERPL-MCNC: 133 MG/DL
HCT VFR BLD AUTO: 32.7 %
HGB BLD-MCNC: 11.4 G/DL
LYMPHOCYTES # BLD AUTO: 1.1 K/UL
LYMPHOCYTES NFR BLD: 14 %
MCH RBC QN AUTO: 35.5 PG
MCHC RBC AUTO-ENTMCNC: 34.9 G/DL
MCV RBC AUTO: 102 FL
MONOCYTES # BLD AUTO: 0.5 K/UL
MONOCYTES NFR BLD: 6.6 %
NEUTROPHILS # BLD AUTO: 6.2 K/UL
NEUTROPHILS NFR BLD: 76.8 %
PLATELET # BLD AUTO: 454 K/UL
PMV BLD AUTO: 8.6 FL
POCT GLUCOSE: 136 MG/DL (ref 70–110)
POCT GLUCOSE: 159 MG/DL (ref 70–110)
POCT GLUCOSE: 172 MG/DL (ref 70–110)
POCT GLUCOSE: 196 MG/DL (ref 70–110)
POTASSIUM SERPL-SCNC: 3.3 MMOL/L
PROT SERPL-MCNC: 6.2 G/DL
RBC # BLD AUTO: 3.21 M/UL
SODIUM SERPL-SCNC: 132 MMOL/L
WBC # BLD AUTO: 8.05 K/UL

## 2018-08-06 PROCEDURE — S4991 NICOTINE PATCH NONLEGEND: HCPCS | Performed by: NURSE PRACTITIONER

## 2018-08-06 PROCEDURE — 94640 AIRWAY INHALATION TREATMENT: CPT

## 2018-08-06 PROCEDURE — 85025 COMPLETE CBC W/AUTO DIFF WBC: CPT

## 2018-08-06 PROCEDURE — 63600175 PHARM REV CODE 636 W HCPCS: Performed by: NURSE PRACTITIONER

## 2018-08-06 PROCEDURE — 21400001 HC TELEMETRY ROOM

## 2018-08-06 PROCEDURE — 25000003 PHARM REV CODE 250: Performed by: NURSE PRACTITIONER

## 2018-08-06 PROCEDURE — 63600175 PHARM REV CODE 636 W HCPCS: Performed by: EMERGENCY MEDICINE

## 2018-08-06 PROCEDURE — 92610 EVALUATE SWALLOWING FUNCTION: CPT

## 2018-08-06 PROCEDURE — 94761 N-INVAS EAR/PLS OXIMETRY MLT: CPT

## 2018-08-06 PROCEDURE — 36415 COLL VENOUS BLD VENIPUNCTURE: CPT

## 2018-08-06 PROCEDURE — 80053 COMPREHEN METABOLIC PANEL: CPT

## 2018-08-06 PROCEDURE — 25000242 PHARM REV CODE 250 ALT 637 W/ HCPCS: Performed by: INTERNAL MEDICINE

## 2018-08-06 PROCEDURE — 94760 N-INVAS EAR/PLS OXIMETRY 1: CPT

## 2018-08-06 PROCEDURE — 27000221 HC OXYGEN, UP TO 24 HOURS

## 2018-08-06 RX ORDER — POTASSIUM CHLORIDE 20 MEQ/1
20 TABLET, EXTENDED RELEASE ORAL ONCE
Status: COMPLETED | OUTPATIENT
Start: 2018-08-06 | End: 2018-08-06

## 2018-08-06 RX ADMIN — IPRATROPIUM BROMIDE AND ALBUTEROL SULFATE 3 ML: .5; 3 SOLUTION RESPIRATORY (INHALATION) at 07:08

## 2018-08-06 RX ADMIN — METOPROLOL TARTRATE 50 MG: 50 TABLET, FILM COATED ORAL at 09:08

## 2018-08-06 RX ADMIN — RAMELTEON 8 MG: 8 TABLET, FILM COATED ORAL at 09:08

## 2018-08-06 RX ADMIN — HYDROCODONE BITARTRATE AND ACETAMINOPHEN 1 TABLET: 10; 325 TABLET ORAL at 07:08

## 2018-08-06 RX ADMIN — FUROSEMIDE 60 MG: 10 INJECTION, SOLUTION INTRAVENOUS at 05:08

## 2018-08-06 RX ADMIN — HYDROCODONE BITARTRATE AND ACETAMINOPHEN 1 TABLET: 10; 325 TABLET ORAL at 01:08

## 2018-08-06 RX ADMIN — POTASSIUM CHLORIDE 20 MEQ: 1500 TABLET, EXTENDED RELEASE ORAL at 05:08

## 2018-08-06 RX ADMIN — ENOXAPARIN SODIUM 30 MG: 100 INJECTION SUBCUTANEOUS at 05:08

## 2018-08-06 RX ADMIN — HYDROCODONE BITARTRATE AND ACETAMINOPHEN 1 TABLET: 10; 325 TABLET ORAL at 08:08

## 2018-08-06 RX ADMIN — AMLODIPINE BESYLATE 10 MG: 10 TABLET ORAL at 09:08

## 2018-08-06 RX ADMIN — IPRATROPIUM BROMIDE AND ALBUTEROL SULFATE 3 ML: .5; 3 SOLUTION RESPIRATORY (INHALATION) at 12:08

## 2018-08-06 RX ADMIN — IPRATROPIUM BROMIDE AND ALBUTEROL SULFATE 3 ML: .5; 3 SOLUTION RESPIRATORY (INHALATION) at 01:08

## 2018-08-06 RX ADMIN — METOPROLOL TARTRATE 50 MG: 50 TABLET, FILM COATED ORAL at 08:08

## 2018-08-06 RX ADMIN — ATORVASTATIN CALCIUM 20 MG: 10 TABLET, FILM COATED ORAL at 09:08

## 2018-08-06 RX ADMIN — NICOTINE 1 PATCH: 21 PATCH, EXTENDED RELEASE TRANSDERMAL at 09:08

## 2018-08-06 RX ADMIN — ASPIRIN 81 MG 81 MG: 81 TABLET ORAL at 09:08

## 2018-08-06 RX ADMIN — PANTOPRAZOLE SODIUM 40 MG: 40 TABLET, DELAYED RELEASE ORAL at 09:08

## 2018-08-06 RX ADMIN — CITALOPRAM HYDROBROMIDE 20 MG: 20 TABLET ORAL at 09:08

## 2018-08-06 RX ADMIN — FUROSEMIDE 60 MG: 10 INJECTION, SOLUTION INTRAVENOUS at 09:08

## 2018-08-06 NOTE — PT/OT/SLP EVAL
Speech Language Pathology Evaluation  Bedside Swallow    Patient Name:  Lionel Castillo   MRN:  [id number]  Admitting Diagnosis: Acute on chronic diastolic congestive heart failure    Recommendations:                 General Recommendations:  Follow-up not indicated  Diet recommendations:  Regular, Thin   Aspiration Precautions: HOB to 90 degrees and Remain upright 30 minutes post meal   General Precautions: Standard, fall  Communication strategies:  none    History:     Past Medical History:   Diagnosis Date    Arthritis     Diabetes     Foot fracture     Hand fracture, left     Hand fracture, right     HTN (hypertension)     Humerus fracture 07/18/2018    Hyperlipidemia     Neuropathy     Renal disorder        Past Surgical History:   Procedure Laterality Date    ANKLE SURGERY Left     CHOLECYSTECTOMY      HUMERUS FRACTURE SURGERY         Social History: Patient lives by himself and was Independent with ADLs prior to fall.      Chest X-Rays:Increased markings throughout both lung fields with right pleural fluid collection again noted with little change from prior exam.    Prior diet: Regular       Subjective     Pt seen sitting up on the side of the bed with no family present.  Pt is awake, alert, and cooperative.  Patient goals: to eat good food (pt on low sodium diet)    Pain/Comfort:  · Pain Rating 1: 0/10    Objective:     Oral Musculature Evaluation  · Oral Musculature: WFL  · Dentition: present and adequate    Bedside Swallow Eval:   Consistencies Assessed:  · Thin liquids and solids     Oral Phase:   · WFL    Pharyngeal Phase:   · no overt clinical signs/symptoms of aspiration  · no overt clinical signs/symptoms of pharyngeal dysphagia    Compensatory Strategies  · None    Assessment:     Lionel Castillo is a 68 y.o. male with a diagnosis of CHF.  He presents with a safe swallow for intake for all consistencies.  Nursing reported pt with difficulty swallowing last night, however pt was  refusing bipap and his O2 sats were in the 80's.  At this time pt's swallow is safe and no further ST warranted.    Goals:    SLP Goals     Not on file                Plan:     · Plan of Care reviewed with:  patient   · SLP Follow-Up:  No         Time Tracking:     SLP Treatment Date:   08/06/18  Speech Start Time:  1440  Speech Stop Time:  1456     Speech Total Time (min):  16 min    Billable Minutes: Eval Swallow and Oral Function 16    Zelda Gonzalez CCC-SLP  08/06/2018

## 2018-08-06 NOTE — ASSESSMENT & PLAN NOTE
-present upon admission  Follow up with Orthopedic as outpatient  He was evaluated by Orthopedic surgeon during hospital admit 2 weeks ago and instructed to follow up as outpatient.   -sling in place

## 2018-08-06 NOTE — PLAN OF CARE
Problem: Patient Care Overview  Goal: Individualization & Mutuality  Outcome: Ongoing (interventions implemented as appropriate)  Pt AAO  SR on tele  RUE in sling and swathe   1500 ml fluid restriction   CHF education provided   Strict I&O   Daily weight   02 5-7  LPM saturating 89-95%  Bipap @HS  BG AC and HS   Fall precautions in place

## 2018-08-06 NOTE — NURSING
Pt O2 sats are low. Pt not in distress.  Called respiratory for nebs treatment that patient refused previously. Instructed to increase O2 to 7 L NC. Added humidifier. Will continue to monitor patient.

## 2018-08-06 NOTE — SUBJECTIVE & OBJECTIVE
Interval History: pt sitting up to side of bed during exam and states he is feeling much better today.  Chest xray remains unchanged.  IV diuresis continued.  Elevated LFTs and bilirubin likely associated with hepatic congestion.      Review of Systems   Constitutional: Positive for fatigue. Negative for chills, diaphoresis and fever.   HENT: Negative for congestion, ear discharge, rhinorrhea, sinus pressure, sore throat and trouble swallowing.    Eyes: Negative for discharge and visual disturbance.   Respiratory: Positive for cough and shortness of breath. Negative for apnea, choking, chest tightness, wheezing and stridor.    Cardiovascular: Negative for chest pain, palpitations and leg swelling.   Gastrointestinal: Negative for abdominal distention, abdominal pain, diarrhea, nausea and vomiting.   Endocrine: Negative for cold intolerance and heat intolerance.   Genitourinary: Negative for dysuria, frequency and hematuria.   Musculoskeletal: Negative for arthralgias, back pain, myalgias and neck pain.   Skin: Negative for pallor and rash.   Neurological: Positive for weakness (improved). Negative for dizziness, seizures, syncope and headaches.   Psychiatric/Behavioral: Negative for agitation, confusion and sleep disturbance.     Objective:     Vital Signs (Most Recent):  Temp: 98.4 °F (36.9 °C) (08/06/18 1624)  Pulse: 68 (08/06/18 1624)  Resp: 18 (08/06/18 1256)  BP: (!) 165/74 (08/06/18 1624)  SpO2: (!) 92 % (08/06/18 1624) Vital Signs (24h Range):  Temp:  [97.6 °F (36.4 °C)-98.5 °F (36.9 °C)] 98.4 °F (36.9 °C)  Pulse:  [61-82] 68  Resp:  [17-20] 18  SpO2:  [87 %-93 %] 92 %  BP: (152-175)/(66-77) 165/74     Weight: 63.7 kg (140 lb 6.9 oz)  Body mass index is 20.74 kg/m².    Intake/Output Summary (Last 24 hours) at 08/06/18 1632  Last data filed at 08/06/18 1100   Gross per 24 hour   Intake              440 ml   Output              785 ml   Net             -345 ml      Physical Exam   Constitutional: He is oriented  to person, place, and time. No distress.   HENT:   Head: Normocephalic.   Mouth/Throat: Oropharynx is clear and moist.   Eyes: Conjunctivae are normal. Right eye exhibits no discharge. Left eye exhibits no discharge.   Neck: Normal range of motion. JVD present.   Cardiovascular: Normal rate, regular rhythm, normal heart sounds and intact distal pulses.  Exam reveals no gallop and no friction rub.    No murmur heard.  Pulmonary/Chest: No respiratory distress. He has decreased breath sounds in the right middle field and the right lower field. He has no wheezes. He has no rales. He exhibits no tenderness.   Abdominal: Soft. Bowel sounds are normal. He exhibits no distension and no mass. There is no tenderness. There is no rebound and no guarding. No hernia.   Genitourinary:   Genitourinary Comments: Deferred   Musculoskeletal: He exhibits no edema or deformity.   Rt upper extremely in sling, limit ROM  Fracture humerus from previous admission     Neurological: He is alert and oriented to person, place, and time.   Skin: Skin is warm and dry. Capillary refill takes less than 2 seconds. He is not diaphoretic.   Psychiatric: He has a normal mood and affect. His behavior is normal. Judgment and thought content normal.   Nursing note and vitals reviewed.      Significant Labs:   CBC:   Recent Labs  Lab 08/05/18  0406 08/06/18  0430   WBC 8.26 8.05   HGB 11.5* 11.4*   HCT 32.4* 32.7*   * 454*     CMP:   Recent Labs  Lab 08/05/18  0406 08/06/18  0430   * 132*   K 3.8 3.3*   CL 98 94*   CO2 22* 26   * 133*   BUN 42* 45*   CREATININE 2.8* 2.7*   CALCIUM 9.3 9.4   PROT 6.1 6.2   ALBUMIN 1.9* 2.0*   BILITOT 0.4 1.3*   ALKPHOS 196* 560*   AST 15 173*   ALT 11 45*   ANIONGAP 13 12   EGFRNONAA 22* 23*       Significant Imaging:   Imaging Results          X-Ray Chest AP Portable (Final result)  Result time 08/04/18 09:28:51    Final result by Paris Trejo MD (08/04/18 09:28:51)                  Impression:      New small right pleural effusion.    Stable right lower lung opacity.      Electronically signed by: Paris Trejo MD  Date:    08/04/2018  Time:    09:28             Narrative:    EXAMINATION:  XR CHEST AP PORTABLE    CLINICAL HISTORY:  sob;    COMPARISON:  July 2018    FINDINGS:  Small right pleural effusion with a stable round 3cm right lower lung  opacity.  Left lung is clear.  Cardiomediastinal silhouette is within normal limits. No new osseous findings.

## 2018-08-06 NOTE — CONSULTS
"  [facility] -   Adult Nutrition  Consult Note    SUMMARY     Recommendations    Recommendation/Intervention: 1. Continue current diet and encourage adherence    2. Provided education on diet for CHF in addition to adequate calorie/protein intake.  Goals: Adherence to Rx diet  Nutrition Goal Status: new    Reason for Assessment    Reason for Assessment: consult (Diet Education)  Diagnosis:  (CHF)  Relevant Medical History: see H&P  Interdisciplinary Rounds: did not attend    Nutrition Risk Screen    Nutrition Risk Screen: no indicators present    Nutrition/Diet History    Typical Food/Fluid Intake: Patient reports fair appetite and intake, he states he has had gradual weight loss over the last 9 years.   Food Preferences: none reported  Do you have any cultural, spiritual, Amish conflicts, given your current situation?: Yazidi    Anthropometrics    Temp: 98.5 °F (36.9 °C)  Height Method: Stated  Height: 5' 9" (175.3 cm)  Height (inches): 69 in  Weight Method: Bed Scale  Weight: 63.7 kg (140 lb 6.9 oz)  Weight (lb): 140.43 lb  Ideal Body Weight (IBW), Male: 160 lb  % Ideal Body Weight, Male (lb): 87.77 lb  BMI (Calculated): 20.8  BMI Grade: 18.5-24.9 - normal       Lab/Procedures/Meds    Pertinent Labs Reviewed: reviewed  Pertinent Medications Reviewed: reviewed      Estimated/Assessed Needs    Weight Used For Calorie Calculations: 63.7 kg (140 lb 6.9 oz)  Energy Calorie Requirements (kcal): 1688  Energy Need Method: [place]-St Jeor (x1.2)  Protein Requirements: 65g  Weight Used For Protein Calculations: 63.7 kg (140 lb 6.9 oz)        RDA Method (mL): 1688         Nutrition Prescription Ordered    Current Diet Order: Cardiac 2g Na 1500ml fluid restriction    Evaluation of Received Nutrient/Fluid Intake       % Intake of Estimated Energy Needs: 50 - 75 %  % Meal Intake: 50 - 75 %    Nutrition Risk    Level of Risk/Frequency of Follow-up: low     Assessment and Plan  Nutrition Problem  decreased " sodium needs    Related to (etiology):   CHF    Signs and Symptoms (as evidenced by):   Admit diagnosis of CHF     Interventions/Recommendations (treatment strategy):  1. Low Na diet  2. Education provided on diet    Nutrition Diagnosis Status:   New        Monitor and Evaluation    Food and Nutrient Intake: food and beverage intake  Food and Nutrient Adminstration: diet order  Knowledge/Beliefs/Attitudes: beliefs and attitudes, food and nutrition knowledge/skill     Nutrition Follow-Up    RD Follow-up?: Yes

## 2018-08-06 NOTE — NURSING
"Pt O2 sat still low at 87%. Pt is not in distress and states "I feel fine."  Breath sounds clear and diminished. Pt asked for pain med. Rated 7/10.Notified NP, Catalina Wong. Instructed it was OK to give pain med. Chest X-ray ordered. Notified respiratory that patient needs nebs. Pt stated "Ok. I will take my breathing treatment."     "

## 2018-08-06 NOTE — PLAN OF CARE
"Problem: Patient Care Overview  Goal: Plan of Care Review  Outcome: Ongoing (interventions implemented as appropriate)  Pt remains free of injuries and falls.  Pt had episodes of low O2 sats with no signs of respiratory distress.  Increase NC from 5L to 7L and added a humidifier.  Pt O2 sats were still low with no signs of respiratory distress.  Respiratory gave nebs and put pt on venti mask.   Pt is tolerating well.   Pt complains of right upper arm pain; prn norco given per order.  Pt coughs after drinking.   Instructed pt to out chin to chest to swallow.  Pt stated "That helps a lot."   Foam dressing applied to buttocks.   Will continue to monitor.           "

## 2018-08-06 NOTE — PLAN OF CARE
Problem: Patient Care Overview  Goal: Plan of Care Review  Outcome: Ongoing (interventions implemented as appropriate)  Recommendation/Intervention: 1. Continue current diet and encourage adherence    2. Provided education on diet for CHF in addition to adequate calorie/protein intake.  Goals: Adherence to Rx diet  Nutrition Goal Status: new

## 2018-08-06 NOTE — ASSESSMENT & PLAN NOTE
O 2 keep sats greater than 92 %  Pulmonary Consult   Lasix 60 mg IV BID   Duo neb every 6 hours

## 2018-08-06 NOTE — ASSESSMENT & PLAN NOTE
Admit to Inpatient   Lasix 60 mg IV X 1 given in ED  Lasix 60 mg IV BID   Strict I & O  Daily weights  -supplemental oxygen to keep O2 sats >90%  -Echo results reviewed  -low sodium diet with 1.5L fluid restriction

## 2018-08-06 NOTE — PROGRESS NOTES
[facility] Hospital Medicine  Progress Note    Patient Name: Lionel Castillo  MRN: [id number]  Patient Class: IP- Inpatient   Admission Date: 8/4/2018  Length of Stay: 2 days  Attending Physician: Manuel Willis MD  Primary Care Provider: Eddie Garcia MD        Subjective:     Principal Problem:Acute on chronic diastolic congestive heart failure    HPI:  Mr Castillo is a 68 year old male with PMHx of DM, diastolic CHF, carotid artery disease, Rt humerus fracture, right lower lobe lung mass, tobacco and ETOL abuse. He presented to [facility] with complaints increase shortness of breath that started about 2 am this morning. Denies associated symptoms of chest pain, palpitations, syncope, dizziness, fever, chill, nausea or vomiting. Patient was admitted to [facility] on 7/18/2018 after experiencing syncope episode with fall, was also noted to have right lower lobe lung mass during that admission as well.  He currently resides at [facility] for Right humerus fractures. 2 D Echo on 7/19/2018 showed, normal left ventricular systolic function (EF 55-60%) and indeterminate LV diastolic function with moderate Aortic regurgitation.  BNP 3963. P O2 53. Troponin negative. He has received Lasix 60 mg IV in the Emergency Room. He is currently on BiPap to maintain stable O 2 sats. Patient reports he already scheduled to follow up with Orthopedic in clinic for Rt humerus. He is scheduled to see Dr Bailey on 8/10/2018 for Rt lower lower lobe lung mass.    Hospital Course:  Pt admitted to Telemetry Unit for acute on chronic diastolic congestive heart failure.  Pt treated with IV Lasix and supplemental oxygen.  Chest xray showed moderate amount of pleural thickening versus pleural fluid throughout the right hemithorax with stable mixed interstitial and alveolar opacities throughout the lungs, which can be associated with pneumonia, pulmonary edema or ARDS.  Hyponatremia improved.  H/H stable.   BUN/Creatinine 42/2.8 >>42/2.7.  Potassium monitored.  Elevated LFTs and bilirubin noted likely due to hepatic congestion.  Repeat xray shows no significant change.  Pt counseled on smoking cessation with understanding verbalized.      Interval History: pt sitting up to side of bed during exam and states he is feeling much better today.  Chest xray remains unchanged.  IV diuresis continued.  Elevated LFTs and bilirubin likely associated with hepatic congestion.      Review of Systems   Constitutional: Positive for fatigue. Negative for chills, diaphoresis and fever.   HENT: Negative for congestion, ear discharge, rhinorrhea, sinus pressure, sore throat and trouble swallowing.    Eyes: Negative for discharge and visual disturbance.   Respiratory: Positive for cough and shortness of breath. Negative for apnea, choking, chest tightness, wheezing and stridor.    Cardiovascular: Negative for chest pain, palpitations and leg swelling.   Gastrointestinal: Negative for abdominal distention, abdominal pain, diarrhea, nausea and vomiting.   Endocrine: Negative for cold intolerance and heat intolerance.   Genitourinary: Negative for dysuria, frequency and hematuria.   Musculoskeletal: Negative for arthralgias, back pain, myalgias and neck pain.   Skin: Negative for pallor and rash.   Neurological: Positive for weakness (improved). Negative for dizziness, seizures, syncope and headaches.   Psychiatric/Behavioral: Negative for agitation, confusion and sleep disturbance.     Objective:     Vital Signs (Most Recent):  Temp: 98.4 °F (36.9 °C) (08/06/18 1624)  Pulse: 68 (08/06/18 1624)  Resp: 18 (08/06/18 1256)  BP: (!) 165/74 (08/06/18 1624)  SpO2: (!) 92 % (08/06/18 1624) Vital Signs (24h Range):  Temp:  [97.6 °F (36.4 °C)-98.5 °F (36.9 °C)] 98.4 °F (36.9 °C)  Pulse:  [61-82] 68  Resp:  [17-20] 18  SpO2:  [87 %-93 %] 92 %  BP: (152-175)/(66-77) 165/74     Weight: 63.7 kg (140 lb 6.9 oz)  Body mass index is 20.74  kg/m².    Intake/Output Summary (Last 24 hours) at 08/06/18 1632  Last data filed at 08/06/18 1100   Gross per 24 hour   Intake              440 ml   Output              785 ml   Net             -345 ml      Physical Exam   Constitutional: He is oriented to person, place, and time. No distress.   HENT:   Head: Normocephalic.   Mouth/Throat: Oropharynx is clear and moist.   Eyes: Conjunctivae are normal. Right eye exhibits no discharge. Left eye exhibits no discharge.   Neck: Normal range of motion. JVD present.   Cardiovascular: Normal rate, regular rhythm, normal heart sounds and intact distal pulses.  Exam reveals no gallop and no friction rub.    No murmur heard.  Pulmonary/Chest: No respiratory distress. He has decreased breath sounds in the right middle field and the right lower field. He has no wheezes. He has no rales. He exhibits no tenderness.   Abdominal: Soft. Bowel sounds are normal. He exhibits no distension and no mass. There is no tenderness. There is no rebound and no guarding. No hernia.   Genitourinary:   Genitourinary Comments: Deferred   Musculoskeletal: He exhibits no edema or deformity.   Rt upper extremely in sling, limit ROM  Fracture humerus from previous admission     Neurological: He is alert and oriented to person, place, and time.   Skin: Skin is warm and dry. Capillary refill takes less than 2 seconds. He is not diaphoretic.   Psychiatric: He has a normal mood and affect. His behavior is normal. Judgment and thought content normal.   Nursing note and vitals reviewed.      Significant Labs:   CBC:   Recent Labs  Lab 08/05/18  0406 08/06/18  0430   WBC 8.26 8.05   HGB 11.5* 11.4*   HCT 32.4* 32.7*   * 454*     CMP:   Recent Labs  Lab 08/05/18  0406 08/06/18  0430   * 132*   K 3.8 3.3*   CL 98 94*   CO2 22* 26   * 133*   BUN 42* 45*   CREATININE 2.8* 2.7*   CALCIUM 9.3 9.4   PROT 6.1 6.2   ALBUMIN 1.9* 2.0*   BILITOT 0.4 1.3*   ALKPHOS 196* 560*   AST 15 173*   ALT 11  45*   ANIONGAP 13 12   EGFRNONAA 22* 23*       Significant Imaging:   Imaging Results          X-Ray Chest AP Portable (Final result)  Result time 08/04/18 09:28:51    Final result by Paris Trejo MD (08/04/18 09:28:51)                 Impression:      New small right pleural effusion.    Stable right lower lung opacity.      Electronically signed by: Paris Trejo MD  Date:    08/04/2018  Time:    09:28             Narrative:    EXAMINATION:  XR CHEST AP PORTABLE    CLINICAL HISTORY:  sob;    COMPARISON:  July 2018    FINDINGS:  Small right pleural effusion with a stable round 3cm right lower lung  opacity.  Left lung is clear.  Cardiomediastinal silhouette is within normal limits. No new osseous findings.                              Assessment/Plan:      * Acute on chronic diastolic congestive heart failure    Admit to Inpatient   Lasix 60 mg IV X 1 given in ED  Lasix 60 mg IV BID   Strict I & O  Daily weights  -supplemental oxygen to keep O2 sats >90%  -Echo results reviewed  -low sodium diet with 1.5L fluid restriction          Transaminitis    -likely related to congestion from acute CHF  -Statin held   -CMP in am         Acute respiratory failure with hypoxia    O 2 keep sats greater than 92 %  Pulmonary Consult   Lasix 60 mg IV BID   Duo neb every 6 hours               Mass of right lung    Pulmonary Consulted          Carotid stenosis, bilateral    Follow up with Vascular Surgeon, Dr Zarco as outpatient  He was evaluated by Vascular surgeon during hospital admit 2 weeks ago and instructed to follow up as outpatient.           Tobacco use    Smoking cessation strongly encouraged        Type 2 diabetes mellitus, without long-term current use of insulin    Accu checks ACHS with low dose SS        Essential hypertension    Continue home Blood pressure medications         Right humeral fracture    -present upon admission  Follow up with Orthopedic as outpatient  He was evaluated by  Orthopedic surgeon during hospital admit 2 weeks ago and instructed to follow up as outpatient.   -sling in place            Alcohol abuse    ETOH cessation strongly advised           VTE Risk Mitigation         Ordered     enoxaparin injection 30 mg  Daily      08/04/18 1048              Kandace Michel NP  Department of Hospital Medicine   [facility]

## 2018-08-07 LAB
ALBUMIN SERPL BCP-MCNC: 2.1 G/DL
ALP SERPL-CCNC: 484 U/L
ALT SERPL W/O P-5'-P-CCNC: 35 U/L
ANION GAP SERPL CALC-SCNC: 12 MMOL/L
AST SERPL-CCNC: 42 U/L
BASOPHILS # BLD AUTO: 0.05 K/UL
BASOPHILS NFR BLD: 0.6 %
BILIRUB SERPL-MCNC: 0.6 MG/DL
BUN SERPL-MCNC: 45 MG/DL
CALCIUM SERPL-MCNC: 9.6 MG/DL
CHLORIDE SERPL-SCNC: 94 MMOL/L
CO2 SERPL-SCNC: 25 MMOL/L
CREAT SERPL-MCNC: 2.6 MG/DL
DIFFERENTIAL METHOD: ABNORMAL
EOSINOPHIL # BLD AUTO: 0.2 K/UL
EOSINOPHIL NFR BLD: 2.7 %
ERYTHROCYTE [DISTWIDTH] IN BLOOD BY AUTOMATED COUNT: 14.2 %
EST. GFR  (AFRICAN AMERICAN): 28 ML/MIN/1.73 M^2
EST. GFR  (NON AFRICAN AMERICAN): 25 ML/MIN/1.73 M^2
GLUCOSE SERPL-MCNC: 131 MG/DL
HCT VFR BLD AUTO: 33.8 %
HGB BLD-MCNC: 12 G/DL
LYMPHOCYTES # BLD AUTO: 1.2 K/UL
LYMPHOCYTES NFR BLD: 13.5 %
MCH RBC QN AUTO: 36.1 PG
MCHC RBC AUTO-ENTMCNC: 35.5 G/DL
MCV RBC AUTO: 102 FL
MONOCYTES # BLD AUTO: 0.7 K/UL
MONOCYTES NFR BLD: 7.7 %
NEUTROPHILS # BLD AUTO: 6.8 K/UL
NEUTROPHILS NFR BLD: 75.5 %
PLATELET # BLD AUTO: 495 K/UL
PMV BLD AUTO: 8.7 FL
POCT GLUCOSE: 119 MG/DL (ref 70–110)
POCT GLUCOSE: 140 MG/DL (ref 70–110)
POCT GLUCOSE: 149 MG/DL (ref 70–110)
POCT GLUCOSE: 160 MG/DL (ref 70–110)
POTASSIUM SERPL-SCNC: 3.6 MMOL/L
PROT SERPL-MCNC: 6.6 G/DL
RBC # BLD AUTO: 3.32 M/UL
SODIUM SERPL-SCNC: 131 MMOL/L
WBC # BLD AUTO: 8.96 K/UL

## 2018-08-07 PROCEDURE — 97166 OT EVAL MOD COMPLEX 45 MIN: CPT

## 2018-08-07 PROCEDURE — 25000003 PHARM REV CODE 250: Performed by: NURSE PRACTITIONER

## 2018-08-07 PROCEDURE — 25000242 PHARM REV CODE 250 ALT 637 W/ HCPCS: Performed by: INTERNAL MEDICINE

## 2018-08-07 PROCEDURE — 96372 THER/PROPH/DIAG INJ SC/IM: CPT

## 2018-08-07 PROCEDURE — 97116 GAIT TRAINING THERAPY: CPT

## 2018-08-07 PROCEDURE — S4991 NICOTINE PATCH NONLEGEND: HCPCS | Performed by: NURSE PRACTITIONER

## 2018-08-07 PROCEDURE — G8979 MOBILITY GOAL STATUS: HCPCS | Mod: CJ

## 2018-08-07 PROCEDURE — 85025 COMPLETE CBC W/AUTO DIFF WBC: CPT

## 2018-08-07 PROCEDURE — G8988 SELF CARE GOAL STATUS: HCPCS | Mod: CK

## 2018-08-07 PROCEDURE — 99233 SBSQ HOSP IP/OBS HIGH 50: CPT | Mod: ,,, | Performed by: INTERNAL MEDICINE

## 2018-08-07 PROCEDURE — 97162 PT EVAL MOD COMPLEX 30 MIN: CPT

## 2018-08-07 PROCEDURE — 63600175 PHARM REV CODE 636 W HCPCS: Performed by: EMERGENCY MEDICINE

## 2018-08-07 PROCEDURE — 97530 THERAPEUTIC ACTIVITIES: CPT

## 2018-08-07 PROCEDURE — 94640 AIRWAY INHALATION TREATMENT: CPT

## 2018-08-07 PROCEDURE — 36415 COLL VENOUS BLD VENIPUNCTURE: CPT

## 2018-08-07 PROCEDURE — 94761 N-INVAS EAR/PLS OXIMETRY MLT: CPT

## 2018-08-07 PROCEDURE — G8978 MOBILITY CURRENT STATUS: HCPCS | Mod: CL

## 2018-08-07 PROCEDURE — 27000221 HC OXYGEN, UP TO 24 HOURS

## 2018-08-07 PROCEDURE — G8987 SELF CARE CURRENT STATUS: HCPCS | Mod: CM

## 2018-08-07 PROCEDURE — 80053 COMPREHEN METABOLIC PANEL: CPT

## 2018-08-07 PROCEDURE — 63600175 PHARM REV CODE 636 W HCPCS: Performed by: NURSE PRACTITIONER

## 2018-08-07 PROCEDURE — 21400001 HC TELEMETRY ROOM

## 2018-08-07 RX ADMIN — METOPROLOL TARTRATE 50 MG: 50 TABLET, FILM COATED ORAL at 09:08

## 2018-08-07 RX ADMIN — METOPROLOL TARTRATE 50 MG: 50 TABLET, FILM COATED ORAL at 08:08

## 2018-08-07 RX ADMIN — CITALOPRAM HYDROBROMIDE 20 MG: 20 TABLET ORAL at 09:08

## 2018-08-07 RX ADMIN — AMLODIPINE BESYLATE 10 MG: 10 TABLET ORAL at 09:08

## 2018-08-07 RX ADMIN — PANTOPRAZOLE SODIUM 40 MG: 40 TABLET, DELAYED RELEASE ORAL at 09:08

## 2018-08-07 RX ADMIN — HYDROCODONE BITARTRATE AND ACETAMINOPHEN 1 TABLET: 10; 325 TABLET ORAL at 05:08

## 2018-08-07 RX ADMIN — NICOTINE 1 PATCH: 21 PATCH, EXTENDED RELEASE TRANSDERMAL at 09:08

## 2018-08-07 RX ADMIN — HYDROCODONE BITARTRATE AND ACETAMINOPHEN 1 TABLET: 10; 325 TABLET ORAL at 12:08

## 2018-08-07 RX ADMIN — FUROSEMIDE 60 MG: 10 INJECTION, SOLUTION INTRAVENOUS at 06:08

## 2018-08-07 RX ADMIN — HYDROCODONE BITARTRATE AND ACETAMINOPHEN 1 TABLET: 10; 325 TABLET ORAL at 06:08

## 2018-08-07 RX ADMIN — IPRATROPIUM BROMIDE AND ALBUTEROL SULFATE 3 ML: .5; 3 SOLUTION RESPIRATORY (INHALATION) at 12:08

## 2018-08-07 RX ADMIN — IPRATROPIUM BROMIDE AND ALBUTEROL SULFATE 3 ML: .5; 3 SOLUTION RESPIRATORY (INHALATION) at 01:08

## 2018-08-07 RX ADMIN — IPRATROPIUM BROMIDE AND ALBUTEROL SULFATE 3 ML: .5; 3 SOLUTION RESPIRATORY (INHALATION) at 08:08

## 2018-08-07 RX ADMIN — FUROSEMIDE 60 MG: 10 INJECTION, SOLUTION INTRAVENOUS at 09:08

## 2018-08-07 RX ADMIN — ENOXAPARIN SODIUM 30 MG: 100 INJECTION SUBCUTANEOUS at 04:08

## 2018-08-07 RX ADMIN — IPRATROPIUM BROMIDE AND ALBUTEROL SULFATE 3 ML: .5; 3 SOLUTION RESPIRATORY (INHALATION) at 07:08

## 2018-08-07 RX ADMIN — RAMELTEON 8 MG: 8 TABLET, FILM COATED ORAL at 09:08

## 2018-08-07 RX ADMIN — ASPIRIN 81 MG 81 MG: 81 TABLET ORAL at 09:08

## 2018-08-07 NOTE — ASSESSMENT & PLAN NOTE
Pulmonary Consulted  -previous scans reviewed  -pt scheduled for outpatient appointment with Dr. Bailey (Pulmonology) on 8/20/18

## 2018-08-07 NOTE — ASSESSMENT & PLAN NOTE
8/4 continue diuretics, start b blocker  8/7 continue diuresis.  Patient currently on Lasix 60 mg IV q.12 hours.  He is negative -2.3 L since admission.

## 2018-08-07 NOTE — CONSULTS
Diabetic consult performed.  Pt stated he is getting a glucose meter from his PCP upon discharge and plans to start taking it regularly. He did state he does take his medication Glucophage daily. Nurse went over normal range numbers according to the [employer] Diabetes Association when he takes his glucose in the am, lunch, pm and bed time. He was able to return demonstrate what to do if his blood sugar drops below 70 (drink juice or candy) and educated on things to do when his glucose is high and what might make it elevated.  He has not attended any outpatient classes. Literature was provided including glucose monitoring chart.

## 2018-08-07 NOTE — SUBJECTIVE & OBJECTIVE
Interval History: pt stable overnight with mild improvement and increased amount of supplemental oxygen required. Home oxygen evaluation results pending.  H/H stable with BUN/Creatinine slightly improved.  LFTs improved.  Case management consulted for discharge planning.  PT evaluation results pending.  Pulmonology following.      Review of Systems   Constitutional: Positive for fatigue. Negative for chills, diaphoresis and fever.   HENT: Negative for congestion, ear discharge, rhinorrhea, sinus pressure, sore throat and trouble swallowing.    Eyes: Negative for discharge and visual disturbance.   Respiratory: Positive for cough and shortness of breath. Negative for apnea, choking, chest tightness, wheezing and stridor.    Cardiovascular: Negative for chest pain, palpitations and leg swelling.   Gastrointestinal: Negative for abdominal distention, abdominal pain, diarrhea, nausea and vomiting.   Endocrine: Negative for cold intolerance and heat intolerance.   Genitourinary: Negative for dysuria, frequency and hematuria.   Musculoskeletal: Negative for arthralgias, back pain, myalgias and neck pain.   Skin: Negative for pallor and rash.   Neurological: Positive for weakness (improved). Negative for dizziness, seizures, syncope and headaches.   Psychiatric/Behavioral: Negative for agitation, confusion and sleep disturbance.     Objective:     Vital Signs (Most Recent):  Temp: 97.6 °F (36.4 °C) (08/07/18 1319)  Pulse: 62 (08/07/18 1356)  Resp: 18 (08/07/18 1230)  BP: 130/61 (08/07/18 1319)  SpO2: (!) 92 % (08/07/18 1319) Vital Signs (24h Range):  Temp:  [97.2 °F (36.2 °C)-98.4 °F (36.9 °C)] 97.6 °F (36.4 °C)  Pulse:  [60-78] 62  Resp:  [18-20] 18  SpO2:  [87 %-97 %] 92 %  BP: (130-177)/(61-80) 130/61     Weight: 61 kg (134 lb 9.5 oz)  Body mass index is 19.88 kg/m².    Intake/Output Summary (Last 24 hours) at 08/07/18 1512  Last data filed at 08/07/18 1100   Gross per 24 hour   Intake               70 ml   Output               875 ml   Net             -805 ml      Physical Exam   Constitutional: He is oriented to person, place, and time. No distress.   HENT:   Head: Normocephalic.   Mouth/Throat: Oropharynx is clear and moist.   Eyes: Conjunctivae are normal. Right eye exhibits no discharge. Left eye exhibits no discharge.   Neck: Normal range of motion. JVD present.   Cardiovascular: Normal rate, regular rhythm, normal heart sounds and intact distal pulses.  Exam reveals no gallop and no friction rub.    No murmur heard.  Pulmonary/Chest: No respiratory distress. He has decreased breath sounds in the right middle field, the right lower field and the left lower field. He has no wheezes. He has no rales. He exhibits no tenderness.   Abdominal: Soft. Bowel sounds are normal. He exhibits no distension and no mass. There is no tenderness. There is no rebound and no guarding. No hernia.   Genitourinary:   Genitourinary Comments: Deferred   Musculoskeletal: He exhibits no edema or deformity.   Rt upper extremely in sling, limit ROM  Fracture humerus from previous admission     Neurological: He is alert and oriented to person, place, and time.   Skin: Skin is warm and dry. Capillary refill takes less than 2 seconds. He is not diaphoretic. There is pallor.   Psychiatric: He has a normal mood and affect. His behavior is normal. Judgment and thought content normal.   Nursing note and vitals reviewed.      Significant Labs:   CBC:   Recent Labs  Lab 08/06/18  0430 08/07/18  0404   WBC 8.05 8.96   HGB 11.4* 12.0*   HCT 32.7* 33.8*   * 495*     CMP:   Recent Labs  Lab 08/06/18  0430 08/07/18  0404   * 131*   K 3.3* 3.6   CL 94* 94*   CO2 26 25   * 131*   BUN 45* 45*   CREATININE 2.7* 2.6*   CALCIUM 9.4 9.6   PROT 6.2 6.6   ALBUMIN 2.0* 2.1*   BILITOT 1.3* 0.6   ALKPHOS 560* 484*   * 42*   ALT 45* 35   ANIONGAP 12 12   EGFRNONAA 23* 25*       Significant Imaging:   Imaging Results          X-Ray Chest AP Portable  (Final result)  Result time 08/04/18 09:28:51    Final result by Paris Trejo MD (08/04/18 09:28:51)                 Impression:      New small right pleural effusion.    Stable right lower lung opacity.      Electronically signed by: Paris Trejo MD  Date:    08/04/2018  Time:    09:28             Narrative:    EXAMINATION:  XR CHEST AP PORTABLE    CLINICAL HISTORY:  sob;    COMPARISON:  July 2018    FINDINGS:  Small right pleural effusion with a stable round 3cm right lower lung  opacity.  Left lung is clear.  Cardiomediastinal silhouette is within normal limits. No new osseous findings.

## 2018-08-07 NOTE — PROGRESS NOTES
[facility]   Pulmonology  Progress Note    Patient Name: Lionel Castillo  MRN: [id number]  Admission Date: 8/4/2018  Hospital Length of Stay: 3 days  Code Status: Full Code  Attending Provider: Manuel Willis MD  Primary Care Provider: Eddie Garcia MD   Principal Problem: Acute on chronic diastolic congestive heart failure    Subjective:     Interval History:   Review of Systems   Constitutional: Negative for chills and fever.   HENT: Negative for hearing loss.    Eyes: Negative for discharge.   Respiratory: Positive for cough and shortness of breath.    Cardiovascular: Negative for chest pain and leg swelling.   Gastrointestinal: Negative for abdominal pain and vomiting.   Genitourinary: Negative for hematuria.   Musculoskeletal: Positive for joint pain.   Skin: Negative for rash.   Neurological: Negative for seizures and loss of consciousness.        Neuropathy     Endo/Heme/Allergies:        Diabetes mellitus on metformin at home   Psychiatric/Behavioral: The patient is not nervous/anxious.      Objective:     Vital Signs (Most Recent):  Temp: 97.3 °F (36.3 °C) (08/07/18 0752)  Pulse: 63 (08/07/18 0900)  Resp: 18 (08/07/18 0752)  BP: (!) 167/72 (08/07/18 0752)  SpO2: (!) 92 % (08/07/18 0752) Vital Signs (24h Range):  Temp:  [97.2 °F (36.2 °C)-98.4 °F (36.9 °C)] 97.3 °F (36.3 °C)  Pulse:  [61-78] 63  Resp:  [18-20] 18  SpO2:  [87 %-93 %] 92 %  BP: (152-177)/(66-80) 167/72     Weight: 61 kg (134 lb 9.5 oz)  Body mass index is 19.88 kg/m².      Intake/Output Summary (Last 24 hours) at 08/07/18 1039  Last data filed at 08/07/18 0600   Gross per 24 hour   Intake               70 ml   Output              850 ml   Net             -780 ml       Physical Exam   Constitutional: He is oriented to person, place, and time. He appears well-developed.   Frail   HENT:   Head: Normocephalic and atraumatic.   Eyes: EOM are normal.   Neck: Neck supple.   Cardiovascular: Normal rate and regular rhythm.     Pulmonary/Chest: Effort normal. No respiratory distress.   Significantly decreased breath sounds bilaterally   Abdominal: Soft. There is no tenderness.   Musculoskeletal: He exhibits no edema.   Neurological: He is alert and oriented to person, place, and time.   Skin: Skin is dry. There is pallor.   Psychiatric: He has a normal mood and affect.       Vents:  Oxygen Concentration (%):  (~50) (08/07/18 0729)    Lines/Drains/Airways     Peripheral Intravenous Line                 Peripheral IV - Single Lumen 08/04/18 0536 Left Forearm 3 days              Significant Labs:    CBC/Anemia Profile:    Recent Labs  Lab 08/06/18  0430 08/07/18  0404   WBC 8.05 8.96   HGB 11.4* 12.0*   HCT 32.7* 33.8*   * 495*   * 102*   RDW 14.3 14.2        Chemistries:    Recent Labs  Lab 08/06/18  0430 08/07/18  0404   * 131*   K 3.3* 3.6   CL 94* 94*   CO2 26 25   BUN 45* 45*   CREATININE 2.7* 2.6*   CALCIUM 9.4 9.6   ALBUMIN 2.0* 2.1*   PROT 6.2 6.6   BILITOT 1.3* 0.6   ALKPHOS 560* 484*   ALT 45* 35   * 42*     BNP 3963     Significant Imaging:  Chest x-ray done yesterday personally reviewed pulmonary edema and right pleural effusion noted.     CT chest without contrast from July 19, 2018 personally reviewed and agree with radiologist findings of         1. There is an ill-defined area of increased density in the right lower lobe that measures 10.5 cm in craniocaudal dimension by 9.4 cm in medial-lateral dimension by 3.8 cm in AP dimension.  There is a curvilinear area of increased density in the lateral aspect of the right middle lobe. There is a heterogeneous mass in the left adrenal with multiple calcifications. This mass measures 4.8 cm in craniocaudal dimension by 4.1 cm in medial-lateral dimension by 5.1 cm in AP dimension.  Metastatic disease cannot be excluded.  2. There are acute appearing fractures in the lateral aspect of the right 7th and 8th ribs.     Echo July 19, 2018      1 - Mild left  atrial enlargement.     2 - Concentric hypertrophy.     3 - No wall motion abnormalities.     4 - Normal left ventricular systolic function (EF 55-60%).     5 - Indeterminate LV diastolic function.     6 - Normal right ventricular systolic function .     7 - The estimated PA systolic pressure is 17 mmHg.     8 - Moderate aortic regurgitation      Assessment/Plan:     * Acute on chronic diastolic congestive heart failure    8/4 continue diuretics, start b blocker  8/7 continue diuresis.  Patient currently on Lasix 60 mg IV q.12 hours.  He is negative -2.3 L since admission.        Acute respiratory failure with hypoxia    8/4 Hold Noninvasive Positive Pressure Ventilation and switch to oxygen via nasal cannula  8/7 wean O2 as tolerated, patient will likely need oxygen on discharge, he takes HCTZ as outpatient .         Mass of right lung    8/4 follow up an review of prior studies,? Rounded atelectasis? 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 Patient will need outpatient pulmonary follow-up, repeat CT chest/PET scan versus biopsy.  He has an appointment with Dr. Bailey August 20, 2018.          Discussed with nurse practitioner of hospital medicine     Chrissie Preston MD  Pulmonology  [facility]

## 2018-08-07 NOTE — ASSESSMENT & PLAN NOTE
8/4 follow up an review of prior studies,? Rounded atelectasis? 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 Patient will need outpatient pulmonary follow-up, repeat CT chest/PET scan versus biopsy.  He has an appointment with Dr. Bailey August 20, 2018.

## 2018-08-07 NOTE — SUBJECTIVE & OBJECTIVE
Interval History:   Review of Systems   Constitutional: Negative for chills and fever.   HENT: Negative for hearing loss.    Eyes: Negative for discharge.   Respiratory: Positive for cough and shortness of breath.    Cardiovascular: Negative for chest pain and leg swelling.   Gastrointestinal: Negative for abdominal pain and vomiting.   Genitourinary: Negative for hematuria.   Musculoskeletal: Positive for joint pain.   Skin: Negative for rash.   Neurological: Negative for seizures and loss of consciousness.        Neuropathy     Endo/Heme/Allergies:        Diabetes mellitus on metformin at home   Psychiatric/Behavioral: The patient is not nervous/anxious.      Objective:     Vital Signs (Most Recent):  Temp: 97.3 °F (36.3 °C) (08/07/18 0752)  Pulse: 63 (08/07/18 0900)  Resp: 18 (08/07/18 0752)  BP: (!) 167/72 (08/07/18 0752)  SpO2: (!) 92 % (08/07/18 0752) Vital Signs (24h Range):  Temp:  [97.2 °F (36.2 °C)-98.4 °F (36.9 °C)] 97.3 °F (36.3 °C)  Pulse:  [61-78] 63  Resp:  [18-20] 18  SpO2:  [87 %-93 %] 92 %  BP: (152-177)/(66-80) 167/72     Weight: 61 kg (134 lb 9.5 oz)  Body mass index is 19.88 kg/m².      Intake/Output Summary (Last 24 hours) at 08/07/18 1039  Last data filed at 08/07/18 0600   Gross per 24 hour   Intake               70 ml   Output              850 ml   Net             -780 ml       Physical Exam   Constitutional: He is oriented to person, place, and time. He appears well-developed.   Frail   HENT:   Head: Normocephalic and atraumatic.   Eyes: EOM are normal.   Neck: Neck supple.   Cardiovascular: Normal rate and regular rhythm.    Pulmonary/Chest: Effort normal. No respiratory distress.   Significantly decreased breath sounds bilaterally   Abdominal: Soft. There is no tenderness.   Musculoskeletal: He exhibits no edema.   Neurological: He is alert and oriented to person, place, and time.   Skin: Skin is dry. There is pallor.   Psychiatric: He has a normal mood and affect.       Vents:  Oxygen  Concentration (%):  (~50) (08/07/18 0729)    Lines/Drains/Airways     Peripheral Intravenous Line                 Peripheral IV - Single Lumen 08/04/18 0536 Left Forearm 3 days              Significant Labs:    CBC/Anemia Profile:    Recent Labs  Lab 08/06/18  0430 08/07/18  0404   WBC 8.05 8.96   HGB 11.4* 12.0*   HCT 32.7* 33.8*   * 495*   * 102*   RDW 14.3 14.2        Chemistries:    Recent Labs  Lab 08/06/18  0430 08/07/18  0404   * 131*   K 3.3* 3.6   CL 94* 94*   CO2 26 25   BUN 45* 45*   CREATININE 2.7* 2.6*   CALCIUM 9.4 9.6   ALBUMIN 2.0* 2.1*   PROT 6.2 6.6   BILITOT 1.3* 0.6   ALKPHOS 560* 484*   ALT 45* 35   * 42*     BNP 3963     Significant Imaging:  Chest x-ray done yesterday personally reviewed pulmonary edema and right pleural effusion noted.     CT chest without contrast from July 19, 2018 personally reviewed and agree with radiologist findings of         1. There is an ill-defined area of increased density in the right lower lobe that measures 10.5 cm in craniocaudal dimension by 9.4 cm in medial-lateral dimension by 3.8 cm in AP dimension.  There is a curvilinear area of increased density in the lateral aspect of the right middle lobe. There is a heterogeneous mass in the left adrenal with multiple calcifications. This mass measures 4.8 cm in craniocaudal dimension by 4.1 cm in medial-lateral dimension by 5.1 cm in AP dimension.  Metastatic disease cannot be excluded.  2. There are acute appearing fractures in the lateral aspect of the right 7th and 8th ribs.     Echo July 19, 2018      1 - Mild left atrial enlargement.     2 - Concentric hypertrophy.     3 - No wall motion abnormalities.     4 - Normal left ventricular systolic function (EF 55-60%).     5 - Indeterminate LV diastolic function.     6 - Normal right ventricular systolic function .     7 - The estimated PA systolic pressure is 17 mmHg.     8 - Moderate aortic regurgitation

## 2018-08-07 NOTE — PLAN OF CARE
Problem: Patient Care Overview  Goal: Plan of Care Review  Outcome: Ongoing (interventions implemented as appropriate)  Patient remains free of falls and safety precautions maintained. Afebrile. Pain controlled. Right arm remains in sling. NSR. Patient remains on NC. Will continue to monitor.12 hour chart check.

## 2018-08-07 NOTE — ASSESSMENT & PLAN NOTE
O 2 keep sats greater than 92 %  Pulmonary Consult   Lasix 60 mg IV BID   Duo neb every 6 hours   -home oxygen evaluation results pending

## 2018-08-07 NOTE — ASSESSMENT & PLAN NOTE
8/4 Hold Noninvasive Positive Pressure Ventilation and switch to oxygen via nasal cannula  8/7 wean O2 as tolerated, patient will likely need oxygen on discharge, he takes HCTZ as outpatient .

## 2018-08-07 NOTE — PT/OT/SLP EVAL
Physical Therapy Evaluation    Patient Name:  Lionel Castillo   MRN:  [id number]    Recommendations:     Discharge Recommendations:  rehabilitation facility   Discharge Equipment Recommendations: bedside commode, bath bench   Barriers to discharge: Inaccessible home and Decreased caregiver support    Assessment:     Lionel Castillo is a 68 y.o. male admitted with a medical diagnosis of Acute on chronic diastolic congestive heart failure.  He presents with the following impairments/functional limitations:  weakness, impaired endurance, impaired functional mobilty, gait instability, orthopedic precautions, decreased safety awareness, decreased coordination, impaired balance, pain, decreased upper extremity function.    Rehab Prognosis:  GOOD; patient would benefit from acute skilled PT services to address these deficits and reach maximum level of function.      Recent Surgery: * No surgery found *     Plan:     During this hospitalization, patient to be seen 5 x/week to address the above listed problems via gait training, therapeutic activities, therapeutic exercises  · Plan of Care Expires:  08/14/18   Plan of Care Reviewed with: patient    Subjective     Communicated with NURSE PAYNE prior to session.  Patient found SUPINE upon PT entry to room, agreeable to evaluation.      Chief Complaint:   Patient comments/goals: RETURN TO REHAB  Pain/Comfort:  · Pain Rating 1: 5/10  · Location - Side 1: Right  · Location - Orientation 1: upper  · Location 1: arm    Patients cultural, spiritual, [employer]ybeterian conflicts given the current situation:     Living Environment:  PT WAS AT  REHAB [facility] BUT LIVED ALONE BEFORE INCIDENT, 1 STORY HOUSE WITH 3 STEPS TO ENTER B RAIL, AMB INDEP COMMUNITY DISTANCES, WAS DRIVING, RETIRED, INDEP WITH ADL'S  Prior to admission, patients level of function was.  Patient has the following equipment: cane, straight, walker, rolling, rollator.  DME owned (not currently used): none.  Upon  discharge, patient will have assistance from ?.    Objective:     Patient found with: telemetry, oxygen     General Precautions: Standard, fall, respiratory   Orthopedic Precautions:RUE non weight bearing   Braces: Sling and swathe     Exams:  · Cognitive Exam:  Patient is oriented to Person, Place, Time and Situation and follows 100% of SIMPLE commands   · Postural Exam:  Patient presented with the following abnormalities:    · -       Rounded shoulders  · -       Forward head  · Sensation:    · -       Intact  · Skin Integrity/Edema:      · -       Skin integrity: Bruising of R SHOULDER  · RLE ROM: WFL  · RLE Strength: GROSSLY 3+/5  · LLE ROM: WFL  · LLE Strength: GROSSLY 3+/5    Functional Mobility:  · Bed Mobility:     · Rolling Left:  moderate assistance  · Scooting: moderate assistance  · Supine to Sit: moderate assistance  · Transfers:     · Sit to Stand:  moderate assistance with no AD  · Bed to Chair: moderate assistance with  no AD  using  Stand Pivot  · Gait: PT AMB 35' X 2 TRIALS WITH HHA OR HOLDING ONTO RAIL IN HALLWAY WITH LUE, MILDLY UNSTEADY, QUICK TO FATIGUE, INCREASED SOB WITH 7L OF O2 IN TOW  · Balance: POOR    AM-PAC 6 CLICK MOBILITY  Total Score:12     Therapeutic Activities and Exercises:   PT EDUCATED IN ROLE OF P.T. AS WELL AS POC.  PT REQUIRED MAXA FOR DONNING/ADJUSTING SLING, MAXA FOR DONNING ROBE AND SHOES.  PT EDUCATED IN BLE THEREX TO PERFORM WHILE SEATED IN CHAIR    Patient left up in chair with all lines intact, call button in reach and NURSE notified.    GOALS:    Physical Therapy Goals        Problem: Physical Therapy Goal    Goal Priority Disciplines Outcome Goal Variances Interventions   Physical Therapy Goal     PT/OT, PT      Description:  LTG'S TO BE MET IN 7 DAYS (8-14-18)  1. PT WILL REQUIRE DANO FOR BED MOBILITY  2. PT WILL REQUIRE DANO FOR TF'S  3. PT WILL ' WITH SPC AND DANO  4. PT WILL DEMO F DYNAMIC BALANCE DURING GAIT                    History:     Past Medical  History:   Diagnosis Date    Arthritis     Diabetes     Foot fracture     Hand fracture, left     Hand fracture, right     HTN (hypertension)     Humerus fracture 07/18/2018    Hyperlipidemia     Neuropathy     Renal disorder        Past Surgical History:   Procedure Laterality Date    ANKLE SURGERY Left     CHOLECYSTECTOMY      HUMERUS FRACTURE SURGERY         Clinical Decision Making:     History  Co-morbidities and personal factors that may impact the plan of care Examination  Body Structures and Functions, activity limitations and participation restrictions that may impact the plan of care Clinical Presentation   Decision Making/ Complexity Score   Co-morbidities:   [] Time since onset of injury / illness / exacerbation  [] Status of current condition  []Patient's cognitive status and safety concerns    [] Multiple Medical Problems (see med hx)  Personal Factors:   [] Patient's age  [] Prior Level of function   [] Patient's home situation (environment and family support)  [] Patient's level of motivation  [] Expected progression of patient      HISTORY:(criteria)    [] 94654 - no personal factors/history    [] 78458 - has 1-2 personal factor/comorbidity     [] 29196 - has >3 personal factor/comorbidity     Body Regions:  [] Objective examination findings  [] Head     []  Neck  [] Trunk   [] Upper Extremity  [] Lower Extremity    Body Systems:  [] For communication ability, affect, cognition, language, and learning style: the assessment of the ability to make needs known, consciousness, orientation (person, place, and time), expected emotional /behavioral responses, and learning preferences (eg, learning barriers, education  needs)  [] For the neuromuscular system: a general assessment of gross coordinated movement (eg, balance, gait, locomotion, transfers, and transitions) and motor function  (motor control and motor learning)  [] For the musculoskeletal system: the assessment of gross symmetry,  gross range of motion, gross strength, height, and weight  [] For the integumentary system: the assessment of pliability(texture), presence of scar formation, skin color, and skin integrity  [] For cardiovascular/pulmonary system: the assessment of heart rate, respiratory rate, blood pressure, and edema     Activity limitations:    [] Patient's cognitive status and saf ety concerns          [] Status of current condition      [] Weight bearing restriction  [] Cardiopulmunary Restriction    Participation Restrictions:   [] Goals and goal agreement with the patient     [] Rehab potential (prognosis) and probable outcome      Examination of Body System: (criteria)    [] 07213 - addressing 1-2 elements    [] 94147 - addressing a total of 3 or more elements     [] 74968 -  Addressing a total of 4 or more elements         Clinical Presentation: (criteria)  Choose one     On examination of body system using standardized tests and measures patient presents with (CHOOSE ONE) elements from any of the following: body structures and functions, activity limitations, and/or participation restrictions.  Leading to a clinical presentation that is considered (CHOOSE ONE)                              Clinical Decision Making  (Eval Complexity):  Choose One     Time Tracking:     PT Received On: 08/07/18  PT Start Time: 0905     PT Stop Time: 0935  PT Total Time (min): 30 min     Billable Minutes: Evaluation 15 and Gait Training 15     PT ENCOURAGED TO CALL FOR ASSISTANCE WITH ALL NEEDS DUE TO FALL RISK STATUS, PT AGREEABLE    Rachelle Sanchez, PT  08/07/2018

## 2018-08-07 NOTE — PLAN OF CARE
Problem: Patient Care Overview  Goal: Plan of Care Review  Outcome: Ongoing (interventions implemented as appropriate)  Pt remains free of fall and injury.  Pt on 7 L NC. Sats 87-93%  Pt c/o right arm pain controlled with PRN meds.  RUE in sling.  Accuchecks ACHS.  Strict I&O.  Daily weight.  NSR on tele monitor.  VSS.  Will continue to monitor.

## 2018-08-07 NOTE — PROGRESS NOTES
Home Oxygen Evaluation    Date Performed: 8/7/2018    1) Patient's Home O2 Sat on room air, while at rest: 87%        If O2 sats on room air at rest are 88% or below, patient qualifies. No additional testing needed. Document N/A in steps 2 and 3. If 89% or above, complete steps 2.      2) Patient's O2 Sat on room air while exercising: n/a        If O2 sats on room air while exercising remain 89% or above patient does not qualify, no further testing needed Document N/A in step 3. If O2 sats on room air while exercising are 88% or below, continue to step 3.      3) Patient's O2 Sat while exercising on O2: n/a         (Must show improvement from #2 for patients to qualify)    If O2 sats improve on oxygen, patient qualifies for portable oxygen. If not, the patient does not qualify.

## 2018-08-07 NOTE — PROGRESS NOTES
[facility] Hospital Medicine  Progress Note    Patient Name: Lionel Castillo  MRN: [id number]  Patient Class: IP- Inpatient   Admission Date: 8/4/2018  Length of Stay: 3 days  Attending Physician: Manuel Willis MD  Primary Care Provider: Eddie Garcia MD        Subjective:     Principal Problem:Acute on chronic diastolic congestive heart failure    HPI:  Mr Castillo is a 68 year old male with PMHx of DM, diastolic CHF, carotid artery disease, Rt humerus fracture, right lower lobe lung mass, tobacco and ETOL abuse. He presented to [facility] Emergency Room with complaints increase shortness of breath that started about 2 am this morning. Denies associated symptoms of chest pain, palpitations, syncope, dizziness, fever, chill, nausea or vomiting. Patient was admitted to [facility] on 7/18/2018 after experiencing syncope episode with fall, was also noted to have right lower lobe lung mass during that admission as well.  He currently resides at [facility] for Right humerus fractures. 2 D Echo on 7/19/2018 showed, normal left ventricular systolic function (EF 55-60%) and indeterminate LV diastolic function with moderate Aortic regurgitation.  BNP 3963. P O2 53. Troponin negative. He has received Lasix 60 mg IV in the Emergency Room. He is currently on BiPap to maintain stable O 2 sats. Patient reports he already scheduled to follow up with Orthopedic in clinic for Rt humerus. He is scheduled to see Dr Baiely on 8/10/2018 for Rt lower lower lobe lung mass.    Hospital Course:  Pt admitted to Telemetry Unit for acute on chronic diastolic congestive heart failure.  Pt treated with IV Lasix and supplemental oxygen.  Chest xray showed moderate amount of pleural thickening versus pleural fluid throughout the right hemithorax with stable mixed interstitial and alveolar opacities throughout the lungs, which can be associated with pneumonia, pulmonary edema or ARDS.  Hyponatremia improved.  H/H stable.   BUN/Creatinine 42/2.8 >>42/2.7.  Potassium monitored.  Elevated LFTs and bilirubin noted likely due to hepatic congestion with spontaneous resolution.  Repeat xray shows no significant change.  Home oxygen evaluation results pending.  PT evaluation results pending.  Case management consulted for discharge planning.  Pt counseled on smoking cessation with understanding verbalized.      Interval History: pt stable overnight with mild improvement and increased amount of supplemental oxygen required. Home oxygen evaluation results pending.  H/H stable with BUN/Creatinine slightly improved.  LFTs improved.  Case management consulted for discharge planning.  PT evaluation results pending.  Pulmonology following.      Review of Systems   Constitutional: Positive for fatigue. Negative for chills, diaphoresis and fever.   HENT: Negative for congestion, ear discharge, rhinorrhea, sinus pressure, sore throat and trouble swallowing.    Eyes: Negative for discharge and visual disturbance.   Respiratory: Positive for cough and shortness of breath. Negative for apnea, choking, chest tightness, wheezing and stridor.    Cardiovascular: Negative for chest pain, palpitations and leg swelling.   Gastrointestinal: Negative for abdominal distention, abdominal pain, diarrhea, nausea and vomiting.   Endocrine: Negative for cold intolerance and heat intolerance.   Genitourinary: Negative for dysuria, frequency and hematuria.   Musculoskeletal: Negative for arthralgias, back pain, myalgias and neck pain.   Skin: Negative for pallor and rash.   Neurological: Positive for weakness (improved). Negative for dizziness, seizures, syncope and headaches.   Psychiatric/Behavioral: Negative for agitation, confusion and sleep disturbance.     Objective:     Vital Signs (Most Recent):  Temp: 97.6 °F (36.4 °C) (08/07/18 1319)  Pulse: 62 (08/07/18 1356)  Resp: 18 (08/07/18 1230)  BP: 130/61 (08/07/18 1319)  SpO2: (!) 92 % (08/07/18 1319) Vital Signs (24h  Range):  Temp:  [97.2 °F (36.2 °C)-98.4 °F (36.9 °C)] 97.6 °F (36.4 °C)  Pulse:  [60-78] 62  Resp:  [18-20] 18  SpO2:  [87 %-97 %] 92 %  BP: (130-177)/(61-80) 130/61     Weight: 61 kg (134 lb 9.5 oz)  Body mass index is 19.88 kg/m².    Intake/Output Summary (Last 24 hours) at 08/07/18 1512  Last data filed at 08/07/18 1100   Gross per 24 hour   Intake               70 ml   Output              875 ml   Net             -805 ml      Physical Exam   Constitutional: He is oriented to person, place, and time. No distress.   HENT:   Head: Normocephalic.   Mouth/Throat: Oropharynx is clear and moist.   Eyes: Conjunctivae are normal. Right eye exhibits no discharge. Left eye exhibits no discharge.   Neck: Normal range of motion. JVD present.   Cardiovascular: Normal rate, regular rhythm, normal heart sounds and intact distal pulses.  Exam reveals no gallop and no friction rub.    No murmur heard.  Pulmonary/Chest: No respiratory distress. He has decreased breath sounds in the right middle field, the right lower field and the left lower field. He has no wheezes. He has no rales. He exhibits no tenderness.   Abdominal: Soft. Bowel sounds are normal. He exhibits no distension and no mass. There is no tenderness. There is no rebound and no guarding. No hernia.   Genitourinary:   Genitourinary Comments: Deferred   Musculoskeletal: He exhibits no edema or deformity.   Rt upper extremely in sling, limit ROM  Fracture humerus from previous admission     Neurological: He is alert and oriented to person, place, and time.   Skin: Skin is warm and dry. Capillary refill takes less than 2 seconds. He is not diaphoretic. There is pallor.   Psychiatric: He has a normal mood and affect. His behavior is normal. Judgment and thought content normal.   Nursing note and vitals reviewed.      Significant Labs:   CBC:   Recent Labs  Lab 08/06/18  0430 08/07/18  0404   WBC 8.05 8.96   HGB 11.4* 12.0*   HCT 32.7* 33.8*   * 495*     CMP:    Recent Labs  Lab 08/06/18  0430 08/07/18  0404   * 131*   K 3.3* 3.6   CL 94* 94*   CO2 26 25   * 131*   BUN 45* 45*   CREATININE 2.7* 2.6*   CALCIUM 9.4 9.6   PROT 6.2 6.6   ALBUMIN 2.0* 2.1*   BILITOT 1.3* 0.6   ALKPHOS 560* 484*   * 42*   ALT 45* 35   ANIONGAP 12 12   EGFRNONAA 23* 25*       Significant Imaging:   Imaging Results          X-Ray Chest AP Portable (Final result)  Result time 08/04/18 09:28:51    Final result by Paris Trejo MD (08/04/18 09:28:51)                 Impression:      New small right pleural effusion.    Stable right lower lung opacity.      Electronically signed by: Paris Trejo MD  Date:    08/04/2018  Time:    09:28             Narrative:    EXAMINATION:  XR CHEST AP PORTABLE    CLINICAL HISTORY:  sob;    COMPARISON:  July 2018    FINDINGS:  Small right pleural effusion with a stable round 3cm right lower lung  opacity.  Left lung is clear.  Cardiomediastinal silhouette is within normal limits. No new osseous findings.                              Assessment/Plan:      * Acute on chronic diastolic congestive heart failure    Admit to Inpatient   Lasix 60 mg IV X 1 given in ED  Lasix 60 mg IV BID   Strict I & O  Daily weights  -supplemental oxygen to keep O2 sats >90%  -Echo results reviewed  -low sodium diet with 1.5L fluid restriction          Transaminitis    -likely related to congestion from acute CHF  -significantly improved  -CMP in am         Acute respiratory failure with hypoxia    O 2 keep sats greater than 92 %  Pulmonary Consult   Lasix 60 mg IV BID   Duo neb every 6 hours   -home oxygen evaluation results pending              Mass of right lung    Pulmonary Consulted  -previous scans reviewed  -pt scheduled for outpatient appointment with Dr. Bailey (Pulmonology) on 8/20/18          Carotid stenosis, bilateral    Follow up with Vascular Surgeon, Dr Zarco as outpatient  He was evaluated by Vascular surgeon during hospital admit 2  weeks ago and instructed to follow up as outpatient.           Tobacco use    Smoking cessation strongly encouraged        Type 2 diabetes mellitus, without long-term current use of insulin    Accu checks ACHS with low dose SS        Essential hypertension    Continue home Blood pressure medications         Right humeral fracture    -present upon admission  Follow up with Orthopedic as outpatient  He was evaluated by Orthopedic surgeon during hospital admit 2 weeks ago and instructed to follow up as outpatient.   -sling in place            Alcohol abuse    ETOH cessation strongly advised           VTE Risk Mitigation         Ordered     enoxaparin injection 30 mg  Daily      08/04/18 1048              Kandace Michel NP  Department of Hospital Medicine   [facility]

## 2018-08-07 NOTE — PLAN OF CARE
CM meet with the patient and discussed d/c plans. Patient to return back to [facility] and new auth will be needed

## 2018-08-07 NOTE — PT/OT/SLP EVAL
Occupational Therapy   Evaluation    Name: Lionel Castillo  MRN: [id number]  Admitting Diagnosis:  Acute on chronic diastolic congestive heart failure      Recommendations:     Discharge Recommendations: rehabilitation facility  Discharge Equipment Recommendations:  bedside commode, bath bench  Barriers to discharge:       History:     Occupational Profile:  Living Environment: LIVES ALONE WITH 3 STEPS TO ENTER WITH B RAIL  Previous level of function: (I) WITH ADL'S AND COMMUNITY DISTANCE. PT REPORTS STILL DRIVES  Roles and Routines: OCCUPATIONAL THERAPY  Equipment Owned:  cane, straight, walker, rolling, rollator  Assistance upon Discharge:     Past Medical History:   Diagnosis Date    Arthritis     Diabetes     Foot fracture     Hand fracture, left     Hand fracture, right     HTN (hypertension)     Humerus fracture 07/18/2018    Hyperlipidemia     Neuropathy     Renal disorder        Past Surgical History:   Procedure Laterality Date    ANKLE SURGERY Left     CHOLECYSTECTOMY      HUMERUS FRACTURE SURGERY         Subjective     Chief Complaint: IMPAIRED ADL'S DECREASE LEFT UE STRENGTH/ENDURANCE, IMPAIRED FUNCTIONAL MOBILITY AS WELL AS T/F'S  Patient/Family stated goals:  Communicated with: NURSE PAYNE AND EPIC CHART REVIEW prior to session.  Pain/Comfort:  · Pain Rating 1: 5/10  · Location - Side 1: Right  · Location - Orientation 1: upper  · Location 1: arm    Patients cultural, spiritual, Yarsanism conflicts given the current situation:      Objective:     Patient found with: telemetry, oxygen    General Precautions: Standard, fall, respiratory   Orthopedic Precautions:RUE non weight bearing   Braces: N/A     Occupational Performance:    Bed Mobility:    · Patient completed Rolling/Turning to Left with  moderate assistance  · Patient completed Scooting/Bridging with moderate assistance  · Patient completed Supine to Sit with moderate assistance    Functional Mobility/Transfers:  · Patient  "completed Sit <> Stand Transfer with moderate assistance  with  hand-held assist   · Patient completed Bed <> Chair Transfer using Stand Pivot technique with moderate assistance with hand-held assist  · Functional Mobility: PT AMBULATED 35 FEET X 2 WITH MOD / MIN A.     Activities of Daily Living:  · Upper Body Dressing: maximal assistance X1  · Lower Body Dressing: maximal assistance X1    Cognitive/Visual Perceptual:  Cognitive/Psychosocial Skills:     -       Oriented to: Person, Place, Time and Situation   -       Follows Commands/attention:Follows two-step commands  -       Communication: clear/fluent  -       Memory: No Deficits noted  -       Safety awareness/insight to disability: impaired   Visual/Perceptual:      -IMPAIRE    Physical Exam:  Upper Extremity Range of Motion:     -       Right Upper Extremity: NOT TESTED  -       Left Upper Extremity: WFL  Upper Extremity Strength:    -       Right Upper Extremity: NOT TESTED  -       Left Upper Extremity: Deficits: MMT: 3/5 GROSSLY   Strength:    -       Left Upper Extremity: Deficits: MMT: 3/5 GROSSLY    Patient left up in chair with all lines intact, call button in reach and NURSE notified    AMPAC 6 Click:  AMPAC Total Score: 14    Treatment & Education:    Education:    Assessment:     Lionel Castillo is a 68 y.o. male with a medical diagnosis of Acute on chronic diastolic congestive heart failure.  He presents with Performance deficits affecting function are weakness, impaired functional mobilty, impaired balance, impaired self care skills, gait instability, decreased upper extremity function, decreased lower extremity function, decreased safety awareness, pain.      Rehab Prognosis:  FAIR+; patient would benefit from acute skilled OT services to address these deficits and reach maximum level of function.         Clinical Decision Makin.  OT Mod:  "Pt evaluation falls under moderate complexity for evaluation coding due to identification " "of 3-5 performance deficits noted as stated above. Eval required Min/Mod assistance to complete on this date and detailed assessment(s) were utilized. Moreover, an expanded review of history and occupational profile obtained with additional review of cognitive, physical and psychosocial hx."     Plan:     Patient to be seen 3 x/week to address the above listed problems via self-care/home management, therapeutic activities, therapeutic exercises  · Plan of Care Expires: 08/07/18  · Plan of Care Reviewed with: patient    This Plan of care has been discussed with the patient who was involved in its development and understands and is in agreement with the identified goals and treatment plan    GOALS:    Occupational Therapy Goals        Problem: Occupational Therapy Goal    Goal Priority Disciplines Outcome Interventions   Occupational Therapy Goal     OT, PT/OT     Description:  Goals to be met by:8-14-18    Patient will increase functional independence with ADLs by performing:    UE Dressing with Minimal Assistance.  LE Dressing with Minimal Assistance.  Toilet transfer to toilet with Stand-by Assistance.  Upper extremity exercise with left ue 15 reps with min resistance             Problem: Occupational Therapy Goal    Goal Priority Disciplines Outcome Interventions   Occupational Therapy Goal     OT, PT/OT                     Time Tracking:     OT Date of Treatment: 08/07/18  OT Start Time: 1030  OT Stop Time: 1100  OT Total Time (min): 30 min    Billable Minutes:Evaluation 15 MINUTES  Self Care/Home Management 15 MINUTES    Freda Marrero OT  8/7/2018    "

## 2018-08-08 PROBLEM — J43.2 CENTRILOBULAR EMPHYSEMA: Status: ACTIVE | Noted: 2018-08-08

## 2018-08-08 LAB
ALBUMIN SERPL BCP-MCNC: 2.1 G/DL
ALP SERPL-CCNC: 409 U/L
ALT SERPL W/O P-5'-P-CCNC: 28 U/L
ANION GAP SERPL CALC-SCNC: 11 MMOL/L
AST SERPL-CCNC: 34 U/L
BASOPHILS # BLD AUTO: 0.07 K/UL
BASOPHILS NFR BLD: 0.7 %
BILIRUB SERPL-MCNC: 0.6 MG/DL
BUN SERPL-MCNC: 46 MG/DL
CALCIUM SERPL-MCNC: 9.5 MG/DL
CHLORIDE SERPL-SCNC: 94 MMOL/L
CO2 SERPL-SCNC: 26 MMOL/L
CREAT SERPL-MCNC: 2.5 MG/DL
DIFFERENTIAL METHOD: ABNORMAL
EOSINOPHIL # BLD AUTO: 0.3 K/UL
EOSINOPHIL NFR BLD: 3.4 %
ERYTHROCYTE [DISTWIDTH] IN BLOOD BY AUTOMATED COUNT: 14.1 %
EST. GFR  (AFRICAN AMERICAN): 30 ML/MIN/1.73 M^2
EST. GFR  (NON AFRICAN AMERICAN): 26 ML/MIN/1.73 M^2
GLUCOSE SERPL-MCNC: 122 MG/DL
HCT VFR BLD AUTO: 33 %
HGB BLD-MCNC: 11.7 G/DL
LYMPHOCYTES # BLD AUTO: 1.1 K/UL
LYMPHOCYTES NFR BLD: 11.2 %
MCH RBC QN AUTO: 36 PG
MCHC RBC AUTO-ENTMCNC: 35.5 G/DL
MCV RBC AUTO: 102 FL
MONOCYTES # BLD AUTO: 0.7 K/UL
MONOCYTES NFR BLD: 7 %
NEUTROPHILS # BLD AUTO: 7.6 K/UL
NEUTROPHILS NFR BLD: 77.7 %
PLATELET # BLD AUTO: 456 K/UL
PMV BLD AUTO: 8.6 FL
POCT GLUCOSE: 130 MG/DL (ref 70–110)
POCT GLUCOSE: 143 MG/DL (ref 70–110)
POCT GLUCOSE: 146 MG/DL (ref 70–110)
POTASSIUM SERPL-SCNC: 3.2 MMOL/L
PROT SERPL-MCNC: 6.6 G/DL
RBC # BLD AUTO: 3.25 M/UL
SODIUM SERPL-SCNC: 131 MMOL/L
WBC # BLD AUTO: 9.77 K/UL

## 2018-08-08 PROCEDURE — 99232 SBSQ HOSP IP/OBS MODERATE 35: CPT | Mod: ,,, | Performed by: INTERNAL MEDICINE

## 2018-08-08 PROCEDURE — 25000003 PHARM REV CODE 250: Performed by: NURSE PRACTITIONER

## 2018-08-08 PROCEDURE — 94640 AIRWAY INHALATION TREATMENT: CPT

## 2018-08-08 PROCEDURE — S4991 NICOTINE PATCH NONLEGEND: HCPCS | Performed by: NURSE PRACTITIONER

## 2018-08-08 PROCEDURE — 63600175 PHARM REV CODE 636 W HCPCS: Performed by: NURSE PRACTITIONER

## 2018-08-08 PROCEDURE — 94761 N-INVAS EAR/PLS OXIMETRY MLT: CPT

## 2018-08-08 PROCEDURE — 63600175 PHARM REV CODE 636 W HCPCS: Performed by: EMERGENCY MEDICINE

## 2018-08-08 PROCEDURE — 85025 COMPLETE CBC W/AUTO DIFF WBC: CPT

## 2018-08-08 PROCEDURE — 97116 GAIT TRAINING THERAPY: CPT

## 2018-08-08 PROCEDURE — 36415 COLL VENOUS BLD VENIPUNCTURE: CPT

## 2018-08-08 PROCEDURE — 80053 COMPREHEN METABOLIC PANEL: CPT

## 2018-08-08 PROCEDURE — 25000242 PHARM REV CODE 250 ALT 637 W/ HCPCS: Performed by: INTERNAL MEDICINE

## 2018-08-08 PROCEDURE — 21400001 HC TELEMETRY ROOM

## 2018-08-08 PROCEDURE — 97110 THERAPEUTIC EXERCISES: CPT

## 2018-08-08 PROCEDURE — 27000221 HC OXYGEN, UP TO 24 HOURS

## 2018-08-08 RX ORDER — POTASSIUM CHLORIDE 20 MEQ/1
20 TABLET, EXTENDED RELEASE ORAL ONCE
Status: COMPLETED | OUTPATIENT
Start: 2018-08-08 | End: 2018-08-08

## 2018-08-08 RX ADMIN — POTASSIUM CHLORIDE 20 MEQ: 1500 TABLET, EXTENDED RELEASE ORAL at 10:08

## 2018-08-08 RX ADMIN — PANTOPRAZOLE SODIUM 40 MG: 40 TABLET, DELAYED RELEASE ORAL at 10:08

## 2018-08-08 RX ADMIN — METOPROLOL TARTRATE 50 MG: 50 TABLET, FILM COATED ORAL at 10:08

## 2018-08-08 RX ADMIN — IPRATROPIUM BROMIDE AND ALBUTEROL SULFATE 3 ML: .5; 3 SOLUTION RESPIRATORY (INHALATION) at 07:08

## 2018-08-08 RX ADMIN — NICOTINE 1 PATCH: 21 PATCH, EXTENDED RELEASE TRANSDERMAL at 10:08

## 2018-08-08 RX ADMIN — HYDROCODONE BITARTRATE AND ACETAMINOPHEN 1 TABLET: 10; 325 TABLET ORAL at 08:08

## 2018-08-08 RX ADMIN — CITALOPRAM HYDROBROMIDE 20 MG: 20 TABLET ORAL at 10:08

## 2018-08-08 RX ADMIN — ENOXAPARIN SODIUM 30 MG: 100 INJECTION SUBCUTANEOUS at 05:08

## 2018-08-08 RX ADMIN — AMLODIPINE BESYLATE 10 MG: 10 TABLET ORAL at 10:08

## 2018-08-08 RX ADMIN — HYDROCODONE BITARTRATE AND ACETAMINOPHEN 1 TABLET: 10; 325 TABLET ORAL at 01:08

## 2018-08-08 RX ADMIN — IPRATROPIUM BROMIDE AND ALBUTEROL SULFATE 3 ML: .5; 3 SOLUTION RESPIRATORY (INHALATION) at 01:08

## 2018-08-08 RX ADMIN — FUROSEMIDE 60 MG: 10 INJECTION, SOLUTION INTRAVENOUS at 06:08

## 2018-08-08 RX ADMIN — ASPIRIN 81 MG 81 MG: 81 TABLET ORAL at 10:08

## 2018-08-08 RX ADMIN — FUROSEMIDE 60 MG: 10 INJECTION, SOLUTION INTRAVENOUS at 09:08

## 2018-08-08 RX ADMIN — METOPROLOL TARTRATE 50 MG: 50 TABLET, FILM COATED ORAL at 08:08

## 2018-08-08 NOTE — ASSESSMENT & PLAN NOTE
8/4 follow up an review of prior studies,? Rounded atelectasis? 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 Patient will need outpatient pulmonary follow-up, repeat CT chest/PET scan versus biopsy.  He has an appointment with Dr. Bailey August 20, 2018.  8/8 same

## 2018-08-08 NOTE — ASSESSMENT & PLAN NOTE
Pulmonary following  -repeat chest xray today remains stable   -previous scans reviewed  -supplemental oxygen required for respiratory support  -pt scheduled for outpatient appointment with Dr. Bailey (Pulmonology) on 8/20/18

## 2018-08-08 NOTE — ASSESSMENT & PLAN NOTE
O 2 keep sats greater than 92 %  Pulmonary Consult   Lasix 60 mg IV BID   Duo neb every 6 hours   -home oxygen evaluation results reviewed and qualifies for home oxygen   -pt unable to tolerate weaning of oxygen at this time

## 2018-08-08 NOTE — PROGRESS NOTES
[facility]  Pulmonology  Progress Note    Patient Name: Lionel Castillo  MRN: [id number]  Admission Date: 8/4/2018  Hospital Length of Stay: 4 days  Code Status: Full Code  Attending Provider: Manuel Willis MD  Primary Care Provider: Eddie Garcia MD   Principal Problem: Acute on chronic diastolic congestive heart failure    Subjective:     Interval History:   Review of Systems   Constitutional: Negative for chills and fever.   HENT: Positive for nosebleeds. Negative for hearing loss.    Eyes: Negative for discharge.   Respiratory: Positive for cough and shortness of breath.    Cardiovascular: Negative for chest pain and leg swelling.   Gastrointestinal: Negative for abdominal pain and vomiting.   Genitourinary: Negative for hematuria.   Musculoskeletal: Positive for joint pain.   Skin: Negative for rash.   Neurological: Negative for seizures, loss of consciousness and headaches.        Neuropathy     Endo/Heme/Allergies:        Diabetes mellitus on metformin at home   Psychiatric/Behavioral: The patient is not nervous/anxious.        Objective:     Vital Signs (Most Recent):  Temp: 96.7 °F (35.9 °C) (08/08/18 1119)  Pulse: (!) 59 (08/08/18 1119)  Resp: 17 (08/08/18 0809)  BP: (!) 157/68 (08/08/18 1119)  SpO2: 97 % (08/08/18 1119) Vital Signs (24h Range):  Temp:  [96.7 °F (35.9 °C)-98.5 °F (36.9 °C)] 96.7 °F (35.9 °C)  Pulse:  [56-69] 59  Resp:  [17-20] 17  SpO2:  [90 %-97 %] 97 %  BP: (130-183)/(61-86) 157/68     Weight: 61 kg (134 lb 9.5 oz)  Body mass index is 19.88 kg/m².      Intake/Output Summary (Last 24 hours) at 08/08/18 1236  Last data filed at 08/08/18 0900   Gross per 24 hour   Intake              240 ml   Output             1050 ml   Net             -810 ml       Physical Exam   Constitutional: He is oriented to person, place, and time. He appears well-developed.   Frail   HENT:   Head: Normocephalic and atraumatic.   Eyes: EOM are normal.   Neck: Neck supple.    Cardiovascular: Normal rate and regular rhythm.    Pulmonary/Chest: Effort normal. No respiratory distress.   Significantly decreased breath sounds bilaterally   Abdominal: Soft. There is no tenderness.   Musculoskeletal: He exhibits no edema.   Neurological: He is alert and oriented to person, place, and time.   Skin: Skin is dry. There is pallor.   Psychiatric: He has a normal mood and affect.       Vents:  Oxygen Concentration (%): 44 (08/08/18 0734)    Lines/Drains/Airways     Peripheral Intravenous Line                 Peripheral IV - Single Lumen 08/04/18 0536 Left Forearm 4 days                Significant Labs:    CBC/Anemia Profile:    Recent Labs  Lab 08/07/18  0404 08/08/18  0503   WBC 8.96 9.77   HGB 12.0* 11.7*   HCT 33.8* 33.0*   * 456*   * 102*   RDW 14.2 14.1        Chemistries:    Recent Labs  Lab 08/07/18  0404 08/08/18  0503   * 131*   K 3.6 3.2*   CL 94* 94*   CO2 25 26   BUN 45* 46*   CREATININE 2.6* 2.5*   CALCIUM 9.6 9.5   ALBUMIN 2.1* 2.1*   PROT 6.6 6.6   BILITOT 0.6 0.6   ALKPHOS 484* 409*   ALT 35 28   AST 42* 34     Significant Imaging:  CXR: I have reviewed all pertinent results/findings within the past 24 hours and my personal findings are:  Last chest x-ray reviewed, right-sided pleural effusion pulmonary edema bilaterally  Echo July 2018     1 - Mild left atrial enlargement.     2 - Concentric hypertrophy.     3 - No wall motion abnormalities.     4 - Normal left ventricular systolic function (EF 55-60%).     5 - Indeterminate LV diastolic function.     6 - Normal right ventricular systolic function .     7 - The estimated PA systolic pressure is 17 mmHg.     8 - Moderate aortic regurgitation    Assessment/Plan:     * Acute on chronic diastolic congestive heart failure    8/4 continue diuretics, start b blocker  8/7 continue diuresis.  Patient currently on Lasix 60 mg IV q.12 hours.  He is negative -2.3 L since admission.  8/88/8 wean O2 as tolerated, will likely  need O2 on discharge, continue diuresis with IV Lasix 60 mg twice a day, repeat chest x-ray and BNP        Centrilobular emphysema    8/8 mild emphysema noted on CT scan from July 2018, O2 keep sat above 89-90%, albuterol Atrovent, outpatient follow-up with Pulmonary for PFT        Acute respiratory failure with hypoxia    8/4 Hold Noninvasive Positive Pressure Ventilation and switch to oxygen via nasal cannula  8/7 wean O2 as tolerated, patient will likely need oxygen on discharge, he takes HCTZ as outpatient .   8/8 wean O2 as tolerated, will likely need O2 on discharge, continue diuresis with IV Lasix 60 mg twice a day, repeat chest x-ray and BNP        Mass of right lung    8/4 follow up an review of prior studies,? Rounded atelectasis? 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 Patient will need outpatient pulmonary follow-up, repeat CT chest/PET scan versus biopsy.  He has an appointment with Dr. Bailey August 20, 2018.  8/8 zenia Preston MD  Pulmonology  [facility]

## 2018-08-08 NOTE — PLAN OF CARE
Problem: Physical Therapy Goal  Goal: Physical Therapy Goal  LTG'S TO BE MET IN 7 DAYS (8-14-18)  1. PT WILL REQUIRE DANO FOR BED MOBILITY  2. PT WILL REQUIRE DANO FOR TF'S  3. PT WILL ' WITH SPC AND DANO  4. PT WILL DEMO F DYNAMIC BALANCE DURING GAIT   Outcome: Ongoing (interventions implemented as appropriate)  IMPROVED FUNCTIONAL MOBILITY, DANO FOR MOBILITY TODAY BUT VERY QUICK TO FATIGUE, INCREASED SOB WITH MINIMAL EXERTION

## 2018-08-08 NOTE — SUBJECTIVE & OBJECTIVE
Interval History:   Review of Systems   Constitutional: Negative for chills and fever.   HENT: Positive for nosebleeds. Negative for hearing loss.    Eyes: Negative for discharge.   Respiratory: Positive for cough and shortness of breath.    Cardiovascular: Negative for chest pain and leg swelling.   Gastrointestinal: Negative for abdominal pain and vomiting.   Genitourinary: Negative for hematuria.   Musculoskeletal: Positive for joint pain.   Skin: Negative for rash.   Neurological: Negative for seizures, loss of consciousness and headaches.        Neuropathy     Endo/Heme/Allergies:        Diabetes mellitus on metformin at home   Psychiatric/Behavioral: The patient is not nervous/anxious.        Objective:     Vital Signs (Most Recent):  Temp: 96.7 °F (35.9 °C) (08/08/18 1119)  Pulse: (!) 59 (08/08/18 1119)  Resp: 17 (08/08/18 0809)  BP: (!) 157/68 (08/08/18 1119)  SpO2: 97 % (08/08/18 1119) Vital Signs (24h Range):  Temp:  [96.7 °F (35.9 °C)-98.5 °F (36.9 °C)] 96.7 °F (35.9 °C)  Pulse:  [56-69] 59  Resp:  [17-20] 17  SpO2:  [90 %-97 %] 97 %  BP: (130-183)/(61-86) 157/68     Weight: 61 kg (134 lb 9.5 oz)  Body mass index is 19.88 kg/m².      Intake/Output Summary (Last 24 hours) at 08/08/18 1236  Last data filed at 08/08/18 0900   Gross per 24 hour   Intake              240 ml   Output             1050 ml   Net             -810 ml       Physical Exam   Constitutional: He is oriented to person, place, and time. He appears well-developed.   Frail   HENT:   Head: Normocephalic and atraumatic.   Eyes: EOM are normal.   Neck: Neck supple.   Cardiovascular: Normal rate and regular rhythm.    Pulmonary/Chest: Effort normal. No respiratory distress.   Significantly decreased breath sounds bilaterally   Abdominal: Soft. There is no tenderness.   Musculoskeletal: He exhibits no edema.   Neurological: He is alert and oriented to person, place, and time.   Skin: Skin is dry. There is pallor.   Psychiatric: He has a normal  mood and affect.       Vents:  Oxygen Concentration (%): 44 (08/08/18 0734)    Lines/Drains/Airways     Peripheral Intravenous Line                 Peripheral IV - Single Lumen 08/04/18 0536 Left Forearm 4 days                Significant Labs:    CBC/Anemia Profile:    Recent Labs  Lab 08/07/18  0404 08/08/18  0503   WBC 8.96 9.77   HGB 12.0* 11.7*   HCT 33.8* 33.0*   * 456*   * 102*   RDW 14.2 14.1        Chemistries:    Recent Labs  Lab 08/07/18  0404 08/08/18  0503   * 131*   K 3.6 3.2*   CL 94* 94*   CO2 25 26   BUN 45* 46*   CREATININE 2.6* 2.5*   CALCIUM 9.6 9.5   ALBUMIN 2.1* 2.1*   PROT 6.6 6.6   BILITOT 0.6 0.6   ALKPHOS 484* 409*   ALT 35 28   AST 42* 34     Significant Imaging:  CXR: I have reviewed all pertinent results/findings within the past 24 hours and my personal findings are:  Last chest x-ray reviewed, right-sided pleural effusion pulmonary edema bilaterally  Echo July 2018     1 - Mild left atrial enlargement.     2 - Concentric hypertrophy.     3 - No wall motion abnormalities.     4 - Normal left ventricular systolic function (EF 55-60%).     5 - Indeterminate LV diastolic function.     6 - Normal right ventricular systolic function .     7 - The estimated PA systolic pressure is 17 mmHg.     8 - Moderate aortic regurgitation

## 2018-08-08 NOTE — ASSESSMENT & PLAN NOTE
8/8 mild emphysema noted on CT scan from July 2018, O2 keep sat above 89-90%, albuterol Atrovent, outpatient follow-up with Pulmonary for PFT

## 2018-08-08 NOTE — ASSESSMENT & PLAN NOTE
8/4 continue diuretics, start b blocker  8/7 continue diuresis.  Patient currently on Lasix 60 mg IV q.12 hours.  He is negative -2.3 L since admission.  8/88/8 wean O2 as tolerated, will likely need O2 on discharge, continue diuresis with IV Lasix 60 mg twice a day, repeat chest x-ray and BNP

## 2018-08-08 NOTE — ASSESSMENT & PLAN NOTE
8/4 Hold Noninvasive Positive Pressure Ventilation and switch to oxygen via nasal cannula  8/7 wean O2 as tolerated, patient will likely need oxygen on discharge, he takes HCTZ as outpatient .   8/8 wean O2 as tolerated, will likely need O2 on discharge, continue diuresis with IV Lasix 60 mg twice a day, repeat chest x-ray and BNP

## 2018-08-08 NOTE — PROGRESS NOTES
[facility] Hospital Medicine  Progress Note    Patient Name: Lionel Castillo  MRN: [id number]  Patient Class: IP- Inpatient   Admission Date: 8/4/2018  Length of Stay: 4 days  Attending Physician: Manuel Willis MD  Primary Care Provider: Eddie Garcia MD        Subjective:     Principal Problem:Acute on chronic diastolic congestive heart failure    HPI:  Mr Castillo is a 68 year old male with PMHx of DM, diastolic CHF, carotid artery disease, Rt humerus fracture, right lower lobe lung mass, tobacco and ETOL abuse. He presented to [facility] Emergency Room with complaints increase shortness of breath that started about 2 am this morning. Denies associated symptoms of chest pain, palpitations, syncope, dizziness, fever, chill, nausea or vomiting. Patient was admitted to [facility] on 7/18/2018 after experiencing syncope episode with fall, was also noted to have right lower lobe lung mass during that admission as well.  He currently resides at [facility] for Right humerus fractures. 2 D Echo on 7/19/2018 showed, normal left ventricular systolic function (EF 55-60%) and indeterminate LV diastolic function with moderate Aortic regurgitation.  BNP 3963. P O2 53. Troponin negative. He has received Lasix 60 mg IV in the Emergency Room. He is currently on BiPap to maintain stable O 2 sats. Patient reports he already scheduled to follow up with Orthopedic in clinic for Rt humerus. He is scheduled to see Dr Bailey on 8/10/2018 for Rt lower lower lobe lung mass.    Hospital Course:  Pt admitted to Telemetry Unit for acute on chronic diastolic congestive heart failure.  Pt treated with IV Lasix and supplemental oxygen.  Chest xray showed moderate amount of pleural thickening versus pleural fluid throughout the right hemithorax with stable mixed interstitial and alveolar opacities throughout the lungs, which can be associated with pneumonia, pulmonary edema or ARDS.  Hyponatremia improved.  H/H stable.   BUN/Creatinine monitored with downward trend noted.  Potassium monitored.  Elevated LFTs and bilirubin noted likely due to hepatic congestion with spontaneous resolution.  Repeat xray shows no significant change.  Home oxygen evaluation results reviewed and patient qualifies for oxygen.  PT evaluation performed and Rehab facility recommended.  Case management consulted for discharge planning.  Pt counseled on smoking cessation with understanding verbalized.      Interval History: pt stable overnight.  IV diuresis continued.  BUN/Creatinine monitored with downward trend noted.  PT evaluation performed with Rehab recommended.   CM to assist with discharge planning.  Pt unable to tolerate weaning of oxygen at this time.      Review of Systems   Constitutional: Positive for fatigue. Negative for chills, diaphoresis and fever.   HENT: Negative for congestion, ear discharge, rhinorrhea, sinus pressure, sore throat and trouble swallowing.    Eyes: Negative for discharge and visual disturbance.   Respiratory: Positive for cough and shortness of breath. Negative for apnea, choking, chest tightness, wheezing and stridor.    Cardiovascular: Negative for chest pain, palpitations and leg swelling.   Gastrointestinal: Negative for abdominal distention, abdominal pain, diarrhea, nausea and vomiting.   Endocrine: Negative for cold intolerance and heat intolerance.   Genitourinary: Negative for dysuria, frequency and hematuria.   Musculoskeletal: Negative for arthralgias, back pain, myalgias and neck pain.   Skin: Negative for pallor and rash.   Neurological: Positive for weakness (improved). Negative for dizziness, seizures, syncope and headaches.   Psychiatric/Behavioral: Negative for agitation, confusion and sleep disturbance.     Objective:     Vital Signs (Most Recent):  Temp: 97.9 °F (36.6 °C) (08/08/18 1535)  Pulse: 60 (08/08/18 1535)  Resp: 20 (08/08/18 1346)  BP: (!) 143/63 (08/08/18 1535)  SpO2: (!) 93 % (08/08/18 1535)  Vital Signs (24h Range):  Temp:  [96.7 °F (35.9 °C)-98.4 °F (36.9 °C)] 97.9 °F (36.6 °C)  Pulse:  [56-69] 60  Resp:  [17-20] 20  SpO2:  [90 %-97 %] 93 %  BP: (143-183)/(63-86) 143/63     Weight: 61 kg (134 lb 9.5 oz)  Body mass index is 19.88 kg/m².    Intake/Output Summary (Last 24 hours) at 08/08/18 1605  Last data filed at 08/08/18 1119   Gross per 24 hour   Intake              720 ml   Output              850 ml   Net             -130 ml      Physical Exam   Constitutional: He is oriented to person, place, and time. No distress.   HENT:   Head: Normocephalic.   Mouth/Throat: Oropharynx is clear and moist.   Eyes: Conjunctivae are normal. Right eye exhibits no discharge. Left eye exhibits no discharge.   Neck: Normal range of motion. No JVD present.   Cardiovascular: Normal rate, regular rhythm, normal heart sounds and intact distal pulses.  Exam reveals no gallop and no friction rub.    No murmur heard.  Pulmonary/Chest: No respiratory distress. He has decreased breath sounds in the right middle field, the right lower field and the left lower field. He has no wheezes. He has no rales. He exhibits no tenderness.   Abdominal: Soft. Bowel sounds are normal. He exhibits no distension and no mass. There is no tenderness. There is no rebound and no guarding. No hernia.   Genitourinary:   Genitourinary Comments: Deferred   Musculoskeletal: He exhibits no edema or deformity.   Rt upper extremely in sling, limit ROM  Fracture humerus from previous admission     Neurological: He is alert and oriented to person, place, and time.   Skin: Skin is warm and dry. Capillary refill takes less than 2 seconds. He is not diaphoretic. There is pallor.   Ecchymosis to R shoulder/RUE   Psychiatric: He has a normal mood and affect. His behavior is normal. Judgment and thought content normal.   Nursing note and vitals reviewed.      Significant Labs:   CBC:   Recent Labs  Lab 08/07/18  0404 08/08/18  0503   WBC 8.96 9.77   HGB 12.0*  11.7*   HCT 33.8* 33.0*   * 456*     CMP:   Recent Labs  Lab 08/07/18  0404 08/08/18  0503   * 131*   K 3.6 3.2*   CL 94* 94*   CO2 25 26   * 122*   BUN 45* 46*   CREATININE 2.6* 2.5*   CALCIUM 9.6 9.5   PROT 6.6 6.6   ALBUMIN 2.1* 2.1*   BILITOT 0.6 0.6   ALKPHOS 484* 409*   AST 42* 34   ALT 35 28   ANIONGAP 12 11   EGFRNONAA 25* 26*       Significant Imaging:   Imaging Results          X-Ray Chest AP Portable (Final result)  Result time 08/04/18 09:28:51    Final result by Paris Trejo MD (08/04/18 09:28:51)                 Impression:      New small right pleural effusion.    Stable right lower lung opacity.      Electronically signed by: Paris Trejo MD  Date:    08/04/2018  Time:    09:28             Narrative:    EXAMINATION:  XR CHEST AP PORTABLE    CLINICAL HISTORY:  sob;    COMPARISON:  July 2018    FINDINGS:  Small right pleural effusion with a stable round 3cm right lower lung  opacity.  Left lung is clear.  Cardiomediastinal silhouette is within normal limits. No new osseous findings.                              Assessment/Plan:      * Acute on chronic diastolic congestive heart failure    Admit to Inpatient   Lasix 60 mg IV X 1 given in ED  Lasix 60 mg IV BID   Strict I & O  Daily weights  -supplemental oxygen to keep O2 sats >90%  -Echo results reviewed  -low sodium diet with 1.5L fluid restriction          Centrilobular emphysema    -supplemental oxygen   -smoking cessation encouraged  -Sandra prn   -Pulmonology following            Transaminitis    -likely related to congestion from acute CHF  -significantly improved>>resolved   -CMP in am         Acute respiratory failure with hypoxia    O 2 keep sats greater than 92 %  Pulmonary Consult   Lasix 60 mg IV BID   Duo neb every 6 hours   -home oxygen evaluation results reviewed and qualifies for home oxygen   -pt unable to tolerate weaning of oxygen at this time              Mass of right lung    Pulmonary  following  -repeat chest xray today remains stable   -previous scans reviewed  -supplemental oxygen required for respiratory support  -pt scheduled for outpatient appointment with Dr. Bailey (Pulmonology) on 8/20/18          Carotid stenosis, bilateral    Follow up with Vascular Surgeon, Dr Zarco as outpatient  He was evaluated by Vascular surgeon during hospital admit 2 weeks ago and instructed to follow up as outpatient.           Tobacco use    Smoking cessation strongly encouraged        Type 2 diabetes mellitus, without long-term current use of insulin    Accu checks ACHS with low dose SS        Essential hypertension    Continue home Blood pressure medications         Right humeral fracture    -present upon admission  Follow up with Orthopedic as outpatient  He was evaluated by Orthopedic surgeon during hospital admit 2 weeks ago and instructed to follow up as outpatient.   -sling in place            Hypokalemia    -in the setting of diuretic use  -will monitor          Alcohol abuse    ETOH cessation encouraged          VTE Risk Mitigation         Ordered     enoxaparin injection 30 mg  Daily      08/04/18 1048              Kandace Michel NP  Department of Hospital Medicine   [facility]

## 2018-08-08 NOTE — PLAN OF CARE
Problem: Patient Care Overview  Goal: Plan of Care Review  Outcome: Ongoing (interventions implemented as appropriate)  Fall precautions maintained. Pt free from falls/injuries. Pt repositions and ambulates with stand by assistance. Pt has occasional c/o pain. Pain well controlled with PRN medication. 2 gram sodium diet maintained and tolerated. Glycemic control provided with ACHS glucose monitoring. Infection control with IV abx. 1500CC fluid restriction maintained. Patient turns every 2 hours. POC and meds reviewed. Patient verbalized understanding. Side rails up x2. Bed Low and locked. Personal items and call light within reach. No signs/symptoms of acute distress. Chart check done. Will monitor

## 2018-08-08 NOTE — SUBJECTIVE & OBJECTIVE
Interval History: pt stable overnight.  IV diuresis continued.  BUN/Creatinine monitored with downward trend noted.  PT evaluation performed with Rehab recommended.   CM to assist with discharge planning.  Pt unable to tolerate weaning of oxygen at this time.      Review of Systems   Constitutional: Positive for fatigue. Negative for chills, diaphoresis and fever.   HENT: Negative for congestion, ear discharge, rhinorrhea, sinus pressure, sore throat and trouble swallowing.    Eyes: Negative for discharge and visual disturbance.   Respiratory: Positive for cough and shortness of breath. Negative for apnea, choking, chest tightness, wheezing and stridor.    Cardiovascular: Negative for chest pain, palpitations and leg swelling.   Gastrointestinal: Negative for abdominal distention, abdominal pain, diarrhea, nausea and vomiting.   Endocrine: Negative for cold intolerance and heat intolerance.   Genitourinary: Negative for dysuria, frequency and hematuria.   Musculoskeletal: Negative for arthralgias, back pain, myalgias and neck pain.   Skin: Negative for pallor and rash.   Neurological: Positive for weakness (improved). Negative for dizziness, seizures, syncope and headaches.   Psychiatric/Behavioral: Negative for agitation, confusion and sleep disturbance.     Objective:     Vital Signs (Most Recent):  Temp: 97.9 °F (36.6 °C) (08/08/18 1535)  Pulse: 60 (08/08/18 1535)  Resp: 20 (08/08/18 1346)  BP: (!) 143/63 (08/08/18 1535)  SpO2: (!) 93 % (08/08/18 1535) Vital Signs (24h Range):  Temp:  [96.7 °F (35.9 °C)-98.4 °F (36.9 °C)] 97.9 °F (36.6 °C)  Pulse:  [56-69] 60  Resp:  [17-20] 20  SpO2:  [90 %-97 %] 93 %  BP: (143-183)/(63-86) 143/63     Weight: 61 kg (134 lb 9.5 oz)  Body mass index is 19.88 kg/m².    Intake/Output Summary (Last 24 hours) at 08/08/18 1605  Last data filed at 08/08/18 1119   Gross per 24 hour   Intake              720 ml   Output              850 ml   Net             -130 ml      Physical Exam    Constitutional: He is oriented to person, place, and time. No distress.   HENT:   Head: Normocephalic.   Mouth/Throat: Oropharynx is clear and moist.   Eyes: Conjunctivae are normal. Right eye exhibits no discharge. Left eye exhibits no discharge.   Neck: Normal range of motion. No JVD present.   Cardiovascular: Normal rate, regular rhythm, normal heart sounds and intact distal pulses.  Exam reveals no gallop and no friction rub.    No murmur heard.  Pulmonary/Chest: No respiratory distress. He has decreased breath sounds in the right middle field, the right lower field and the left lower field. He has no wheezes. He has no rales. He exhibits no tenderness.   Abdominal: Soft. Bowel sounds are normal. He exhibits no distension and no mass. There is no tenderness. There is no rebound and no guarding. No hernia.   Genitourinary:   Genitourinary Comments: Deferred   Musculoskeletal: He exhibits no edema or deformity.   Rt upper extremely in sling, limit ROM  Fracture humerus from previous admission     Neurological: He is alert and oriented to person, place, and time.   Skin: Skin is warm and dry. Capillary refill takes less than 2 seconds. He is not diaphoretic. There is pallor.   Ecchymosis to R shoulder/RUE   Psychiatric: He has a normal mood and affect. His behavior is normal. Judgment and thought content normal.   Nursing note and vitals reviewed.      Significant Labs:   CBC:   Recent Labs  Lab 08/07/18  0404 08/08/18  0503   WBC 8.96 9.77   HGB 12.0* 11.7*   HCT 33.8* 33.0*   * 456*     CMP:   Recent Labs  Lab 08/07/18  0404 08/08/18  0503   * 131*   K 3.6 3.2*   CL 94* 94*   CO2 25 26   * 122*   BUN 45* 46*   CREATININE 2.6* 2.5*   CALCIUM 9.6 9.5   PROT 6.6 6.6   ALBUMIN 2.1* 2.1*   BILITOT 0.6 0.6   ALKPHOS 484* 409*   AST 42* 34   ALT 35 28   ANIONGAP 12 11   EGFRNONAA 25* 26*       Significant Imaging:   Imaging Results          X-Ray Chest AP Portable (Final result)  Result time  08/04/18 09:28:51    Final result by Paris Trejo MD (08/04/18 09:28:51)                 Impression:      New small right pleural effusion.    Stable right lower lung opacity.      Electronically signed by: Paris Trejo MD  Date:    08/04/2018  Time:    09:28             Narrative:    EXAMINATION:  XR CHEST AP PORTABLE    CLINICAL HISTORY:  sob;    COMPARISON:  July 2018    FINDINGS:  Small right pleural effusion with a stable round 3cm right lower lung  opacity.  Left lung is clear.  Cardiomediastinal silhouette is within normal limits. No new osseous findings.

## 2018-08-08 NOTE — PT/OT/SLP PROGRESS
Physical Therapy  Treatment    Lionel Castillo   MRN: [id number]   Admitting Diagnosis: Acute on chronic diastolic congestive heart failure    PT Received On: 08/08/18  PT Start Time: 0955     PT Stop Time: 1020    PT Total Time (min): 25 min       Billable Minutes:  Gait Training 15 and Therapeutic Exercise 10    Treatment Type: Treatment  PT/PTA: PT       General Precautions: Standard, fall, respiratory  Orthopedic Precautions: RUE non weight bearing   Braces: Sling and swathe    Subjective:  Communicated with NURSE VAZQUEZ prior to session.  Pain/Comfort  Pain Rating 1: 5/10  Location - Side 1: Right  Location - Orientation 1: upper  Location 1: arm    Objective:   Patient found with: telemetry, oxygen    Functional Mobility:  Therapeutic Activities and Exercises:  PT FOUND SUPINE IN BED, MAXA FOR DONNING/ADJUSTING SLING TO RUE, PT AGREEABLE TO TX. DANO FOR SUP>SIT TF, MAXA FOR DONNING TENNIS SHOES, DANO FOR SIT>STAND TF, PT AMB 60' X 2 TRIALS WITH HHA/HOLDING ONTO RAIL IN HALLWAY, QUICK TO FATIGUE, INCREASED SOB WITH 7L OF O2 IN TOW, APPROX. 4-5 SMALL STANDING REST BREAKS. CUES FOR UPRIGHT POSTURE AND DEEP BREATHING.  PT RETURN TO ROOM, PERFORMED BLE THEREX X 20 REPS AROM WITH REST    AM-PAC 6 CLICK MOBILITY  How much help from another person does this patient currently need?   1 = Unable, Total/Dependent Assistance  2 = A lot, Maximum/Moderate Assistance  3 = A little, Minimum/Contact Guard/Supervision  4 = None, Modified [place]/Independent    Turning over in bed (including adjusting bedclothes, sheets and blankets)?: 3  Sitting down on and standing up from a chair with arms (e.g., wheelchair, bedside commode, etc.): 3  Moving from lying on back to sitting on the side of the bed?: 3  Moving to and from a bed to a chair (including a wheelchair)?: 3  Need to walk in hospital room?: 3  Climbing 3-5 steps with a railing?: 1  Basic Mobility Total Score: 16    AM-PAC Raw Score CMS G-Code Modifier Level of  Impairment Assistance   6 % Total / Unable   7 - 9 CM 80 - 100% Maximal Assist   10 - 14 CL 60 - 80% Moderate Assist   15 - 19 CK 40 - 60% Moderate Assist   20 - 22 CJ 20 - 40% Minimal Assist   23 CI 1-20% SBA / CGA   24 CH 0% Independent/ Mod I     Patient left up in chair with all lines intact, call button in reach and NURSE notified.    Assessment:  Lionel Castillo is a 68 y.o. male with a medical diagnosis of Acute on chronic diastolic congestive heart failure and presents with IMPAIRED FUNCTIONAL MOBILITY. PT WILL BENEFIT FROM CONT. SKILLED P.T. TO ADDRESS IMPAIRMENTS    Rehab identified problem list/impairments: Rehab identified problem list/impairments: weakness, impaired endurance, impaired functional mobilty, gait instability, impaired balance, decreased coordination, decreased safety awareness, pain    Rehab potential is good.    Activity tolerance: Fair    Discharge recommendations: Discharge Facility/Level Of Care Needs: rehabilitation facility     Barriers to discharge:     Equipment recommendations: Equipment Needed After Discharge: bedside commode, bath bench     GOALS:    Physical Therapy Goals        Problem: Physical Therapy Goal    Goal Priority Disciplines Outcome Goal Variances Interventions   Physical Therapy Goal     PT/OT, PT Ongoing (interventions implemented as appropriate)     Description:  LTG'S TO BE MET IN 7 DAYS (8-14-18)  1. PT WILL REQUIRE DANO FOR BED MOBILITY  2. PT WILL REQUIRE DANO FOR TF'S  3. PT WILL ' WITH SPC AND DANO  4. PT WILL DEMO F DYNAMIC BALANCE DURING GAIT                    PLAN:    Patient to be seen 5 x/week  to address the above listed problems via therapeutic activities, therapeutic exercises, gait training  Plan of Care expires: 08/14/18  Plan of Care reviewed with: patient    PT ENCOURAGED TO CALL FOR ASSISTANCE WITH ALL NEEDS DUE TO FALL RISK STATUS, PT AGREEABLE    Rachelle Sanchez, PT  08/08/2018

## 2018-08-08 NOTE — PLAN OF CARE
HOWIE was notified by Esthela at  Rehab , the patient needs to off Vapotherm and on 4l/NC for O2 in order for the patient to return to rehab. Patient is presently on 6L and is weaning down, Preference form signed for [facility], copy given to the patient and the original to the folder. CM place referral in [facility] and sent clinical to [facility] for auth     08/08/18 1550   Discharge Reassessment   Assessment Type Discharge Planning Reassessment   Provided patient/caregiver education on the expected discharge date and the discharge plan Yes   Do you have any problems affording any of your prescribed medications? No   Discharge Plan A Rehab   Discharge Plan B Skilled Nursing Facility   Patient choice form signed by patient/caregiver No   Can the patient answer the patient profile reliably? Yes, cognitively intact   How does the patient rate their overall health at the present time? Fair   Describe the patient's ability to walk at the present time. Walks with the help of equipment   How often would a person be available to care for the patient? Often   Number of comorbid conditions (as recorded on the chart) Five or more   During the past month, has the patient often been bothered by feeling down, depressed or hopeless? No   During the past month, has the patient often been bothered by little interest or pleasure in doing things? No

## 2018-08-08 NOTE — PLAN OF CARE
Problem: Patient Care Overview  Goal: Interdisciplinary Rounds/Family Conf  Outcome: Ongoing (interventions implemented as appropriate)  Pt stable. Plan of care reviewed with patient. Patient verbalized understanding. Bed low, wheels locked, bed alarm on, call light within reach. Patient instructed to call for assistance. Will continue to monitor.

## 2018-08-09 PROBLEM — R74.01 TRANSAMINITIS: Status: RESOLVED | Noted: 2018-08-06 | Resolved: 2018-08-09

## 2018-08-09 LAB
ALBUMIN SERPL BCP-MCNC: 2 G/DL
ALP SERPL-CCNC: 426 U/L
ALT SERPL W/O P-5'-P-CCNC: 24 U/L
ANION GAP SERPL CALC-SCNC: 15 MMOL/L
AST SERPL-CCNC: 30 U/L
BACTERIA BLD CULT: NORMAL
BACTERIA BLD CULT: NORMAL
BASOPHILS # BLD AUTO: 0.05 K/UL
BASOPHILS NFR BLD: 0.6 %
BILIRUB SERPL-MCNC: 0.7 MG/DL
BNP SERPL-MCNC: 3756 PG/ML
BUN SERPL-MCNC: 45 MG/DL
CALCIUM SERPL-MCNC: 9.4 MG/DL
CHLORIDE SERPL-SCNC: 95 MMOL/L
CO2 SERPL-SCNC: 23 MMOL/L
CREAT SERPL-MCNC: 2.4 MG/DL
DIFFERENTIAL METHOD: ABNORMAL
EOSINOPHIL # BLD AUTO: 0.3 K/UL
EOSINOPHIL NFR BLD: 4.3 %
ERYTHROCYTE [DISTWIDTH] IN BLOOD BY AUTOMATED COUNT: 14.2 %
EST. GFR  (AFRICAN AMERICAN): 31 ML/MIN/1.73 M^2
EST. GFR  (NON AFRICAN AMERICAN): 27 ML/MIN/1.73 M^2
GLUCOSE SERPL-MCNC: 116 MG/DL
HCT VFR BLD AUTO: 32.4 %
HGB BLD-MCNC: 11.3 G/DL
LYMPHOCYTES # BLD AUTO: 0.9 K/UL
LYMPHOCYTES NFR BLD: 11.8 %
MCH RBC QN AUTO: 35.3 PG
MCHC RBC AUTO-ENTMCNC: 34.9 G/DL
MCV RBC AUTO: 101 FL
MONOCYTES # BLD AUTO: 0.6 K/UL
MONOCYTES NFR BLD: 7.8 %
NEUTROPHILS # BLD AUTO: 5.9 K/UL
NEUTROPHILS NFR BLD: 75.5 %
PLATELET # BLD AUTO: 467 K/UL
PMV BLD AUTO: 8.6 FL
POCT GLUCOSE: 125 MG/DL (ref 70–110)
POCT GLUCOSE: 129 MG/DL (ref 70–110)
POCT GLUCOSE: 149 MG/DL (ref 70–110)
POCT GLUCOSE: 157 MG/DL (ref 70–110)
POTASSIUM SERPL-SCNC: 3.1 MMOL/L
PROT SERPL-MCNC: 6.3 G/DL
RBC # BLD AUTO: 3.2 M/UL
SODIUM SERPL-SCNC: 133 MMOL/L
WBC # BLD AUTO: 7.86 K/UL

## 2018-08-09 PROCEDURE — 25000003 PHARM REV CODE 250: Performed by: NURSE PRACTITIONER

## 2018-08-09 PROCEDURE — 85025 COMPLETE CBC W/AUTO DIFF WBC: CPT

## 2018-08-09 PROCEDURE — 99232 SBSQ HOSP IP/OBS MODERATE 35: CPT | Mod: ,,, | Performed by: INTERNAL MEDICINE

## 2018-08-09 PROCEDURE — 97530 THERAPEUTIC ACTIVITIES: CPT

## 2018-08-09 PROCEDURE — 97116 GAIT TRAINING THERAPY: CPT

## 2018-08-09 PROCEDURE — G8987 SELF CARE CURRENT STATUS: HCPCS | Mod: CM

## 2018-08-09 PROCEDURE — G8988 SELF CARE GOAL STATUS: HCPCS | Mod: CK

## 2018-08-09 PROCEDURE — 27000221 HC OXYGEN, UP TO 24 HOURS

## 2018-08-09 PROCEDURE — 94640 AIRWAY INHALATION TREATMENT: CPT

## 2018-08-09 PROCEDURE — 94761 N-INVAS EAR/PLS OXIMETRY MLT: CPT

## 2018-08-09 PROCEDURE — 63600175 PHARM REV CODE 636 W HCPCS: Performed by: NURSE PRACTITIONER

## 2018-08-09 PROCEDURE — S4991 NICOTINE PATCH NONLEGEND: HCPCS | Performed by: NURSE PRACTITIONER

## 2018-08-09 PROCEDURE — 63600175 PHARM REV CODE 636 W HCPCS: Performed by: EMERGENCY MEDICINE

## 2018-08-09 PROCEDURE — 21400001 HC TELEMETRY ROOM

## 2018-08-09 PROCEDURE — 25000242 PHARM REV CODE 250 ALT 637 W/ HCPCS: Performed by: INTERNAL MEDICINE

## 2018-08-09 PROCEDURE — 80053 COMPREHEN METABOLIC PANEL: CPT

## 2018-08-09 PROCEDURE — 83880 ASSAY OF NATRIURETIC PEPTIDE: CPT

## 2018-08-09 RX ORDER — METOLAZONE 5 MG/1
10 TABLET ORAL ONCE
Status: COMPLETED | OUTPATIENT
Start: 2018-08-09 | End: 2018-08-09

## 2018-08-09 RX ORDER — POTASSIUM CHLORIDE 20 MEQ/1
20 TABLET, EXTENDED RELEASE ORAL 2 TIMES DAILY
Status: DISCONTINUED | OUTPATIENT
Start: 2018-08-09 | End: 2018-08-10 | Stop reason: HOSPADM

## 2018-08-09 RX ADMIN — IPRATROPIUM BROMIDE AND ALBUTEROL SULFATE 3 ML: .5; 3 SOLUTION RESPIRATORY (INHALATION) at 07:08

## 2018-08-09 RX ADMIN — PANTOPRAZOLE SODIUM 40 MG: 40 TABLET, DELAYED RELEASE ORAL at 08:08

## 2018-08-09 RX ADMIN — RAMELTEON 8 MG: 8 TABLET, FILM COATED ORAL at 09:08

## 2018-08-09 RX ADMIN — POTASSIUM CHLORIDE 20 MEQ: 1500 TABLET, EXTENDED RELEASE ORAL at 10:08

## 2018-08-09 RX ADMIN — METOLAZONE 10 MG: 5 TABLET ORAL at 10:08

## 2018-08-09 RX ADMIN — IPRATROPIUM BROMIDE AND ALBUTEROL SULFATE 3 ML: .5; 3 SOLUTION RESPIRATORY (INHALATION) at 02:08

## 2018-08-09 RX ADMIN — METOPROLOL TARTRATE 50 MG: 50 TABLET, FILM COATED ORAL at 09:08

## 2018-08-09 RX ADMIN — HYDROCODONE BITARTRATE AND ACETAMINOPHEN 1 TABLET: 10; 325 TABLET ORAL at 04:08

## 2018-08-09 RX ADMIN — IPRATROPIUM BROMIDE AND ALBUTEROL SULFATE 3 ML: .5; 3 SOLUTION RESPIRATORY (INHALATION) at 12:08

## 2018-08-09 RX ADMIN — HYDROCODONE BITARTRATE AND ACETAMINOPHEN 1 TABLET: 10; 325 TABLET ORAL at 11:08

## 2018-08-09 RX ADMIN — AMLODIPINE BESYLATE 10 MG: 10 TABLET ORAL at 08:08

## 2018-08-09 RX ADMIN — HYDROCODONE BITARTRATE AND ACETAMINOPHEN 1 TABLET: 10; 325 TABLET ORAL at 03:08

## 2018-08-09 RX ADMIN — METOPROLOL TARTRATE 50 MG: 50 TABLET, FILM COATED ORAL at 08:08

## 2018-08-09 RX ADMIN — NICOTINE 1 PATCH: 21 PATCH, EXTENDED RELEASE TRANSDERMAL at 09:08

## 2018-08-09 RX ADMIN — RAMELTEON 8 MG: 8 TABLET, FILM COATED ORAL at 12:08

## 2018-08-09 RX ADMIN — HYDROCODONE BITARTRATE AND ACETAMINOPHEN 1 TABLET: 10; 325 TABLET ORAL at 10:08

## 2018-08-09 RX ADMIN — POTASSIUM CHLORIDE 20 MEQ: 1500 TABLET, EXTENDED RELEASE ORAL at 09:08

## 2018-08-09 RX ADMIN — FUROSEMIDE 60 MG: 10 INJECTION, SOLUTION INTRAVENOUS at 05:08

## 2018-08-09 RX ADMIN — ENOXAPARIN SODIUM 30 MG: 100 INJECTION SUBCUTANEOUS at 04:08

## 2018-08-09 RX ADMIN — IPRATROPIUM BROMIDE AND ALBUTEROL SULFATE 3 ML: .5; 3 SOLUTION RESPIRATORY (INHALATION) at 08:08

## 2018-08-09 RX ADMIN — CITALOPRAM HYDROBROMIDE 20 MG: 20 TABLET ORAL at 08:08

## 2018-08-09 RX ADMIN — FUROSEMIDE 60 MG: 10 INJECTION, SOLUTION INTRAVENOUS at 08:08

## 2018-08-09 RX ADMIN — ASPIRIN 81 MG 81 MG: 81 TABLET ORAL at 08:08

## 2018-08-09 NOTE — PLAN OF CARE
Problem: Physical Therapy Goal  Goal: Physical Therapy Goal  LTG'S TO BE MET IN 7 DAYS (8-14-18)  1. PT WILL REQUIRE DANO FOR BED MOBILITY  2. PT WILL REQUIRE DANO FOR TF'S  3. PT WILL ' WITH SPC AND DANO  4. PT WILL DEMO F DYNAMIC BALANCE DURING GAIT   Outcome: Ongoing (interventions implemented as appropriate)  GOOD PARTICIPATION, CGA FOR BED MOBILITY, DANO FOR TF'S AND GAIT

## 2018-08-09 NOTE — PT/OT/SLP PROGRESS
Occupational Therapy  Treatment    Lionel Castillo   MRN: [id number]   Admitting Diagnosis: Acute on chronic diastolic congestive heart failure    OT Date of Treatment: 08/09/18   OT Start Time: 1055  OT Stop Time: 1110  OT Total Time (min): 15 min    Billable Minutes:  Therapeutic Activity 15    General Precautions: Standard, fall, respiratory  Orthopedic Precautions: RUE non weight bearing  Braces: UE Sling         Subjective:  Communicated with STARR ROBB prior to session.    Pain/Comfort  Pain Rating 1: 0/10  Pain Rating Post-Intervention 1: 0/10    Objective:  Patient found with: oxygen, telemetry     Functional Mobility:  Bed Mobility:       Transfers:        Functional Ambulation: AMBUALTED 2X70 FEET HHA MIN A WITH SEVERAL REST BREAKS       Balance:   Static Sit: GOOD: Takes MODERATE challenges from all directions  Dynamic Sit: GOOD-: Incosistently Maintains balance through MODERATE excursions of active trunk movement,     Static Stand: POOR+: Needs MINIMAL assist to maintain  Dynamic stand: POOR+: Needs MIN (minimal ) assist during gait    Therapeutic Activities and Exercises:  PT PERFORMED BED MOBILITY CGA. READJUSTED RUE SLING FOR PROPER FIT. PT STOOD MIN A. PT AMBULATED HHA 2X70 FEET MIN A WITH 6 L O2 IN TOW, FREQUENT STANDING REST BREAKS. PT DECLINED TO SIT UP IN CHAIR. RETURNED TO SUPINE CGA. ALL LINES INTACT. ALL NEEDS MET.    AM-PAC 6 CLICK ADL   How much help from another person does this patient currently need?   1 = Unable, Total/Dependent Assistance  2 = A lot, Maximum/Moderate Assistance  3 = A little, Minimum/Contact Guard/Supervision  4 = None, Modified [place]/Independent    Putting on and taking off regular lower body clothing? : 2  Bathing (including washing, rinsing, drying)?: 2  Toileting, which includes using toilet, bedpan, or urinal? : 3  Putting on and taking off regular upper body clothing?: 2  Taking care of personal grooming such as brushing teeth?: 3  Eating meals?: 3  Daily  "Activity Total Score: 15     AM-PAC Raw Score CMS "G-Code Modifier Level of Impairment Assistance   6 % Total / Unable   7 - 8 CM 80 - 100% Maximal Assist   9-13 CL 60 - 80% Moderate Assist   14 - 19 CK 40 - 60% Moderate Assist   20 - 22 CJ 20 - 40% Minimal Assist   23 CI 1-20% SBA / CGA   24 CH 0% Independent/ Mod I       Patient left supine with all lines intact, call button in reach, bed alarm on and NRSING notified    ASSESSMENT:  Lionel Castillo is a 68 y.o. male with a medical diagnosis of Acute on chronic diastolic congestive heart failure and presents with DEBILITY, IMPAIRED ADLS, FUNCTIONAL MOBILITY AND IMPAIRED SAFETY AWARENESS. PT WILL BENEFIT FROM CONTINUED SKILLED OT SERVICES TO INCREASE SAFETY AWARENESS AND FUNCTIONAL INDEPENDENCE.    Rehab identified problem list/impairments: Rehab identified problem list/impairments: weakness, impaired endurance, gait instability, impaired functional mobilty, impaired self care skills, impaired balance, decreased safety awareness, pain, decreased upper extremity function, decreased coordination, orthopedic precautions, impaired coordination    Rehab potential is good.    Activity tolerance: Good    Discharge recommendations: Discharge Facility/Level Of Care Needs: rehabilitation facility     Barriers to discharge: Barriers to Discharge: None    Equipment recommendations: bedside commode, bath bench     GOALS:    Occupational Therapy Goals        Problem: Occupational Therapy Goal    Goal Priority Disciplines Outcome Interventions   Occupational Therapy Goal     OT, PT/OT Ongoing (interventions implemented as appropriate)    Description:  Goals to be met by:8-14-18    Patient will increase functional independence with ADLs by performing:    UE Dressing with Minimal Assistance.  LE Dressing with Minimal Assistance.  Toilet transfer to toilet with Stand-by Assistance.  Upper extremity exercise with left ue 15 reps with min resistance             Problem: " Occupational Therapy Goal    Goal Priority Disciplines Outcome Interventions   Occupational Therapy Goal     OT, PT/OT                     Plan:  Patient to be seen 3 x/week to address the above listed problems via self-care/home management, therapeutic activities, therapeutic exercises  Plan of Care expires: 08/16/18  Plan of Care reviewed with: patient    OT G-codes  Functional Assessment Tool Used: [place] AM-PAC  Score: 15  Functional Limitation: Self care  Self Care Current Status (): HOWIE  Self Care Goal Status (): FARTUN Shah OT  08/09/2018

## 2018-08-09 NOTE — PLAN OF CARE
08/09/18 1525   Medicare Message   Important Message from Medicare regarding Discharge Appeal Rights Given to patient/caregiver;Explained to patient/caregiver;Signed/date by patient/caregiver   Date IMM was signed 08/09/18   Time IMM was signed [id number]

## 2018-08-09 NOTE — SUBJECTIVE & OBJECTIVE
Interval History:   Review of Systems   Constitutional: Negative for chills and fever.   HENT: Negative for hearing loss and nosebleeds.    Eyes: Negative for discharge.   Respiratory: Positive for cough and shortness of breath.    Cardiovascular: Negative for chest pain and leg swelling.   Gastrointestinal: Negative for abdominal pain and vomiting.   Genitourinary: Negative for hematuria.   Musculoskeletal: Positive for joint pain.   Skin: Negative for rash.   Neurological: Negative for seizures, loss of consciousness and headaches.        Neuropathy     Endo/Heme/Allergies:        Diabetes mellitus on metformin at home   Psychiatric/Behavioral: The patient is not nervous/anxious.      Objective:     Vital Signs (Most Recent):  Temp: 97.7 °F (36.5 °C) (08/09/18 1534)  Pulse: 62 (08/09/18 1534)  Resp: 18 (08/09/18 1534)  BP: (!) 160/70 (08/09/18 1534)  SpO2: (!) 94 % (08/09/18 1534) Vital Signs (24h Range):  Temp:  [97 °F (36.1 °C)-97.8 °F (36.6 °C)] 97.7 °F (36.5 °C)  Pulse:  [55-79] 62  Resp:  [18-20] 18  SpO2:  [91 %-96 %] 94 %  BP: (124-172)/(58-74) 160/70     Weight: 60.6 kg (133 lb 9.6 oz)  Body mass index is 19.73 kg/m².      Intake/Output Summary (Last 24 hours) at 08/09/18 1636  Last data filed at 08/09/18 0341   Gross per 24 hour   Intake              300 ml   Output              700 ml   Net             -400 ml       Physical Exam   Constitutional: He is oriented to person, place, and time. He appears well-developed.   Frail   HENT:   Head: Normocephalic and atraumatic.   Eyes: EOM are normal.   Neck: Neck supple.   Cardiovascular: Normal rate and regular rhythm.    Pulmonary/Chest: Effort normal. No respiratory distress.   Significantly decreased breath sounds bilaterally   Abdominal: Soft. There is no tenderness.   Musculoskeletal: He exhibits no edema.   Neurological: He is alert and oriented to person, place, and time.   Skin: Skin is dry. There is pallor.   Psychiatric: He has a normal mood and  affect.       Vents:  Oxygen Concentration (%): 44 (08/09/18 1401)    Lines/Drains/Airways     Peripheral Intravenous Line                 Peripheral IV - Single Lumen 08/04/18 0536 Left Forearm 5 days                Significant Labs:    CBC/Anemia Profile:    Recent Labs  Lab 08/08/18  0503 08/09/18  0446   WBC 9.77 7.86   HGB 11.7* 11.3*   HCT 33.0* 32.4*   * 467*   * 101*   RDW 14.1 14.2        Chemistries:    Recent Labs  Lab 08/08/18  0503 08/09/18  0446   * 133*   K 3.2* 3.1*   CL 94* 95   CO2 26 23   BUN 46* 45*   CREATININE 2.5* 2.4*   CALCIUM 9.5 9.4   ALBUMIN 2.1* 2.0*   PROT 6.6 6.3   BILITOT 0.6 0.7   ALKPHOS 409* 426*   ALT 28 24   AST 34 30     Significant Imaging:  No new chest x-ray

## 2018-08-09 NOTE — PT/OT/SLP PROGRESS
Physical Therapy  Treatment    Lionel Castillo   MRN: [id number]   Admitting Diagnosis: Acute on chronic diastolic congestive heart failure    PT Received On: 08/09/18  PT Start Time: 1110     PT Stop Time: 1135    PT Total Time (min): 25 min       Billable Minutes:  Gait Training 15 and Therapeutic Activity 10    Treatment Type: Treatment  PT/PTA: PT       General Precautions: Standard, fall, respiratory  Orthopedic Precautions: RUE non weight bearing   Braces: Sling and swathe    Subjective:  Communicated with NURSE ROBB prior to session.  Pain/Comfort  Pain Rating 1: 5/10  Location - Side 1: Right  Location - Orientation 1: upper  Location 1: arm    Objective:   Patient found with: telemetry, oxygen    Functional Mobility:  Therapeutic Activities and Exercises:  PT FOUND SUPINE IN BED REQUESTING ASSISTANCE FOR ADJUSTING RUE SLING, MAXA TO ADJUST SLING APPROPRIATELY.  CGA FOR SUP>SIT TF, CGA FOR SEATED SCOOT TO EOB, MAXA FOR DONNING TENNIS SHOES, DANO FOR SIT>STAND TF, PT AMB 70' X 2 TRIALS WITH DANO, 6L OF O2 IN TOW, INCREASED SOB, DEEP BREATHING ENCOURAGED, PT UNSTEADY BUT NO GROSS LOB, PT HOLDING ONTO HALLWAY RAIL WITH LUE, PT REQUIRED FREQUENT STANDING REST DUE TO FATIGUE, PT RETURNED TO ROOM, DECLINED SITTING IN CHAIR, ASSIST BACK TO SUPINE WITH CGA, DANO FOR SUPINE SCOOT TOWARD HEAD OF BED.  REVIEW BLE THEREX TO PERFORM WHILE SUPINE IN BED OR SEATED IN CHAIR    AM-PAC 6 CLICK MOBILITY  How much help from another person does this patient currently need?   1 = Unable, Total/Dependent Assistance  2 = A lot, Maximum/Moderate Assistance  3 = A little, Minimum/Contact Guard/Supervision  4 = None, Modified [place]/Independent    Turning over in bed (including adjusting bedclothes, sheets and blankets)?: 3  Sitting down on and standing up from a chair with arms (e.g., wheelchair, bedside commode, etc.): 3  Moving from lying on back to sitting on the side of the bed?: 3  Moving to and from a bed to a chair  (including a wheelchair)?: 3  Need to walk in hospital room?: 3  Climbing 3-5 steps with a railing?: 1  Basic Mobility Total Score: 16    AM-PAC Raw Score CMS G-Code Modifier Level of Impairment Assistance   6 % Total / Unable   7 - 9 CM 80 - 100% Maximal Assist   10 - 14 CL 60 - 80% Moderate Assist   15 - 19 CK 40 - 60% Moderate Assist   20 - 22 CJ 20 - 40% Minimal Assist   23 CI 1-20% SBA / CGA   24 CH 0% Independent/ Mod I     Patient left supine with all lines intact, call button in reach and NURSE notified.    Assessment:  Lionel Castillo is a 68 y.o. male with a medical diagnosis of Acute on chronic diastolic congestive heart failure and presents with IMPAIRED FUNCTIONAL MOBILITY. PT WILL BENEFIT FROM CONT. SKILLED P.T. TO ADDRESS IMPAIRMENTS    Rehab identified problem list/impairments: Rehab identified problem list/impairments: weakness, impaired endurance, impaired functional mobilty, gait instability, impaired balance, decreased coordination, decreased safety awareness, decreased upper extremity function, pain    Rehab potential is good.    Activity tolerance: Good    Discharge recommendations: Discharge Facility/Level Of Care Needs: rehabilitation facility     Barriers to discharge:     Equipment recommendations: Equipment Needed After Discharge: bedside commode, bath bench     GOALS:    Physical Therapy Goals        Problem: Physical Therapy Goal    Goal Priority Disciplines Outcome Goal Variances Interventions   Physical Therapy Goal     PT/OT, PT Ongoing (interventions implemented as appropriate)     Description:  LTG'S TO BE MET IN 7 DAYS (8-14-18)  1. PT WILL REQUIRE DANO FOR BED MOBILITY  2. PT WILL REQUIRE DANO FOR TF'S  3. PT WILL ' WITH SPC AND DANO  4. PT WILL DEMO F DYNAMIC BALANCE DURING GAIT                    PLAN:    Patient to be seen 5 x/week  to address the above listed problems via gait training, therapeutic activities, therapeutic exercises  Plan of Care expires:  08/14/18  Plan of Care reviewed with: patient    PT ENCOURAGED TO CALL FOR ASSISTANCE WITH ALL NEEDS DUE TO FALL RISK STATUS, PT AGREEABLE    Rachelle Sanchez, PT  08/09/2018

## 2018-08-09 NOTE — ASSESSMENT & PLAN NOTE
-improved  O 2 keep sats greater than 90 %  Pulmonary Consult   -diuresis continued  Duo neb every 6 hours   -home oxygen evaluation results reviewed and qualifies for home oxygen   -will continue to wean pt as tolerated

## 2018-08-09 NOTE — PLAN OF CARE
Problem: Occupational Therapy Goal  Goal: Occupational Therapy Goal  Goals to be met by:8-14-18    Patient will increase functional independence with ADLs by performing:    UE Dressing with Minimal Assistance.  LE Dressing with Minimal Assistance.  Toilet transfer to toilet with Stand-by Assistance.  Upper extremity exercise with left ue 15 reps with min resistance     Outcome: Ongoing (interventions implemented as appropriate)  PT PERFORMED BED MOBILITY CGA. READJUSTED RUE SLING FOR PROPER FIT. PT STOOD MIN A. PT AMBULATED HHA 2X70 FEET MIN A WITH 6 L O2 IN TOW, FREQUENT STANDING REST BREAKS. PT DECLINED TO SIT UP IN CHAIR. RETURNED TO SUPINE CGA. ALL LINES INTACT. ALL NEEDS MET.

## 2018-08-09 NOTE — ASSESSMENT & PLAN NOTE
8/4 follow up an review of prior studies,? Rounded atelectasis? 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 Patient will need outpatient pulmonary follow-up, repeat CT chest/PET scan versus biopsy.  He has an appointment with Dr. Bailey August 20, 2018.  8/8 same  8/9 same

## 2018-08-09 NOTE — PROGRESS NOTES
[facility] Hospital Medicine  Progress Note    Patient Name: Linoel Castillo  MRN: [id number]  Patient Class: IP- Inpatient   Admission Date: 8/4/2018  Length of Stay: 5 days  Attending Physician: Manuel Willis MD  Primary Care Provider: Eddie Garcia MD        Subjective:     Principal Problem:Acute on chronic diastolic congestive heart failure    HPI:  Mr Castillo is a 68 year old male with PMHx of DM, diastolic CHF, carotid artery disease, Rt humerus fracture, right lower lobe lung mass, tobacco and ETOL abuse. He presented to [facility] Emergency Room with complaints increase shortness of breath that started about 2 am this morning. Denies associated symptoms of chest pain, palpitations, syncope, dizziness, fever, chill, nausea or vomiting. Patient was admitted to [facility] on 7/18/2018 after experiencing syncope episode with fall, was also noted to have right lower lobe lung mass during that admission as well.  He currently resides at [facility] for Right humerus fractures. 2 D Echo on 7/19/2018 showed, normal left ventricular systolic function (EF 55-60%) and indeterminate LV diastolic function with moderate Aortic regurgitation.  BNP 3963. P O2 53. Troponin negative. He has received Lasix 60 mg IV in the Emergency Room. He is currently on BiPap to maintain stable O 2 sats. Patient reports he already scheduled to follow up with Orthopedic in clinic for Rt humerus. He is scheduled to see Dr Bailey on 8/10/2018 for Rt lower lower lobe lung mass.    Hospital Course:  Pt admitted to Telemetry Unit for acute on chronic diastolic congestive heart failure.  Pt treated with IV Lasix and supplemental oxygen.  Chest xray showed moderate amount of pleural thickening versus pleural fluid throughout the right hemithorax with stable mixed interstitial and alveolar opacities throughout the lungs, which can be associated with pneumonia, pulmonary edema or ARDS.  Hyponatremia improved.  H/H stable.   BUN/Creatinine monitored with downward trend noted.  Potassium monitored.  Elevated LFTs and bilirubin noted likely due to hepatic congestion with spontaneous resolution.  Repeat xray shows no significant change.  Home oxygen evaluation results reviewed and patient qualifies for oxygen.  PT evaluation performed and Rehab facility recommended.  Case management consulted for discharge planning.  Pt counseled on smoking cessation with understanding verbalized.  Will wean oxygen as tolerated for anticipated discharge.      Interval History: pt stable overnight and sitting up in chair during exam.  Pt denies any additional complaints at this time.  BNP remains elevated with Metolazone initiated.  BUN Creatinine monitored with downward trend noted.  Pt counseled on the importance of maintaining compliance with 1.5L fluid restriction.  Will continue to wean oxygen as tolerated.      Review of Systems   Constitutional: Positive for fatigue. Negative for chills, diaphoresis and fever.   HENT: Negative for congestion, ear discharge, rhinorrhea, sinus pressure, sore throat and trouble swallowing.    Eyes: Negative for discharge and visual disturbance.   Respiratory: Positive for cough (improved ) and shortness of breath (improved). Negative for apnea, choking, chest tightness, wheezing and stridor.    Cardiovascular: Negative for chest pain, palpitations and leg swelling.   Gastrointestinal: Negative for abdominal distention, abdominal pain, diarrhea, nausea and vomiting.   Endocrine: Negative for cold intolerance and heat intolerance.   Genitourinary: Negative for dysuria, frequency and hematuria.   Musculoskeletal: Negative for arthralgias, back pain, myalgias and neck pain.   Skin: Negative for pallor and rash.   Allergic/Immunologic: Negative for environmental allergies and food allergies.   Neurological: Positive for weakness (improved). Negative for dizziness, seizures, syncope and headaches.   Psychiatric/Behavioral:  Negative for agitation, confusion and sleep disturbance.     Objective:     Vital Signs (Most Recent):  Temp: 97.1 °F (36.2 °C) (08/09/18 0746)  Pulse: 62 (08/09/18 0943)  Resp: 18 (08/09/18 0746)  BP: (!) 172/73 (08/09/18 0746)  SpO2: (!) 93 % (08/09/18 0746) Vital Signs (24h Range):  Temp:  [96.7 °F (35.9 °C)-97.9 °F (36.6 °C)] 97.1 °F (36.2 °C)  Pulse:  [57-70] 62  Resp:  [18-20] 18  SpO2:  [91 %-97 %] 93 %  BP: (143-172)/(63-74) 172/73     Weight: 60.6 kg (133 lb 9.6 oz)  Body mass index is 19.73 kg/m².    Intake/Output Summary (Last 24 hours) at 08/09/18 1017  Last data filed at 08/09/18 0341   Gross per 24 hour   Intake              780 ml   Output              900 ml   Net             -120 ml      Physical Exam   Constitutional: He is oriented to person, place, and time. No distress.   HENT:   Head: Normocephalic.   Mouth/Throat: Oropharynx is clear and moist.   Eyes: Conjunctivae are normal. Right eye exhibits no discharge. Left eye exhibits no discharge.   Neck: Normal range of motion. No JVD present.   Cardiovascular: Normal rate, regular rhythm, normal heart sounds and intact distal pulses.  Exam reveals no gallop and no friction rub.    No murmur heard.  Pulmonary/Chest: No respiratory distress. He has decreased breath sounds in the right lower field and the left lower field. He has no wheezes. He has no rales. He exhibits no tenderness.   Abdominal: Soft. Bowel sounds are normal. He exhibits no distension and no mass. There is no tenderness. There is no rebound and no guarding. No hernia.   Genitourinary:   Genitourinary Comments: Deferred   Musculoskeletal: He exhibits no edema or deformity.   Rt upper extremely in sling, limit ROM  Fracture humerus from previous admission     Neurological: He is alert and oriented to person, place, and time.   Skin: Skin is warm and dry. Capillary refill takes less than 2 seconds. He is not diaphoretic. There is pallor.   Ecchymosis to R shoulder/RUE   Psychiatric: He  has a normal mood and affect. His behavior is normal. Judgment and thought content normal.   Nursing note and vitals reviewed.      Significant Labs:   CBC:   Recent Labs  Lab 08/08/18  0503 08/09/18 0446   WBC 9.77 7.86   HGB 11.7* 11.3*   HCT 33.0* 32.4*   * 467*     CMP:   Recent Labs  Lab 08/08/18  0503 08/09/18  0446   * 133*   K 3.2* 3.1*   CL 94* 95   CO2 26 23   * 116*   BUN 46* 45*   CREATININE 2.5* 2.4*   CALCIUM 9.5 9.4   PROT 6.6 6.3   ALBUMIN 2.1* 2.0*   BILITOT 0.6 0.7   ALKPHOS 409* 426*   AST 34 30   ALT 28 24   ANIONGAP 11 15   EGFRNONAA 26* 27*       Significant Imaging:   Imaging Results          X-Ray Chest AP Portable (Final result)  Result time 08/04/18 09:28:51    Final result by Paris Trejo MD (08/04/18 09:28:51)                 Impression:      New small right pleural effusion.    Stable right lower lung opacity.      Electronically signed by: Paris Trejo MD  Date:    08/04/2018  Time:    09:28             Narrative:    EXAMINATION:  XR CHEST AP PORTABLE    CLINICAL HISTORY:  sob;    COMPARISON:  July 2018    FINDINGS:  Small right pleural effusion with a stable round 3cm right lower lung  opacity.  Left lung is clear.  Cardiomediastinal silhouette is within normal limits. No new osseous findings.                              Assessment/Plan:      * Acute on chronic diastolic congestive heart failure    Admit to Inpatient   Lasix 60 mg IV X 1 given in ED  Lasix 60 mg IV BID   -Metolazone added  Strict I & O  Daily weights  -supplemental oxygen to keep O2 sats >90%  -Echo results reviewed  -low sodium diet with 1.5L fluid restriction          Centrilobular emphysema    -supplemental oxygen   -smoking cessation encouraged  -Sandra prn   -Pulmonology following            Acute respiratory failure with hypoxia    -improved  O 2 keep sats greater than 90 %  Pulmonary Consult   -diuresis continued  Duo neb every 6 hours   -home oxygen evaluation results  reviewed and qualifies for home oxygen   -will continue to wean pt as tolerated              Mass of right lung    Pulmonary following  -repeat chest xray today remains stable   -previous scans reviewed  -supplemental oxygen required for respiratory support  -pt scheduled for outpatient appointment with Dr. Bailey (Pulmonology) on 8/20/18          Carotid stenosis, bilateral    Follow up with Vascular Surgeon, Dr Zarco as outpatient  He was evaluated by Vascular surgeon during hospital admit 2 weeks ago and instructed to follow up as outpatient.           Tobacco use    Smoking cessation strongly encouraged        Type 2 diabetes mellitus, without long-term current use of insulin    Accu checks ACHS with low dose SS        Essential hypertension    Continue home Blood pressure medications         Right humeral fracture    -present upon admission  Follow up with Orthopedic as outpatient  He was evaluated by Orthopedic surgeon during hospital admit 2 weeks ago and instructed to follow up as outpatient.   -sling in place            Hypokalemia    -in the setting of diuretic use  -will monitor  -repleted          Alcohol abuse    ETOH cessation encouraged          VTE Risk Mitigation         Ordered     enoxaparin injection 30 mg  Daily      08/04/18 2198              Kandace Michel NP  Department of Hospital Medicine   [facility]

## 2018-08-09 NOTE — PLAN OF CARE
Problem: Patient Care Overview  Goal: Plan of Care Review  Outcome: Ongoing (interventions implemented as appropriate)  Patient remains free from falls or injury this shift, safety measures in place. Educated patient about importance of fluid restriction and monitoring how much intake and output he has throughout the shift, patient states understanding. Tolerating treatment well. Pain controlled with PO pain medications, RUE sling in place. VS stable on 5 L O2. Normal sinus rhythm on the monitor. Denies any other needs or complaints at this time. Will continue to monitor.

## 2018-08-09 NOTE — PLAN OF CARE
Problem: Patient Care Overview  Goal: Plan of Care Review  Outcome: Ongoing (interventions implemented as appropriate)  POC discussed w/patient, verbalized understanding.   NSR on monitor. AAO X 4. VSS.   Voids per urinal at bedside.   IV intact. Medications administered as prescribed.   Patient complains of intermittent pain to right arm.   Patient turns independently in bed.   Fall precautions in place, call bell in reach, bed in low and locked position.   Patient remains free from falls/injuries.   Patient denies needs at this time.   Will continue to monitor.

## 2018-08-09 NOTE — PROGRESS NOTES
[facility]  Pulmonology  Progress Note    Patient Name: Lionel Castillo  MRN: [id number]  Admission Date: 8/4/2018  Hospital Length of Stay: 5 days  Code Status: Full Code  Attending Provider: Manuel Willis MD  Primary Care Provider: Eddie Garcia MD   Principal Problem: Acute on chronic diastolic congestive heart failure    Subjective:     Interval History:   Review of Systems   Constitutional: Negative for chills and fever.   HENT: Negative for hearing loss and nosebleeds.    Eyes: Negative for discharge.   Respiratory: Positive for cough and shortness of breath.    Cardiovascular: Negative for chest pain and leg swelling.   Gastrointestinal: Negative for abdominal pain and vomiting.   Genitourinary: Negative for hematuria.   Musculoskeletal: Positive for joint pain.   Skin: Negative for rash.   Neurological: Negative for seizures, loss of consciousness and headaches.        Neuropathy     Endo/Heme/Allergies:        Diabetes mellitus on metformin at home   Psychiatric/Behavioral: The patient is not nervous/anxious.      Objective:     Vital Signs (Most Recent):  Temp: 97.7 °F (36.5 °C) (08/09/18 1534)  Pulse: 62 (08/09/18 1534)  Resp: 18 (08/09/18 1534)  BP: (!) 160/70 (08/09/18 1534)  SpO2: (!) 94 % (08/09/18 1534) Vital Signs (24h Range):  Temp:  [97 °F (36.1 °C)-97.8 °F (36.6 °C)] 97.7 °F (36.5 °C)  Pulse:  [55-79] 62  Resp:  [18-20] 18  SpO2:  [91 %-96 %] 94 %  BP: (124-172)/(58-74) 160/70     Weight: 60.6 kg (133 lb 9.6 oz)  Body mass index is 19.73 kg/m².      Intake/Output Summary (Last 24 hours) at 08/09/18 1636  Last data filed at 08/09/18 0341   Gross per 24 hour   Intake              300 ml   Output              700 ml   Net             -400 ml       Physical Exam   Constitutional: He is oriented to person, place, and time. He appears well-developed.   Frail   HENT:   Head: Normocephalic and atraumatic.   Eyes: EOM are normal.   Neck: Neck supple.   Cardiovascular: Normal  rate and regular rhythm.    Pulmonary/Chest: Effort normal. No respiratory distress.   Significantly decreased breath sounds bilaterally   Abdominal: Soft. There is no tenderness.   Musculoskeletal: He exhibits no edema.   Neurological: He is alert and oriented to person, place, and time.   Skin: Skin is dry. There is pallor.   Psychiatric: He has a normal mood and affect.       Vents:  Oxygen Concentration (%): 44 (08/09/18 1401)    Lines/Drains/Airways     Peripheral Intravenous Line                 Peripheral IV - Single Lumen 08/04/18 0536 Left Forearm 5 days                Significant Labs:    CBC/Anemia Profile:    Recent Labs  Lab 08/08/18  0503 08/09/18  0446   WBC 9.77 7.86   HGB 11.7* 11.3*   HCT 33.0* 32.4*   * 467*   * 101*   RDW 14.1 14.2        Chemistries:    Recent Labs  Lab 08/08/18  0503 08/09/18  0446   * 133*   K 3.2* 3.1*   CL 94* 95   CO2 26 23   BUN 46* 45*   CREATININE 2.5* 2.4*   CALCIUM 9.5 9.4   ALBUMIN 2.1* 2.0*   PROT 6.6 6.3   BILITOT 0.6 0.7   ALKPHOS 409* 426*   ALT 28 24   AST 34 30     Significant Imaging:  No new chest x-ray      Assessment/Plan:     * Acute on chronic diastolic congestive heart failure    8/4 continue diuretics, start b blocker  8/7 continue diuresis.  Patient currently on Lasix 60 mg IV q.12 hours.  He is negative -2.3 L since admission.  8/8 wean O2 as tolerated, will likely need O2 on discharge, continue diuresis with IV Lasix 60 mg twice a day, repeat chest x-ray and BNP  8/9 BNP 3756, chest x-ray no changes in pulmonary edema.  Remains on Lasix 60 mg twice a day, fluid balance -3.4 L        Acute respiratory failure with hypoxia    8/4 Hold Noninvasive Positive Pressure Ventilation and switch to oxygen via nasal cannula  8/7 wean O2 as tolerated, patient will likely need oxygen on discharge, he takes HCTZ as outpatient .   8/8 wean O2 as tolerated, will likely need O2 on discharge, continue diuresis with IV Lasix 60 mg twice a day, repeat  chest x-ray and BNP  8/9 BNP 3756, chest x-ray no changes in pulmonary edema.  Remains on Lasix 60 mg twice a day, fluid balance -3.4 L        Mass of right lung    8/4 follow up an review of prior studies,? Rounded atelectasis? 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 Patient will need outpatient pulmonary follow-up, repeat CT chest/PET scan versus biopsy.  He has an appointment with Dr. Bailey August 20, 2018.  8/8 same  8/9 same          No significant improvement in oxygen requirement, remains on IV diuretic     Chrissie Preston MD  Pulmonology  [facility]

## 2018-08-09 NOTE — ASSESSMENT & PLAN NOTE
8/4 Hold Noninvasive Positive Pressure Ventilation and switch to oxygen via nasal cannula  8/7 wean O2 as tolerated, patient will likely need oxygen on discharge, he takes HCTZ as outpatient .   8/8 wean O2 as tolerated, will likely need O2 on discharge, continue diuresis with IV Lasix 60 mg twice a day, repeat chest x-ray and BNP  8/9 BNP 3756, chest x-ray no changes in pulmonary edema.  Remains on Lasix 60 mg twice a day, fluid balance -3.4 L

## 2018-08-09 NOTE — ASSESSMENT & PLAN NOTE
8/4 continue diuretics, start b blocker  8/7 continue diuresis.  Patient currently on Lasix 60 mg IV q.12 hours.  He is negative -2.3 L since admission.  8/8 wean O2 as tolerated, will likely need O2 on discharge, continue diuresis with IV Lasix 60 mg twice a day, repeat chest x-ray and BNP  8/9 BNP 3756, chest x-ray no changes in pulmonary edema.  Remains on Lasix 60 mg twice a day, fluid balance -3.4 L

## 2018-08-09 NOTE — ASSESSMENT & PLAN NOTE
Admit to Inpatient   Lasix 60 mg IV X 1 given in ED  Lasix 60 mg IV BID   -Metolazone added  Strict I & O  Daily weights  -supplemental oxygen to keep O2 sats >90%  -Echo results reviewed  -low sodium diet with 1.5L fluid restriction

## 2018-08-09 NOTE — SUBJECTIVE & OBJECTIVE
Interval History: pt stable overnight and sitting up in chair during exam.  Pt denies any additional complaints at this time.  BNP remains elevated with Metolazone initiated.  BUN Creatinine monitored with downward trend noted.  Pt counseled on the importance of maintaining compliance with 1.5L fluid restriction.  Will continue to wean oxygen as tolerated.      Review of Systems   Constitutional: Positive for fatigue. Negative for chills, diaphoresis and fever.   HENT: Negative for congestion, ear discharge, rhinorrhea, sinus pressure, sore throat and trouble swallowing.    Eyes: Negative for discharge and visual disturbance.   Respiratory: Positive for cough (improved ) and shortness of breath (improved). Negative for apnea, choking, chest tightness, wheezing and stridor.    Cardiovascular: Negative for chest pain, palpitations and leg swelling.   Gastrointestinal: Negative for abdominal distention, abdominal pain, diarrhea, nausea and vomiting.   Endocrine: Negative for cold intolerance and heat intolerance.   Genitourinary: Negative for dysuria, frequency and hematuria.   Musculoskeletal: Negative for arthralgias, back pain, myalgias and neck pain.   Skin: Negative for pallor and rash.   Allergic/Immunologic: Negative for environmental allergies and food allergies.   Neurological: Positive for weakness (improved). Negative for dizziness, seizures, syncope and headaches.   Psychiatric/Behavioral: Negative for agitation, confusion and sleep disturbance.     Objective:     Vital Signs (Most Recent):  Temp: 97.1 °F (36.2 °C) (08/09/18 0746)  Pulse: 62 (08/09/18 0943)  Resp: 18 (08/09/18 0746)  BP: (!) 172/73 (08/09/18 0746)  SpO2: (!) 93 % (08/09/18 0746) Vital Signs (24h Range):  Temp:  [96.7 °F (35.9 °C)-97.9 °F (36.6 °C)] 97.1 °F (36.2 °C)  Pulse:  [57-70] 62  Resp:  [18-20] 18  SpO2:  [91 %-97 %] 93 %  BP: (143-172)/(63-74) 172/73     Weight: 60.6 kg (133 lb 9.6 oz)  Body mass index is 19.73  kg/m².    Intake/Output Summary (Last 24 hours) at 08/09/18 1017  Last data filed at 08/09/18 0341   Gross per 24 hour   Intake              780 ml   Output              900 ml   Net             -120 ml      Physical Exam   Constitutional: He is oriented to person, place, and time. No distress.   HENT:   Head: Normocephalic.   Mouth/Throat: Oropharynx is clear and moist.   Eyes: Conjunctivae are normal. Right eye exhibits no discharge. Left eye exhibits no discharge.   Neck: Normal range of motion. No JVD present.   Cardiovascular: Normal rate, regular rhythm, normal heart sounds and intact distal pulses.  Exam reveals no gallop and no friction rub.    No murmur heard.  Pulmonary/Chest: No respiratory distress. He has decreased breath sounds in the right lower field and the left lower field. He has no wheezes. He has no rales. He exhibits no tenderness.   Abdominal: Soft. Bowel sounds are normal. He exhibits no distension and no mass. There is no tenderness. There is no rebound and no guarding. No hernia.   Genitourinary:   Genitourinary Comments: Deferred   Musculoskeletal: He exhibits no edema or deformity.   Rt upper extremely in sling, limit ROM  Fracture humerus from previous admission     Neurological: He is alert and oriented to person, place, and time.   Skin: Skin is warm and dry. Capillary refill takes less than 2 seconds. He is not diaphoretic. There is pallor.   Ecchymosis to R shoulder/RUE   Psychiatric: He has a normal mood and affect. His behavior is normal. Judgment and thought content normal.   Nursing note and vitals reviewed.      Significant Labs:   CBC:   Recent Labs  Lab 08/08/18  0503 08/09/18  0446   WBC 9.77 7.86   HGB 11.7* 11.3*   HCT 33.0* 32.4*   * 467*     CMP:   Recent Labs  Lab 08/08/18  0503 08/09/18  0446   * 133*   K 3.2* 3.1*   CL 94* 95   CO2 26 23   * 116*   BUN 46* 45*   CREATININE 2.5* 2.4*   CALCIUM 9.5 9.4   PROT 6.6 6.3   ALBUMIN 2.1* 2.0*   BILITOT 0.6  0.7   ALKPHOS 409* 426*   AST 34 30   ALT 28 24   ANIONGAP 11 15   EGFRNONAA 26* 27*       Significant Imaging:   Imaging Results          X-Ray Chest AP Portable (Final result)  Result time 08/04/18 09:28:51    Final result by Paris Trejo MD (08/04/18 09:28:51)                 Impression:      New small right pleural effusion.    Stable right lower lung opacity.      Electronically signed by: Paris Trejo MD  Date:    08/04/2018  Time:    09:28             Narrative:    EXAMINATION:  XR CHEST AP PORTABLE    CLINICAL HISTORY:  sob;    COMPARISON:  July 2018    FINDINGS:  Small right pleural effusion with a stable round 3cm right lower lung  opacity.  Left lung is clear.  Cardiomediastinal silhouette is within normal limits. No new osseous findings.

## 2018-08-10 VITALS
WEIGHT: 130.94 LBS | BODY MASS INDEX: 19.39 KG/M2 | HEART RATE: 68 BPM | RESPIRATION RATE: 18 BRPM | TEMPERATURE: 97 F | OXYGEN SATURATION: 95 % | SYSTOLIC BLOOD PRESSURE: 164 MMHG | DIASTOLIC BLOOD PRESSURE: 89 MMHG | HEIGHT: 69 IN

## 2018-08-10 LAB
ALBUMIN SERPL BCP-MCNC: 2 G/DL
ALP SERPL-CCNC: 347 U/L
ALT SERPL W/O P-5'-P-CCNC: 20 U/L
ANION GAP SERPL CALC-SCNC: 14 MMOL/L
AST SERPL-CCNC: 19 U/L
BASOPHILS # BLD AUTO: 0.05 K/UL
BASOPHILS NFR BLD: 0.6 %
BILIRUB SERPL-MCNC: 0.5 MG/DL
BUN SERPL-MCNC: 46 MG/DL
CALCIUM SERPL-MCNC: 9.7 MG/DL
CHLORIDE SERPL-SCNC: 93 MMOL/L
CO2 SERPL-SCNC: 26 MMOL/L
CREAT SERPL-MCNC: 2.5 MG/DL
DIFFERENTIAL METHOD: ABNORMAL
EOSINOPHIL # BLD AUTO: 0.4 K/UL
EOSINOPHIL NFR BLD: 5.1 %
ERYTHROCYTE [DISTWIDTH] IN BLOOD BY AUTOMATED COUNT: 14 %
EST. GFR  (AFRICAN AMERICAN): 30 ML/MIN/1.73 M^2
EST. GFR  (NON AFRICAN AMERICAN): 26 ML/MIN/1.73 M^2
GLUCOSE SERPL-MCNC: 116 MG/DL
HCT VFR BLD AUTO: 32.3 %
HGB BLD-MCNC: 11.4 G/DL
LYMPHOCYTES # BLD AUTO: 1 K/UL
LYMPHOCYTES NFR BLD: 12.1 %
MCH RBC QN AUTO: 35.8 PG
MCHC RBC AUTO-ENTMCNC: 35.3 G/DL
MCV RBC AUTO: 102 FL
MONOCYTES # BLD AUTO: 0.7 K/UL
MONOCYTES NFR BLD: 9.2 %
NEUTROPHILS # BLD AUTO: 5.9 K/UL
NEUTROPHILS NFR BLD: 73 %
PLATELET # BLD AUTO: 455 K/UL
PMV BLD AUTO: 8.7 FL
POCT GLUCOSE: 126 MG/DL (ref 70–110)
POCT GLUCOSE: 135 MG/DL (ref 70–110)
POTASSIUM SERPL-SCNC: 3.1 MMOL/L
PROT SERPL-MCNC: 6.4 G/DL
RBC # BLD AUTO: 3.18 M/UL
SODIUM SERPL-SCNC: 133 MMOL/L
WBC # BLD AUTO: 8.04 K/UL

## 2018-08-10 PROCEDURE — 80053 COMPREHEN METABOLIC PANEL: CPT

## 2018-08-10 PROCEDURE — 27000221 HC OXYGEN, UP TO 24 HOURS

## 2018-08-10 PROCEDURE — 25000003 PHARM REV CODE 250: Performed by: NURSE PRACTITIONER

## 2018-08-10 PROCEDURE — 36415 COLL VENOUS BLD VENIPUNCTURE: CPT

## 2018-08-10 PROCEDURE — 97110 THERAPEUTIC EXERCISES: CPT

## 2018-08-10 PROCEDURE — 99231 SBSQ HOSP IP/OBS SF/LOW 25: CPT | Mod: ,,, | Performed by: INTERNAL MEDICINE

## 2018-08-10 PROCEDURE — 25000242 PHARM REV CODE 250 ALT 637 W/ HCPCS: Performed by: INTERNAL MEDICINE

## 2018-08-10 PROCEDURE — 63600175 PHARM REV CODE 636 W HCPCS: Performed by: EMERGENCY MEDICINE

## 2018-08-10 PROCEDURE — 94761 N-INVAS EAR/PLS OXIMETRY MLT: CPT

## 2018-08-10 PROCEDURE — 94640 AIRWAY INHALATION TREATMENT: CPT

## 2018-08-10 PROCEDURE — 85025 COMPLETE CBC W/AUTO DIFF WBC: CPT

## 2018-08-10 PROCEDURE — 97116 GAIT TRAINING THERAPY: CPT

## 2018-08-10 PROCEDURE — S4991 NICOTINE PATCH NONLEGEND: HCPCS | Performed by: NURSE PRACTITIONER

## 2018-08-10 RX ORDER — POTASSIUM CHLORIDE 20 MEQ/1
20 TABLET, EXTENDED RELEASE ORAL DAILY
Qty: 30 TABLET | Refills: 0 | Status: ON HOLD | OUTPATIENT
Start: 2018-08-10 | End: 2018-08-24 | Stop reason: SDUPTHER

## 2018-08-10 RX ORDER — FUROSEMIDE 40 MG/1
40 TABLET ORAL 2 TIMES DAILY
Qty: 60 TABLET | Refills: 0 | Status: ON HOLD | OUTPATIENT
Start: 2018-08-10 | End: 2018-08-24 | Stop reason: SDUPTHER

## 2018-08-10 RX ADMIN — IPRATROPIUM BROMIDE AND ALBUTEROL SULFATE 3 ML: .5; 3 SOLUTION RESPIRATORY (INHALATION) at 07:08

## 2018-08-10 RX ADMIN — IPRATROPIUM BROMIDE AND ALBUTEROL SULFATE 3 ML: .5; 3 SOLUTION RESPIRATORY (INHALATION) at 12:08

## 2018-08-10 RX ADMIN — HYDROCODONE BITARTRATE AND ACETAMINOPHEN 1 TABLET: 10; 325 TABLET ORAL at 12:08

## 2018-08-10 RX ADMIN — METOPROLOL TARTRATE 50 MG: 50 TABLET, FILM COATED ORAL at 08:08

## 2018-08-10 RX ADMIN — CITALOPRAM HYDROBROMIDE 20 MG: 20 TABLET ORAL at 08:08

## 2018-08-10 RX ADMIN — PANTOPRAZOLE SODIUM 40 MG: 40 TABLET, DELAYED RELEASE ORAL at 08:08

## 2018-08-10 RX ADMIN — ASPIRIN 81 MG 81 MG: 81 TABLET ORAL at 08:08

## 2018-08-10 RX ADMIN — NICOTINE 1 PATCH: 21 PATCH, EXTENDED RELEASE TRANSDERMAL at 08:08

## 2018-08-10 RX ADMIN — FUROSEMIDE 60 MG: 10 INJECTION, SOLUTION INTRAVENOUS at 08:08

## 2018-08-10 RX ADMIN — AMLODIPINE BESYLATE 10 MG: 10 TABLET ORAL at 08:08

## 2018-08-10 RX ADMIN — POTASSIUM CHLORIDE 20 MEQ: 1500 TABLET, EXTENDED RELEASE ORAL at 08:08

## 2018-08-10 RX ADMIN — HYDROCODONE BITARTRATE AND ACETAMINOPHEN 1 TABLET: 10; 325 TABLET ORAL at 06:08

## 2018-08-10 NOTE — PROGRESS NOTES
Pt elected to wear venti mask to sleep, refuses Bipap QHS, didn't last long, RN replaced with NC 5lpm at pt's request, Sat 92%  Scheduled neb given, no distress noted.

## 2018-08-10 NOTE — PLAN OF CARE
Patient alert and oriented. NSR on the monitor. VSS. Patient reported right upper arm pain, PRN medication administered, full relief obtained. Right arm sling remains in place. Patient voiding per bedside urinal. Patient compliant with fluid restriction. Patient remains on 5 liters nasal cannula; tolerating interventions well. Plan of care discussed with patient. Will continue to monitor.

## 2018-08-10 NOTE — DISCHARGE SUMMARY
[facility]  Hospital Medicine  Discharge Summary      Patient Name: Lionel Castillo  MRN: [id number]  Admission Date: 8/4/2018  Hospital Length of Stay: 6 days  Discharge Date and Time: 08/10/2018 6:12 PM  Attending Physician: Manuel Willis MD   Discharging Provider: Kandace Michel NP  Primary Care Provider: Eddie Garcia MD      HPI:   Mr Castillo is a 68 year old male with PMHx of DM, diastolic CHF, carotid artery disease, Rt humerus fracture, right lower lobe lung mass, tobacco and ETOL abuse. He presented to [facility] Emergency Room with complaints increase shortness of breath that started about 2 am this morning. Denies associated symptoms of chest pain, palpitations, syncope, dizziness, fever, chill, nausea or vomiting. Patient was admitted to [facility] on 7/18/2018 after experiencing syncope episode with fall, was also noted to have right lower lobe lung mass during that admission as well.  He currently resides at [facility] for Right humerus fractures. 2 D Echo on 7/19/2018 showed, normal left ventricular systolic function (EF 55-60%) and indeterminate LV diastolic function with moderate Aortic regurgitation.  BNP 3963. P O2 53. Troponin negative. He has received Lasix 60 mg IV in the Emergency Room. He is currently on BiPap to maintain stable O 2 sats. Patient reports he already scheduled to follow up with Orthopedic in clinic for Rt humerus. He is scheduled to see Dr Bailey on 8/10/2018 for Rt lower lower lobe lung mass.    * No surgery found *      Hospital Course:   Pt admitted to Telemetry Unit for acute on chronic diastolic congestive heart failure.  Pt treated with IV Lasix and supplemental oxygen.  Chest xray showed moderate amount of pleural thickening versus pleural fluid throughout the right hemithorax with stable mixed interstitial and alveolar opacities throughout the lungs, which can be associated with pneumonia, pulmonary edema or ARDS.  Hyponatremia improved.  H/H  stable.  BUN/Creatinine monitored with downward trend noted.  Potassium monitored.  Elevated LFTs and bilirubin noted likely due to hepatic congestion with spontaneous resolution.  Repeat xray shows no significant change.  Home oxygen evaluation results reviewed and patient qualifies for oxygen.  PT evaluation performed and Rehab facility recommended.  Case management consulted for discharge planning and placement.  Pt counseled on smoking cessation with understanding verbalized.  Repeat chest xray stable.  Pt verbalized symptom improvement and eager for discharge.  Pt seen and examined on the date of discharge and deemed suitable for discharge to  Rehab facility.  Current medications resumed with Lasix and Potassium prescribed.  ARB and Metformin stopped due to decreased kidney function.  Pt instructed to follow up with PCP, Nephrology, Vascular Surgery, Cardiology, and to keep previously scheduled appointment with Pulmonology on 8/20/18.        Consults:   Consults         Status Ordering Provider     Inpatient consult to Pulmonology  Once     Provider:  (Not yet assigned)    Completed JADEN SINGH     Inpatient consult to Registered Dietitian/Nutritionist  Once     Provider:  (Not yet assigned)    Completed CORETTA BECERRIL     Inpatient consult to Social Work/Case Management  Once     Provider:  (Not yet assigned)    Completed ULYSSES WILSON     IP consult to case management  Once     Provider:  (Not yet assigned)    Completed CORETTA BECERRIL          No new Assessment & Plan notes have been filed under this hospital service since the last note was generated.  Service: Hospital Medicine    Final Active Diagnoses:    Diagnosis Date Noted POA    PRINCIPAL PROBLEM:  Acute on chronic diastolic congestive heart failure [I50.33] 08/04/2018 Yes    Centrilobular emphysema [J43.2] 08/08/2018 Unknown    Acute respiratory failure with hypoxia [J96.01] 08/04/2018 Unknown    Mass of right lung [R91.8] 07/20/2018  Yes    Type 2 diabetes mellitus, without long-term current use of insulin [E11.9] 07/19/2018 Yes     Chronic    Tobacco use [Z72.0] 07/19/2018 Yes     Chronic    Essential hypertension [I10] 07/19/2018 Yes     Chronic    Carotid stenosis, bilateral [I65.23] 07/19/2018 Yes    Right humeral fracture [S42.301A] 07/18/2018 Yes    Alcohol abuse [F10.10] 07/18/2018 Yes     Chronic    Hypokalemia [E87.6] 07/18/2018 Yes      Problems Resolved During this Admission:    Diagnosis Date Noted Date Resolved POA    Transaminitis [R74.0] 08/06/2018 08/09/2018 No       Discharged Condition: stable    Disposition: Rehabilitation Facility    Follow Up:  Follow-up Information     Eddie Garcia MD In 3 days.    Specialty:  Internal Medicine  Why:  -hospital follow up   Contact information:  [address] [facility] [address] [phone]             Jelani Bailey MD In 10 days.    Specialty:  Pulmonary Disease  Why:  -please keep previously scheduled apppointment on 8/20/18  Contact information:  [address]  [place] [address] [phone]             Lisset Valentin PA-C In 2 weeks.    Specialty:  Cardiology  Why:  -follow up for CHF  Contact information:  [address] [facility]   [place] [address] [phone]             Vito Donaldson MD In 2 weeks.    Specialty:  Nephrology  Why:  -hospital follow up for CKD  Contact information:  [address] [facility] [place] [address] [phone]             Manuel Zarco MD In 1 week.    Specialty:  Vascular Surgery  Why:  -hospital follow up for evaluation for Carotids  Contact information:  [address] [facility]  [address]  [facility] [address] [phone]             [facility].    Why:  IP Rehab  Contact information:  [address] [facility] [address]               Patient Instructions:     OXYGEN FOR HOME USE   Order Specific Question Answer Comments   Liter Flow 4    Duration Continuous  "   Qualifying SpO2: 87%    Testing done at: Rest    Route nasal cannula    Device home concentrator with portable unit    Length of need (in months): 99 mos    Patient condition with qualifying saturation other chronic pulmonary condition R lung mass   Height: 5' 9" (1.753 m)    Weight: 59.4 kg (130 lb 15.3 oz)    Does patient have medical equipment at home? cane, straight    Does patient have medical equipment at home? walker, rolling    Does patient have medical equipment at home? rollator    Alternative treatment measures have been tried or considered and deemed clinically ineffective. Yes      Diet diabetic     Diet Cardiac   Order Comments: 1500 mL fluid restriction     Notify your health care provider if you experience any of the following:  temperature >100.4     Notify your health care provider if you experience any of the following:  persistent nausea and vomiting or diarrhea     Notify your health care provider if you experience any of the following:  difficulty breathing or increased cough     Notify your health care provider if you experience any of the following:  persistent dizziness, light-headedness, or visual disturbances     Notify your health care provider if you experience any of the following:  increased confusion or weakness     Activity as tolerated         Significant Diagnostic Studies: Labs:   CMP   Recent Labs  Lab 08/09/18  0446 08/10/18  0442   * 133*   K 3.1* 3.1*   CL 95 93*   CO2 23 26   * 116*   BUN 45* 46*   CREATININE 2.4* 2.5*   CALCIUM 9.4 9.7   PROT 6.3 6.4   ALBUMIN 2.0* 2.0*   BILITOT 0.7 0.5   ALKPHOS 426* 347*   AST 30 19   ALT 24 20   ANIONGAP 15 14   ESTGFRAFRICA 31* 30*   EGFRNONAA 27* 26*    and CBC   Recent Labs  Lab 08/09/18 0446 08/10/18  0442   WBC 7.86 8.04   HGB 11.3* 11.4*   HCT 32.4* 32.3*   * 455*       Pending Diagnostic Studies:     None         Medications:  Reconciled Home Medications:      Medication List      START taking these " medications    furosemide 40 MG tablet  Commonly known as:  LASIX  Take 1 tablet (40 mg total) by mouth 2 (two) times daily.     potassium chloride SA 20 MEQ tablet  Commonly known as:  K-DUR,KLOR-CON  Take 1 tablet (20 mEq total) by mouth once daily.        CONTINUE taking these medications    amLODIPine 10 MG tablet  Commonly known as:  NORVASC  Take 10 mg by mouth once daily.     aspirin 81 MG Chew  Take 81 mg by mouth once daily.     citalopram 40 MG tablet  Commonly known as:  CELEXA  Take 40 mg by mouth once daily.     docusate sodium 100 MG capsule  Commonly known as:  COLACE  Take 1 capsule (100 mg total) by mouth 2 (two) times daily as needed for Constipation.     folic acid 1 MG tablet  Commonly known as:  FOLVITE  Take 1 tablet (1 mg total) by mouth once daily.     HYDROcodone-acetaminophen  mg per tablet  Commonly known as:  NORCO  Take 1 tablet by mouth every 6 (six) hours as needed for Pain.     insulin regular 100 unit/mL injection  Commonly known as:  Humulin R  Inject into the skin 3 (three) times daily before meals.     metoprolol tartrate 50 MG tablet  Commonly known as:  LOPRESSOR  Take 1 tablet (50 mg total) by mouth 2 (two) times daily.     mupirocin 2 % ointment  Commonly known as:  BACTROBAN  Apply topically 3 (three) times daily.     nicotine 21 mg/24 hr  Commonly known as:  NICODERM CQ  Place 1 patch onto the skin every 24 hours.     pantoprazole 40 MG tablet  Commonly known as:  PROTONIX  Take 40 mg by mouth once daily.     polyethylene glycol 17 gram/dose powder  Commonly known as:  GLYCOLAX  Take 17 g by mouth once daily. Take daily to keep your bowel movements regular while taking pain medicines     temazepam 30 mg capsule  Commonly known as:  RESTORIL  Take 30 mg by mouth nightly as needed for Insomnia.     thiamine 100 MG tablet  Take 1 tablet (100 mg total) by mouth once daily.        STOP taking these medications    atorvastatin 20 MG tablet  Commonly known as:  LIPITOR      enoxaparin 150 mg/mL Syrg  Commonly known as:  LOVENOX     losartan-hydrochlorothiazide 100-25 mg 100-25 mg per tablet  Commonly known as:  HYZAAR     metFORMIN 500 MG tablet  Commonly known as:  GLUCOPHAGE            Indwelling Lines/Drains at time of discharge:   Lines/Drains/Airways          No matching active lines, drains, or airways          Time spent on the discharge of patient: 50 minutes  Patient was seen and examined on the date of discharge and determined to be suitable for discharge.         Kandace Michel NP  Department of Hospital Medicine  [facility]

## 2018-08-10 NOTE — PLAN OF CARE
Problem: Patient Care Overview  Goal: Plan of Care Review  Outcome: Ongoing (interventions implemented as appropriate)  Patient 's spo2 and breathing are improved significantly , his spo2 93 % on nasal cannula 3 lpm.

## 2018-08-10 NOTE — ASSESSMENT & PLAN NOTE
8/4 continue diuretics, start b blocker  8/7 continue diuresis.  Patient currently on Lasix 60 mg IV q.12 hours.  He is negative -2.3 L since admission.  8/8 wean O2 as tolerated, will likely need O2 on discharge, continue diuresis with IV Lasix 60 mg twice a day, repeat chest x-ray and BNP  8/9 BNP 3756, chest x-ray no changes in pulmonary edema.  Remains on Lasix 60 mg twice a day, fluid balance -3.4 L  8/10 switch to p.o. Lasix.

## 2018-08-10 NOTE — NURSING
Report called to Daria at [facility]. IV and telemetry monitor removed from patient. Belongings packed for patient. Waiting for transportation at 1700. Will continue to monitor.

## 2018-08-10 NOTE — SUBJECTIVE & OBJECTIVE
Interval History:   Review of Systems   Constitutional: Negative for chills and fever.   HENT: Negative for hearing loss and nosebleeds.    Eyes: Negative for discharge.   Respiratory: Positive for cough. Negative for shortness of breath.    Cardiovascular: Negative for chest pain and leg swelling.   Gastrointestinal: Negative for abdominal pain, blood in stool and vomiting.   Genitourinary: Negative for hematuria.   Musculoskeletal: Positive for joint pain.   Skin: Negative for rash.   Neurological: Negative for seizures, loss of consciousness and headaches.        Neuropathy     Endo/Heme/Allergies:        Diabetes mellitus on metformin at home   Psychiatric/Behavioral: The patient is not nervous/anxious.        Objective:     Vital Signs (Most Recent):  Temp: 97.4 °F (36.3 °C) (08/10/18 1135)  Pulse: 62 (08/10/18 1347)  Resp: 18 (08/10/18 1200)  BP: (!) 146/66 (08/10/18 1135)  SpO2: (!) 93 % (08/10/18 1200) Vital Signs (24h Range):  Temp:  [96.8 °F (36 °C)-97.7 °F (36.5 °C)] 97.4 °F (36.3 °C)  Pulse:  [57-69] 62  Resp:  [18] 18  SpO2:  [90 %-96 %] 93 %  BP: (146-178)/(66-77) 146/66     Weight: 59.4 kg (130 lb 15.3 oz)  Body mass index is 19.34 kg/m².      Intake/Output Summary (Last 24 hours) at 08/10/18 1430  Last data filed at 08/10/18 0600   Gross per 24 hour   Intake               20 ml   Output             1150 ml   Net            -1130 ml       Physical Exam   Constitutional: He is oriented to person, place, and time. He appears well-developed.   Frail   HENT:   Head: Normocephalic and atraumatic.   Eyes: EOM are normal.   Neck: Neck supple.   Cardiovascular: Normal rate and regular rhythm.    Pulmonary/Chest: Effort normal. No respiratory distress.   Significantly decreased breath sounds bilaterally   Abdominal: Soft. There is no tenderness.   Musculoskeletal: He exhibits no edema.   Wearing sling on the right arm   Neurological: He is alert and oriented to person, place, and time.   Skin: Skin is dry.  There is pallor.   Psychiatric: He has a normal mood and affect.       Vents:  Oxygen Concentration (%): 44 (08/09/18 2013)    Lines/Drains/Airways     Peripheral Intravenous Line                 Peripheral IV - Single Lumen 08/04/18 0536 Left Forearm 6 days                Significant Labs:    CBC/Anemia Profile:    Recent Labs  Lab 08/09/18  0446 08/10/18  0442   WBC 7.86 8.04   HGB 11.3* 11.4*   HCT 32.4* 32.3*   * 455*   * 102*   RDW 14.2 14.0        Chemistries:    Recent Labs  Lab 08/09/18  0446 08/10/18  0442   * 133*   K 3.1* 3.1*   CL 95 93*   CO2 23 26   BUN 45* 46*   CREATININE 2.4* 2.5*   CALCIUM 9.4 9.7   ALBUMIN 2.0* 2.0*   PROT 6.3 6.4   BILITOT 0.7 0.5   ALKPHOS 426* 347*   ALT 24 20   AST 30 19         Significant Imaging:  CXR: I have reviewed all pertinent results/findings within the past 24 hours and my personal findings are:  No changes when compared to previous chest x-ray car, right-sided effusion/opacity stable

## 2018-08-10 NOTE — PLAN OF CARE
Problem: Physical Therapy Goal  Goal: Physical Therapy Goal  LTG'S TO BE MET IN 7 DAYS (8-14-18)  1. PT WILL REQUIRE DANO FOR BED MOBILITY  2. PT WILL REQUIRE DANO FOR TF'S  3. PT WILL ' WITH SPC AND DANO  4. PT WILL DEMO F DYNAMIC BALANCE DURING GAIT   Outcome: Ongoing (interventions implemented as appropriate)  PATIENT DID WELL WITH GT INTO HALLWAY  40'X2 AT CGA TO MIN ASSIST X1 WITH SC, O2 AT 3L CONTINOUS ALL TX. T/FS OOB TO B/S CHAIR AT CGA TO MIN ASSIST X1. PATIENT REQUIRES FREQUENT REST PERIODS. PATIENT MOTIVIATED TO INCREASE ACTIVITY LEVEL AS TOLERATED.

## 2018-08-10 NOTE — PLAN OF CARE
Cole contacted Linda at [facility] at [phone] regarding pt. Cole reported pt is on 3L and tolerating well. Linda reports she will contact [employer] about pt's auth. She will also staff with her DON about acceptance on today. Linda reports she will contact Cole back.

## 2018-08-10 NOTE — PLAN OF CARE
Problem: Patient Care Overview  Goal: Plan of Care Review  Outcome: Ongoing (interventions implemented as appropriate)  Pt tolerates 02 and nebs well, no distress noted.

## 2018-08-10 NOTE — ASSESSMENT & PLAN NOTE
8/4 Hold Noninvasive Positive Pressure Ventilation and switch to oxygen via nasal cannula  8/7 wean O2 as tolerated, patient will likely need oxygen on discharge, he takes HCTZ as outpatient .   8/8 wean O2 as tolerated, will likely need O2 on discharge, continue diuresis with IV Lasix 60 mg twice a day, repeat chest x-ray and BNP  8/9 BNP 3756, chest x-ray no changes in pulmonary edema.  Remains on Lasix 60 mg twice a day, fluid balance -3.4 L  8/10 improved, will do be discharged on oxygen via nasal cannula,

## 2018-08-10 NOTE — PT/OT/SLP PROGRESS
Physical Therapy  Treatment    Lionel Castillo   MRN: [id number]   Admitting Diagnosis: Acute on chronic diastolic congestive heart failure    PT Received On: 08/10/18  PT Start Time: 1135     PT Stop Time: 1200    PT Total Time (min): 25 min       Billable Minutes:  Gait Training 15 and Therapeutic Exercise 10    Treatment Type: Treatment  PT/PTA: PTA     PTA Visit Number: 1       General Precautions: Standard, fall, respiratory  Orthopedic Precautions: RUE non weight bearing   Braces: Sling and swathe         Subjective:  Communicated with EPIC AND NURSECEZAR  prior to session.  PATIENT AGREE TO TX NOW.    Pain/Comfort  Pain Rating 1: 0/10    Objective:   Patient found with: telemetry, oxygen    Functional Mobility:  Bed Mobility:    SUPINE TO SIT AT CGA X1.    Transfers:   SIT TO STAND , STAND TO SIT AT AT CGA X1 , CUES FOR BODY ALIGNMENT .    Gait:    AMBULATE INTO HALLWAY AT 40'X2 , WITH SC , O2 AT 2L ALL TX , UNSTEADY AT TIMES, REQUIRES FREQUENT REST PERIODS .    Stairs:  N/A    Balance:   Static Sit: GOOD: Takes MODERATE challenges from all directions  Dynamic Sit: GOOD: Maintains balance through MODERATE excursions of active trunk movement  Static Stand: POOR+: Needs MINIMAL assist to maintain  Dynamic stand: 0: N/A     Therapeutic Activities and Exercises:  ABDOUL LE EXERCISES , GT INTO HALLWAY, SAFETY AWARENESS WITH T/FS    AM-PAC 6 CLICK MOBILITY  How much help from another person does this patient currently need?   1 = Unable, Total/Dependent Assistance  2 = A lot, Maximum/Moderate Assistance  3 = A little, Minimum/Contact Guard/Supervision  4 = None, Modified [place]/Independent    Turning over in bed (including adjusting bedclothes, sheets and blankets)?: 3  Sitting down on and standing up from a chair with arms (e.g., wheelchair, bedside commode, etc.): 3  Moving from lying on back to sitting on the side of the bed?: 3  Moving to and from a bed to a chair (including a wheelchair)?: 3  Need to  walk in hospital room?: 3  Climbing 3-5 steps with a railing?: 1  Basic Mobility Total Score: 16    AM-PAC Raw Score CMS G-Code Modifier Level of Impairment Assistance   6 % Total / Unable   7 - 9 CM 80 - 100% Maximal Assist   10 - 14 CL 60 - 80% Moderate Assist   15 - 19 CK 40 - 60% Moderate Assist   20 - 22 CJ 20 - 40% Minimal Assist   23 CI 1-20% SBA / CGA   24 CH 0% Independent/ Mod I     Patient left up in chair with all lines intact and call button in reach.    Assessment:  Lionel Castillo is a 68 y.o. male with a medical diagnosis of Acute on chronic diastolic congestive heart failure .    Rehab identified problem list/impairments: Rehab identified problem list/impairments: weakness, impaired self care skills, impaired balance, impaired endurance, impaired sensation, gait instability, impaired functional mobilty, decreased upper extremity function    Rehab potential is excellent.    Activity tolerance: Good    Discharge recommendations: Discharge Facility/Level Of Care Needs: rehabilitation facility     Barriers to discharge:      Equipment recommendations:       GOALS:    Physical Therapy Goals        Problem: Physical Therapy Goal    Goal Priority Disciplines Outcome Goal Variances Interventions   Physical Therapy Goal     PT/OT, PT Ongoing (interventions implemented as appropriate)     Description:  LTG'S TO BE MET IN 7 DAYS (8-14-18)  1. PT WILL REQUIRE DANO FOR BED MOBILITY  2. PT WILL REQUIRE DANO FOR TF'S  3. PT WILL ' WITH SPC AND DANO  4. PT WILL DEMO F DYNAMIC BALANCE DURING GAIT                    PLAN:    Patient to be seen 5 x/week  to address the above listed problems via gait training, therapeutic activities, therapeutic exercises  Plan of Care expires: 08/14/18  Plan of Care reviewed with: patient         Pauly Stock, PTA  08/10/2018

## 2018-08-10 NOTE — ASSESSMENT & PLAN NOTE
8/4 follow up an review of prior studies,? Rounded atelectasis? 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 Patient will need outpatient pulmonary follow-up, repeat CT chest/PET scan versus biopsy.  He has an appointment with Dr. Bailey August 20, 2018.  8/8 same  8/9 same  8/10 same

## 2018-08-10 NOTE — PROGRESS NOTES
[facility] -   Pulmonology  Progress Note    Patient Name: Lionel Castillo  MRN: [id number]  Admission Date: 8/4/2018  Hospital Length of Stay: 6 days  Code Status: Full Code  Attending Provider: Manuel Willis MD  Primary Care Provider: Eddie Garcia MD   Principal Problem: Acute on chronic diastolic congestive heart failure    Subjective:     Interval History:   Review of Systems   Constitutional: Negative for chills and fever.   HENT: Negative for hearing loss and nosebleeds.    Eyes: Negative for discharge.   Respiratory: Positive for cough. Negative for shortness of breath.    Cardiovascular: Negative for chest pain and leg swelling.   Gastrointestinal: Negative for abdominal pain, blood in stool and vomiting.   Genitourinary: Negative for hematuria.   Musculoskeletal: Positive for joint pain.   Skin: Negative for rash.   Neurological: Negative for seizures, loss of consciousness and headaches.        Neuropathy     Endo/Heme/Allergies:        Diabetes mellitus on metformin at home   Psychiatric/Behavioral: The patient is not nervous/anxious.        Objective:     Vital Signs (Most Recent):  Temp: 97.4 °F (36.3 °C) (08/10/18 1135)  Pulse: 62 (08/10/18 1347)  Resp: 18 (08/10/18 1200)  BP: (!) 146/66 (08/10/18 1135)  SpO2: (!) 93 % (08/10/18 1200) Vital Signs (24h Range):  Temp:  [96.8 °F (36 °C)-97.7 °F (36.5 °C)] 97.4 °F (36.3 °C)  Pulse:  [57-69] 62  Resp:  [18] 18  SpO2:  [90 %-96 %] 93 %  BP: (146-178)/(66-77) 146/66     Weight: 59.4 kg (130 lb 15.3 oz)  Body mass index is 19.34 kg/m².      Intake/Output Summary (Last 24 hours) at 08/10/18 1430  Last data filed at 08/10/18 0600   Gross per 24 hour   Intake               20 ml   Output             1150 ml   Net            -1130 ml       Physical Exam   Constitutional: He is oriented to person, place, and time. He appears well-developed.   Frail   HENT:   Head: Normocephalic and atraumatic.   Eyes: EOM are normal.   Neck: Neck  supple.   Cardiovascular: Normal rate and regular rhythm.    Pulmonary/Chest: Effort normal. No respiratory distress.   Significantly decreased breath sounds bilaterally   Abdominal: Soft. There is no tenderness.   Musculoskeletal: He exhibits no edema.   Wearing sling on the right arm   Neurological: He is alert and oriented to person, place, and time.   Skin: Skin is dry. There is pallor.   Psychiatric: He has a normal mood and affect.       Vents:  Oxygen Concentration (%): 44 (08/09/18 2013)    Lines/Drains/Airways     Peripheral Intravenous Line                 Peripheral IV - Single Lumen 08/04/18 0536 Left Forearm 6 days                Significant Labs:    CBC/Anemia Profile:    Recent Labs  Lab 08/09/18 0446 08/10/18  0442   WBC 7.86 8.04   HGB 11.3* 11.4*   HCT 32.4* 32.3*   * 455*   * 102*   RDW 14.2 14.0        Chemistries:    Recent Labs  Lab 08/09/18 0446 08/10/18  0442   * 133*   K 3.1* 3.1*   CL 95 93*   CO2 23 26   BUN 45* 46*   CREATININE 2.4* 2.5*   CALCIUM 9.4 9.7   ALBUMIN 2.0* 2.0*   PROT 6.3 6.4   BILITOT 0.7 0.5   ALKPHOS 426* 347*   ALT 24 20   AST 30 19         Significant Imaging:  CXR: I have reviewed all pertinent results/findings within the past 24 hours and my personal findings are:  No changes when compared to previous chest x-ray car, right-sided effusion/opacity stable      Assessment/Plan:     * Acute on chronic diastolic congestive heart failure    8/4 continue diuretics, start b blocker  8/7 continue diuresis.  Patient currently on Lasix 60 mg IV q.12 hours.  He is negative -2.3 L since admission.  8/8 wean O2 as tolerated, will likely need O2 on discharge, continue diuresis with IV Lasix 60 mg twice a day, repeat chest x-ray and BNP  8/9 BNP 3756, chest x-ray no changes in pulmonary edema.  Remains on Lasix 60 mg twice a day, fluid balance -3.4 L  8/10 switch to p.o. Lasix.        Centrilobular emphysema    8/8 mild emphysema noted on CT scan from July 2018,  O2 keep sat above 89-90%, albuterol Atrovent, outpatient follow-up with Pulmonary for PFT  8/9 O2 keep sat above 89%, albuterol Atrovent by nebulizer, outpatient pulmonary follow-up for PFT and workup of his lung mass on the right.        Acute respiratory failure with hypoxia    8/4 Hold Noninvasive Positive Pressure Ventilation and switch to oxygen via nasal cannula  8/7 wean O2 as tolerated, patient will likely need oxygen on discharge, he takes HCTZ as outpatient .   8/8 wean O2 as tolerated, will likely need O2 on discharge, continue diuresis with IV Lasix 60 mg twice a day, repeat chest x-ray and BNP  8/9 BNP 3756, chest x-ray no changes in pulmonary edema.  Remains on Lasix 60 mg twice a day, fluid balance -3.4 L  8/10 improved, will do be discharged on oxygen via nasal cannula,        Mass of right lung    8/4 follow up an review of prior studies,? Rounded atelectasis? 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 Patient will need outpatient pulmonary follow-up, repeat CT chest/PET scan versus biopsy.  He has an appointment with Dr. Bailey August 20, 2018.  8/8 same  8/9 same  8/10 zenia Preston MD  Pulmonology  [facility] -

## 2018-08-10 NOTE — ASSESSMENT & PLAN NOTE
8/8 mild emphysema noted on CT scan from July 2018, O2 keep sat above 89-90%, albuterol Atrovent, outpatient follow-up with Pulmonary for PFT  8/9 O2 keep sat above 89%, albuterol Atrovent by nebulizer, outpatient pulmonary follow-up for PFT and workup of his lung mass on the right.

## 2018-08-10 NOTE — PLAN OF CARE
Pt has been accepted to [facility]. Nurse can call report to [phone]. Sw will provide number to nurse after discharge orders are received.

## 2018-08-13 NOTE — PLAN OF CARE
08/13/18 0852   Final Note   Assessment Type Discharge Planning Assessment   Discharge Disposition Rehab   Right Care Referral Info   Post Acute Recommendation IRF  ([facility])

## 2018-08-18 ENCOUNTER — HOSPITAL ENCOUNTER (INPATIENT)
Facility: HOSPITAL | Age: 68
LOS: 4 days | Discharge: HOME HEALTH | DRG: 291 | End: 2018-08-22
Attending: EMERGENCY MEDICINE | Admitting: INTERNAL MEDICINE
Payer: MEDICARE

## 2018-08-18 DIAGNOSIS — J18.9 PNEUMONIA OF LEFT LOWER LOBE DUE TO INFECTIOUS ORGANISM: ICD-10-CM

## 2018-08-18 DIAGNOSIS — I50.9 CHF (CONGESTIVE HEART FAILURE): ICD-10-CM

## 2018-08-18 DIAGNOSIS — I50.9 ACUTE CONGESTIVE HEART FAILURE, UNSPECIFIED HEART FAILURE TYPE: ICD-10-CM

## 2018-08-18 DIAGNOSIS — E11.22 TYPE 2 DIABETES MELLITUS WITH STAGE 3 CHRONIC KIDNEY DISEASE, WITHOUT LONG-TERM CURRENT USE OF INSULIN: Chronic | ICD-10-CM

## 2018-08-18 DIAGNOSIS — N18.30 TYPE 2 DIABETES MELLITUS WITH STAGE 3 CHRONIC KIDNEY DISEASE, WITHOUT LONG-TERM CURRENT USE OF INSULIN: Chronic | ICD-10-CM

## 2018-08-18 DIAGNOSIS — R94.31 QT PROLONGATION: ICD-10-CM

## 2018-08-18 DIAGNOSIS — J96.21 ACUTE ON CHRONIC RESPIRATORY FAILURE WITH HYPOXIA: Primary | ICD-10-CM

## 2018-08-18 DIAGNOSIS — J96.90 RESPIRATORY FAILURE: ICD-10-CM

## 2018-08-18 DIAGNOSIS — I50.814 RIGHT-SIDED CONGESTIVE HEART FAILURE SECONDARY TO LEFT-SIDED CONGESTIVE HEART FAILURE: ICD-10-CM

## 2018-08-18 PROBLEM — E87.5 HYPERKALEMIA: Status: ACTIVE | Noted: 2018-08-18

## 2018-08-18 LAB
ALBUMIN SERPL BCP-MCNC: 2.3 G/DL
ALLENS TEST: ABNORMAL
ALP SERPL-CCNC: 204 U/L
ALT SERPL W/O P-5'-P-CCNC: 9 U/L
ANION GAP SERPL CALC-SCNC: 12 MMOL/L
AST SERPL-CCNC: 16 U/L
BACTERIA #/AREA URNS HPF: NORMAL /HPF
BASOPHILS # BLD AUTO: 0.04 K/UL
BASOPHILS NFR BLD: 0.3 %
BILIRUB SERPL-MCNC: 0.8 MG/DL
BILIRUB UR QL STRIP: NEGATIVE
BNP SERPL-MCNC: 4343 PG/ML
BUN SERPL-MCNC: 57 MG/DL
CALCIUM SERPL-MCNC: 10 MG/DL
CHLORIDE SERPL-SCNC: 101 MMOL/L
CLARITY UR: CLEAR
CO2 SERPL-SCNC: 19 MMOL/L
COLOR UR: YELLOW
CREAT SERPL-MCNC: 2.8 MG/DL
DELSYS: ABNORMAL
DIFFERENTIAL METHOD: ABNORMAL
EOSINOPHIL # BLD AUTO: 0.1 K/UL
EOSINOPHIL NFR BLD: 0.6 %
ERYTHROCYTE [DISTWIDTH] IN BLOOD BY AUTOMATED COUNT: 14 %
EST. GFR  (AFRICAN AMERICAN): 26 ML/MIN/1.73 M^2
EST. GFR  (NON AFRICAN AMERICAN): 22 ML/MIN/1.73 M^2
FIO2: 36
FLOW: 4
GLUCOSE SERPL-MCNC: 179 MG/DL
GLUCOSE UR QL STRIP: NEGATIVE
HCO3 UR-SCNC: 17.8 MMOL/L (ref 24–28)
HCT VFR BLD AUTO: 33.4 %
HGB BLD-MCNC: 11.6 G/DL
HGB UR QL STRIP: NEGATIVE
HYALINE CASTS #/AREA URNS LPF: 0 /LPF
KETONES UR QL STRIP: NEGATIVE
LACTATE SERPL-SCNC: 2.1 MMOL/L
LEUKOCYTE ESTERASE UR QL STRIP: NEGATIVE
LYMPHOCYTES # BLD AUTO: 1.1 K/UL
LYMPHOCYTES NFR BLD: 9.5 %
MCH RBC QN AUTO: 35.5 PG
MCHC RBC AUTO-ENTMCNC: 34.7 G/DL
MCV RBC AUTO: 102 FL
MICROSCOPIC COMMENT: NORMAL
MODE: ABNORMAL
MONOCYTES # BLD AUTO: 0.9 K/UL
MONOCYTES NFR BLD: 7.2 %
NEUTROPHILS # BLD AUTO: 9.7 K/UL
NEUTROPHILS NFR BLD: 82.4 %
NITRITE UR QL STRIP: NEGATIVE
PCO2 BLDA: 21.3 MMHG (ref 35–45)
PH SMN: 7.53 [PH] (ref 7.35–7.45)
PH UR STRIP: 7 [PH] (ref 5–8)
PLATELET # BLD AUTO: 542 K/UL
PMV BLD AUTO: 8.7 FL
PO2 BLDA: 41 MMHG (ref 80–100)
POC BE: -5 MMOL/L
POC SATURATED O2: 84 % (ref 95–100)
POTASSIUM SERPL-SCNC: 6.6 MMOL/L
PROCALCITONIN SERPL IA-MCNC: 0.18 NG/ML
PROT SERPL-MCNC: 7.3 G/DL
PROT UR QL STRIP: ABNORMAL
RBC # BLD AUTO: 3.27 M/UL
RBC #/AREA URNS HPF: 1 /HPF (ref 0–4)
SAMPLE: ABNORMAL
SITE: ABNORMAL
SODIUM SERPL-SCNC: 132 MMOL/L
SP GR UR STRIP: 1.01 (ref 1–1.03)
SQUAMOUS #/AREA URNS HPF: 3 /HPF
TROPONIN I SERPL DL<=0.01 NG/ML-MCNC: 0.01 NG/ML
URN SPEC COLLECT METH UR: ABNORMAL
UROBILINOGEN UR STRIP-ACNC: NEGATIVE EU/DL
WBC # BLD AUTO: 11.74 K/UL
WBC #/AREA URNS HPF: 3 /HPF (ref 0–5)

## 2018-08-18 PROCEDURE — 25000003 PHARM REV CODE 250: Performed by: EMERGENCY MEDICINE

## 2018-08-18 PROCEDURE — 84484 ASSAY OF TROPONIN QUANT: CPT

## 2018-08-18 PROCEDURE — 94660 CPAP INITIATION&MGMT: CPT

## 2018-08-18 PROCEDURE — 99285 EMERGENCY DEPT VISIT HI MDM: CPT | Mod: 25

## 2018-08-18 PROCEDURE — 63600175 PHARM REV CODE 636 W HCPCS: Performed by: INTERNAL MEDICINE

## 2018-08-18 PROCEDURE — 82803 BLOOD GASES ANY COMBINATION: CPT

## 2018-08-18 PROCEDURE — 85025 COMPLETE CBC W/AUTO DIFF WBC: CPT

## 2018-08-18 PROCEDURE — 80053 COMPREHEN METABOLIC PANEL: CPT

## 2018-08-18 PROCEDURE — 25000003 PHARM REV CODE 250: Performed by: INTERNAL MEDICINE

## 2018-08-18 PROCEDURE — 96365 THER/PROPH/DIAG IV INF INIT: CPT

## 2018-08-18 PROCEDURE — 96375 TX/PRO/DX INJ NEW DRUG ADDON: CPT

## 2018-08-18 PROCEDURE — 36600 WITHDRAWAL OF ARTERIAL BLOOD: CPT

## 2018-08-18 PROCEDURE — 83880 ASSAY OF NATRIURETIC PEPTIDE: CPT

## 2018-08-18 PROCEDURE — 27000221 HC OXYGEN, UP TO 24 HOURS

## 2018-08-18 PROCEDURE — 84145 PROCALCITONIN (PCT): CPT

## 2018-08-18 PROCEDURE — 99900035 HC TECH TIME PER 15 MIN (STAT)

## 2018-08-18 PROCEDURE — 83605 ASSAY OF LACTIC ACID: CPT

## 2018-08-18 PROCEDURE — 93010 ELECTROCARDIOGRAM REPORT: CPT | Mod: ,,, | Performed by: INTERNAL MEDICINE

## 2018-08-18 PROCEDURE — 94640 AIRWAY INHALATION TREATMENT: CPT

## 2018-08-18 PROCEDURE — 63600175 PHARM REV CODE 636 W HCPCS: Performed by: EMERGENCY MEDICINE

## 2018-08-18 PROCEDURE — 27000190 HC CPAP FULL FACE MASK W/VALVE

## 2018-08-18 PROCEDURE — 25000242 PHARM REV CODE 250 ALT 637 W/ HCPCS: Performed by: EMERGENCY MEDICINE

## 2018-08-18 PROCEDURE — 20000000 HC ICU ROOM

## 2018-08-18 PROCEDURE — 81000 URINALYSIS NONAUTO W/SCOPE: CPT

## 2018-08-18 PROCEDURE — 87040 BLOOD CULTURE FOR BACTERIA: CPT | Mod: 59

## 2018-08-18 PROCEDURE — 36415 COLL VENOUS BLD VENIPUNCTURE: CPT

## 2018-08-18 RX ORDER — HYDROCODONE BITARTRATE AND ACETAMINOPHEN 10; 325 MG/1; MG/1
1 TABLET ORAL EVERY 6 HOURS PRN
Status: DISCONTINUED | OUTPATIENT
Start: 2018-08-18 | End: 2018-08-22 | Stop reason: HOSPADM

## 2018-08-18 RX ORDER — POLYETHYLENE GLYCOL 3350 17 G/17G
17 POWDER, FOR SOLUTION ORAL DAILY
Status: DISCONTINUED | OUTPATIENT
Start: 2018-08-19 | End: 2018-08-18

## 2018-08-18 RX ORDER — FUROSEMIDE 10 MG/ML
60 INJECTION INTRAMUSCULAR; INTRAVENOUS EVERY 6 HOURS
Status: COMPLETED | OUTPATIENT
Start: 2018-08-18 | End: 2018-08-19

## 2018-08-18 RX ORDER — ACETAMINOPHEN 325 MG/1
650 TABLET ORAL EVERY 8 HOURS PRN
Status: DISCONTINUED | OUTPATIENT
Start: 2018-08-18 | End: 2018-08-18 | Stop reason: DRUGHIGH

## 2018-08-18 RX ORDER — ONDANSETRON 8 MG/1
8 TABLET, ORALLY DISINTEGRATING ORAL EVERY 8 HOURS PRN
Status: DISCONTINUED | OUTPATIENT
Start: 2018-08-18 | End: 2018-08-22 | Stop reason: HOSPADM

## 2018-08-18 RX ORDER — ENOXAPARIN SODIUM 150 MG/ML
30 INJECTION SUBCUTANEOUS
Status: ON HOLD | COMMUNITY
End: 2018-08-22 | Stop reason: HOSPADM

## 2018-08-18 RX ORDER — GLUCAGON 1 MG
1 KIT INJECTION
Status: DISCONTINUED | OUTPATIENT
Start: 2018-08-18 | End: 2018-08-22 | Stop reason: HOSPADM

## 2018-08-18 RX ORDER — IPRATROPIUM BROMIDE AND ALBUTEROL SULFATE 2.5; .5 MG/3ML; MG/3ML
3 SOLUTION RESPIRATORY (INHALATION)
Status: COMPLETED | OUTPATIENT
Start: 2018-08-18 | End: 2018-08-18

## 2018-08-18 RX ORDER — POLYETHYLENE GLYCOL 3350 17 G/17G
17 POWDER, FOR SOLUTION ORAL DAILY
Status: DISCONTINUED | OUTPATIENT
Start: 2018-08-19 | End: 2018-08-22 | Stop reason: HOSPADM

## 2018-08-18 RX ORDER — SODIUM CHLORIDE 0.9 % (FLUSH) 0.9 %
5 SYRINGE (ML) INJECTION
Status: DISCONTINUED | OUTPATIENT
Start: 2018-08-18 | End: 2018-08-22 | Stop reason: HOSPADM

## 2018-08-18 RX ORDER — RAMELTEON 8 MG/1
8 TABLET ORAL NIGHTLY PRN
Status: DISCONTINUED | OUTPATIENT
Start: 2018-08-18 | End: 2018-08-22 | Stop reason: HOSPADM

## 2018-08-18 RX ORDER — CITALOPRAM 20 MG/1
40 TABLET, FILM COATED ORAL DAILY
Status: DISCONTINUED | OUTPATIENT
Start: 2018-08-19 | End: 2018-08-22 | Stop reason: HOSPADM

## 2018-08-18 RX ORDER — HYDROCODONE BITARTRATE AND ACETAMINOPHEN 5; 325 MG/1; MG/1
1 TABLET ORAL EVERY 6 HOURS PRN
Status: DISCONTINUED | OUTPATIENT
Start: 2018-08-18 | End: 2018-08-22 | Stop reason: HOSPADM

## 2018-08-18 RX ORDER — ACETAMINOPHEN 500 MG
500 TABLET ORAL EVERY 4 HOURS PRN
Status: DISCONTINUED | OUTPATIENT
Start: 2018-08-18 | End: 2018-08-22 | Stop reason: HOSPADM

## 2018-08-18 RX ORDER — DEXTROSE 50 % IN WATER (D50W) INTRAVENOUS SYRINGE
25
Status: COMPLETED | OUTPATIENT
Start: 2018-08-18 | End: 2018-08-18

## 2018-08-18 RX ORDER — ENOXAPARIN SODIUM 100 MG/ML
30 INJECTION SUBCUTANEOUS EVERY 24 HOURS
Status: DISCONTINUED | OUTPATIENT
Start: 2018-08-18 | End: 2018-08-19

## 2018-08-18 RX ORDER — IBUPROFEN 200 MG
24 TABLET ORAL
Status: DISCONTINUED | OUTPATIENT
Start: 2018-08-18 | End: 2018-08-22 | Stop reason: HOSPADM

## 2018-08-18 RX ORDER — INSULIN ASPART 100 [IU]/ML
0-5 INJECTION, SOLUTION INTRAVENOUS; SUBCUTANEOUS
Status: DISCONTINUED | OUTPATIENT
Start: 2018-08-18 | End: 2018-08-22 | Stop reason: HOSPADM

## 2018-08-18 RX ORDER — MUPIROCIN 20 MG/G
OINTMENT TOPICAL 3 TIMES DAILY
Status: DISCONTINUED | OUTPATIENT
Start: 2018-08-18 | End: 2018-08-22 | Stop reason: HOSPADM

## 2018-08-18 RX ORDER — FUROSEMIDE 10 MG/ML
80 INJECTION INTRAMUSCULAR; INTRAVENOUS
Status: COMPLETED | OUTPATIENT
Start: 2018-08-18 | End: 2018-08-18

## 2018-08-18 RX ORDER — DOCUSATE SODIUM 100 MG/1
100 CAPSULE, LIQUID FILLED ORAL 2 TIMES DAILY PRN
Status: DISCONTINUED | OUTPATIENT
Start: 2018-08-18 | End: 2018-08-22 | Stop reason: HOSPADM

## 2018-08-18 RX ORDER — IBUPROFEN 200 MG
16 TABLET ORAL
Status: DISCONTINUED | OUTPATIENT
Start: 2018-08-18 | End: 2018-08-22 | Stop reason: HOSPADM

## 2018-08-18 RX ORDER — ONDANSETRON 2 MG/ML
4 INJECTION INTRAMUSCULAR; INTRAVENOUS EVERY 12 HOURS PRN
Status: DISCONTINUED | OUTPATIENT
Start: 2018-08-18 | End: 2018-08-22 | Stop reason: HOSPADM

## 2018-08-18 RX ADMIN — FUROSEMIDE 60 MG: 10 INJECTION, SOLUTION INTRAMUSCULAR; INTRAVENOUS at 05:08

## 2018-08-18 RX ADMIN — MOXIFLOXACIN HYDROCHLORIDE 400 MG: 400 INJECTION, SOLUTION INTRAVENOUS at 01:08

## 2018-08-18 RX ADMIN — RAMELTEON 8 MG: 8 TABLET, FILM COATED ORAL at 09:08

## 2018-08-18 RX ADMIN — LORAZEPAM 1 MG: 2 INJECTION INTRAMUSCULAR; INTRAVENOUS at 12:08

## 2018-08-18 RX ADMIN — FUROSEMIDE 60 MG: 10 INJECTION, SOLUTION INTRAMUSCULAR; INTRAVENOUS at 11:08

## 2018-08-18 RX ADMIN — MUPIROCIN: 20 OINTMENT TOPICAL at 08:08

## 2018-08-18 RX ADMIN — IPRATROPIUM BROMIDE AND ALBUTEROL SULFATE 3 ML: .5; 3 SOLUTION RESPIRATORY (INHALATION) at 11:08

## 2018-08-18 RX ADMIN — INSULIN HUMAN 10 UNITS: 100 INJECTION, SOLUTION PARENTERAL at 01:08

## 2018-08-18 RX ADMIN — FUROSEMIDE 80 MG: 10 INJECTION, SOLUTION INTRAMUSCULAR; INTRAVENOUS at 12:08

## 2018-08-18 RX ADMIN — SODIUM POLYSTYRENE SULFONATE 15 G: 15 SUSPENSION ORAL; RECTAL at 05:08

## 2018-08-18 RX ADMIN — DEXTROSE MONOHYDRATE 25 G: 500 INJECTION PARENTERAL at 01:08

## 2018-08-18 RX ADMIN — ENOXAPARIN SODIUM 30 MG: 100 INJECTION SUBCUTANEOUS at 08:08

## 2018-08-18 RX ADMIN — NITROGLYCERIN 1 INCH: 20 OINTMENT TOPICAL at 12:08

## 2018-08-18 NOTE — HPI
Worsening shortness of breath since returning home after a recent admission.  He also has a non-productive cough.  Denies chest pain.  Denies nausea, vomiting, fevers, or chills.  He was admitted on 04 August with shortness of breath and diagnosed with a diastolic heart failure exacerbation.  He has a right humerus fracture from a fall from a syncopal episode on 18 July.  He had a complicated right sided pleural effusion and atelectasis with a non-specific lung mass discovered on his first admission.  Further workup was planned as an outpatient with Dr. Bailey of Pulmonary medicine.

## 2018-08-18 NOTE — ED NOTES
The patient is resting quietly, eyes closed, arouses easily to stimuli. Airway is open and patent, respirations are spontaneous, Bipap in place. skin warm and dry

## 2018-08-18 NOTE — ED NOTES
The patient is resting quietly, eyes closed, arouses easily to stimuli. Airway is open and patent, respirations are spontaneous. Bipap in place. Skin warm and dry, appearance: in no acute distress and resting comfortably.

## 2018-08-18 NOTE — NURSING
Received pt from ER on continuous BiPAP, lasix given to diurese, urinal at bedside. VSS at this time. No distress noted

## 2018-08-18 NOTE — ED NOTES
Pt lying in bed with eyes closed. Easily aroused. AAO x 3. Skin warm and dry. Bipap in place. Visitors at bedside.

## 2018-08-18 NOTE — ED PROVIDER NOTES
SCRIBE #1 NOTE: I, Deo Og, am scribing for, and in the presence of, Jerald Clancy Jr., MD. I have scribed the entire note.      History      Chief Complaint   Patient presents with    Shortness of Breath     sob with hx of lung ca       Review of patient's allergies indicates:   Allergen Reactions    Penicillins         HPI   HPI    8/18/2018, 11:40 AM   History obtained from the patient      History of Present Illness: Lionel Castillo is a 68 y.o. male patient w/ a hist of lung ca who presents to the Emergency Department for SOB which worsened since d/c on 08/10. Symptoms are constant and moderate in severity. No mitigating or exacerbating factors reported. Associated sxs include nonproductive cough. Patient denies any CP, leg swelling, palpitations, fever, chills, n/v/d, HA, dizziness, and all other sxs at this time. Pt is not currently on chemotherapy or radiology regimen. He has an oncology appointment Monday. He states desire to be intubated if necessary. No further complaints or concerns at this time.         Arrival mode: EMS    PCP: Eddie Garcia MD       Past Medical History:  Past Medical History:   Diagnosis Date    Arthritis     Cancer     lung cancer    Diabetes     Foot fracture     Hand fracture, left     Hand fracture, right     HTN (hypertension)     Humerus fracture 07/18/2018    Hyperlipidemia     Neuropathy     Renal disorder        Past Surgical History:  Past Surgical History:   Procedure Laterality Date    ANKLE SURGERY Left     CHOLECYSTECTOMY      HUMERUS FRACTURE SURGERY           Family History:  Family History   Problem Relation Age of Onset    Heart disease Father     Miscarriages / Stillbirths Paternal Uncle     Heart disease Paternal Grandfather     Miscarriages / Stillbirths Paternal Grandfather        Social History:  Social History     Tobacco Use    Smoking status: Current Every Day Smoker     Packs/day: 1.00    Smokeless tobacco: Never  Used   Substance and Sexual Activity    Alcohol use: Yes     Comment: 6 beers per day    Drug use: No    Sexual activity: Unknown       ROS   Review of Systems   Constitutional: Negative for chills and fever.   HENT: Negative for congestion and sore throat.    Respiratory: Positive for cough and shortness of breath.    Cardiovascular: Negative for chest pain, palpitations and leg swelling.   Gastrointestinal: Negative for abdominal pain, diarrhea, nausea and vomiting.   Genitourinary: Negative for dysuria.   Musculoskeletal: Negative for back pain.   Skin: Negative for rash.   Neurological: Negative for dizziness, weakness, light-headedness and headaches.   Hematological: Does not bruise/bleed easily.   All other systems reviewed and are negative.      Physical Exam      Initial Vitals [08/18/18 1131]   BP Pulse Resp Temp SpO2   (!) 187/81 80 (!) 40 97.4 °F (36.3 °C) (!) 87 %      MAP       --          Physical Exam  Nursing Notes and Vital Signs Reviewed.  Constitutional: Patient is in moderate distress. Ill-appearing. Cachetic.  Head: Atraumatic. Normocephalic.  Eyes: PERRL. EOM intact. Conjunctivae are not pale. No scleral icterus.  ENT: Mucous membranes are moist. Oropharynx is clear and symmetric.    Neck: Supple. Full ROM. No lymphadenopathy.  Cardiovascular: Regular rate. Regular rhythm. No murmurs, rubs, or gallops. Distal pulses are 2+ and symmetric.  Pulmonary/Chest: Obvious respiratory distress. Tachypnic.  Crackles in left base.  Patient has respiratory distress with retractions  Abdominal: Soft and non-distended.  There is no tenderness.  No rebound, guarding, or rigidity.   Musculoskeletal: Moves all extremities. No obvious deformities. No edema. No calf tenderness. No swelling.  Skin: Warm and dry.   Neurological:  Alert, awake, and appropriate. AAOx3. Lucid.  Normal speech.  No acute focal neurological deficits are appreciated.  Psychiatric: Normal affect. Good eye contact. Appropriate in  "content.    ED Course    Procedures  ED Vital Signs:  Vitals:    08/18/18 1131 08/18/18 1135 08/18/18 1138 08/18/18 1145   BP: (!) 187/81      Pulse: 80 76  69   Resp: (!) 40   (!) 34   Temp: 97.4 °F (36.3 °C)      SpO2: (!) 87%  (!) 90% 97%   Height: 5' 9" (1.753 m)       08/18/18 1146 08/18/18 1150 08/18/18 1157 08/18/18 1203   BP:    (!) 159/75   Pulse:  70 71 70   Resp:  (!) 32  (!) 22   Temp:       SpO2: 97% 97%  99%   Height:        08/18/18 1302   BP: (!) 149/69   Pulse: 64   Resp: (!) 28   Temp: 97.6 °F (36.4 °C)   SpO2: 100%   Height:        Abnormal Lab Results:  Labs Reviewed   CBC W/ AUTO DIFFERENTIAL - Abnormal; Notable for the following components:       Result Value    RBC 3.27 (*)     Hemoglobin 11.6 (*)     Hematocrit 33.4 (*)      (*)     MCH 35.5 (*)     Platelets 542 (*)     MPV 8.7 (*)     Gran # (ANC) 9.7 (*)     Gran% 82.4 (*)     Lymph% 9.5 (*)     All other components within normal limits   COMPREHENSIVE METABOLIC PANEL - Abnormal; Notable for the following components:    Sodium 132 (*)     Potassium 6.6 (*)     CO2 19 (*)     Glucose 179 (*)     BUN, Bld 57 (*)     Creatinine 2.8 (*)     Albumin 2.3 (*)     Alkaline Phosphatase 204 (*)     ALT 9 (*)     eGFR if  26 (*)     eGFR if non  22 (*)     All other components within normal limits    Narrative:     K   critical result(s) called and verbal readback obtained from   Opal Weaver RN, 08/18/2018 12:38   B-TYPE NATRIURETIC PEPTIDE - Abnormal; Notable for the following components:    BNP 4,343 (*)     All other components within normal limits   ISTAT PROCEDURE - Abnormal; Notable for the following components:    POC PH 7.531 (*)     POC PCO2 21.3 (*)     POC PO2 41 (*)     POC HCO3 17.8 (*)     POC SATURATED O2 84 (*)     All other components within normal limits   CULTURE, BLOOD   CULTURE, BLOOD   TROPONIN I   LACTIC ACID, PLASMA   URINALYSIS, REFLEX TO URINE CULTURE        All Lab " Results:  Results for orders placed or performed during the hospital encounter of 08/18/18   CBC auto differential   Result Value Ref Range    WBC 11.74 3.90 - 12.70 K/uL    RBC 3.27 (L) 4.60 - 6.20 M/uL    Hemoglobin 11.6 (L) 14.0 - 18.0 g/dL    Hematocrit 33.4 (L) 40.0 - 54.0 %     (H) 82 - 98 fL    MCH 35.5 (H) 27.0 - 31.0 pg    MCHC 34.7 32.0 - 36.0 g/dL    RDW 14.0 11.5 - 14.5 %    Platelets 542 (H) 150 - 350 K/uL    MPV 8.7 (L) 9.2 - 12.9 fL    Gran # (ANC) 9.7 (H) 1.8 - 7.7 K/uL    Lymph # 1.1 1.0 - 4.8 K/uL    Mono # 0.9 0.3 - 1.0 K/uL    Eos # 0.1 0.0 - 0.5 K/uL    Baso # 0.04 0.00 - 0.20 K/uL    Gran% 82.4 (H) 38.0 - 73.0 %    Lymph% 9.5 (L) 18.0 - 48.0 %    Mono% 7.2 4.0 - 15.0 %    Eosinophil% 0.6 0.0 - 8.0 %    Basophil% 0.3 0.0 - 1.9 %    Differential Method Automated    Comprehensive metabolic panel   Result Value Ref Range    Sodium 132 (L) 136 - 145 mmol/L    Potassium 6.6 (HH) 3.5 - 5.1 mmol/L    Chloride 101 95 - 110 mmol/L    CO2 19 (L) 23 - 29 mmol/L    Glucose 179 (H) 70 - 110 mg/dL    BUN, Bld 57 (H) 8 - 23 mg/dL    Creatinine 2.8 (H) 0.5 - 1.4 mg/dL    Calcium 10.0 8.7 - 10.5 mg/dL    Total Protein 7.3 6.0 - 8.4 g/dL    Albumin 2.3 (L) 3.5 - 5.2 g/dL    Total Bilirubin 0.8 0.1 - 1.0 mg/dL    Alkaline Phosphatase 204 (H) 55 - 135 U/L    AST 16 10 - 40 U/L    ALT 9 (L) 10 - 44 U/L    Anion Gap 12 8 - 16 mmol/L    eGFR if African American 26 (A) >60 mL/min/1.73 m^2    eGFR if non African American 22 (A) >60 mL/min/1.73 m^2   Troponin I   Result Value Ref Range    Troponin I 0.013 0.000 - 0.026 ng/mL   Brain natriuretic peptide   Result Value Ref Range    BNP 4,343 (H) 0 - 99 pg/mL   Lactic acid, plasma   Result Value Ref Range    Lactate (Lactic Acid) 2.1 0.5 - 2.2 mmol/L   ISTAT PROCEDURE   Result Value Ref Range    POC PH 7.531 (H) 7.35 - 7.45    POC PCO2 21.3 (LL) 35 - 45 mmHg    POC PO2 41 (LL) 80 - 100 mmHg    POC HCO3 17.8 (L) 24 - 28 mmol/L    POC BE -5 -2 to 2 mmol/L    POC  SATURATED O2 84 (L) 95 - 100 %    Sample ARTERIAL     Site LR     Allens Test Pass     DelSys Nasal Can     Mode SPONT     Flow 4     FiO2 36          Imaging Results:  Imaging Results          X-Ray Chest 1 View (Final result)  Result time 08/18/18 12:16:33    Final result by Irwin Tomas III, MD (08/18/18 12:16:33)                 Impression:      1.  Persistent right pleural effusion.  Severe chronic lung changes again noted bilaterally.    2.  Suspicion of developing left basilar infiltrate and/or atelectasis.  Cannot exclude a small left effusion follow-up recommended      Electronically signed by: Irwin Tomas MD  Date:    08/18/2018  Time:    12:16             Narrative:    EXAMINATION:  XR CHEST 1 VIEW    CLINICAL HISTORY:  Respiratory failure, unspecified, unspecified whether with hypoxia or hypercapnia    COMPARISON:  August 10th    FINDINGS:  Persistent right pleural effusion with moderate coarsening of the interstitial markings bilaterally, most of which may be chronic in nature.  Suspicion of developing left basilar infiltrate and possible effusion.  Normal heart size.  Scarring right apex.                                        The EKG was ordered, reviewed, and independently interpreted by the ED provider.  Interpretation time: 11:57  Rate: 71 BPM  Rhythm: normal sinus rhythm  Interpretation: Septal infarct, age undetermined. No STEMI.        The Emergency Provider reviewed the vital signs and test results, which are outlined above.    ED Discussion     1:40 PM: Discussed case with Kandace Michel NP, (Hospital Medicine). Dr. Willis agrees with current care and management of pt and accepts admission.   Admitting Service: Hospital medicine   Admitting Physician: Lazaro  Admit to: ICU    1:46 PM: Re-evaluated pt. I have discussed test results, shared treatment plan, and the need for admission with patient and family at bedside. Pt and family express understanding at this time and agree with  all information. All questions answered. Pt and family have no further questions or concerns at this time. Pt is ready for admit.  2:15 PM  Patient is improved on BiPAP.  His respiratory status is markedly better however is unable to maintain off of oxygen.  He has CHF with a superimposed apparent left lower lobe pneumonia.  Has crackles in left base.  I have discussed with hospital medicine.  Will admit to ICU.  They will adjust his antibiotics as needed.      ED Medication(s):  Medications   moxifloxacin 400 mg/250 mL IVPB 400 mg (400 mg Intravenous New Bag 8/18/18 1344)   lorazepam (ATIVAN) injection 1 mg (1 mg Intravenous Given 8/18/18 1220)   furosemide injection 80 mg (80 mg Intravenous Given 8/18/18 1220)   albuterol-ipratropium 2.5 mg-0.5 mg/3 mL nebulizer solution 3 mL (3 mLs Nebulization Given 8/18/18 1150)   nitroGLYCERIN 2% TD oint ointment 1 inch (1 inch Transdermal Given 8/18/18 1220)   dextrose 50 % in water (D50W) injection 25 g (25 g Intravenous Given 8/18/18 1314)   insulin regular injection 10 Units (10 Units Intravenous Given 8/18/18 1314)       New Prescriptions    No medications on file             Medical Decision Making    Medical Decision Making:   Clinical Tests:   Lab Tests: Ordered and Reviewed  Medical Tests: Ordered and Reviewed           Scribe Attestation:   Scribe #1: I performed the above scribed service and the documentation accurately describes the services I performed. I attest to the accuracy of the note.    Attending:   Physician Attestation Statement for Scribe #1: I, Jerald Clancy Jr., MD, personally performed the services described in this documentation, as scribed by Deo Og, in my presence, and it is both accurate and complete.          Clinical Impression       ICD-10-CM ICD-9-CM   1. Respiratory failure J96.90 518.81       Disposition:   Disposition: Admitted  Condition: Serious         Jerald Clancy Jr., MD  08/18/18 1416       Jerald Clancy Jr.,  MD  08/18/18 0123

## 2018-08-18 NOTE — SUBJECTIVE & OBJECTIVE
Past Medical History:   Diagnosis Date    Arthritis     Cancer     lung cancer    Diabetes     Foot fracture     Hand fracture, left     Hand fracture, right     HTN (hypertension)     Humerus fracture 07/18/2018    Hyperlipidemia     Neuropathy     Renal disorder        Past Surgical History:   Procedure Laterality Date    ANKLE SURGERY Left     CHOLECYSTECTOMY      HUMERUS FRACTURE SURGERY         Review of patient's allergies indicates:   Allergen Reactions    Penicillins        No current facility-administered medications on file prior to encounter.      Current Outpatient Medications on File Prior to Encounter   Medication Sig    amlodipine (NORVASC) 10 MG tablet Take 10 mg by mouth once daily.    aspirin 81 MG Chew Take 81 mg by mouth once daily.    citalopram (CELEXA) 40 MG tablet Take 40 mg by mouth once daily.    docusate sodium (COLACE) 100 MG capsule Take 1 capsule (100 mg total) by mouth 2 (two) times daily as needed for Constipation.    enoxaparin (LOVENOX) 150 mg/mL Syrg Inject 30 mg into the skin.    folic acid (FOLVITE) 1 MG tablet Take 1 tablet (1 mg total) by mouth once daily.    furosemide (LASIX) 40 MG tablet Take 1 tablet (40 mg total) by mouth 2 (two) times daily. (Patient taking differently: Take 80 mg by mouth 2 (two) times daily. )    HYDROcodone-acetaminophen (NORCO)  mg per tablet Take 1 tablet by mouth every 6 (six) hours as needed for Pain.    metoprolol tartrate (LOPRESSOR) 50 MG tablet Take 1 tablet (50 mg total) by mouth 2 (two) times daily.    nicotine (NICODERM CQ) 21 mg/24 hr Place 1 patch onto the skin every 24 hours.    pantoprazole (PROTONIX) 40 MG tablet Take 40 mg by mouth once daily.    potassium chloride SA (K-DUR,KLOR-CON) 20 MEQ tablet Take 1 tablet (20 mEq total) by mouth once daily.    thiamine 100 MG tablet Take 1 tablet (100 mg total) by mouth once daily.    insulin regular 100 unit/mL Inj injection Inject into the skin 3 (three)  times daily before meals.    mupirocin (BACTROBAN) 2 % ointment Apply topically 3 (three) times daily.    polyethylene glycol (GLYCOLAX) 17 gram/dose powder Take 17 g by mouth once daily. Take daily to keep your bowel movements regular while taking pain medicines    temazepam (RESTORIL) 30 mg capsule Take 30 mg by mouth nightly as needed for Insomnia.     Family History     Problem Relation (Age of Onset)    Heart disease Father, Paternal Grandfather    Miscarriages / Stillbirths Paternal Uncle, Paternal Grandfather        Tobacco Use    Smoking status: Current Every Day Smoker     Packs/day: 1.00    Smokeless tobacco: Never Used   Substance and Sexual Activity    Alcohol use: Yes     Comment: 6 beers per day    Drug use: No    Sexual activity: Not on file     Review of Systems   Constitutional: Positive for fatigue. Negative for chills and fever.   HENT: Negative.  Negative for congestion and sore throat.    Eyes: Negative.  Negative for visual disturbance.   Respiratory: Positive for cough and shortness of breath. Negative for wheezing.    Cardiovascular: Negative.  Negative for chest pain.   Gastrointestinal: Negative for abdominal pain, diarrhea, nausea and vomiting.   Endocrine: Negative.    Genitourinary: Negative.    Musculoskeletal: Negative.  Negative for myalgias and neck stiffness.   Skin: Negative.  Negative for color change and pallor.   Allergic/Immunologic: Negative.    Neurological: Negative.    Hematological: Negative.    Psychiatric/Behavioral: Negative.    All other systems reviewed and are negative.    Objective:     Vital Signs (Most Recent):  Temp: 97.9 °F (36.6 °C) (08/18/18 1745)  Pulse: 73 (08/18/18 1830)  Resp: (!) 26 (08/18/18 1830)  BP: (!) 165/66 (08/18/18 1830)  SpO2: 96 % (08/18/18 1830) Vital Signs (24h Range):  Temp:  [97.1 °F (36.2 °C)-97.9 °F (36.6 °C)] 97.9 °F (36.6 °C)  Pulse:  [59-80] 73  Resp:  [19-40] 26  SpO2:  [87 %-100 %] 96 %  BP: (122-187)/(39-81) 165/66      Weight: 55.5 kg (122 lb 5.7 oz)  Body mass index is 18.07 kg/m².    Physical Exam   Constitutional: He is oriented to person, place, and time. No distress.   Thin and frail.  Borderline cachectic.   HENT:   Head: Normocephalic and atraumatic.   Mouth/Throat: Oropharynx is clear and moist.   Eyes: Conjunctivae and EOM are normal. Pupils are equal, round, and reactive to light.   Neck: No JVD present. No thyromegaly present.   Cardiovascular: Normal rate, regular rhythm and normal heart sounds. Exam reveals no gallop and no friction rub.   No murmur heard.  Pulmonary/Chest: Effort normal and breath sounds normal. No respiratory distress. He has no wheezes. He has no rales.   Bilateral fine crackles with right sided consolidation.   Abdominal: Soft. Bowel sounds are normal. He exhibits no distension. There is no tenderness. There is no rebound and no guarding.   Musculoskeletal: Normal range of motion. He exhibits no edema or tenderness.   Calf muscles atrophied.  No edema.   Lymphadenopathy:     He has no cervical adenopathy.   Neurological: He is alert and oriented to person, place, and time. He has normal reflexes. He displays normal reflexes. No cranial nerve deficit.   Skin: Skin is warm and dry. No rash noted. He is not diaphoretic. No erythema.   Scattered ecchymosis forearms bilaterally.   Psychiatric: He has a normal mood and affect. His behavior is normal. Judgment and thought content normal.         CRANIAL NERVES     CN III, IV, VI   Pupils are equal, round, and reactive to light.  Extraocular motions are normal.        Significant Labs: All pertinent labs within the past 24 hours have been reviewed.    Significant Imaging: I have reviewed all pertinent imaging results/findings within the past 24 hours.

## 2018-08-19 PROBLEM — N18.9 ACUTE ON CHRONIC RENAL FAILURE: Status: ACTIVE | Noted: 2018-07-18

## 2018-08-19 PROBLEM — N17.9 AKI (ACUTE KIDNEY INJURY): Status: ACTIVE | Noted: 2018-08-19

## 2018-08-19 PROBLEM — J90 RECURRENT RIGHT PLEURAL EFFUSION: Status: ACTIVE | Noted: 2018-08-19

## 2018-08-19 LAB
ALBUMIN FLD-MCNC: 1.2 G/DL
ALBUMIN SERPL BCP-MCNC: 2.3 G/DL
ALLENS TEST: ABNORMAL
ALP SERPL-CCNC: 183 U/L
ALT SERPL W/O P-5'-P-CCNC: 8 U/L
AMYLASE, BODY FLUID: 25 U/L
ANION GAP SERPL CALC-SCNC: 13 MMOL/L
ANION GAP SERPL CALC-SCNC: 14 MMOL/L
AST SERPL-CCNC: 11 U/L
BACTERIA #/AREA URNS HPF: NORMAL /HPF
BASOPHILS # BLD AUTO: 0.04 K/UL
BASOPHILS NFR BLD: 0.4 %
BILIRUB SERPL-MCNC: 0.6 MG/DL
BILIRUB UR QL STRIP: NEGATIVE
BODY FLUID SOURCE AMYLASE: NORMAL
BODY FLUID SOURCE, LDH: NORMAL
BUN SERPL-MCNC: 58 MG/DL
BUN SERPL-MCNC: 58 MG/DL
CALCIUM SERPL-MCNC: 10.1 MG/DL
CALCIUM SERPL-MCNC: 9.9 MG/DL
CHLORIDE SERPL-SCNC: 101 MMOL/L
CHLORIDE SERPL-SCNC: 101 MMOL/L
CLARITY UR: CLEAR
CO2 SERPL-SCNC: 23 MMOL/L
CO2 SERPL-SCNC: 24 MMOL/L
COLOR UR: YELLOW
CREAT SERPL-MCNC: 3 MG/DL
CREAT SERPL-MCNC: 3 MG/DL
CREAT UR-MCNC: 54.2 MG/DL
CREAT UR-MCNC: 54.2 MG/DL
DELSYS: ABNORMAL
DIASTOLIC DYSFUNCTION: NO
DIFFERENTIAL METHOD: ABNORMAL
EOSINOPHIL # BLD AUTO: 0.1 K/UL
EOSINOPHIL NFR BLD: 1.4 %
EP: 5
ERYTHROCYTE [DISTWIDTH] IN BLOOD BY AUTOMATED COUNT: 14.1 %
ERYTHROCYTE [SEDIMENTATION RATE] IN BLOOD BY WESTERGREN METHOD: 16 MM/H
EST. GFR  (AFRICAN AMERICAN): 24 ML/MIN/1.73 M^2
EST. GFR  (AFRICAN AMERICAN): 24 ML/MIN/1.73 M^2
EST. GFR  (NON AFRICAN AMERICAN): 21 ML/MIN/1.73 M^2
EST. GFR  (NON AFRICAN AMERICAN): 21 ML/MIN/1.73 M^2
ESTIMATED PA SYSTOLIC PRESSURE: 22.01
GLUCOSE FLD-MCNC: 137 MG/DL
GLUCOSE SERPL-MCNC: 141 MG/DL
GLUCOSE SERPL-MCNC: 191 MG/DL
GLUCOSE UR QL STRIP: NEGATIVE
HCO3 UR-SCNC: 22.8 MMOL/L (ref 24–28)
HCT VFR BLD AUTO: 32.5 %
HGB BLD-MCNC: 11.2 G/DL
HGB UR QL STRIP: NEGATIVE
HYALINE CASTS #/AREA URNS LPF: 0 /LPF
IP: 10
KETONES UR QL STRIP: NEGATIVE
LDH FLD L TO P-CCNC: 244 U/L
LDH SERPL L TO P-CCNC: 204 U/L
LEUKOCYTE ESTERASE UR QL STRIP: NEGATIVE
LYMPHOCYTES # BLD AUTO: 1.3 K/UL
LYMPHOCYTES NFR BLD: 12.5 %
MCH RBC QN AUTO: 35.2 PG
MCHC RBC AUTO-ENTMCNC: 34.5 G/DL
MCV RBC AUTO: 102 FL
MICROSCOPIC COMMENT: NORMAL
MODE: ABNORMAL
MONOCYTES # BLD AUTO: 0.9 K/UL
MONOCYTES NFR BLD: 8.5 %
NEUTROPHILS # BLD AUTO: 7.8 K/UL
NEUTROPHILS NFR BLD: 77.2 %
NITRITE UR QL STRIP: NEGATIVE
PCO2 BLDA: 28.2 MMHG (ref 35–45)
PH SMN: 7.52 [PH] (ref 7.35–7.45)
PH UR STRIP: 6 [PH] (ref 5–8)
PLATELET # BLD AUTO: 503 K/UL
PMV BLD AUTO: 8.7 FL
PO2 BLDA: 56 MMHG (ref 80–100)
POC BE: 0 MMOL/L
POC SATURATED O2: 92 % (ref 95–100)
POCT GLUCOSE: 162 MG/DL (ref 70–110)
POCT GLUCOSE: 164 MG/DL (ref 70–110)
POCT GLUCOSE: 171 MG/DL (ref 70–110)
POTASSIUM SERPL-SCNC: 3.3 MMOL/L
POTASSIUM SERPL-SCNC: 3.8 MMOL/L
PROT FLD-MCNC: 2.5 G/DL
PROT SERPL-MCNC: 6.9 G/DL
PROT SERPL-MCNC: 6.9 G/DL
PROT UR QL STRIP: ABNORMAL
PROT UR-MCNC: 159 MG/DL
PROT/CREAT UR: 2.93 MG/G{CREAT}
RBC # BLD AUTO: 3.18 M/UL
RBC #/AREA URNS HPF: 0 /HPF (ref 0–4)
RETIRED EF AND QEF - SEE NOTES: 55 (ref 55–65)
SAMPLE: ABNORMAL
SITE: ABNORMAL
SODIUM SERPL-SCNC: 137 MMOL/L
SODIUM SERPL-SCNC: 139 MMOL/L
SODIUM UR-SCNC: 57 MMOL/L
SP GR UR STRIP: 1.01 (ref 1–1.03)
SPECIMEN SOURCE: NORMAL
URATE SERPL-MCNC: 12.2 MG/DL
URN SPEC COLLECT METH UR: ABNORMAL
UROBILINOGEN UR STRIP-ACNC: NEGATIVE EU/DL
WBC # BLD AUTO: 10.14 K/UL
WBC #/AREA URNS HPF: 2 /HPF (ref 0–5)

## 2018-08-19 PROCEDURE — 0W993ZZ DRAINAGE OF RIGHT PLEURAL CAVITY, PERCUTANEOUS APPROACH: ICD-10-PCS | Performed by: INTERNAL MEDICINE

## 2018-08-19 PROCEDURE — 99291 CRITICAL CARE FIRST HOUR: CPT | Mod: 25,,, | Performed by: INTERNAL MEDICINE

## 2018-08-19 PROCEDURE — 84156 ASSAY OF PROTEIN URINE: CPT

## 2018-08-19 PROCEDURE — 99223 1ST HOSP IP/OBS HIGH 75: CPT | Mod: ,,, | Performed by: INTERNAL MEDICINE

## 2018-08-19 PROCEDURE — 87102 FUNGUS ISOLATION CULTURE: CPT

## 2018-08-19 PROCEDURE — 27000221 HC OXYGEN, UP TO 24 HOURS

## 2018-08-19 PROCEDURE — 21400001 HC TELEMETRY ROOM

## 2018-08-19 PROCEDURE — 63600175 PHARM REV CODE 636 W HCPCS: Performed by: INTERNAL MEDICINE

## 2018-08-19 PROCEDURE — 86255 FLUORESCENT ANTIBODY SCREEN: CPT

## 2018-08-19 PROCEDURE — 83615 LACTATE (LD) (LDH) ENZYME: CPT | Mod: 91

## 2018-08-19 PROCEDURE — 84311 SPECTROPHOTOMETRY: CPT

## 2018-08-19 PROCEDURE — 81000 URINALYSIS NONAUTO W/SCOPE: CPT

## 2018-08-19 PROCEDURE — 84550 ASSAY OF BLOOD/URIC ACID: CPT

## 2018-08-19 PROCEDURE — 85025 COMPLETE CBC W/AUTO DIFF WBC: CPT

## 2018-08-19 PROCEDURE — 84157 ASSAY OF PROTEIN OTHER: CPT

## 2018-08-19 PROCEDURE — 94799 UNLISTED PULMONARY SVC/PX: CPT

## 2018-08-19 PROCEDURE — 88112 CYTOPATH CELL ENHANCE TECH: CPT | Mod: 26,,, | Performed by: PATHOLOGY

## 2018-08-19 PROCEDURE — 36415 COLL VENOUS BLD VENIPUNCTURE: CPT

## 2018-08-19 PROCEDURE — 84155 ASSAY OF PROTEIN SERUM: CPT

## 2018-08-19 PROCEDURE — 94660 CPAP INITIATION&MGMT: CPT

## 2018-08-19 PROCEDURE — 80048 BASIC METABOLIC PNL TOTAL CA: CPT

## 2018-08-19 PROCEDURE — 93307 TTE W/O DOPPLER COMPLETE: CPT | Mod: 26,,, | Performed by: INTERNAL MEDICINE

## 2018-08-19 PROCEDURE — 89051 BODY FLUID CELL COUNT: CPT

## 2018-08-19 PROCEDURE — 25000003 PHARM REV CODE 250: Performed by: INTERNAL MEDICINE

## 2018-08-19 PROCEDURE — 32555 ASPIRATE PLEURA W/ IMAGING: CPT

## 2018-08-19 PROCEDURE — 82945 GLUCOSE OTHER FLUID: CPT

## 2018-08-19 PROCEDURE — 87116 MYCOBACTERIA CULTURE: CPT

## 2018-08-19 PROCEDURE — 99900035 HC TECH TIME PER 15 MIN (STAT)

## 2018-08-19 PROCEDURE — 93010 ELECTROCARDIOGRAM REPORT: CPT | Mod: ,,, | Performed by: INTERNAL MEDICINE

## 2018-08-19 PROCEDURE — 87205 SMEAR GRAM STAIN: CPT

## 2018-08-19 PROCEDURE — 32555 ASPIRATE PLEURA W/ IMAGING: CPT | Mod: RT,,, | Performed by: INTERNAL MEDICINE

## 2018-08-19 PROCEDURE — C8923 2D TTE W OR W/O FOL W/CON,CO: HCPCS

## 2018-08-19 PROCEDURE — 25000003 PHARM REV CODE 250: Performed by: NURSE PRACTITIONER

## 2018-08-19 PROCEDURE — 82150 ASSAY OF AMYLASE: CPT

## 2018-08-19 PROCEDURE — 86038 ANTINUCLEAR ANTIBODIES: CPT

## 2018-08-19 PROCEDURE — 63600175 PHARM REV CODE 636 W HCPCS: Performed by: EMERGENCY MEDICINE

## 2018-08-19 PROCEDURE — 99222 1ST HOSP IP/OBS MODERATE 55: CPT | Mod: ,,, | Performed by: INTERNAL MEDICINE

## 2018-08-19 PROCEDURE — 87206 SMEAR FLUORESCENT/ACID STAI: CPT

## 2018-08-19 PROCEDURE — 82803 BLOOD GASES ANY COMBINATION: CPT

## 2018-08-19 PROCEDURE — 82042 OTHER SOURCE ALBUMIN QUAN EA: CPT

## 2018-08-19 PROCEDURE — 93005 ELECTROCARDIOGRAM TRACING: CPT

## 2018-08-19 PROCEDURE — 84300 ASSAY OF URINE SODIUM: CPT

## 2018-08-19 PROCEDURE — 80053 COMPREHEN METABOLIC PANEL: CPT

## 2018-08-19 PROCEDURE — 36600 WITHDRAWAL OF ARTERIAL BLOOD: CPT

## 2018-08-19 PROCEDURE — 83615 LACTATE (LD) (LDH) ENZYME: CPT

## 2018-08-19 PROCEDURE — 88112 CYTOPATH CELL ENHANCE TECH: CPT | Performed by: PATHOLOGY

## 2018-08-19 PROCEDURE — 88305 TISSUE EXAM BY PATHOLOGIST: CPT | Mod: 26,,, | Performed by: PATHOLOGY

## 2018-08-19 RX ORDER — AMLODIPINE BESYLATE 10 MG/1
10 TABLET ORAL DAILY
Status: DISCONTINUED | OUTPATIENT
Start: 2018-08-19 | End: 2018-08-22 | Stop reason: HOSPADM

## 2018-08-19 RX ORDER — ISOSORBIDE DINITRATE AND HYDRALAZINE HYDROCHLORIDE 37.5; 2 MG/1; MG/1
1 TABLET ORAL 3 TIMES DAILY
Status: DISCONTINUED | OUTPATIENT
Start: 2018-08-19 | End: 2018-08-22 | Stop reason: HOSPADM

## 2018-08-19 RX ORDER — CHLORDIAZEPOXIDE HYDROCHLORIDE 10 MG/1
10 CAPSULE, GELATIN COATED ORAL 3 TIMES DAILY
Status: DISCONTINUED | OUTPATIENT
Start: 2018-08-19 | End: 2018-08-22 | Stop reason: HOSPADM

## 2018-08-19 RX ORDER — IPRATROPIUM BROMIDE AND ALBUTEROL SULFATE 2.5; .5 MG/3ML; MG/3ML
3 SOLUTION RESPIRATORY (INHALATION) EVERY 6 HOURS PRN
Status: DISCONTINUED | OUTPATIENT
Start: 2018-08-19 | End: 2018-08-22 | Stop reason: HOSPADM

## 2018-08-19 RX ORDER — HEPARIN SODIUM 5000 [USP'U]/ML
5000 INJECTION, SOLUTION INTRAVENOUS; SUBCUTANEOUS EVERY 8 HOURS
Status: DISCONTINUED | OUTPATIENT
Start: 2018-08-19 | End: 2018-08-22 | Stop reason: HOSPADM

## 2018-08-19 RX ORDER — POTASSIUM CHLORIDE 750 MG/1
10 TABLET, EXTENDED RELEASE ORAL 3 TIMES DAILY
Status: DISCONTINUED | OUTPATIENT
Start: 2018-08-19 | End: 2018-08-20

## 2018-08-19 RX ORDER — PANTOPRAZOLE SODIUM 40 MG/1
40 TABLET, DELAYED RELEASE ORAL DAILY
Status: DISCONTINUED | OUTPATIENT
Start: 2018-08-19 | End: 2018-08-22 | Stop reason: HOSPADM

## 2018-08-19 RX ORDER — HYDRALAZINE HYDROCHLORIDE 20 MG/ML
10 INJECTION INTRAMUSCULAR; INTRAVENOUS EVERY 8 HOURS PRN
Status: DISCONTINUED | OUTPATIENT
Start: 2018-08-19 | End: 2018-08-22 | Stop reason: HOSPADM

## 2018-08-19 RX ADMIN — HYDROCODONE BITARTRATE AND ACETAMINOPHEN 1 TABLET: 10; 325 TABLET ORAL at 02:08

## 2018-08-19 RX ADMIN — HYDRALAZINE HYDROCHLORIDE 10 MG: 20 INJECTION INTRAMUSCULAR; INTRAVENOUS at 03:08

## 2018-08-19 RX ADMIN — POLYETHYLENE GLYCOL (3350) 17 G: 17 POWDER, FOR SOLUTION ORAL at 10:08

## 2018-08-19 RX ADMIN — RAMELTEON 8 MG: 8 TABLET, FILM COATED ORAL at 10:08

## 2018-08-19 RX ADMIN — HEPARIN SODIUM 5000 UNITS: 5000 INJECTION, SOLUTION INTRAVENOUS; SUBCUTANEOUS at 02:08

## 2018-08-19 RX ADMIN — MUPIROCIN: 20 OINTMENT TOPICAL at 09:08

## 2018-08-19 RX ADMIN — CITALOPRAM HYDROBROMIDE 40 MG: 20 TABLET ORAL at 10:08

## 2018-08-19 RX ADMIN — CHLORDIAZEPOXIDE HYDROCHLORIDE 10 MG: 10 CAPSULE ORAL at 11:08

## 2018-08-19 RX ADMIN — HYDRALAZINE HYDROCHLORIDE AND ISOSORBIDE DINITRATE 1 TABLET: 37.5; 2 TABLET, FILM COATED ORAL at 09:08

## 2018-08-19 RX ADMIN — HYDRALAZINE HYDROCHLORIDE AND ISOSORBIDE DINITRATE 1 TABLET: 37.5; 2 TABLET, FILM COATED ORAL at 11:08

## 2018-08-19 RX ADMIN — CHLORDIAZEPOXIDE HYDROCHLORIDE 10 MG: 10 CAPSULE ORAL at 09:08

## 2018-08-19 RX ADMIN — AMLODIPINE BESYLATE 10 MG: 10 TABLET ORAL at 10:08

## 2018-08-19 RX ADMIN — SODIUM POLYSTYRENE SULFONATE 15 G: 15 SUSPENSION ORAL; RECTAL at 10:08

## 2018-08-19 RX ADMIN — MUPIROCIN: 20 OINTMENT TOPICAL at 10:08

## 2018-08-19 RX ADMIN — HEPARIN SODIUM 5000 UNITS: 5000 INJECTION, SOLUTION INTRAVENOUS; SUBCUTANEOUS at 09:08

## 2018-08-19 RX ADMIN — HYDRALAZINE HYDROCHLORIDE AND ISOSORBIDE DINITRATE 1 TABLET: 37.5; 2 TABLET, FILM COATED ORAL at 02:08

## 2018-08-19 RX ADMIN — PANTOPRAZOLE SODIUM 40 MG: 40 TABLET, DELAYED RELEASE ORAL at 12:08

## 2018-08-19 RX ADMIN — POTASSIUM CHLORIDE 10 MEQ: 750 TABLET, EXTENDED RELEASE ORAL at 09:08

## 2018-08-19 RX ADMIN — MUPIROCIN: 20 OINTMENT TOPICAL at 02:08

## 2018-08-19 RX ADMIN — LORAZEPAM 1 MG: 2 INJECTION INTRAMUSCULAR; INTRAVENOUS at 06:08

## 2018-08-19 RX ADMIN — CHLORDIAZEPOXIDE HYDROCHLORIDE 10 MG: 10 CAPSULE ORAL at 02:08

## 2018-08-19 RX ADMIN — FUROSEMIDE 60 MG: 10 INJECTION, SOLUTION INTRAMUSCULAR; INTRAVENOUS at 05:08

## 2018-08-19 RX ADMIN — LORAZEPAM 1 MG: 2 INJECTION INTRAMUSCULAR; INTRAVENOUS at 05:08

## 2018-08-19 NOTE — SUBJECTIVE & OBJECTIVE
Past Medical History:   Diagnosis Date    Arthritis     Cancer     lung cancer    Diabetes     Foot fracture     Hand fracture, left     Hand fracture, right     HTN (hypertension)     Humerus fracture 07/18/2018    Hyperlipidemia     Neuropathy     Renal disorder        Past Surgical History:   Procedure Laterality Date    ANKLE SURGERY Left     CHOLECYSTECTOMY      HUMERUS FRACTURE SURGERY         Review of patient's allergies indicates:   Allergen Reactions    Penicillins        No current facility-administered medications on file prior to encounter.      Current Outpatient Medications on File Prior to Encounter   Medication Sig    amlodipine (NORVASC) 10 MG tablet Take 10 mg by mouth once daily.    aspirin 81 MG Chew Take 81 mg by mouth once daily.    citalopram (CELEXA) 40 MG tablet Take 40 mg by mouth once daily.    docusate sodium (COLACE) 100 MG capsule Take 1 capsule (100 mg total) by mouth 2 (two) times daily as needed for Constipation.    enoxaparin (LOVENOX) 150 mg/mL Syrg Inject 30 mg into the skin.    folic acid (FOLVITE) 1 MG tablet Take 1 tablet (1 mg total) by mouth once daily.    furosemide (LASIX) 40 MG tablet Take 1 tablet (40 mg total) by mouth 2 (two) times daily. (Patient taking differently: Take 80 mg by mouth 2 (two) times daily. )    HYDROcodone-acetaminophen (NORCO)  mg per tablet Take 1 tablet by mouth every 6 (six) hours as needed for Pain.    metoprolol tartrate (LOPRESSOR) 50 MG tablet Take 1 tablet (50 mg total) by mouth 2 (two) times daily.    nicotine (NICODERM CQ) 21 mg/24 hr Place 1 patch onto the skin every 24 hours.    pantoprazole (PROTONIX) 40 MG tablet Take 40 mg by mouth once daily.    potassium chloride SA (K-DUR,KLOR-CON) 20 MEQ tablet Take 1 tablet (20 mEq total) by mouth once daily.    thiamine 100 MG tablet Take 1 tablet (100 mg total) by mouth once daily.    insulin regular 100 unit/mL Inj injection Inject into the skin 3 (three)  times daily before meals.    mupirocin (BACTROBAN) 2 % ointment Apply topically 3 (three) times daily.    polyethylene glycol (GLYCOLAX) 17 gram/dose powder Take 17 g by mouth once daily. Take daily to keep your bowel movements regular while taking pain medicines    temazepam (RESTORIL) 30 mg capsule Take 30 mg by mouth nightly as needed for Insomnia.     Family History     Problem Relation (Age of Onset)    Heart disease Father, Paternal Grandfather    Miscarriages / Stillbirths Paternal Uncle, Paternal Grandfather        Tobacco Use    Smoking status: Current Every Day Smoker     Packs/day: 1.00    Smokeless tobacco: Never Used   Substance and Sexual Activity    Alcohol use: Yes     Comment: 6 beers per day    Drug use: No    Sexual activity: Not on file     Review of Systems   Constitution: Negative for weakness and malaise/fatigue.   Eyes: Negative for blurred vision.   Cardiovascular: Positive for dyspnea on exertion. Negative for chest pain, claudication, cyanosis, irregular heartbeat, leg swelling, near-syncope, orthopnea, palpitations and paroxysmal nocturnal dyspnea.   Respiratory: Positive for cough and shortness of breath. Negative for hemoptysis.    Hematologic/Lymphatic: Negative for bleeding problem. Does not bruise/bleed easily.   Skin: Negative for dry skin and itching.   Musculoskeletal: Negative for falls, muscle weakness and myalgias.   Gastrointestinal: Negative for abdominal pain, diarrhea, heartburn, hematemesis, hematochezia and melena.   Genitourinary: Negative for flank pain and hematuria.   Neurological: Negative for dizziness, focal weakness, headaches, light-headedness, numbness, paresthesias and seizures.   Psychiatric/Behavioral: Negative for altered mental status and memory loss. The patient is not nervous/anxious.    Allergic/Immunologic: Negative for hives.     Objective:     Vital Signs (Most Recent):  Temp: 98 °F (36.7 °C) (08/19/18 1100)  Pulse: 73 (08/19/18 1100)  Resp:  20 (08/19/18 1100)  BP: (!) 170/50 (08/19/18 1100)  SpO2: (!) 89 % (08/19/18 1100) Vital Signs (24h Range):  Temp:  [97.1 °F (36.2 °C)-98.6 °F (37 °C)] 98 °F (36.7 °C)  Pulse:  [59-80] 73  Resp:  [19-34] 20  SpO2:  [88 %-100 %] 89 %  BP: (122-185)/() 170/50     Weight: 57.7 kg (127 lb 3.3 oz)  Body mass index is 18.78 kg/m².    SpO2: (!) 89 %  O2 Device (Oxygen Therapy): (P) nasal cannula      Intake/Output Summary (Last 24 hours) at 8/19/2018 1131  Last data filed at 8/19/2018 1100  Gross per 24 hour   Intake 670 ml   Output 1225 ml   Net -555 ml       Lines/Drains/Airways     Peripheral Intravenous Line                 Peripheral IV - Single Lumen 08/18/18 Left Hand 1 day                Physical Exam   Constitutional: He is oriented to person, place, and time. He appears well-developed and well-nourished. He appears distressed.   HENT:   Head: Normocephalic and atraumatic.   Eyes: EOM are normal. Pupils are equal, round, and reactive to light. Right eye exhibits no discharge. Left eye exhibits no discharge.   Neck: Neck supple. No JVD present. No thyromegaly present.   Cardiovascular: Normal rate, regular rhythm and intact distal pulses. Exam reveals no gallop and no friction rub.   Murmur heard.   Harsh midsystolic murmur is present with a grade of 1/6 at the upper right sternal border radiating to the neck.  High-pitched blowing decrescendo early diastolic murmur is present with a grade of 1/6 at the upper right sternal border radiating to the apex.  Pulmonary/Chest: Breath sounds normal. He is in respiratory distress. He has no wheezes. He has no rales. He exhibits no tenderness.   Has coarse rales bilaterally with decreased breath sounds on the rt    Abdominal: Soft. Bowel sounds are normal. He exhibits no distension. There is no tenderness.   Musculoskeletal: Normal range of motion. He exhibits no edema.   Neurological: He is alert and oriented to person, place, and time. No cranial nerve deficit.    Skin: Skin is warm and dry. No rash noted. He is not diaphoretic. No erythema.   Psychiatric: He has a normal mood and affect. His behavior is normal.   Nursing note and vitals reviewed.      Significant Labs:   ABG:   Recent Labs   Lab  08/18/18   1137  08/19/18   0758   PH  7.531*  7.516*   PCO2  21.3*  28.2*   HCO3  17.8*  22.8*   POCSATURATED  84*  92*   BE  -5  0   , Blood Culture:   Recent Labs   Lab  08/18/18   1200  08/18/18   1210   LABBLOO  No Growth to date  No Growth to date   , BMP:   Recent Labs   Lab  08/18/18   1200  08/19/18   0425   GLU  179*  141*   NA  132*  137   K  6.6*  3.8   CL  101  101   CO2  19*  23   BUN  57*  58*   CREATININE  2.8*  3.0*   CALCIUM  10.0  9.9   , CMP   Recent Labs   Lab  08/18/18   1200  08/19/18   0425   NA  132*  137   K  6.6*  3.8   CL  101  101   CO2  19*  23   GLU  179*  141*   BUN  57*  58*   CREATININE  2.8*  3.0*   CALCIUM  10.0  9.9   PROT  7.3  6.9   ALBUMIN  2.3*  2.3*   BILITOT  0.8  0.6   ALKPHOS  204*  183*   AST  16  11   ALT  9*  8*   ANIONGAP  12  13   ESTGFRAFRICA  26*  24*   EGFRNONAA  22*  21*   , CBC   Recent Labs   Lab  08/18/18   1200  08/19/18   0425   WBC  11.74  10.14   HGB  11.6*  11.2*   HCT  33.4*  32.5*   PLT  542*  503*   , INR No results for input(s): INR, PROTIME in the last 48 hours., Lipid Panel No results for input(s): CHOL, HDL, LDLCALC, TRIG, CHOLHDL in the last 48 hours., Troponin   Recent Labs   Lab  08/18/18   1200   TROPONINI  0.013    and   Recent Lab Results       08/19/18  0758 08/19/18  0517 08/19/18  0425 08/18/18  1714 08/18/18  1700      Procalcitonin     0.18  Comment:  Please re-baseline procalcitonin if a patient is transferred from  other facilities to [facility].  A concentration < 0.25 ng/mL represents a low risk bacterial   infection.  Procalcitonin may not be accurate among patients with localized   infection, recent trauma or major surgery, immunosuppressed state,   invasive fungal infection, renal  dysfunction. Decisions regarding   initiation or continuation of antibiotic therapy should not be based   solely on procalcitonin levels.       Albumin   2.3       Alkaline Phosphatase   183       Allens Test Pass         ALT   8       Anion Gap   13       Appearance, UA    Clear      AST   11       Bacteria, UA    Rare      Baso #   0.04       Basophil%   0.4       Bilirubin (UA)    Negative      Total Bilirubin   0.6  Comment:  For infants and newborns, interpretation of results should be based  on gestational age, weight and in agreement with clinical  observations.  Premature Infant recommended reference ranges:  Up to 24 hours.............<8.0 mg/dL  Up to 48 hours............<12.0 mg/dL  3-5 days..................<15.0 mg/dL  6-29 days.................<15.0 mg/dL         Blood Culture, Routine          BNP          Site LR         BUN, Bld   58       Calcium   9.9       Chloride   101       CO2   23       Color, UA    Yellow      Creatinine   3.0       DelSys CPAP/BiPAP         Differential Method   Automated       eGFR if    24       eGFR if non    21  Comment:  Calculation used to obtain the estimated glomerular filtration  rate (eGFR) is the CKD-EPI equation.          Eos #   0.1       Eosinophil%   1.4       EP 5         FiO2          Flow          Glucose   141       Glucose, UA    Negative      Gran # (ANC)   7.8       Gran%   77.2       Hematocrit   32.5       Hemoglobin   11.2       Hyaline Casts, UA    0      IP 10         Ketones, UA    Negative      Lactate, Cas          Leukocytes, UA    Negative      Lymph #   1.3       Lymph%   12.5       MCH   35.2       MCHC   34.5       MCV   102       Microscopic Comment    SEE COMMENT  Comment:  Other formed elements not mentioned in the report are not   present in the microscopic examination.         Mode BiPAP         Mono #   0.9       Mono%   8.5       MPV   8.7       Nitrite, UA    Negative      Occult Blood UA     Negative      pH, UA    7.0      Platelets   503       POC BE 0         POC HCO3 22.8         POC PCO2 28.2         POC PH 7.516         POC PO2 56         POC SATURATED O2 92         POCT Glucose  162        Potassium   3.8       Total Protein   6.9       Protein, UA    2+  Comment:  Recommend a 24 hour urine protein or a urine   protein/creatinine ratio if globulin induced proteinuria is  clinically suspected.        Rate 16         RBC   3.18       RBC, UA    1      RDW   14.1       Sample ARTERIAL         Sodium   137       Specific [place], UA    1.015      Specimen UA    Urine, Clean Catch      Squam Epithel, UA    3      Troponin I          Urobilinogen, UA    Negative      WBC, UA    3      WBC   10.14                   08/18/18  1210 08/18/18  1200 08/18/18  1137      Procalcitonin        Albumin  2.3      Alkaline Phosphatase  204      Allens Test   Pass     ALT  9      Anion Gap  12      Appearance, UA        AST  16      Bacteria, UA        Baso #  0.04      Basophil%  0.3      Bilirubin (UA)        Total Bilirubin  0.8  Comment:  For infants and newborns, interpretation of results should be based  on gestational age, weight and in agreement with clinical  observations.  Premature Infant recommended reference ranges:  Up to 24 hours.............<8.0 mg/dL  Up to 48 hours............<12.0 mg/dL  3-5 days..................<15.0 mg/dL  6-29 days.................<15.0 mg/dL        Blood Culture, Routine No Growth to date[P] No Growth to date[P]      BNP  4,343  Comment:  Values of less than 100 pg/ml are consistent with non-CHF populations.      Site   LR     BUN, Bld  57      Calcium  10.0      Chloride  101      CO2  19      Color, UA        Creatinine  2.8      DelSys   Nasal Can     Differential Method  Automated      eGFR if   26      eGFR if non   22  Comment:  Calculation used to obtain the estimated glomerular filtration  rate (eGFR) is the CKD-EPI equation.          Eos #  0.1      Eosinophil%  0.6      EP        FiO2   36     Flow   4     Glucose  179      Glucose, UA        Gran # (ANC)  9.7      Gran%  82.4      Hematocrit  33.4      Hemoglobin  11.6      Hyaline Casts, UA        IP        Ketones, UA        Lactate, Cas  2.1  Comment:  Falsely low lactic acid results can be found in samples   containing >=13.0 mg/dL total bilirubin and/or >=3.5 mg/dL   direct bilirubin.        Leukocytes, UA        Lymph #  1.1      Lymph%  9.5      MCH  35.5      MCHC  34.7      MCV  102      Microscopic Comment        Mode   SPONT     Mono #  0.9      Mono%  7.2      MPV  8.7      Nitrite, UA        Occult Blood UA        pH, UA        Platelets  542      POC BE   -5     POC HCO3   17.8     POC PCO2   21.3     POC PH   7.531     POC PO2   41     POC SATURATED O2   84     POCT Glucose        Potassium  6.6  Comment:  No visible hemolysis  K   critical result(s) called and verbal readback obtained from   Opal Weaver RN, 08/18/2018 12:38        Total Protein  7.3      Protein, UA        Rate        RBC  3.27      RBC, UA        RDW  14.0      Sample   ARTERIAL     Sodium  132      Specific Gravity, UA        Specimen UA        Squam Epithel, UA        Troponin I  0.013  Comment:  The reference interval for Troponin I represents the 99th percentile   cutoff   for our facility and is consistent with 3rd generation assay   performance.        Urobilinogen, UA        WBC, UA        WBC  11.74            Significant Imaging: CT scan: CT ABDOMEN PELVIS WITH CONTRAST: No results found for this visit on 08/18/18. and X-Ray: CXR: X-Ray Chest 1 View (CXR):   Results for orders placed or performed during the hospital encounter of 08/18/18   X-Ray Chest 1 View    Narrative    EXAMINATION:  XR CHEST 1 VIEW    CLINICAL HISTORY:  Respiratory failure, unspecified, unspecified whether with hypoxia or hypercapnia    COMPARISON:  August 10th    FINDINGS:  Persistent right pleural effusion with moderate  coarsening of the interstitial markings bilaterally, most of which may be chronic in nature.  Suspicion of developing left basilar infiltrate and possible effusion.  Normal heart size.  Scarring right apex.      Impression    1.  Persistent right pleural effusion.  Severe chronic lung changes again noted bilaterally.    2.  Suspicion of developing left basilar infiltrate and/or atelectasis.  Cannot exclude a small left effusion follow-up recommended      Electronically signed by: Irwin Tomas MD  Date:    08/18/2018  Time:    12:16

## 2018-08-19 NOTE — HPI
Patient is a 68-year-old male with history of chronic kidney disease stage 4.  Baseline creatinine ranging between 2.5 and 3.0.  Patient also has had off and on hypokalemia as well as hyperkalemia.  Now the creatinine has gone up to 3.0 with off and on hyperkalemia.  Patient has had shortness of breath and he is status post thoracentesis today.  Nephrology consultation is requested for further management of electrolyte imbalance, fluctuating creatinine with acute kidney injury on chronic kidney disease stage 4.  Patient also has hyponatremia with history of alcohol abuse in the past

## 2018-08-19 NOTE — PLAN OF CARE
Problem: Patient Care Overview  Goal: Plan of Care Review  Outcome: Ongoing (interventions implemented as appropriate)  Pt tolerated being off BiPAP all afternoon, SaO2  on 3L/NC. Multiple loose stools following kayexelate. Decreased appetite. Specialty bed in use. Multiple procedures done today, see previous notes. POC reviewed. Transferred to tele

## 2018-08-19 NOTE — ASSESSMENT & PLAN NOTE
Repeat echo: also help determine volume status  Current wt 57 kg , last visit 65 kg: looks euvolemic  BNP trend up  Diuresis lasix: nephrology consulted, ? Lasix drip

## 2018-08-19 NOTE — ASSESSMENT & PLAN NOTE
He received 80 mg Furosemide IV in the ER with a modest increase in urine output.  BNP very high over 4000.  Recent cardiac echo showed normal systolic function and diastolic dysfunction and structural abnormalities.

## 2018-08-19 NOTE — PLAN OF CARE
Problem: Patient Care Overview  Goal: Plan of Care Review  Outcome: Ongoing (interventions implemented as appropriate)  Pt remains AAOx4, VSS w/ hypertension noted, PRN Hydralazine added w/ no effect noted, lasix given w/ little effect, and afebrile. Pt remained on BiPAP all night except for a 15 min break at the start of the shift to eat and then again during the night and both times pt's RR increased and pt c/o SOB. Pt got anxious also during the night and a JUAN CARLOS of Ativan was given. Pt's potassium decreased to 3.8 this AM, Sodium 137, and creatinine increased to 3. POC discussed w/ pt who verbalizes understanding.

## 2018-08-19 NOTE — ASSESSMENT & PLAN NOTE
He received 80 mg Furosemide IV in the ER with a modest increase in urine output.  BNP very high over 4000.  Recent cardiac echo showed normal systolic function and diastolic dysfunction and structural abnormalities.  Consulting Cardiology.

## 2018-08-19 NOTE — EICU
Bedside nurse called for prn hydralazine for pt with b/p running in the 170's. Informed Dr. Salmeron.

## 2018-08-19 NOTE — ASSESSMENT & PLAN NOTE
New infiltrate on CXR in left lower lobe.  Received Moxifloxacin.  Procalcitonin negative and WBC returned to normal afebrile.  Stopped Moxifloxacin and ruled out pneumonia.

## 2018-08-19 NOTE — NURSING
Thoracentesis done at bedside by Dr. Canseco. Consents verified and time out performed. Pt tolerated procedure very well. 1200cc removed.

## 2018-08-19 NOTE — PROGRESS NOTES
Patient struggling with wearing the bipap mask, I took bipap off and placed him on 5 L NC. No distress notes at this time. Breath sounds are clear/ diminished in the bases. ABG being done at this time

## 2018-08-19 NOTE — ASSESSMENT & PLAN NOTE
Due to volume overload and heart failure.  Continuous BiPAP to maintain oxygen saturation above 88%.  Diuresis with IV Furosemide.  Persistent hypoxia after diuresis although urine output limited by acute renal failure.  Unable to perform CTA of the chest due to renal failure.  Ordered Lung  Perfusion scan.

## 2018-08-19 NOTE — CONSULTS
[facility] -   Nephrology  Consult Note        Patient Name: Lionel Castillo  MRN: [id number]  Admission Date: 8/18/2018  Hospital Length of Stay: 1 days  Attending Provider: Manuel Willis MD   Primary Care Physician: Eddie Garcia MD  Principal Problem:Acute on chronic respiratory failure with hypoxia    Consults  Subjective:     HPI: Patient is a 68-year-old male with history of chronic kidney disease stage 4.  Baseline creatinine ranging between 2.5 and 3.0.  Patient also has had off and on hypokalemia as well as hyperkalemia.  Now the creatinine has gone up to 3.0 with off and on hyperkalemia.  Patient has had shortness of breath and he is status post thoracentesis today.  Nephrology consultation is requested for further management of electrolyte imbalance, fluctuating creatinine with acute kidney injury on chronic kidney disease stage 4.  Patient also has hyponatremia with history of alcohol abuse in the past    Past Medical History:   Diagnosis Date    Arthritis     Cancer     lung cancer    Diabetes     Foot fracture     Hand fracture, left     Hand fracture, right     HTN (hypertension)     Humerus fracture 07/18/2018    Hyperlipidemia     Neuropathy     Renal disorder        Past Surgical History:   Procedure Laterality Date    ANKLE SURGERY Left     CHOLECYSTECTOMY      HUMERUS FRACTURE SURGERY         Review of patient's allergies indicates:   Allergen Reactions    Penicillins      Current Facility-Administered Medications   Medication Frequency    acetaminophen tablet 500 mg Q4H PRN    albuterol-ipratropium 2.5 mg-0.5 mg/3 mL nebulizer solution 3 mL Q6H PRN    amLODIPine tablet 10 mg Daily    chlordiazepoxide capsule 10 mg TID    citalopram tablet 40 mg Daily    dextrose 50% injection 12.5 g PRN    dextrose 50% injection 25 g PRN    docusate sodium capsule 100 mg BID PRN    glucagon (human recombinant) injection 1 mg PRN    glucose chewable tablet 16 g  PRN    glucose chewable tablet 24 g PRN    heparin (porcine) injection 5,000 Units Q8H    hydrALAZINE injection 10 mg Q8H PRN    HYDROcodone-acetaminophen  mg per tablet 1 tablet Q6H PRN    HYDROcodone-acetaminophen 5-325 mg per tablet 1 tablet Q6H PRN    insulin aspart U-100 pen 0-5 Units QID (AC + HS) PRN    isosorbide-hydrALAZINE 20-37.5 mg per tablet 1 tablet TID    lorazepam (ATIVAN) injection 1 mg Q30 Min PRN    mupirocin 2 % ointment TID    ondansetron disintegrating tablet 8 mg Q8H PRN    ondansetron injection 4 mg Q12H PRN    pantoprazole EC tablet 40 mg Daily    pneumoc 13-lisseth conj-dip cr(PF) 0.5 mL vaccine x 1 dose    polyethylene glycol packet 17 g Daily    promethazine (PHENERGAN) 6.25 mg in dextrose 5 % 50 mL IVPB Q6H PRN    ramelteon tablet 8 mg Nightly PRN    sodium chloride 0.9% flush 5 mL PRN     Family History     Problem Relation (Age of Onset)    Heart disease Father, Paternal Grandfather    Miscarriages / Stillbirths Paternal Uncle, Paternal Grandfather        Tobacco Use    Smoking status: Current Every Day Smoker     Packs/day: 1.00    Smokeless tobacco: Never Used   Substance and Sexual Activity    Alcohol use: Yes     Comment: 6 beers per day    Drug use: No    Sexual activity: Not on file     Review of Systems   Constitutional: Positive for activity change, appetite change, fatigue and unexpected weight change. Negative for chills, diaphoresis and fever.   HENT: Negative.  Negative for congestion and trouble swallowing.    Eyes: Negative.    Respiratory: Positive for cough, chest tightness and shortness of breath. Negative for wheezing.    Cardiovascular: Negative.  Negative for chest pain, palpitations and leg swelling.   Gastrointestinal: Negative.  Negative for abdominal distention, abdominal pain, nausea and vomiting.   Genitourinary: Negative.  Negative for decreased urine volume, difficulty urinating, dysuria, enuresis, flank pain, frequency, hematuria,  penile swelling, scrotal swelling and urgency.   Musculoskeletal: Negative.  Negative for arthralgias, back pain, joint swelling and neck pain.   Skin: Negative for rash.   Neurological: Negative.  Negative for tremors, seizures and headaches.   Psychiatric/Behavioral: Negative.  Negative for confusion and sleep disturbance. The patient is not nervous/anxious.    All other systems reviewed and are negative.    Objective:     Vital Signs (Most Recent):  Temp: 98 °F (36.7 °C) (08/19/18 1100)  Pulse: 81 (08/19/18 1200)  Resp: (!) 28 (08/19/18 1200)  BP: (!) 149/53 (08/19/18 1200)  SpO2: (!) 93 % (08/19/18 1200)  O2 Device (Oxygen Therapy): nasal cannula (08/19/18 1105) Vital Signs (24h Range):  Temp:  [97.6 °F (36.4 °C)-98.6 °F (37 °C)] 98 °F (36.7 °C)  Pulse:  [59-81] 81  Resp:  [19-32] 28  SpO2:  [88 %-100 %] 93 %  BP: (128-185)/() 149/53     Weight: 57.7 kg (127 lb 3.3 oz) (08/19/18 0505)  Body mass index is 18.78 kg/m².  Body surface area is 1.68 meters squared.    I/O last 3 completed shifts:  In: 670 [P.O.:420; IV Piggyback:250]  Out: 875 [Urine:875]    Physical Exam   Constitutional: He is oriented to person, place, and time. He appears well-developed and well-nourished. No distress.   HENT:   Head: Normocephalic.   Eyes: EOM are normal. Pupils are equal, round, and reactive to light.   Neck: Normal range of motion. Neck supple. No JVD present. No thyromegaly present.   Cardiovascular: Normal rate, regular rhythm, S1 normal, S2 normal and intact distal pulses. PMI is not displaced. Exam reveals no gallop and no friction rub.   Murmur heard.  Pulmonary/Chest: No respiratory distress. He has wheezes. He has rales. He exhibits no tenderness.   Abdominal: He exhibits no distension and no mass. There is no hepatosplenomegaly. There is no tenderness. There is no rebound and no CVA tenderness. No hernia.   Musculoskeletal: Normal range of motion. He exhibits no edema or tenderness.   Muscle wasting and signs of  malnutrition   Lymphadenopathy:     He has no cervical adenopathy.   Neurological: He is alert and oriented to person, place, and time. He has normal reflexes. He is not disoriented. He displays normal reflexes. No cranial nerve deficit. He exhibits normal muscle tone. Coordination normal.   Skin: Skin is warm and dry. No rash noted. No erythema.   Psychiatric: He has a normal mood and affect. His behavior is normal. Judgment and thought content normal.   Nursing note and vitals reviewed.      Significant Labs:  All labs within the past 24 hours have been reviewed.    Significant Imaging:  Labs: Reviewed    Assessment/Plan:     KATRIN (acute kidney injury)    Acute kidney injury with higher BNP.  Clinically patient looks dehydrated/prerenal.  Will check renal ultrasound.  Will check urine electrolytes and determine etiology and management of different electrolyte abnormalities as well as acute kidney injury and chronic kidney disease stage 4.  Case discussed in detail with ICU team and Dr. Kurt Canseco         Hyperkalemia    Agree with Kayexalate            Thank you for your consult.     Clint Zabala MD   Nephrology  [facility] -

## 2018-08-19 NOTE — ASSESSMENT & PLAN NOTE
Complications of the procedure discussed in detail with patient. Complications including but not limited to infection that may require hospital admission, bleeding that may require blood transfusion and or hospital admission, perforation of the lung which may require surgery. Patient expressed and verbalized understanding. Alternate treatments and material risks associated with such alternatives were discussed with pateint. These include radiologic surveillance with minimal risk and sugery with an indeterminate risk. The material risks of refusing the procedure was discussed in detail. This includes no diagnosis or confirmation of diagnisis and rendering of appropriate treatment the risk of which depends on the nature of the diagnosed illness. Patient expressed and verbalized understanding. Pleural fluid will be sent for chenistry, microbiology and cytology.      Right side

## 2018-08-19 NOTE — ASSESSMENT & PLAN NOTE
Potassium level 6.6 on admission with peaked T waves on EKG.  He received 10 units IV insulin and and ampule of D50 in the ED.  He had hypokalemia during his prior admission and was taking Potassium replacement.  Added Kayexalate 15 mg BID.  Unable to give IV fluids aggressively due to heart failure exacerbation.

## 2018-08-19 NOTE — SUBJECTIVE & OBJECTIVE
Past Medical History:   Diagnosis Date    Arthritis     Cancer     lung cancer    Diabetes     Foot fracture     Hand fracture, left     Hand fracture, right     HTN (hypertension)     Humerus fracture 07/18/2018    Hyperlipidemia     Neuropathy     Renal disorder        Past Surgical History:   Procedure Laterality Date    ANKLE SURGERY Left     CHOLECYSTECTOMY      HUMERUS FRACTURE SURGERY         Review of patient's allergies indicates:   Allergen Reactions    Penicillins      Current Facility-Administered Medications   Medication Frequency    acetaminophen tablet 500 mg Q4H PRN    albuterol-ipratropium 2.5 mg-0.5 mg/3 mL nebulizer solution 3 mL Q6H PRN    amLODIPine tablet 10 mg Daily    chlordiazepoxide capsule 10 mg TID    citalopram tablet 40 mg Daily    dextrose 50% injection 12.5 g PRN    dextrose 50% injection 25 g PRN    docusate sodium capsule 100 mg BID PRN    glucagon (human recombinant) injection 1 mg PRN    glucose chewable tablet 16 g PRN    glucose chewable tablet 24 g PRN    heparin (porcine) injection 5,000 Units Q8H    hydrALAZINE injection 10 mg Q8H PRN    HYDROcodone-acetaminophen  mg per tablet 1 tablet Q6H PRN    HYDROcodone-acetaminophen 5-325 mg per tablet 1 tablet Q6H PRN    insulin aspart U-100 pen 0-5 Units QID (AC + HS) PRN    isosorbide-hydrALAZINE 20-37.5 mg per tablet 1 tablet TID    lorazepam (ATIVAN) injection 1 mg Q30 Min PRN    mupirocin 2 % ointment TID    ondansetron disintegrating tablet 8 mg Q8H PRN    ondansetron injection 4 mg Q12H PRN    pantoprazole EC tablet 40 mg Daily    pneumoc 13-lisseth conj-dip cr(PF) 0.5 mL vaccine x 1 dose    polyethylene glycol packet 17 g Daily    promethazine (PHENERGAN) 6.25 mg in dextrose 5 % 50 mL IVPB Q6H PRN    ramelteon tablet 8 mg Nightly PRN    sodium chloride 0.9% flush 5 mL PRN     Family History     Problem Relation (Age of Onset)    Heart disease Father, Paternal Grandfather     Miscarriages / Stillbirths Paternal Uncle, Paternal Grandfather        Tobacco Use    Smoking status: Current Every Day Smoker     Packs/day: 1.00    Smokeless tobacco: Never Used   Substance and Sexual Activity    Alcohol use: Yes     Comment: 6 beers per day    Drug use: No    Sexual activity: Not on file     Review of Systems   Constitutional: Positive for activity change, appetite change, fatigue and unexpected weight change. Negative for chills, diaphoresis and fever.   HENT: Negative.  Negative for congestion and trouble swallowing.    Eyes: Negative.    Respiratory: Positive for cough, chest tightness and shortness of breath. Negative for wheezing.    Cardiovascular: Negative.  Negative for chest pain, palpitations and leg swelling.   Gastrointestinal: Negative.  Negative for abdominal distention, abdominal pain, nausea and vomiting.   Genitourinary: Negative.  Negative for decreased urine volume, difficulty urinating, dysuria, enuresis, flank pain, frequency, hematuria, penile swelling, scrotal swelling and urgency.   Musculoskeletal: Negative.  Negative for arthralgias, back pain, joint swelling and neck pain.   Skin: Negative for rash.   Neurological: Negative.  Negative for tremors, seizures and headaches.   Psychiatric/Behavioral: Negative.  Negative for confusion and sleep disturbance. The patient is not nervous/anxious.    All other systems reviewed and are negative.    Objective:     Vital Signs (Most Recent):  Temp: 98 °F (36.7 °C) (08/19/18 1100)  Pulse: 81 (08/19/18 1200)  Resp: (!) 28 (08/19/18 1200)  BP: (!) 149/53 (08/19/18 1200)  SpO2: (!) 93 % (08/19/18 1200)  O2 Device (Oxygen Therapy): nasal cannula (08/19/18 1105) Vital Signs (24h Range):  Temp:  [97.6 °F (36.4 °C)-98.6 °F (37 °C)] 98 °F (36.7 °C)  Pulse:  [59-81] 81  Resp:  [19-32] 28  SpO2:  [88 %-100 %] 93 %  BP: (128-185)/() 149/53     Weight: 57.7 kg (127 lb 3.3 oz) (08/19/18 0505)  Body mass index is 18.78 kg/m².  Body  surface area is 1.68 meters squared.    I/O last 3 completed shifts:  In: 670 [P.O.:420; IV Piggyback:250]  Out: 875 [Urine:875]    Physical Exam   Constitutional: He is oriented to person, place, and time. He appears well-developed and well-nourished. No distress.   HENT:   Head: Normocephalic.   Eyes: EOM are normal. Pupils are equal, round, and reactive to light.   Neck: Normal range of motion. Neck supple. No JVD present. No thyromegaly present.   Cardiovascular: Normal rate, regular rhythm, S1 normal, S2 normal and intact distal pulses. PMI is not displaced. Exam reveals no gallop and no friction rub.   Murmur heard.  Pulmonary/Chest: No respiratory distress. He has wheezes. He has rales. He exhibits no tenderness.   Abdominal: He exhibits no distension and no mass. There is no hepatosplenomegaly. There is no tenderness. There is no rebound and no CVA tenderness. No hernia.   Musculoskeletal: Normal range of motion. He exhibits no edema or tenderness.   Muscle wasting and signs of malnutrition   Lymphadenopathy:     He has no cervical adenopathy.   Neurological: He is alert and oriented to person, place, and time. He has normal reflexes. He is not disoriented. He displays normal reflexes. No cranial nerve deficit. He exhibits normal muscle tone. Coordination normal.   Skin: Skin is warm and dry. No rash noted. No erythema.   Psychiatric: He has a normal mood and affect. His behavior is normal. Judgment and thought content normal.   Nursing note and vitals reviewed.      Significant Labs:  All labs within the past 24 hours have been reviewed.    Significant Imaging:  Labs: Reviewed

## 2018-08-19 NOTE — ASSESSMENT & PLAN NOTE
Due to volume overload and heart failure.  Continuous BiPAP to maintain oxygen saturation above 88%.  Diuresis with IV Furosemide.

## 2018-08-19 NOTE — CONSULTS
[facility] -   Critical Care Medicine  Consult Note    Patient Name: Lionel Castillo  MRN: [id number]  Admission Date: 8/18/2018  Hospital Length of Stay: 1 days  Code Status: Full Code  Attending Physician: Manuel Willis MD   Primary Care Provider: Eddie Garcia MD   Principal Problem: Acute on chronic respiratory failure with hypoxia      Subjective:     HPI:  Mr Lionel Castillo is 68 years old  Admitted to  MICU with SOB, Hypoxemic respiratory failure on BIPAP  COPD on Anoro  Brought by EMS, SOB, congestion, denies edema  /81 and SpO2 87% RA  Smoker 1 ppd > 20 years  Recent admissions for right humerus fracture, ETOH withdrawal,   08/10/2018 and 07/20/2018, UDS +ve  Also BNP increased from 242, now 4343 and GFR 21.  UA 2+ urine    Hospital/ICU Course:  08/19: off BIPAP, discussed need for right thora to eval effusion    Past Medical History:   Diagnosis Date    Arthritis     Cancer     lung cancer    Diabetes     Foot fracture     Hand fracture, left     Hand fracture, right     HTN (hypertension)     Humerus fracture 07/18/2018    Hyperlipidemia     Neuropathy     Renal disorder        Past Surgical History:   Procedure Laterality Date    ANKLE SURGERY Left     CHOLECYSTECTOMY      HUMERUS FRACTURE SURGERY         Review of patient's allergies indicates:   Allergen Reactions    Penicillins        Family History     Problem Relation (Age of Onset)    Heart disease Father, Paternal Grandfather    Miscarriages / Stillbirths Paternal Uncle, Paternal Grandfather        Tobacco Use    Smoking status: Current Every Day Smoker     Packs/day: 1.00    Smokeless tobacco: Never Used   Substance and Sexual Activity    Alcohol use: Yes     Comment: 6 beers per day    Drug use: No    Sexual activity: Not on file         Review of Systems  Objective:     Vital Signs (Most Recent):  Temp: 97.6 °F (36.4 °C) (08/19/18 0700)  Pulse: 72 (08/19/18 0800)  Resp: (!) 23 (08/19/18  0800)  BP: (!) 153/49 (08/19/18 0800)  SpO2: 100 % (08/19/18 0950) Vital Signs (24h Range):  Temp:  [97.1 °F (36.2 °C)-98.6 °F (37 °C)] 97.6 °F (36.4 °C)  Pulse:  [59-80] 72  Resp:  [19-40] 23  SpO2:  [87 %-100 %] 100 %  BP: (122-187)/() 153/49     Weight: 57.7 kg (127 lb 3.3 oz)  Body mass index is 18.78 kg/m².      Intake/Output Summary (Last 24 hours) at 8/19/2018 1008  Last data filed at 8/19/2018 0800  Gross per 24 hour   Intake 670 ml   Output 1025 ml   Net -355 ml       Physical Exam   Constitutional: He is oriented to person, place, and time. Vital signs are normal. He appears well-developed and well-nourished. He has a sickly appearance. Nasal cannula in place.       HENT:   Head: Normocephalic and atraumatic.   Mouth/Throat: Oropharynx is clear and moist. No oropharyngeal exudate.   Eyes: Conjunctivae and EOM are normal. Pupils are equal, round, and reactive to light.   Neck: Normal range of motion. Neck supple. No tracheal deviation present. No thyromegaly present.   Cardiovascular: Normal rate, regular rhythm, normal heart sounds and intact distal pulses.   No murmur heard.  Pulmonary/Chest: Effort normal and breath sounds normal. He has no wheezes. He has no rales. He exhibits no tenderness.   Abdominal: Soft. Bowel sounds are normal.   Musculoskeletal: Normal range of motion. He exhibits no edema.   Neurological: He is alert and oriented to person, place, and time. No cranial nerve deficit.   Skin: Skin is warm and dry. Capillary refill takes 2 to 3 seconds.   Nursing note and vitals reviewed.      Vents:  Oxygen Concentration (%): 45 (08/19/18 0734)    Lines/Drains/Airways     Peripheral Intravenous Line                 Peripheral IV - Single Lumen 08/18/18 Left Hand 1 day                Significant Labs:    CBC/Anemia Profile:  Recent Labs   Lab  08/18/18   1200  08/19/18   0425   WBC  11.74  10.14   HGB  11.6*  11.2*   HCT  33.4*  32.5*   PLT  542*  503*   MCV  102*  102*   RDW  14.0  14.1         Chemistries:  Recent Labs   Lab  08/18/18   1200  08/19/18   0425   NA  132*  137   K  6.6*  3.8   CL  101  101   CO2  19*  23   BUN  57*  58*   CREATININE  2.8*  3.0*   CALCIUM  10.0  9.9   ALBUMIN  2.3*  2.3*   PROT  7.3  6.9   BILITOT  0.8  0.6   ALKPHOS  204*  183*   ALT  9*  8*   AST  16  11       ABGs:   Recent Labs   Lab  08/19/18   0758   PH  7.516*   PCO2  28.2*   HCO3  22.8*   POCSATURATED  92*   BE  0     Blood Culture:   Recent Labs   Lab  08/18/18   1200  08/18/18   1210   LABBLOO  No Growth to date  No Growth to date     All pertinent labs within the past 24 hours have been reviewed.    Significant Imaging:   I have reviewed and interpreted all pertinent imaging results/findings within the past 24 hours.     Prior chest CT reviewed 07/19/2018    CXR  FINDINGS:  Persistent right pleural effusion with moderate coarsening of the interstitial markings bilaterally, most of which may be chronic in nature.  Suspicion of developing left basilar infiltrate and possible effusion.  Normal heart size.  Scarring right apex.      Impression       1.  Persistent right pleural effusion.  Severe chronic lung changes again noted bilaterally.    2.  Suspicion of developing left basilar infiltrate and/or atelectasis.  Cannot exclude a small left effusion follow-up recommended         Assessment/Plan:     Neuro    Continue CITALOPRAM        Psychiatric   Alcohol abuse    Hx of drinking 1/5 daily, recently completed drug rehab, will monitor        Pulmonary   * Acute on chronic respiratory failure with hypoxia    Multifactorial: COPD. Fibrosis, HFpEF and valvular heart disease, Pul vascular disease , atelectasis, PNA  Off BIPAP for now, PRN and at night  ABG dont show hypercapnia  Nasal canullar keep sat> 92%  Incentive spirometry  Aerobika  Repeat CTA chest: after DW nephrology, GFR 21, VQ will not be helpful        Recurrent right pleural effusion    Complications of the procedure discussed in detail with patient.  Complications including but not limited to infection that may require hospital admission, bleeding that may require blood transfusion and or hospital admission, perforation of the lung which may require surgery. Patient expressed and verbalized understanding. Alternate treatments and material risks associated with such alternatives were discussed with pateint. These include radiologic surveillance with minimal risk and sugery with an indeterminate risk. The material risks of refusing the procedure was discussed in detail. This includes no diagnosis or confirmation of diagnisis and rendering of appropriate treatment the risk of which depends on the nature of the diagnosed illness. Patient expressed and verbalized understanding. Pleural fluid will be sent for chenistry, microbiology and cytology.      Right side        Left lower lobe pneumonia    New infiltrate on CXR in left lower lobe.    Continue Moxifloxacin.            Centrilobular emphysema    Short acting Albuterol and Ipratropium  PCV 13 ordered        Mass of right lung    Uncertain etiology but high risk for pulmonary malignancy.  Smoking history and signs of emphysema on CT chest.   Has been documented since 2016  Rounded atelectasis differential        Cardiac/Vascular   Essential hypertension    Po  Amlodipine        Acute on chronic diastolic congestive heart failure    Repeat echo: also help determine volume status  Current wt 57 kg , last visit 65 kg: looks euvolemic  BNP trend up  Diuresis lasix: nephrology consulted, ? Lasix drip          Renal/   Hyperkalemia    PO Kayexalate  Monitor and treat        Acute renal failure superimposed on stage 3 chronic kidney disease    Renal function declined  Renal US  RAY, ANCA  Consult Nephrology, may need biopsy        Endocrine   Type 2 diabetes mellitus, without long-term current use of insulin    Diabetic diet  Last A1C was 4.8  Sliding scale        Orthopedic   Right humeral fracture    Right arm  sling        Other   Other nondisplaced fracture of upper end of right humerus, initial encounter for closed fracture    monitor and treat pain        Tobacco use    Encourage smoking cessation           Proportion of BNP elevation out of proportion with heart function demonstrated by recent echo     Critical Care Daily Checklist:    A: Awake: RASS Goal/Actual Goal:    Actual: [place] Agitation Sedation Scale (RASS): Alert and calm   B: Spontaneous Breathing Trial Performed?     C: SAT & SBT Coordinated?  N/A                      D: Delirium: CAM-ICU Overall CAM-ICU: Negative   E: Early Mobility Performed? Yes   F: Feeding Goal:    Status:     Current Diet Order   Procedures    Diet Adult Regular (IDDSI Level 7) [facility]; Fluid - 1500mL     Order Specific Question:   Indicate patient location for additional diet options:     Answer:   [facility]     Order Specific Question:   Fluid restriction:     Answer:   Fluid - 1500mL      AS: Analgesia/Sedation NORCO for shoulder pain   T: Thromboembolic Prophylaxis Heparin aSC   H: HOB > 300 Yes   U: Stress Ulcer Prophylaxis (if needed) famotidine   G: Glucose Control Monitor   B: Bowel Function     I: Indwelling Catheter (Lines & Lay) Necessity Peripheral IV   D: De-escalation of Antimicrobials/Pharmacotherapies On Avelox    Plan for the day/ETD Thoracentsis    Code Status:  Family/Goals of Care: Full Code  Home     Critical Care Time: 45 minutes  Critical secondary to Patient has a condition that poses threat to life and bodily function: HYPOXEMIc RESP FAILURE, Acute renal failure     Critical care was time spent personally by me on the following activities: development of treatment plan with patient or surrogate and bedside caregivers, discussions with consultants, evaluation of patient's response to treatment, examination of patient, ordering and performing treatments and interventions, ordering and review of laboratory studies, ordering and review of  radiographic studies, pulse oximetry, re-evaluation of patient's condition. This critical care time did not overlap with that of any other provider or involve time for any procedures.    Thank you for your consult. I will follow-up with patient. Please contact us if you have any additional questions.     Kurt Canseco MD  Critical Care Medicine  [facility]

## 2018-08-19 NOTE — ASSESSMENT & PLAN NOTE
Potassium level 6.6 on admission with peaked T waves on EKG.  He received 10 units IV insulin and and ampule of D50 in the ED.  He had hypokalemia during his prior admission and was taking Potassium replacement.  Added Kayexalate 15 mg BID.  Unable to give IV fluids aggressively due to heart failure exacerbation.  Decreased to 3.8 after 24 hours.  Continue to monitor.

## 2018-08-19 NOTE — ASSESSMENT & PLAN NOTE
Uncertain etiology but high risk for pulmonary malignancy.  Smoking history and signs of emphysema on CT chest.   Has been documented since 2016  Rounded atelectasis differential

## 2018-08-19 NOTE — PROGRESS NOTES
Pharmacist Renal Dose Adjustment Note    Lionel Castillo is a 68 y.o. male being treated with the medication lovenox    Patient Data:    Vital Signs (Most Recent):  Temp: 97.9 °F (36.6 °C) (08/18/18 1745)  Pulse: 73 (08/18/18 1830)  Resp: (!) 26 (08/18/18 1830)  BP: (!) 165/66 (08/18/18 1830)  SpO2: 96 % (08/18/18 1830)   Vital Signs (72h Range):  Temp:  [97.1 °F (36.2 °C)-97.9 °F (36.6 °C)]   Pulse:  [59-80]   Resp:  [19-40]   BP: (122-187)/(39-81)   SpO2:  [87 %-100 %]      Recent Labs   Lab  08/18/18   1200   CREATININE  2.8*     Serum creatinine: 2.8 mg/dL (H) 08/18/18 1200  Estimated creatinine clearance: 20.4 mL/min (A)    Medication:lovenox dose: 40mg frequency daily will be changed to medication:lovenox dose:30mg frequency:daily    Pharmacist's Name: Manjeet Munroe  Pharmacist's Extension: [phone]

## 2018-08-19 NOTE — H&P
[facility] Hospital Medicine  History & Physical    Patient Name: Lionel Castillo  MRN: [id number]  Admission Date: 8/18/2018  Attending Physician: Manuel Willis MD   Primary Care Provider: Eddie Garcia MD         Patient information was obtained from patient, past medical records and ER records.     Subjective:     Principal Problem:Acute on chronic respiratory failure with hypoxia    Chief Complaint:   Chief Complaint   Patient presents with    Shortness of Breath     sob with hx of lung ca        HPI: Worsening shortness of breath since returning home after a recent admission.  He also has a non-productive cough.  Denies chest pain.  Denies nausea, vomiting, fevers, or chills.  He was admitted on 04 August with shortness of breath and diagnosed with a diastolic heart failure exacerbation.  He has a right humerus fracture from a fall from a syncopal episode on 18 July.  He had a complicated right sided pleural effusion and atelectasis with a non-specific lung mass discovered on his first admission.  Further workup was planned as an outpatient with Dr. Bailey of Pulmonary medicine.      Past Medical History:   Diagnosis Date    Arthritis     Cancer     lung cancer    Diabetes     Foot fracture     Hand fracture, left     Hand fracture, right     HTN (hypertension)     Humerus fracture 07/18/2018    Hyperlipidemia     Neuropathy     Renal disorder        Past Surgical History:   Procedure Laterality Date    ANKLE SURGERY Left     CHOLECYSTECTOMY      HUMERUS FRACTURE SURGERY         Review of patient's allergies indicates:   Allergen Reactions    Penicillins        No current facility-administered medications on file prior to encounter.      Current Outpatient Medications on File Prior to Encounter   Medication Sig    amlodipine (NORVASC) 10 MG tablet Take 10 mg by mouth once daily.    aspirin 81 MG Chew Take 81 mg by mouth once daily.    citalopram (CELEXA) 40 MG  tablet Take 40 mg by mouth once daily.    docusate sodium (COLACE) 100 MG capsule Take 1 capsule (100 mg total) by mouth 2 (two) times daily as needed for Constipation.    enoxaparin (LOVENOX) 150 mg/mL Syrg Inject 30 mg into the skin.    folic acid (FOLVITE) 1 MG tablet Take 1 tablet (1 mg total) by mouth once daily.    furosemide (LASIX) 40 MG tablet Take 1 tablet (40 mg total) by mouth 2 (two) times daily. (Patient taking differently: Take 80 mg by mouth 2 (two) times daily. )    HYDROcodone-acetaminophen (NORCO)  mg per tablet Take 1 tablet by mouth every 6 (six) hours as needed for Pain.    metoprolol tartrate (LOPRESSOR) 50 MG tablet Take 1 tablet (50 mg total) by mouth 2 (two) times daily.    nicotine (NICODERM CQ) 21 mg/24 hr Place 1 patch onto the skin every 24 hours.    pantoprazole (PROTONIX) 40 MG tablet Take 40 mg by mouth once daily.    potassium chloride SA (K-DUR,KLOR-CON) 20 MEQ tablet Take 1 tablet (20 mEq total) by mouth once daily.    thiamine 100 MG tablet Take 1 tablet (100 mg total) by mouth once daily.    insulin regular 100 unit/mL Inj injection Inject into the skin 3 (three) times daily before meals.    mupirocin (BACTROBAN) 2 % ointment Apply topically 3 (three) times daily.    polyethylene glycol (GLYCOLAX) 17 gram/dose powder Take 17 g by mouth once daily. Take daily to keep your bowel movements regular while taking pain medicines    temazepam (RESTORIL) 30 mg capsule Take 30 mg by mouth nightly as needed for Insomnia.     Family History     Problem Relation (Age of Onset)    Heart disease Father, Paternal Grandfather    Miscarriages / Stillbirths Paternal Uncle, Paternal Grandfather        Tobacco Use    Smoking status: Current Every Day Smoker     Packs/day: 1.00    Smokeless tobacco: Never Used   Substance and Sexual Activity    Alcohol use: Yes     Comment: 6 beers per day    Drug use: No    Sexual activity: Not on file     Review of Systems   Constitutional:  Positive for fatigue. Negative for chills and fever.   HENT: Negative.  Negative for congestion and sore throat.    Eyes: Negative.  Negative for visual disturbance.   Respiratory: Positive for cough and shortness of breath. Negative for wheezing.    Cardiovascular: Negative.  Negative for chest pain.   Gastrointestinal: Negative for abdominal pain, diarrhea, nausea and vomiting.   Endocrine: Negative.    Genitourinary: Negative.    Musculoskeletal: Negative.  Negative for myalgias and neck stiffness.   Skin: Negative.  Negative for color change and pallor.   Allergic/Immunologic: Negative.    Neurological: Negative.    Hematological: Negative.    Psychiatric/Behavioral: Negative.    All other systems reviewed and are negative.    Objective:     Vital Signs (Most Recent):  Temp: 97.9 °F (36.6 °C) (08/18/18 1745)  Pulse: 73 (08/18/18 1830)  Resp: (!) 26 (08/18/18 1830)  BP: (!) 165/66 (08/18/18 1830)  SpO2: 96 % (08/18/18 1830) Vital Signs (24h Range):  Temp:  [97.1 °F (36.2 °C)-97.9 °F (36.6 °C)] 97.9 °F (36.6 °C)  Pulse:  [59-80] 73  Resp:  [19-40] 26  SpO2:  [87 %-100 %] 96 %  BP: (122-187)/(39-81) 165/66     Weight: 55.5 kg (122 lb 5.7 oz)  Body mass index is 18.07 kg/m².    Physical Exam   Constitutional: He is oriented to person, place, and time. No distress.   Thin and frail.  Borderline cachectic.   HENT:   Head: Normocephalic and atraumatic.   Mouth/Throat: Oropharynx is clear and moist.   Eyes: Conjunctivae and EOM are normal. Pupils are equal, round, and reactive to light.   Neck: No JVD present. No thyromegaly present.   Cardiovascular: Normal rate, regular rhythm and normal heart sounds. Exam reveals no gallop and no friction rub.   No murmur heard.  Pulmonary/Chest: Effort normal and breath sounds normal. No respiratory distress. He has no wheezes. He has no rales.   Bilateral fine crackles with right sided consolidation.   Abdominal: Soft. Bowel sounds are normal. He exhibits no distension. There is no  tenderness. There is no rebound and no guarding.   Musculoskeletal: Normal range of motion. He exhibits no edema or tenderness.   Calf muscles atrophied.  No edema.   Lymphadenopathy:     He has no cervical adenopathy.   Neurological: He is alert and oriented to person, place, and time. He has normal reflexes. He displays normal reflexes. No cranial nerve deficit.   Skin: Skin is warm and dry. No rash noted. He is not diaphoretic. No erythema.   Scattered ecchymosis forearms bilaterally.   Psychiatric: He has a normal mood and affect. His behavior is normal. Judgment and thought content normal.         CRANIAL NERVES     CN III, IV, VI   Pupils are equal, round, and reactive to light.  Extraocular motions are normal.        Significant Labs: All pertinent labs within the past 24 hours have been reviewed.    Significant Imaging: I have reviewed all pertinent imaging results/findings within the past 24 hours.    Assessment/Plan:     * Acute on chronic respiratory failure with hypoxia    Due to volume overload and heart failure.  Continuous BiPAP to maintain oxygen saturation above 88%.  Diuresis with IV Furosemide.        Left lower lobe pneumonia    New infiltrate on CXR in left lower lobe.  Continue Moxifloxacin.  Check procalcitonin.        Hyperkalemia    Potassium level 6.6 on admission with peaked T waves on EKG.  He received 10 units IV insulin and and ampule of D50 in the ED.  He had hypokalemia during his prior admission and was taking Potassium replacement.  Added Kayexalate 15 mg BID.  Unable to give IV fluids aggressively due to heart failure exacerbation.        Acute on chronic diastolic congestive heart failure    He received 80 mg Furosemide IV in the ER with a modest increase in urine output.  BNP very high over 4000.  Recent cardiac echo showed normal systolic function and diastolic dysfunction and structural abnormalities.        Mass of right lung    Uncertain etiology but high risk for pulmonary  malignancy.  Smoking history and signs of emphysema on CT chest.  There was an outpatient plan for further workup with Dr. Bailey.        Hyponatremia    Multifactorial due to use of loop diuretic, heart failure, and possible pulmonary malignancy.  Total water restriction to 1500 mL per day.  Careful use of diuretics.  Signs of pulmonary edema but no peripheral edema.          VTE Risk Mitigation (From admission, onward)    None        Critical care time spent on the evaluation and treatment of severe organ dysfunction, review of pertinent labs and imaging studies, discussions with consulting providers and discussions with patient/family: 30 minutes.     Critical care time:  30 Minutes    Manuel Willis MD  Department of Hospital Medicine   [facility]

## 2018-08-19 NOTE — EICU
eICU Note :    Called by the [facility]:    Problem: Hypertension, SBP in the 170's , needs PRN meds    Pertinent History and labs reviewed :  Patient Active Problem List   Diagnosis    Alcohol abuse    Hyponatremia    Hypokalemia    Acute renal failure superimposed on stage 3 chronic kidney disease    Right humeral fracture    Essential hypertension    Type 2 diabetes mellitus, without long-term current use of insulin    Tobacco use    Carotid stenosis, bilateral    LBBB (left bundle branch block)    Polysubstance abuse    Other nondisplaced fracture of upper end of right humerus, initial encounter for closed fracture    Mass of right lung    Acute on chronic diastolic congestive heart failure    Acute respiratory failure with hypoxia    Centrilobular emphysema    Acute on chronic respiratory failure with hypoxia    Hyperkalemia    Left lower lobe pneumonia         Treatment /Intervention given: Hydralazine 10 mg q6 PRN for SBP >160        Candelaria Tiffany OQUENDO  eICU Physician

## 2018-08-19 NOTE — PROCEDURES
"Lionel Castillo is a 68 y.o. male patient.    Temp: 98 °F (36.7 °C) (18 1100)  Pulse: 73 (18 1100)  Resp: 20 (18 1100)  BP: (!) 170/50 (18 1100)  SpO2: (!) 89 % (18 1100)  Weight: 57.7 kg (127 lb 3.3 oz) (18 0505)  Height: 5' 9" (175.3 cm) (18 1745)       Thoracentesis  Date/Time: 2018 11:48 AM  Performed by: Kurt Canseco MD  Authorized by: Kurt Canseco MD   Pre-operative diagnosis: right pleural effusion  Post-operative diagnosis: SAME  Consent Done: Yes  Consent: Verbal consent obtained. Written consent obtained.  Risks and benefits: risks, benefits and alternatives were discussed  Consent given by: patient  Patient understanding: patient states understanding of the procedure being performed  Patient consent: the patient's understanding of the procedure matches consent given  Procedure consent: procedure consent matches procedure scheduled  Relevant documents: relevant documents present and verified  Test results: test results available and properly labeled  Site marked: the operative site was marked  Imaging studies: imaging studies available  Required items: required blood products, implants, devices, and special equipment available  Patient identity confirmed: , MRN, name, verbally with patient and provided demographic data  Time out: Immediately prior to procedure a "time out" was called to verify the correct patient, procedure, equipment, support staff and site/side marked as required.  Procedure purpose: diagnostic and therapeutic  Indications: pleural effusion  Preparation: Patient was prepped and draped in the usual sterile fashion.  Local anesthesia used: yes  Anesthesia: local infiltration    Anesthesia:  Local anesthesia used: yes  Local Anesthetic: lidocaine 1% without epinephrine  Anesthetic total: 10 mL  Patient sedated: no  Preparation: skin prepped with ChloraPrep  Patient position: sitting  Ultrasound guidance: yes  Location: " right posterior  Intercostal space: 7th  Puncture method: over-the-needle catheter  Needle size: 18  Catheter size: 8 Irish  Number of attempts: 1  Drainage amount: 1200 ml  Drainage characteristics: serosanguinous  Patient tolerance: Patient tolerated the procedure well with no immediate complications  Chest x-ray performed: yes  Chest x-ray interpreted by me.  Chest x-ray findings: pneumothorax (tiny apical pneumo, repeat cxr @ 330pm)  Complications: No  Estimated blood loss (mL): 0  Specimens: Yes (tube 1: gram stain, KOH, AFB, Tube 2: prot, LDH, Amylase, chol, glu, Tube 3: cytology, Lavender for wbcc, urine specimen cup for cytology)  Implants: No          Kurt Canseco  8/19/2018

## 2018-08-19 NOTE — SUBJECTIVE & OBJECTIVE
Interval History:  No acute problems overnight.  Continuous BiPAP to maintain oxygen saturation.    Review of Systems   Constitutional: Negative.  Negative for chills and fever.   HENT: Negative.  Negative for congestion and sore throat.    Eyes: Negative.  Negative for visual disturbance.   Respiratory: Positive for cough and shortness of breath. Negative for wheezing.    Cardiovascular: Negative.  Negative for chest pain.   Gastrointestinal: Negative for abdominal pain, diarrhea, nausea and vomiting.   Endocrine: Negative.    Genitourinary: Negative.    Musculoskeletal: Negative.  Negative for myalgias and neck stiffness.   Skin: Negative.  Negative for color change and pallor.   Allergic/Immunologic: Negative.    Neurological: Negative.    Hematological: Negative.    Psychiatric/Behavioral: Negative.    All other systems reviewed and are negative.    Objective:     Vital Signs (Most Recent):  Temp: 97.6 °F (36.4 °C) (08/19/18 0700)  Pulse: 72 (08/19/18 0800)  Resp: (!) 23 (08/19/18 0800)  BP: (!) 153/49 (08/19/18 0800)  SpO2: 100 % (08/19/18 0950) Vital Signs (24h Range):  Temp:  [97.1 °F (36.2 °C)-98.6 °F (37 °C)] 97.6 °F (36.4 °C)  Pulse:  [59-80] 72  Resp:  [19-40] 23  SpO2:  [87 %-100 %] 100 %  BP: (122-187)/() 153/49     Weight: 57.7 kg (127 lb 3.3 oz)  Body mass index is 18.78 kg/m².    Intake/Output Summary (Last 24 hours) at 8/19/2018 1033  Last data filed at 8/19/2018 0800  Gross per 24 hour   Intake 670 ml   Output 1025 ml   Net -355 ml      Physical Exam   Constitutional: He is oriented to person, place, and time. He appears well-developed. No distress.   Frail and borderline cachectic   HENT:   Head: Normocephalic and atraumatic.   Mouth/Throat: Oropharynx is clear and moist.   Eyes: Conjunctivae and EOM are normal. Pupils are equal, round, and reactive to light.   Neck: No JVD present. No thyromegaly present.   Cardiovascular: Normal rate, regular rhythm and normal heart sounds. Exam reveals no  gallop and no friction rub.   No murmur heard.  Pulmonary/Chest: Effort normal and breath sounds normal. No respiratory distress. He has no wheezes. He has no rales.   Upper and lower crackles left chest and moderate crackles with reduced breath sounds on the right.   Abdominal: Soft. Bowel sounds are normal. He exhibits no distension. There is no tenderness. There is no rebound and no guarding.   Musculoskeletal: Normal range of motion. He exhibits no edema or tenderness.   Lymphadenopathy:     He has no cervical adenopathy.   Neurological: He is alert and oriented to person, place, and time. He has normal reflexes. He displays normal reflexes. No cranial nerve deficit.   Skin: Skin is warm and dry. No rash noted. He is not diaphoretic. No erythema.   Scattered ecchymosis forearms bilaterally   Psychiatric: He has a normal mood and affect. His behavior is normal. Judgment and thought content normal.       Significant Labs: All pertinent labs within the past 24 hours have been reviewed.    Significant Imaging: I have reviewed all pertinent imaging results/findings within the past 24 hours.

## 2018-08-19 NOTE — ASSESSMENT & PLAN NOTE
Uncertain etiology but high risk for pulmonary malignancy.  Smoking history and signs of emphysema on CT chest.  There was an outpatient plan for further workup with Dr. Bailey.

## 2018-08-19 NOTE — NURSING
Small pneumothorax present. No evidence clinically. Pt in no distress, Sa02 100%. Argelia, NP aware. Will continue to monitor.

## 2018-08-19 NOTE — HPI
Mr Lionel Castillo is 68 years old  Admitted to  MICU with SOB, Hypoxemic respiratory failure on BIPAP  COPD on Anoro  Brought by EMS, SOB, congestion, denies edema  /81 and SpO2 87% RA  Smoker 1 ppd > 20 years  Recent admissions for right humerus fracture, ETOH withdrawal,   08/10/2018 and 07/20/2018, UDS +ve  Also BNP increased from 242, now 4343 and GFR 21.  UA 2+ urine

## 2018-08-19 NOTE — PROGRESS NOTES
Notified EICU, pt c/o anxiety, removing BiPAP mask, and asking to remove mask. Mask was removed prior for about 10 min so that pt could drink some water and use the restroom. Pt's O2 sats maintained around 88-91% on 5 lpm but before the 10 min was up pt's RR increased into the 30's and was c/o SOB. Pt teaching was done on importance of wearing the mask and pt understands but states that he is uncomfortable, anxious, and the mask is causing the anxiety. EICU was informed and will continue to monitor.

## 2018-08-19 NOTE — HOSPITAL COURSE
08/19: off BIPAP, discussed need for right thora to eval effusion    08/20/18: Seen and examined at bedside. Asleep but arousable. Off BIPAP. Pleural fluid: At least one of Lights Criteria ( LDH) has been met; according to one study, this was 98% sensitive for exudative effusion.    08/21/2018: Seen and examined at bedside. No acute interval developments.

## 2018-08-19 NOTE — PROGRESS NOTES
[facility] Hospital Medicine  Progress Note    Patient Name: Lionel Castillo  MRN: [id number]  Patient Class: IP- Inpatient   Admission Date: 8/18/2018  Length of Stay: 1 days  Attending Physician: Manuel Willis MD  Primary Care Provider: Eddie Garcia MD        Subjective:     Principal Problem:Acute on chronic respiratory failure with hypoxia    HPI:  Worsening shortness of breath since returning home after a recent admission.  He also has a non-productive cough.  Denies chest pain.  Denies nausea, vomiting, fevers, or chills.  He was admitted on 04 August with shortness of breath and diagnosed with a diastolic heart failure exacerbation.  He has a right humerus fracture from a fall from a syncopal episode on 18 July.  He had a complicated right sided pleural effusion and atelectasis with a non-specific lung mass discovered on his first admission.  Further workup was planned as an outpatient with Dr. Bailey of Pulmonary medicine.      Hospital Course:  Admitted for further evaluation and treatment of shortness of breath.  Hemodynamically stable in the ER.  He had a serum Potassium of 6.6 with peaked T-waves on EKG.  He receive 10 units regular insulin IV and an ampule of D50 in the ER.  He also received 15 g of Kayexalate.  Chest X-ray showed a new left basilar infiltrate.  Empirically started Moxifloxacin and Furosemide IV 80 mg.  WBC count was high normal with a left shift.  Lactic acid 2.1 and mild hyponatremia.  WBC count returned to normal and procalcitonin normal.  Stopped Moxifloxacin - Pneumonia ruled out.  Persistent hypoxemia that corrected well with continuous BiPAP.  Consulting Cardiology and Nephrology for assistance optimizing management.    Interval History:  No acute problems overnight.  Continuous BiPAP to maintain oxygen saturation.    Review of Systems   Constitutional: Negative.  Negative for chills and fever.   HENT: Negative.  Negative for congestion and sore  throat.    Eyes: Negative.  Negative for visual disturbance.   Respiratory: Positive for cough and shortness of breath. Negative for wheezing.    Cardiovascular: Negative.  Negative for chest pain.   Gastrointestinal: Negative for abdominal pain, diarrhea, nausea and vomiting.   Endocrine: Negative.    Genitourinary: Negative.    Musculoskeletal: Negative.  Negative for myalgias and neck stiffness.   Skin: Negative.  Negative for color change and pallor.   Allergic/Immunologic: Negative.    Neurological: Negative.    Hematological: Negative.    Psychiatric/Behavioral: Negative.    All other systems reviewed and are negative.    Objective:     Vital Signs (Most Recent):  Temp: 97.6 °F (36.4 °C) (08/19/18 0700)  Pulse: 72 (08/19/18 0800)  Resp: (!) 23 (08/19/18 0800)  BP: (!) 153/49 (08/19/18 0800)  SpO2: 100 % (08/19/18 0950) Vital Signs (24h Range):  Temp:  [97.1 °F (36.2 °C)-98.6 °F (37 °C)] 97.6 °F (36.4 °C)  Pulse:  [59-80] 72  Resp:  [19-40] 23  SpO2:  [87 %-100 %] 100 %  BP: (122-187)/() 153/49     Weight: 57.7 kg (127 lb 3.3 oz)  Body mass index is 18.78 kg/m².    Intake/Output Summary (Last 24 hours) at 8/19/2018 1033  Last data filed at 8/19/2018 0800  Gross per 24 hour   Intake 670 ml   Output 1025 ml   Net -355 ml      Physical Exam   Constitutional: He is oriented to person, place, and time. He appears well-developed. No distress.   Frail and borderline cachectic   HENT:   Head: Normocephalic and atraumatic.   Mouth/Throat: Oropharynx is clear and moist.   Eyes: Conjunctivae and EOM are normal. Pupils are equal, round, and reactive to light.   Neck: No JVD present. No thyromegaly present.   Cardiovascular: Normal rate, regular rhythm and normal heart sounds. Exam reveals no gallop and no friction rub.   No murmur heard.  Pulmonary/Chest: Effort normal and breath sounds normal. No respiratory distress. He has no wheezes. He has no rales.   Upper and lower crackles left chest and moderate crackles  with reduced breath sounds on the right.   Abdominal: Soft. Bowel sounds are normal. He exhibits no distension. There is no tenderness. There is no rebound and no guarding.   Musculoskeletal: Normal range of motion. He exhibits no edema or tenderness.   Lymphadenopathy:     He has no cervical adenopathy.   Neurological: He is alert and oriented to person, place, and time. He has normal reflexes. He displays normal reflexes. No cranial nerve deficit.   Skin: Skin is warm and dry. No rash noted. He is not diaphoretic. No erythema.   Scattered ecchymosis forearms bilaterally   Psychiatric: He has a normal mood and affect. His behavior is normal. Judgment and thought content normal.       Significant Labs: All pertinent labs within the past 24 hours have been reviewed.    Significant Imaging: I have reviewed all pertinent imaging results/findings within the past 24 hours.    Assessment/Plan:      * Acute on chronic respiratory failure with hypoxia    Due to volume overload and heart failure.  Continuous BiPAP to maintain oxygen saturation above 88%.  Diuresis with IV Furosemide.  Persistent hypoxia after diuresis although urine output limited by acute renal failure.  Unable to perform CTA of the chest due to renal failure.  Ordered Lung  Perfusion scan.        Left lower lobe pneumonia    New infiltrate on CXR in left lower lobe.  Received Moxifloxacin.  Procalcitonin negative and WBC returned to normal afebrile.  Stopped Moxifloxacin and ruled out pneumonia.        Hyperkalemia    Potassium level 6.6 on admission with peaked T waves on EKG.  He received 10 units IV insulin and and ampule of D50 in the ED.  He had hypokalemia during his prior admission and was taking Potassium replacement.  Added Kayexalate 15 mg BID.  Unable to give IV fluids aggressively due to heart failure exacerbation.  Decreased to 3.8 after 24 hours.  Continue to monitor.        Acute on chronic diastolic congestive heart failure    He received  80 mg Furosemide IV in the ER with a modest increase in urine output.  BNP very high over 4000.  Recent cardiac echo showed normal systolic function and diastolic dysfunction and structural abnormalities.  Consulting Cardiology.          Mass of right lung    Uncertain etiology but high risk for pulmonary malignancy.  Smoking history and signs of emphysema on CT chest.  There was an outpatient plan for further workup with Dr. Bailey.        Essential hypertension    Continuing Amlodipine.  Add Bidil.        Acute on chronic renal failure    Careful use of nephrotoxic medications.  Limited on fluid resuscitation due to heart failure.  Consult Nephrology.        Hyponatremia    Multifactorial due to use of loop diuretic, heart failure, and possible pulmonary malignancy.  Total water restriction to 1500 mL per day.  Careful use of diuretics.  Signs of pulmonary edema but no peripheral edema.        Alcohol abuse    Chlordiazepoxide 10 mg three times daily.  Lorazepam as needed.          VTE Risk Mitigation (From admission, onward)        Ordered     heparin (porcine) injection 5,000 Units  Every 8 hours      08/19/18 1031          Critical care time spent on the evaluation and treatment of severe organ dysfunction, review of pertinent labs and imaging studies, discussions with consulting providers and discussions with patient/family: 30 minutes.    Manuel Willis MD  Department of Hospital Medicine   [facility]

## 2018-08-19 NOTE — ASSESSMENT & PLAN NOTE
Acute kidney injury with higher BNP.  Clinically patient looks dehydrated/prerenal.  Will check renal ultrasound.  Will check urine electrolytes and determine etiology and management of different electrolyte abnormalities as well as acute kidney injury and chronic kidney disease stage 4.  Case discussed in detail with ICU team and Dr. Kurt Canseco

## 2018-08-19 NOTE — CONSULTS
[facility] -   Cardiology  Consult Note    Patient Name: Lionel Castillo  MRN: [id number]  Admission Date: 8/18/2018  Hospital Length of Stay: 1 days  Code Status: Full Code   Attending Provider: Manuel Willis MD   Consulting Provider: Jessica Gallo MD  Primary Care Physician: Eddie Garcia MD  Principal Problem:Acute on chronic respiratory failure with hypoxia    Patient information was obtained from patient and ER records.     Inpatient consult to Cardiology  Consult performed by: Luke Gallo MD  Consult ordered by: Manuel Willis MD        Subjective:     Chief Complaint:  Shortness of breath     HPI:   A 67 yo male with rt humerus fracture treated conservatively alcohol use admitted earlier this month with shortness of breath and diastolic chf was discharged to rehab after diuresis presents back with shortness of breath hypoxia and elevated bnp., he has  Been treated fro diastolic chf has pleural effusion on cxr and ct thoracentesis plans made. He ahs hypoxia resiratory failure. Unfortunately his creatinine is elevated at 3 he is uncomfortable sitting in bed. Perfusion scan being planned to rule out pe.he denies chest pain fevr chills nausea diaphoresis he has orthopnea no palpitation tia syncope near syncope urinary symptoms or bleeding weight loss.    Past Medical History:   Diagnosis Date    Arthritis     Cancer     lung cancer    Diabetes     Foot fracture     Hand fracture, left     Hand fracture, right     HTN (hypertension)     Humerus fracture 07/18/2018    Hyperlipidemia     Neuropathy     Renal disorder        Past Surgical History:   Procedure Laterality Date    ANKLE SURGERY Left     CHOLECYSTECTOMY      HUMERUS FRACTURE SURGERY         Review of patient's allergies indicates:   Allergen Reactions    Penicillins        No current facility-administered medications on file prior to encounter.      Current Outpatient Medications on File Prior to  Encounter   Medication Sig    amlodipine (NORVASC) 10 MG tablet Take 10 mg by mouth once daily.    aspirin 81 MG Chew Take 81 mg by mouth once daily.    citalopram (CELEXA) 40 MG tablet Take 40 mg by mouth once daily.    docusate sodium (COLACE) 100 MG capsule Take 1 capsule (100 mg total) by mouth 2 (two) times daily as needed for Constipation.    enoxaparin (LOVENOX) 150 mg/mL Syrg Inject 30 mg into the skin.    folic acid (FOLVITE) 1 MG tablet Take 1 tablet (1 mg total) by mouth once daily.    furosemide (LASIX) 40 MG tablet Take 1 tablet (40 mg total) by mouth 2 (two) times daily. (Patient taking differently: Take 80 mg by mouth 2 (two) times daily. )    HYDROcodone-acetaminophen (NORCO)  mg per tablet Take 1 tablet by mouth every 6 (six) hours as needed for Pain.    metoprolol tartrate (LOPRESSOR) 50 MG tablet Take 1 tablet (50 mg total) by mouth 2 (two) times daily.    nicotine (NICODERM CQ) 21 mg/24 hr Place 1 patch onto the skin every 24 hours.    pantoprazole (PROTONIX) 40 MG tablet Take 40 mg by mouth once daily.    potassium chloride SA (K-DUR,KLOR-CON) 20 MEQ tablet Take 1 tablet (20 mEq total) by mouth once daily.    thiamine 100 MG tablet Take 1 tablet (100 mg total) by mouth once daily.    insulin regular 100 unit/mL Inj injection Inject into the skin 3 (three) times daily before meals.    mupirocin (BACTROBAN) 2 % ointment Apply topically 3 (three) times daily.    polyethylene glycol (GLYCOLAX) 17 gram/dose powder Take 17 g by mouth once daily. Take daily to keep your bowel movements regular while taking pain medicines    temazepam (RESTORIL) 30 mg capsule Take 30 mg by mouth nightly as needed for Insomnia.     Family History     Problem Relation (Age of Onset)    Heart disease Father, Paternal Grandfather    Miscarriages / Stillbirths Paternal Uncle, Paternal Grandfather        Tobacco Use    Smoking status: Current Every Day Smoker     Packs/day: 1.00    Smokeless tobacco:  Never Used   Substance and Sexual Activity    Alcohol use: Yes     Comment: 6 beers per day    Drug use: No    Sexual activity: Not on file     Review of Systems   Constitution: Negative for weakness and malaise/fatigue.   Eyes: Negative for blurred vision.   Cardiovascular: Positive for dyspnea on exertion. Negative for chest pain, claudication, cyanosis, irregular heartbeat, leg swelling, near-syncope, orthopnea, palpitations and paroxysmal nocturnal dyspnea.   Respiratory: Positive for cough and shortness of breath. Negative for hemoptysis.    Hematologic/Lymphatic: Negative for bleeding problem. Does not bruise/bleed easily.   Skin: Negative for dry skin and itching.   Musculoskeletal: Negative for falls, muscle weakness and myalgias.   Gastrointestinal: Negative for abdominal pain, diarrhea, heartburn, hematemesis, hematochezia and melena.   Genitourinary: Negative for flank pain and hematuria.   Neurological: Negative for dizziness, focal weakness, headaches, light-headedness, numbness, paresthesias and seizures.   Psychiatric/Behavioral: Negative for altered mental status and memory loss. The patient is not nervous/anxious.    Allergic/Immunologic: Negative for hives.     Objective:     Vital Signs (Most Recent):  Temp: 98 °F (36.7 °C) (08/19/18 1100)  Pulse: 73 (08/19/18 1100)  Resp: 20 (08/19/18 1100)  BP: (!) 170/50 (08/19/18 1100)  SpO2: (!) 89 % (08/19/18 1100) Vital Signs (24h Range):  Temp:  [97.1 °F (36.2 °C)-98.6 °F (37 °C)] 98 °F (36.7 °C)  Pulse:  [59-80] 73  Resp:  [19-34] 20  SpO2:  [88 %-100 %] 89 %  BP: (122-185)/() 170/50     Weight: 57.7 kg (127 lb 3.3 oz)  Body mass index is 18.78 kg/m².    SpO2: (!) 89 %  O2 Device (Oxygen Therapy): (P) nasal cannula      Intake/Output Summary (Last 24 hours) at 8/19/2018 1131  Last data filed at 8/19/2018 1100  Gross per 24 hour   Intake 670 ml   Output 1225 ml   Net -555 ml       Lines/Drains/Airways     Peripheral Intravenous Line                  Peripheral IV - Single Lumen 08/18/18 Left Hand 1 day                Physical Exam   Constitutional: He is oriented to person, place, and time. He appears well-developed and well-nourished. He appears distressed.   HENT:   Head: Normocephalic and atraumatic.   Eyes: EOM are normal. Pupils are equal, round, and reactive to light. Right eye exhibits no discharge. Left eye exhibits no discharge.   Neck: Neck supple. No JVD present. No thyromegaly present.   Cardiovascular: Normal rate, regular rhythm and intact distal pulses. Exam reveals no gallop and no friction rub.   Murmur heard.   Harsh midsystolic murmur is present with a grade of 1/6 at the upper right sternal border radiating to the neck.  High-pitched blowing decrescendo early diastolic murmur is present with a grade of 1/6 at the upper right sternal border radiating to the apex.  Pulmonary/Chest: Breath sounds normal. He is in respiratory distress. He has no wheezes. He has no rales. He exhibits no tenderness.   Has coarse rales bilaterally with decreased breath sounds on the rt    Abdominal: Soft. Bowel sounds are normal. He exhibits no distension. There is no tenderness.   Musculoskeletal: Normal range of motion. He exhibits no edema.   Neurological: He is alert and oriented to person, place, and time. No cranial nerve deficit.   Skin: Skin is warm and dry. No rash noted. He is not diaphoretic. No erythema.   Psychiatric: He has a normal mood and affect. His behavior is normal.   Nursing note and vitals reviewed.      Significant Labs:   ABG:   Recent Labs   Lab  08/18/18   1137  08/19/18   0758   PH  7.531*  7.516*   PCO2  21.3*  28.2*   HCO3  17.8*  22.8*   POCSATURATED  84*  92*   BE  -5  0   , Blood Culture:   Recent Labs   Lab  08/18/18   1200  08/18/18   1210   LABBLOO  No Growth to date  No Growth to date   , BMP:   Recent Labs   Lab  08/18/18   1200  08/19/18   0425   GLU  179*  141*   NA  132*  137   K  6.6*  3.8   CL  101  101   CO2  19*  23    BUN  57*  58*   CREATININE  2.8*  3.0*   CALCIUM  10.0  9.9   , CMP   Recent Labs   Lab  08/18/18   1200  08/19/18   0425   NA  132*  137   K  6.6*  3.8   CL  101  101   CO2  19*  23   GLU  179*  141*   BUN  57*  58*   CREATININE  2.8*  3.0*   CALCIUM  10.0  9.9   PROT  7.3  6.9   ALBUMIN  2.3*  2.3*   BILITOT  0.8  0.6   ALKPHOS  204*  183*   AST  16  11   ALT  9*  8*   ANIONGAP  12  13   ESTGFRAFRICA  26*  24*   EGFRNONAA  22*  21*   , CBC   Recent Labs   Lab  08/18/18   1200  08/19/18   0425   WBC  11.74  10.14   HGB  11.6*  11.2*   HCT  33.4*  32.5*   PLT  542*  503*   , INR No results for input(s): INR, PROTIME in the last 48 hours., Lipid Panel No results for input(s): CHOL, HDL, LDLCALC, TRIG, CHOLHDL in the last 48 hours., Troponin   Recent Labs   Lab  08/18/18   1200   TROPONINI  0.013    and   Recent Lab Results       08/19/18  0758 08/19/18  0517 08/19/18  0425 08/18/18  1714 08/18/18  1700      Procalcitonin     0.18  Comment:  Please re-baseline procalcitonin if a patient is transferred from  other facilities to [facility].  A concentration < 0.25 ng/mL represents a low risk bacterial   infection.  Procalcitonin may not be accurate among patients with localized   infection, recent trauma or major surgery, immunosuppressed state,   invasive fungal infection, renal dysfunction. Decisions regarding   initiation or continuation of antibiotic therapy should not be based   solely on procalcitonin levels.       Albumin   2.3       Alkaline Phosphatase   183       Allens Test Pass         ALT   8       Anion Gap   13       Appearance, UA    Clear      AST   11       Bacteria, UA    Rare      Baso #   0.04       Basophil%   0.4       Bilirubin (UA)    Negative      Total Bilirubin   0.6  Comment:  For infants and newborns, interpretation of results should be based  on gestational age, weight and in agreement with clinical  observations.  Premature Infant recommended reference ranges:  Up to 24  hours.............<8.0 mg/dL  Up to 48 hours............<12.0 mg/dL  3-5 days..................<15.0 mg/dL  6-29 days.................<15.0 mg/dL         Blood Culture, Routine          BNP          Site LR         BUN, Bld   58       Calcium   9.9       Chloride   101       CO2   23       Color, UA    Yellow      Creatinine   3.0       DelSys CPAP/BiPAP         Differential Method   Automated       eGFR if    24       eGFR if non    21  Comment:  Calculation used to obtain the estimated glomerular filtration  rate (eGFR) is the CKD-EPI equation.          Eos #   0.1       Eosinophil%   1.4       EP 5         FiO2          Flow          Glucose   141       Glucose, UA    Negative      Gran # (ANC)   7.8       Gran%   77.2       Hematocrit   32.5       Hemoglobin   11.2       Hyaline Casts, UA    0      IP 10         Ketones, UA    Negative      Lactate, Cas          Leukocytes, UA    Negative      Lymph #   1.3       Lymph%   12.5       MCH   35.2       MCHC   34.5       MCV   102       Microscopic Comment    SEE COMMENT  Comment:  Other formed elements not mentioned in the report are not   present in the microscopic examination.         Mode BiPAP         Mono #   0.9       Mono%   8.5       MPV   8.7       Nitrite, UA    Negative      Occult Blood UA    Negative      pH, UA    7.0      Platelets   503       POC BE 0         POC HCO3 22.8         POC PCO2 28.2         POC PH 7.516         POC PO2 56         POC SATURATED O2 92         POCT Glucose  162        Potassium   3.8       Total Protein   6.9       Protein, UA    2+  Comment:  Recommend a 24 hour urine protein or a urine   protein/creatinine ratio if globulin induced proteinuria is  clinically suspected.        Rate 16         RBC   3.18       RBC, UA    1      RDW   14.1       Sample ARTERIAL         Sodium   137       Specific [place], UA    1.015      Specimen UA    Urine, Clean Catch      Squam Epithel, UA    3       Troponin I          Urobilinogen, UA    Negative      WBC, UA    3      WBC   10.14                   08/18/18  1210 08/18/18  1200 08/18/18  1137      Procalcitonin        Albumin  2.3      Alkaline Phosphatase  204      Allens Test   Pass     ALT  9      Anion Gap  12      Appearance, UA        AST  16      Bacteria, UA        Baso #  0.04      Basophil%  0.3      Bilirubin (UA)        Total Bilirubin  0.8  Comment:  For infants and newborns, interpretation of results should be based  on gestational age, weight and in agreement with clinical  observations.  Premature Infant recommended reference ranges:  Up to 24 hours.............<8.0 mg/dL  Up to 48 hours............<12.0 mg/dL  3-5 days..................<15.0 mg/dL  6-29 days.................<15.0 mg/dL        Blood Culture, Routine No Growth to date[P] No Growth to date[P]      BNP  4,343  Comment:  Values of less than 100 pg/ml are consistent with non-CHF populations.      Site   LR     BUN, Bld  57      Calcium  10.0      Chloride  101      CO2  19      Color, UA        Creatinine  2.8      DelSys   Nasal Can     Differential Method  Automated      eGFR if   26      eGFR if non   22  Comment:  Calculation used to obtain the estimated glomerular filtration  rate (eGFR) is the CKD-EPI equation.         Eos #  0.1      Eosinophil%  0.6      EP        FiO2   36     Flow   4     Glucose  179      Glucose, UA        Gran # (ANC)  9.7      Gran%  82.4      Hematocrit  33.4      Hemoglobin  11.6      Hyaline Casts, UA        IP        Ketones, UA        Lactate, Cas  2.1  Comment:  Falsely low lactic acid results can be found in samples   containing >=13.0 mg/dL total bilirubin and/or >=3.5 mg/dL   direct bilirubin.        Leukocytes, UA        Lymph #  1.1      Lymph%  9.5      MCH  35.5      MCHC  34.7      MCV  102      Microscopic Comment        Mode   SPONT     Mono #  0.9      Mono%  7.2      MPV  8.7      Nitrite, UA         Occult Blood UA        pH, UA        Platelets  542      POC BE   -5     POC HCO3   17.8     POC PCO2   21.3     POC PH   7.531     POC PO2   41     POC SATURATED O2   84     POCT Glucose        Potassium  6.6  Comment:  No visible hemolysis  K   critical result(s) called and verbal readback obtained from   Opal Weaver RN, 08/18/2018 12:38        Total Protein  7.3      Protein, UA        Rate        RBC  3.27      RBC, UA        RDW  14.0      Sample   ARTERIAL     Sodium  132      Specific Gravity, UA        Specimen UA        Squam Epithel, UA        Troponin I  0.013  Comment:  The reference interval for Troponin I represents the 99th percentile   cutoff   for our facility and is consistent with 3rd generation assay   performance.        Urobilinogen, UA        WBC, UA        WBC  11.74            Significant Imaging: CT scan: CT ABDOMEN PELVIS WITH CONTRAST: No results found for this visit on 08/18/18. and X-Ray: CXR: X-Ray Chest 1 View (CXR):   Results for orders placed or performed during the hospital encounter of 08/18/18   X-Ray Chest 1 View    Narrative    EXAMINATION:  XR CHEST 1 VIEW    CLINICAL HISTORY:  Respiratory failure, unspecified, unspecified whether with hypoxia or hypercapnia    COMPARISON:  August 10th    FINDINGS:  Persistent right pleural effusion with moderate coarsening of the interstitial markings bilaterally, most of which may be chronic in nature.  Suspicion of developing left basilar infiltrate and possible effusion.  Normal heart size.  Scarring right apex.      Impression    1.  Persistent right pleural effusion.  Severe chronic lung changes again noted bilaterally.    2.  Suspicion of developing left basilar infiltrate and/or atelectasis.  Cannot exclude a small left effusion follow-up recommended      Electronically signed by: Irwin Tomas MD  Date:    08/18/2018  Time:    12:16     Assessment and Plan:     * Acute on chronic respiratory failure with hypoxia    Per  pulmonary        Recurrent right pleural effusion    For thoracentesis        Acute on chronic diastolic congestive heart failure    Will diurese still need to r/o pe         Mass of right lung    W/u per pulmonary        Type 2 diabetes mellitus, without long-term current use of insulin    On medical therapy        Essential hypertension    On medical therapy        Acute on chronic renal failure    From intravascular contraction        Alcohol abuse    None lately            VTE Risk Mitigation (From admission, onward)        Ordered     heparin (porcine) injection 5,000 Units  Every 8 hours      08/19/18 1031      DESPITE THE ELEVATION OF BNP AND HIS SHORTNESS OF BREATH THE PATIENT IS INTRAVASCULARILY CONTRACTED. I DO NOT THINK HE HAS ACUTE DECOMPENSATED DIASTOLIC CHF. HIS ANSWERS WILL RELY IN HIS THORACENTESIS.HE HAS ELEVATION OF BNP FROM A COMPONENT OF RENAL INSUFFICIENCY.WILL CONTINUE TO KEEP HIM EUVOLEMIC AND EQUILIBRATING HIS I/O.    Thank you for your consult. I will follow-up with patient. Please contact us if you have any additional questions.    Jessica Gallo MD  Cardiology   [facility] -

## 2018-08-19 NOTE — HPI
A 69 yo male with rt humerus fracture treated conservatively alcohol use admitted earlier this month with shortness of breath and diastolic chf was discharged to rehab after diuresis presents back with shortness of breath hypoxia and elevated bnp., he has  Been treated fro diastolic chf has pleural effusion on cxr and ct thoracentesis plans made. He ahs hypoxia resiratory failure. Unfortunately his creatinine is elevated at 3 he is uncomfortable sitting in bed. Perfusion scan being planned to rule out pe.he denies chest pain fevr chills nausea diaphoresis he has orthopnea no palpitation tia syncope near syncope urinary symptoms or bleeding weight loss.

## 2018-08-19 NOTE — PROGRESS NOTES
[facility] in the 160-170's. MD to put in PRN med.

## 2018-08-19 NOTE — EICU
eICU Note :    Called by the [facility] eRN:    Problem: Anxiety , keeps pullingBIPAP  , received Ativan earlier     Pertinent History and labs reviewed :  Patient Active Problem List   Diagnosis    Alcohol abuse    Hyponatremia    Hypokalemia    Acute renal failure superimposed on stage 3 chronic kidney disease    Right humeral fracture    Essential hypertension    Type 2 diabetes mellitus, without long-term current use of insulin    Tobacco use    Carotid stenosis, bilateral    LBBB (left bundle branch block)    Polysubstance abuse    Other nondisplaced fracture of upper end of right humerus, initial encounter for closed fracture    Mass of right lung    Acute on chronic diastolic congestive heart failure    Acute respiratory failure with hypoxia    Centrilobular emphysema    Acute on chronic respiratory failure with hypoxia    Hyperkalemia    Left lower lobe pneumonia         Treatment /Intervention given: Ativan 1 mg x1 now          Candelaria Allen M.D  eICU Physician

## 2018-08-19 NOTE — SUBJECTIVE & OBJECTIVE
Past Medical History:   Diagnosis Date    Arthritis     Cancer     lung cancer    Diabetes     Foot fracture     Hand fracture, left     Hand fracture, right     HTN (hypertension)     Humerus fracture 07/18/2018    Hyperlipidemia     Neuropathy     Renal disorder        Past Surgical History:   Procedure Laterality Date    ANKLE SURGERY Left     CHOLECYSTECTOMY      HUMERUS FRACTURE SURGERY         Review of patient's allergies indicates:   Allergen Reactions    Penicillins        Family History     Problem Relation (Age of Onset)    Heart disease Father, Paternal Grandfather    Miscarriages / Stillbirths Paternal Uncle, Paternal Grandfather        Tobacco Use    Smoking status: Current Every Day Smoker     Packs/day: 1.00    Smokeless tobacco: Never Used   Substance and Sexual Activity    Alcohol use: Yes     Comment: 6 beers per day    Drug use: No    Sexual activity: Not on file         Review of Systems  Objective:     Vital Signs (Most Recent):  Temp: 97.6 °F (36.4 °C) (08/19/18 0700)  Pulse: 72 (08/19/18 0800)  Resp: (!) 23 (08/19/18 0800)  BP: (!) 153/49 (08/19/18 0800)  SpO2: 100 % (08/19/18 0950) Vital Signs (24h Range):  Temp:  [97.1 °F (36.2 °C)-98.6 °F (37 °C)] 97.6 °F (36.4 °C)  Pulse:  [59-80] 72  Resp:  [19-40] 23  SpO2:  [87 %-100 %] 100 %  BP: (122-187)/() 153/49     Weight: 57.7 kg (127 lb 3.3 oz)  Body mass index is 18.78 kg/m².      Intake/Output Summary (Last 24 hours) at 8/19/2018 1008  Last data filed at 8/19/2018 0800  Gross per 24 hour   Intake 670 ml   Output 1025 ml   Net -355 ml       Physical Exam   Constitutional: He is oriented to person, place, and time. Vital signs are normal. He appears well-developed and well-nourished. He has a sickly appearance. Nasal cannula in place.       HENT:   Head: Normocephalic and atraumatic.   Mouth/Throat: Oropharynx is clear and moist. No oropharyngeal exudate.   Eyes: Conjunctivae and EOM are normal. Pupils are equal,  round, and reactive to light.   Neck: Normal range of motion. Neck supple. No tracheal deviation present. No thyromegaly present.   Cardiovascular: Normal rate, regular rhythm, normal heart sounds and intact distal pulses.   No murmur heard.  Pulmonary/Chest: Effort normal and breath sounds normal. He has no wheezes. He has no rales. He exhibits no tenderness.   Abdominal: Soft. Bowel sounds are normal.   Musculoskeletal: Normal range of motion. He exhibits no edema.   Neurological: He is alert and oriented to person, place, and time. No cranial nerve deficit.   Skin: Skin is warm and dry. Capillary refill takes 2 to 3 seconds.   Nursing note and vitals reviewed.      Vents:  Oxygen Concentration (%): 45 (08/19/18 0734)    Lines/Drains/Airways     Peripheral Intravenous Line                 Peripheral IV - Single Lumen 08/18/18 Left Hand 1 day                Significant Labs:    CBC/Anemia Profile:  Recent Labs   Lab  08/18/18   1200  08/19/18   0425   WBC  11.74  10.14   HGB  11.6*  11.2*   HCT  33.4*  32.5*   PLT  542*  503*   MCV  102*  102*   RDW  14.0  14.1        Chemistries:  Recent Labs   Lab  08/18/18   1200  08/19/18   0425   NA  132*  137   K  6.6*  3.8   CL  101  101   CO2  19*  23   BUN  57*  58*   CREATININE  2.8*  3.0*   CALCIUM  10.0  9.9   ALBUMIN  2.3*  2.3*   PROT  7.3  6.9   BILITOT  0.8  0.6   ALKPHOS  204*  183*   ALT  9*  8*   AST  16  11       ABGs:   Recent Labs   Lab  08/19/18   0758   PH  7.516*   PCO2  28.2*   HCO3  22.8*   POCSATURATED  92*   BE  0     Blood Culture:   Recent Labs   Lab  08/18/18   1200  08/18/18   1210   LABBLOO  No Growth to date  No Growth to date     All pertinent labs within the past 24 hours have been reviewed.    Significant Imaging:   I have reviewed and interpreted all pertinent imaging results/findings within the past 24 hours.     Prior chest CT reviewed 07/19/2018    CXR  FINDINGS:  Persistent right pleural effusion with moderate coarsening of the  interstitial markings bilaterally, most of which may be chronic in nature.  Suspicion of developing left basilar infiltrate and possible effusion.  Normal heart size.  Scarring right apex.      Impression       1.  Persistent right pleural effusion.  Severe chronic lung changes again noted bilaterally.    2.  Suspicion of developing left basilar infiltrate and/or atelectasis.  Cannot exclude a small left effusion follow-up recommended

## 2018-08-19 NOTE — ASSESSMENT & PLAN NOTE
Careful use of nephrotoxic medications.  Limited on fluid resuscitation due to heart failure.  Consult Nephrology.

## 2018-08-19 NOTE — NURSING
Dr. Canseco performed bedside thoracentesis. Time out performed, consent signed and procedure explained to patient. 1200cc fluid removed. specimens labeled and sent to lab. Pt tolerated procedure well.

## 2018-08-19 NOTE — ASSESSMENT & PLAN NOTE
Multifactorial due to use of loop diuretic, heart failure, and possible pulmonary malignancy.  Total water restriction to 1500 mL per day.  Careful use of diuretics.  Signs of pulmonary edema but no peripheral edema.

## 2018-08-19 NOTE — ASSESSMENT & PLAN NOTE
Multifactorial: COPD. Fibrosis, HFpEF and valvular heart disease, Pul vascular disease , atelectasis, PNA  Off BIPAP for now, PRN and at night  ABG dont show hypercapnia  Nasal canullar keep sat> 92%  Incentive spirometry  Aerobika  Repeat CTA chest: after DW nephrology, GFR 21, VQ will not be helpful

## 2018-08-20 PROBLEM — E87.8 ELECTROLYTE ABNORMALITY: Status: ACTIVE | Noted: 2018-08-20

## 2018-08-20 PROBLEM — E43 SEVERE MALNUTRITION: Status: ACTIVE | Noted: 2018-08-20

## 2018-08-20 LAB
ALBUMIN SERPL BCP-MCNC: 2.3 G/DL
ALP SERPL-CCNC: 159 U/L
ALT SERPL W/O P-5'-P-CCNC: 9 U/L
ANA SER QL IF: NORMAL
ANION GAP SERPL CALC-SCNC: 13 MMOL/L
APPEARANCE FLD: NORMAL
AST SERPL-CCNC: 10 U/L
BASOPHILS # BLD AUTO: 0.02 K/UL
BASOPHILS NFR BLD: 0.2 %
BASOPHILS NFR FLD MANUAL: 2 %
BILIRUB SERPL-MCNC: 0.5 MG/DL
BODY FLD TYPE: NORMAL
BUN SERPL-MCNC: 55 MG/DL
CALCIUM SERPL-MCNC: 9.5 MG/DL
CHLORIDE SERPL-SCNC: 101 MMOL/L
CO2 SERPL-SCNC: 23 MMOL/L
COLOR FLD: NORMAL
CREAT SERPL-MCNC: 2.9 MG/DL
DIFFERENTIAL METHOD: ABNORMAL
EOSINOPHIL # BLD AUTO: 0.1 K/UL
EOSINOPHIL NFR BLD: 0.7 %
ERYTHROCYTE [DISTWIDTH] IN BLOOD BY AUTOMATED COUNT: 14.2 %
EST. GFR  (AFRICAN AMERICAN): 25 ML/MIN/1.73 M^2
EST. GFR  (NON AFRICAN AMERICAN): 22 ML/MIN/1.73 M^2
GLUCOSE SERPL-MCNC: 132 MG/DL
GRAM STN SPEC: NORMAL
GRAM STN SPEC: NORMAL
HCT VFR BLD AUTO: 32.2 %
HGB BLD-MCNC: 11.1 G/DL
LYMPHOCYTES # BLD AUTO: 0.7 K/UL
LYMPHOCYTES NFR BLD: 7.4 %
LYMPHOCYTES NFR FLD MANUAL: 67 %
MAGNESIUM SERPL-MCNC: 1.3 MG/DL
MCH RBC QN AUTO: 35 PG
MCHC RBC AUTO-ENTMCNC: 34.5 G/DL
MCV RBC AUTO: 102 FL
MESOTHL CELL NFR FLD MANUAL: 2 %
MONOCYTES # BLD AUTO: 0.8 K/UL
MONOCYTES NFR BLD: 8.8 %
MONOS+MACROS NFR FLD MANUAL: 24 %
NEUTROPHILS # BLD AUTO: 7.9 K/UL
NEUTROPHILS NFR BLD: 82.9 %
NEUTROPHILS NFR FLD MANUAL: 5 %
PATH INTERP FLD-IMP: NORMAL
PHOSPHATE SERPL-MCNC: 5 MG/DL
PLATELET # BLD AUTO: 528 K/UL
PMV BLD AUTO: 8.6 FL
POCT GLUCOSE: 127 MG/DL (ref 70–110)
POCT GLUCOSE: 139 MG/DL (ref 70–110)
POCT GLUCOSE: 149 MG/DL (ref 70–110)
POCT GLUCOSE: 169 MG/DL (ref 70–110)
POTASSIUM SERPL-SCNC: 3.1 MMOL/L
PROT SERPL-MCNC: 6.5 G/DL
RBC # BLD AUTO: 3.17 M/UL
SODIUM SERPL-SCNC: 137 MMOL/L
WBC # BLD AUTO: 9.48 K/UL
WBC # FLD: 1020 /CU MM

## 2018-08-20 PROCEDURE — 25000003 PHARM REV CODE 250: Performed by: INTERNAL MEDICINE

## 2018-08-20 PROCEDURE — 27000221 HC OXYGEN, UP TO 24 HOURS

## 2018-08-20 PROCEDURE — 63600175 PHARM REV CODE 636 W HCPCS: Performed by: INTERNAL MEDICINE

## 2018-08-20 PROCEDURE — 99232 SBSQ HOSP IP/OBS MODERATE 35: CPT | Mod: ,,, | Performed by: INTERNAL MEDICINE

## 2018-08-20 PROCEDURE — 97802 MEDICAL NUTRITION INDIV IN: CPT

## 2018-08-20 PROCEDURE — 21400001 HC TELEMETRY ROOM

## 2018-08-20 PROCEDURE — 94799 UNLISTED PULMONARY SVC/PX: CPT

## 2018-08-20 PROCEDURE — 80053 COMPREHEN METABOLIC PANEL: CPT

## 2018-08-20 PROCEDURE — 25000003 PHARM REV CODE 250: Performed by: NURSE PRACTITIONER

## 2018-08-20 PROCEDURE — 36415 COLL VENOUS BLD VENIPUNCTURE: CPT

## 2018-08-20 PROCEDURE — 85025 COMPLETE CBC W/AUTO DIFF WBC: CPT

## 2018-08-20 PROCEDURE — 82610 CYSTATIN C: CPT

## 2018-08-20 PROCEDURE — 99233 SBSQ HOSP IP/OBS HIGH 50: CPT | Mod: ,,, | Performed by: INTERNAL MEDICINE

## 2018-08-20 PROCEDURE — 83735 ASSAY OF MAGNESIUM: CPT

## 2018-08-20 PROCEDURE — 94761 N-INVAS EAR/PLS OXIMETRY MLT: CPT

## 2018-08-20 PROCEDURE — 84100 ASSAY OF PHOSPHORUS: CPT

## 2018-08-20 RX ORDER — THIAMINE HCL 100 MG
100 TABLET ORAL DAILY
Status: DISCONTINUED | OUTPATIENT
Start: 2018-08-20 | End: 2018-08-22 | Stop reason: HOSPADM

## 2018-08-20 RX ORDER — FOLIC ACID 1 MG/1
1 TABLET ORAL DAILY
Status: DISCONTINUED | OUTPATIENT
Start: 2018-08-20 | End: 2018-08-22 | Stop reason: HOSPADM

## 2018-08-20 RX ORDER — POTASSIUM CHLORIDE 20 MEQ/1
20 TABLET, EXTENDED RELEASE ORAL 3 TIMES DAILY
Status: COMPLETED | OUTPATIENT
Start: 2018-08-20 | End: 2018-08-20

## 2018-08-20 RX ORDER — FUROSEMIDE 10 MG/ML
40 INJECTION INTRAMUSCULAR; INTRAVENOUS ONCE
Status: COMPLETED | OUTPATIENT
Start: 2018-08-20 | End: 2018-08-20

## 2018-08-20 RX ADMIN — HYDRALAZINE HYDROCHLORIDE AND ISOSORBIDE DINITRATE 1 TABLET: 37.5; 2 TABLET, FILM COATED ORAL at 09:08

## 2018-08-20 RX ADMIN — HEPARIN SODIUM 5000 UNITS: 5000 INJECTION, SOLUTION INTRAVENOUS; SUBCUTANEOUS at 02:08

## 2018-08-20 RX ADMIN — CITALOPRAM HYDROBROMIDE 40 MG: 20 TABLET ORAL at 09:08

## 2018-08-20 RX ADMIN — HYDRALAZINE HYDROCHLORIDE 10 MG: 20 INJECTION INTRAMUSCULAR; INTRAVENOUS at 12:08

## 2018-08-20 RX ADMIN — HYDRALAZINE HYDROCHLORIDE AND ISOSORBIDE DINITRATE 1 TABLET: 37.5; 2 TABLET, FILM COATED ORAL at 08:08

## 2018-08-20 RX ADMIN — POTASSIUM CHLORIDE 20 MEQ: 1500 TABLET, EXTENDED RELEASE ORAL at 02:08

## 2018-08-20 RX ADMIN — MUPIROCIN: 20 OINTMENT TOPICAL at 09:08

## 2018-08-20 RX ADMIN — HYDRALAZINE HYDROCHLORIDE 10 MG: 20 INJECTION INTRAMUSCULAR; INTRAVENOUS at 11:08

## 2018-08-20 RX ADMIN — CHLORDIAZEPOXIDE HYDROCHLORIDE 10 MG: 10 CAPSULE ORAL at 02:08

## 2018-08-20 RX ADMIN — HYDRALAZINE HYDROCHLORIDE AND ISOSORBIDE DINITRATE 1 TABLET: 37.5; 2 TABLET, FILM COATED ORAL at 02:08

## 2018-08-20 RX ADMIN — Medication 100 MG: at 02:08

## 2018-08-20 RX ADMIN — FOLIC ACID 1 MG: 1 TABLET ORAL at 02:08

## 2018-08-20 RX ADMIN — MUPIROCIN: 20 OINTMENT TOPICAL at 02:08

## 2018-08-20 RX ADMIN — POLYETHYLENE GLYCOL (3350) 17 G: 17 POWDER, FOR SOLUTION ORAL at 09:08

## 2018-08-20 RX ADMIN — HYDROCODONE BITARTRATE AND ACETAMINOPHEN 1 TABLET: 10; 325 TABLET ORAL at 08:08

## 2018-08-20 RX ADMIN — AMLODIPINE BESYLATE 10 MG: 10 TABLET ORAL at 09:08

## 2018-08-20 RX ADMIN — POTASSIUM CHLORIDE 20 MEQ: 1500 TABLET, EXTENDED RELEASE ORAL at 08:08

## 2018-08-20 RX ADMIN — RAMELTEON 8 MG: 8 TABLET, FILM COATED ORAL at 10:08

## 2018-08-20 RX ADMIN — MUPIROCIN: 20 OINTMENT TOPICAL at 08:08

## 2018-08-20 RX ADMIN — HYDROCODONE BITARTRATE AND ACETAMINOPHEN 1 TABLET: 5; 325 TABLET ORAL at 06:08

## 2018-08-20 RX ADMIN — HYDROCODONE BITARTRATE AND ACETAMINOPHEN 1 TABLET: 10; 325 TABLET ORAL at 02:08

## 2018-08-20 RX ADMIN — HEPARIN SODIUM 5000 UNITS: 5000 INJECTION, SOLUTION INTRAVENOUS; SUBCUTANEOUS at 08:08

## 2018-08-20 RX ADMIN — PANTOPRAZOLE SODIUM 40 MG: 40 TABLET, DELAYED RELEASE ORAL at 09:08

## 2018-08-20 RX ADMIN — FUROSEMIDE 40 MG: 10 INJECTION, SOLUTION INTRAMUSCULAR; INTRAVENOUS at 11:08

## 2018-08-20 RX ADMIN — CHLORDIAZEPOXIDE HYDROCHLORIDE 10 MG: 10 CAPSULE ORAL at 09:08

## 2018-08-20 RX ADMIN — CHLORDIAZEPOXIDE HYDROCHLORIDE 10 MG: 10 CAPSULE ORAL at 08:08

## 2018-08-20 RX ADMIN — HEPARIN SODIUM 5000 UNITS: 5000 INJECTION, SOLUTION INTRAVENOUS; SUBCUTANEOUS at 06:08

## 2018-08-20 RX ADMIN — THERA TABS 1 TABLET: TAB at 02:08

## 2018-08-20 RX ADMIN — POTASSIUM CHLORIDE 20 MEQ: 1500 TABLET, EXTENDED RELEASE ORAL at 09:08

## 2018-08-20 NOTE — HOSPITAL COURSE
8/20/18-Patient seen and examined today, sitting up in bed, s/p right sided thoracentesis yesterday. Feels weak/fatigued. SOB improved. No chest pain. Labs reviewed. Creatinine 2.9, K slightly low.  08/21-dyspnea resolved. No chest pain.  08/22 NO C/O quan AND CHEST PAIN

## 2018-08-20 NOTE — CONSULTS
[facility] -   Adult Nutrition  Consult Note    SUMMARY     Recommendations    Recommendation/Intervention:   1. Recommend Low Na/Diabetic 2000 diet w/ fluid restriction per MD.   2. Pt nutritionally diagnosed w/ severe malnutrition, added to hospital problems list  3. Will continue to monitor, encourage PO intake    Goals: Meet >85% EEN/EPN this admit  Nutrition Goal Status: new  Communication of RD Recs: (POC review, sticky note)    Reason for Assessment    Reason for Assessment: consult, identified at risk by screening criteria    Dx:  1. Acute on chronic respiratory failure with hypoxia    2. Respiratory failure    3. Pneumonia of left lower lobe due to infectious organism    4. Acute congestive heart failure, unspecified heart failure type    5. QT prolongation    6. Right-sided congestive heart failure secondary to left-sided congestive heart failure    7. CHF (congestive heart failure)      Past Medical History:   Diagnosis Date    Arthritis     Cancer     lung cancer    Diabetes     Foot fracture     Hand fracture, left     Hand fracture, right     HTN (hypertension)     Humerus fracture 07/18/2018    Hyperlipidemia     Neuropathy     Renal disorder      General Information Comments: Pt reports recent unintentional wt loss, unsure of amount. Per [facility] records, pt weighed 65 kg on 7/20/18, 57.7kg on 8/20/18 (-11.2% body wt in 1 month). Pt reports decreased appetite due to food preferences, not liking hospital food. Pt has a fair appetite at home (intake ~75% EEN). Moderate muscle/fat wasting noted. RD encouraged PO intake, reinforced sodium/fluid restrictions.     Nutrition Discharge Planning: Low Na/Diabetic diet & fluid restriction per MD    Nutrition Risk Screen    Nutrition Risk Screen: no indicators present    Nutrition/Diet History    Patient Reported Diet/Restrictions/Preferences: low salt  Do you have any cultural, spiritual, Orthodoxy conflicts, given your current  "situation?: none  Food Allergies: NKFA    Anthropometrics    Temp: 97.7 °F (36.5 °C)  Height Method: Stated  Height: 5' 9" (175.3 cm)  Height (inches): 69 in  Weight Method: Bed Scale  Weight: 57.7 kg (127 lb 3.3 oz)  Weight (lb): 127.21 lb  Ideal Body Weight (IBW), Male: 160 lb  % Ideal Body Weight, Male (lb): 79.51 lb  BMI (Calculated): 18.8  BMI Grade: 18.5-24.9 - normal       Lab/Procedures/Meds    Pertinent Labs Reviewed: reviewed  BMP  Lab Results   Component Value Date     08/20/2018    K 3.1 (L) 08/20/2018     08/20/2018    CO2 23 08/20/2018    BUN 55 (H) 08/20/2018    CREATININE 2.9 (H) 08/20/2018    CALCIUM 9.5 08/20/2018    ANIONGAP 13 08/20/2018    ESTGFRAFRICA 25 (A) 08/20/2018    EGFRNONAA 22 (A) 08/20/2018     Lab Results   Component Value Date    CALCIUM 9.5 08/20/2018    PHOS 5.0 (H) 08/20/2018     Lab Results   Component Value Date    ALBUMIN 2.3 (L) 08/20/2018     Recent Labs   Lab  08/20/18   1107   POCTGLUCOSE  169*     Lab Results   Component Value Date    HGBA1C 4.8 07/19/2018     Lab Results   Component Value Date    URICACID 12.2 (H) 08/19/2018     Pertinent Medications Reviewed: reviewed  Scheduled Meds:   amLODIPine  10 mg Oral Daily    chlordiazepoxide  10 mg Oral TID    citalopram  40 mg Oral Daily    folic acid  1 mg Oral Daily    heparin (porcine)  5,000 Units Subcutaneous Q8H    isosorbide-hydrALAZINE 20-37.5 mg  1 tablet Oral TID    multivitamin  1 tablet Oral Daily    mupirocin   Topical (Top) TID    pantoprazole  40 mg Oral Daily    polyethylene glycol  17 g Oral Daily    potassium chloride  20 mEq Oral TID    thiamine  100 mg Oral Daily     Continuous Infusions:  PRN Meds:.acetaminophen, albuterol-ipratropium, dextrose 50%, dextrose 50%, docusate sodium, glucagon (human recombinant), glucose, glucose, hydrALAZINE, HYDROcodone-acetaminophen, HYDROcodone-acetaminophen, insulin aspart U-100, lorazepam, ondansetron, ondansetron, pneumoc 13-lisseth conj-dip cr(PF), " promethazine (PHENERGAN) IVPB, ramelteon, sodium chloride 0.9%    Physical Findings/Assessment    Overall Physical Appearance: loss of muscle mass, loss of subcutaneous fat, on oxygen therapy  Oral/Mouth Cavity: tooth/teeth missing  Skin: (Micky=19)    Estimated/Assessed Needs    Weight Used For Calorie Calculations: 57.7 kg (127 lb 3.3 oz)  Energy Calorie Requirements (kcal): 6387-0813 kcal/day  Energy Need Method: Kcal/kg(30-35)  Protein Requirements: 58-69 gm/day  Weight Used For Protein Calculations: 57.7 kg (127 lb 3.3 oz)(x1-1.2 g/kg)     Fluid Need Method: (1500 mL per MD orders)  RDA Method (mL): 1731  CHO Requirement: 50% EEN      Nutrition Prescription Ordered    Current Diet Order: Regular, 1500 mL fluid    Evaluation of Received Nutrient/Fluid Intake    Intake/Output Summary (Last 24 hours) at 8/20/2018 1240  Last data filed at 8/20/2018 0100  Gross per 24 hour   Intake 240 ml   Output 345 ml   Net -105 ml          % Intake of Estimated Energy Needs: 25 - 50 %  % Meal Intake: 25 - 50 %    Nutrition Risk    Level of Risk/Frequency of Follow-up: (F/u x2 weekly)     Assessment and Plan    Severe malnutrition    Malnutrition in the context of Acute Illness/Injury    Related to (etiology):  Increased energy/protein needs s/p humerus fx, decreased PO intake    Signs and Symptoms (as evidenced by):  Energy Intake: <75% of estimated energy requirement for 1 month  Body Fat Depletion: moderate depletion of triceps   Muscle Mass Depletion: moderate depletion of temples and clavicle region   Weight Loss: -11.2% x 1 month     Interventions/Recommendations (treatment strategy):  Low Na diet  Not recommending PO supplements due to fluid restriction  RD encouraged meal/snack intake, discussed ways to safely increase calorie intake    Nutrition Diagnosis Status:  New                 Monitor and Evaluation    Food and Nutrient Intake: energy intake, food and beverage intake  Food and Nutrient Adminstration: diet  order  Physical Activity and Function: nutrition-related ADLs and IADLs  Anthropometric Measurements: weight  Biochemical Data, Medical Tests and Procedures: electrolyte and renal panel  Nutrition-Focused Physical Findings: overall appearance, extremities, muscles and bones     Nutrition Follow-Up    RD Follow-up?: Yes

## 2018-08-20 NOTE — ASSESSMENT & PLAN NOTE
Malnutrition in the context of Acute Illness/Injury    Related to (etiology):  Increased energy/protein needs s/p humerus fx, decreased PO intake    Signs and Symptoms (as evidenced by):  Energy Intake: <75% of estimated energy requirement for 1 month  Body Fat Depletion: moderate depletion of triceps   Muscle Mass Depletion: moderate depletion of temples and clavicle region   Weight Loss: -11.2% x 1 month     Interventions/Recommendations (treatment strategy):  Low Na diet  Not recommending PO supplements due to fluid restriction  RD encouraged meal/snack intake, discussed ways to safely increase calorie intake    Nutrition Diagnosis Status:  New

## 2018-08-20 NOTE — PROGRESS NOTES
[facility] -   Nephrology  Progress Note    Patient Name: Lionel Castillo  MRN: [id number]  Admission Date: 8/18/2018  Hospital Length of Stay: 2 days  Attending Provider: Manuel Willis MD   Primary Care Physician: Eddie Garcia MD  Principal Problem:Acute on chronic respiratory failure with hypoxia    Subjective:     HPI: Patient is a 68-year-old male with history of chronic kidney disease stage 4.  Baseline creatinine ranging between 2.5 and 3.0.  Patient also has had off and on hypokalemia as well as hyperkalemia.  Now the creatinine has gone up to 3.0 with off and on hyperkalemia.  Patient has had shortness of breath and he is status post thoracentesis today.  Nephrology consultation is requested for further management of electrolyte imbalance, fluctuating creatinine with acute kidney injury on chronic kidney disease stage 4.  Patient also has hyponatremia with history of alcohol abuse in the past    Interval History:  The patient able to void without any difficulty.  Ultrasound shows no hydronephrosis.  Urine protein is 2.9 g per 24 hr.  Kidney function is stable with chronic kidney disease stage 4.  Patient is without any chest pain or shortness of Breath this morning.    Review of patient's allergies indicates:   Allergen Reactions    Penicillins      Current Facility-Administered Medications   Medication Frequency    acetaminophen tablet 500 mg Q4H PRN    albuterol-ipratropium 2.5 mg-0.5 mg/3 mL nebulizer solution 3 mL Q6H PRN    amLODIPine tablet 10 mg Daily    chlordiazepoxide capsule 10 mg TID    citalopram tablet 40 mg Daily    dextrose 50% injection 12.5 g PRN    dextrose 50% injection 25 g PRN    docusate sodium capsule 100 mg BID PRN    glucagon (human recombinant) injection 1 mg PRN    glucose chewable tablet 16 g PRN    glucose chewable tablet 24 g PRN    heparin (porcine) injection 5,000 Units Q8H    hydrALAZINE injection 10 mg Q8H PRN     HYDROcodone-acetaminophen  mg per tablet 1 tablet Q6H PRN    HYDROcodone-acetaminophen 5-325 mg per tablet 1 tablet Q6H PRN    insulin aspart U-100 pen 0-5 Units QID (AC + HS) PRN    isosorbide-hydrALAZINE 20-37.5 mg per tablet 1 tablet TID    lorazepam (ATIVAN) injection 1 mg Q30 Min PRN    mupirocin 2 % ointment TID    ondansetron disintegrating tablet 8 mg Q8H PRN    ondansetron injection 4 mg Q12H PRN    pantoprazole EC tablet 40 mg Daily    pneumoc 13-lisseth conj-dip cr(PF) 0.5 mL vaccine x 1 dose    polyethylene glycol packet 17 g Daily    potassium chloride SA CR tablet 20 mEq TID    promethazine (PHENERGAN) 6.25 mg in dextrose 5 % 50 mL IVPB Q6H PRN    ramelteon tablet 8 mg Nightly PRN    sodium chloride 0.9% flush 5 mL PRN       Objective:     Vital Signs (Most Recent):  Temp: 97.7 °F (36.5 °C) (08/20/18 0800)  Pulse: 83 (08/20/18 0800)  Resp: 18 (08/20/18 0800)  BP: (!) 161/70 (08/20/18 0800)  SpO2: (!) 94 % (08/20/18 0800)  O2 Device (Oxygen Therapy): nasal cannula (08/20/18 0800) Vital Signs (24h Range):  Temp:  [97.1 °F (36.2 °C)-98.4 °F (36.9 °C)] 97.7 °F (36.5 °C)  Pulse:  [65-85] 83  Resp:  [16-31] 18  SpO2:  [89 %-100 %] 94 %  BP: (134-185)/(44-73) 161/70     Weight: 57.7 kg (127 lb 3.3 oz) (08/19/18 0505)  Body mass index is 18.78 kg/m².  Body surface area is 1.68 meters squared.    I/O last 3 completed shifts:  In: 900 [P.O.:900]  Out: 1420 [Urine:1420]    Physical Exam   Constitutional: He is oriented to person, place, and time. He appears well-developed and well-nourished. No distress.   HENT:   Head: Normocephalic.   Eyes: EOM are normal. Pupils are equal, round, and reactive to light.   Neck: Normal range of motion. Neck supple. No JVD present. No thyromegaly present.   Cardiovascular: Normal rate, regular rhythm, S1 normal, S2 normal and intact distal pulses. PMI is not displaced. Exam reveals no gallop and no friction rub.   Murmur heard.  No JVD at this time    Pulmonary/Chest: No respiratory distress. He has wheezes. He has no rales. He exhibits no tenderness.   Abdominal: He exhibits no distension and no mass. There is no hepatosplenomegaly. There is no tenderness. There is no rebound and no CVA tenderness. No hernia.   Musculoskeletal: Normal range of motion. He exhibits no edema or tenderness.   Muscle wasting and signs of malnutrition   Lymphadenopathy:     He has no cervical adenopathy.   Neurological: He is alert and oriented to person, place, and time. He has normal reflexes. He is not disoriented. He displays normal reflexes. No cranial nerve deficit. He exhibits normal muscle tone. Coordination normal.   Skin: Skin is warm and dry. No rash noted. No erythema.   Psychiatric: He has a normal mood and affect. His behavior is normal. Judgment and thought content normal.   Nursing note and vitals reviewed.      Significant Labs:  All labs within the past 24 hours have been reviewed.     Significant Imaging:  Labs: Reviewed  US: Reviewed    Assessment/Plan:     KATRIN (acute kidney injury)    Acute kidney injury is stable.  Good urine output.  No kidney biopsy plan today        Acute on chronic renal failure    Acute kidney injury is stable.  Ultrasound shows no hydronephrosis.  Heavy proteinuria.  If the kidney function worsens may consider kidney biopsy at some point.  Will check cystatin C for accurate GFR at this time.        Hyponatremia    No evidence of SIADH.  Likely due to alcohol intake and free water intake in the presence of advanced kidney failure.            Thank you for your consult.     Clint Zabala MD  Nephrology  [facility] -

## 2018-08-20 NOTE — ASSESSMENT & PLAN NOTE
-Creatinine 2.9 this AM  -Nephrology on board, discussed plan of care, will give IV Lasix today and assess response  -Repeat labs in AM

## 2018-08-20 NOTE — ASSESSMENT & PLAN NOTE
Continue with LABA, MIKEY and ICS.        Continue IV glucocorticoids        Continue nighttime BiPAP

## 2018-08-20 NOTE — SUBJECTIVE & OBJECTIVE
Interval History:  The patient able to void without any difficulty.  Ultrasound shows no hydronephrosis.  Urine protein is 2.9 g per 24 hr.  Kidney function is stable with chronic kidney disease stage 4.  Patient is without any chest pain or shortness of Breath this morning.    Review of patient's allergies indicates:   Allergen Reactions    Penicillins      Current Facility-Administered Medications   Medication Frequency    acetaminophen tablet 500 mg Q4H PRN    albuterol-ipratropium 2.5 mg-0.5 mg/3 mL nebulizer solution 3 mL Q6H PRN    amLODIPine tablet 10 mg Daily    chlordiazepoxide capsule 10 mg TID    citalopram tablet 40 mg Daily    dextrose 50% injection 12.5 g PRN    dextrose 50% injection 25 g PRN    docusate sodium capsule 100 mg BID PRN    glucagon (human recombinant) injection 1 mg PRN    glucose chewable tablet 16 g PRN    glucose chewable tablet 24 g PRN    heparin (porcine) injection 5,000 Units Q8H    hydrALAZINE injection 10 mg Q8H PRN    HYDROcodone-acetaminophen  mg per tablet 1 tablet Q6H PRN    HYDROcodone-acetaminophen 5-325 mg per tablet 1 tablet Q6H PRN    insulin aspart U-100 pen 0-5 Units QID (AC + HS) PRN    isosorbide-hydrALAZINE 20-37.5 mg per tablet 1 tablet TID    lorazepam (ATIVAN) injection 1 mg Q30 Min PRN    mupirocin 2 % ointment TID    ondansetron disintegrating tablet 8 mg Q8H PRN    ondansetron injection 4 mg Q12H PRN    pantoprazole EC tablet 40 mg Daily    pneumoc 13-lisseth conj-dip cr(PF) 0.5 mL vaccine x 1 dose    polyethylene glycol packet 17 g Daily    potassium chloride SA CR tablet 20 mEq TID    promethazine (PHENERGAN) 6.25 mg in dextrose 5 % 50 mL IVPB Q6H PRN    ramelteon tablet 8 mg Nightly PRN    sodium chloride 0.9% flush 5 mL PRN       Objective:     Vital Signs (Most Recent):  Temp: 97.7 °F (36.5 °C) (08/20/18 0800)  Pulse: 83 (08/20/18 0800)  Resp: 18 (08/20/18 0800)  BP: (!) 161/70 (08/20/18 0800)  SpO2: (!) 94 % (08/20/18  0800)  O2 Device (Oxygen Therapy): nasal cannula (08/20/18 0800) Vital Signs (24h Range):  Temp:  [97.1 °F (36.2 °C)-98.4 °F (36.9 °C)] 97.7 °F (36.5 °C)  Pulse:  [65-85] 83  Resp:  [16-31] 18  SpO2:  [89 %-100 %] 94 %  BP: (134-185)/(44-73) 161/70     Weight: 57.7 kg (127 lb 3.3 oz) (08/19/18 0505)  Body mass index is 18.78 kg/m².  Body surface area is 1.68 meters squared.    I/O last 3 completed shifts:  In: 900 [P.O.:900]  Out: 1420 [Urine:1420]    Physical Exam   Constitutional: He is oriented to person, place, and time. He appears well-developed and well-nourished. No distress.   HENT:   Head: Normocephalic.   Eyes: EOM are normal. Pupils are equal, round, and reactive to light.   Neck: Normal range of motion. Neck supple. No JVD present. No thyromegaly present.   Cardiovascular: Normal rate, regular rhythm, S1 normal, S2 normal and intact distal pulses. PMI is not displaced. Exam reveals no gallop and no friction rub.   Murmur heard.  No JVD at this time   Pulmonary/Chest: No respiratory distress. He has wheezes. He has no rales. He exhibits no tenderness.   Abdominal: He exhibits no distension and no mass. There is no hepatosplenomegaly. There is no tenderness. There is no rebound and no CVA tenderness. No hernia.   Musculoskeletal: Normal range of motion. He exhibits no edema or tenderness.   Muscle wasting and signs of malnutrition   Lymphadenopathy:     He has no cervical adenopathy.   Neurological: He is alert and oriented to person, place, and time. He has normal reflexes. He is not disoriented. He displays normal reflexes. No cranial nerve deficit. He exhibits normal muscle tone. Coordination normal.   Skin: Skin is warm and dry. No rash noted. No erythema.   Psychiatric: He has a normal mood and affect. His behavior is normal. Judgment and thought content normal.   Nursing note and vitals reviewed.      Significant Labs:  All labs within the past 24 hours have been reviewed.     Significant  Imaging:  Labs: Reviewed  US: Reviewed

## 2018-08-20 NOTE — PLAN OF CARE
Problem: Patient Care Overview  Goal: Plan of Care Review  Outcome: Ongoing (interventions implemented as appropriate)  Recommendations    Recommendation/Intervention:   1. Recommend Low Na/Diabetic 2000 diet w/ fluid restriction per MD.   2. Pt nutritionally diagnosed w/ severe malnutrition, added to hospital problems list  3. Will continue to monitor, encourage PO intake    Goals: Meet >85% EEN/EPN this admit  Nutrition Goal Status: new  Communication of RD Recs: (POC review, sticky note)

## 2018-08-20 NOTE — PLAN OF CARE
08/20/18 1002   Medicare Message   Important Message from Medicare regarding Discharge Appeal Rights Given to patient/caregiver;Explained to patient/caregiver  (Verbal explaination and copy provided.  Patient unable to sign because of a broken arm)   Date IMM was signed 08/20/18   Time IMM was signed [id number]

## 2018-08-20 NOTE — ASSESSMENT & PLAN NOTE
Acute kidney injury is stable.  Ultrasound shows no hydronephrosis.  Heavy proteinuria.  If the kidney function worsens may consider kidney biopsy at some point.  Will check cystatin C for accurate GFR at this time.

## 2018-08-20 NOTE — ASSESSMENT & PLAN NOTE
No evidence of SIADH.  Likely due to alcohol intake and free water intake in the presence of advanced kidney failure.

## 2018-08-20 NOTE — SUBJECTIVE & OBJECTIVE
Interval History: Seen and examined at bedside. Hospital chart reviewed. No acute interval detrimental events noted. He reports that he  has moderately improved.      Review of Systems   Constitutional: Negative for chills and fever.   HENT: Negative for hearing loss and nosebleeds.    Eyes: Negative for discharge.   Respiratory: Positive for cough. Negative for shortness of breath.    Cardiovascular: Negative for chest pain and leg swelling.   Gastrointestinal: Negative for abdominal pain, blood in stool and vomiting.   Genitourinary: Negative for hematuria.   Musculoskeletal: Positive for joint pain.   Skin: Negative for rash.   Neurological: Negative for seizures, loss of consciousness and headaches.        Neuropathy     Endo/Heme/Allergies:        Diabetes mellitus on metformin at home   Psychiatric/Behavioral: The patient is not nervous/anxious.        Scheduled Meds:   amLODIPine  10 mg Oral Daily    chlordiazepoxide  10 mg Oral TID    citalopram  40 mg Oral Daily    heparin (porcine)  5,000 Units Subcutaneous Q8H    isosorbide-hydrALAZINE 20-37.5 mg  1 tablet Oral TID    mupirocin   Topical (Top) TID    pantoprazole  40 mg Oral Daily    polyethylene glycol  17 g Oral Daily    potassium chloride  20 mEq Oral TID     Continuous Infusions:  PRN Meds:.acetaminophen, albuterol-ipratropium, dextrose 50%, dextrose 50%, docusate sodium, glucagon (human recombinant), glucose, glucose, hydrALAZINE, HYDROcodone-acetaminophen, HYDROcodone-acetaminophen, insulin aspart U-100, lorazepam, ondansetron, ondansetron, pneumoc 13-lisseth conj-dip cr(PF), promethazine (PHENERGAN) IVPB, ramelteon, sodium chloride 0.9%       Objective:     Vital Signs (Most Recent):  Temp: 97.7 °F (36.5 °C) (08/20/18 1133)  Pulse: 87 (08/20/18 1133)  Resp: 18 (08/20/18 0800)  BP: (!) 160/72 (08/20/18 1133)  SpO2: (!) 94 % (08/20/18 1133) Vital Signs (24h Range):  Temp:  [97.1 °F (36.2 °C)-98.4 °F (36.9 °C)] 97.7 °F (36.5 °C)  Pulse:  [72-87]  87  Resp:  [16-29] 18  SpO2:  [91 %-98 %] 94 %  BP: (134-163)/(53-73) 160/72     Weight: 57.7 kg (127 lb 3.3 oz)  Body mass index is 18.78 kg/m².      Intake/Output Summary (Last 24 hours) at 8/20/2018 1223  Last data filed at 8/20/2018 0100  Gross per 24 hour   Intake 240 ml   Output 345 ml   Net -105 ml       Physical Exam   Constitutional: He is oriented to person, place, and time. He appears well-developed and well-nourished. No distress.   HENT:   Head: Normocephalic.   Eyes: EOM are normal. Pupils are equal, round, and reactive to light.   Neck: Normal range of motion. Neck supple. No JVD present. No thyromegaly present.   Cardiovascular: Normal rate, regular rhythm, S1 normal, S2 normal and intact distal pulses. PMI is not displaced. Exam reveals no gallop and no friction rub.   Murmur heard.  Pulmonary/Chest: No respiratory distress. He has no wheezes. He has rales. He exhibits no tenderness.   Abdominal: He exhibits no distension and no mass. There is no hepatosplenomegaly. There is no tenderness. There is no rebound and no CVA tenderness. No hernia.   Musculoskeletal: Normal range of motion. He exhibits no edema or tenderness.   Muscle wasting and signs of malnutrition   Lymphadenopathy:     He has no cervical adenopathy.   Neurological: He is alert and oriented to person, place, and time. He has normal reflexes. He is not disoriented. He displays normal reflexes. No cranial nerve deficit. He exhibits normal muscle tone. Coordination normal.   Skin: Skin is warm and dry. No rash noted. No erythema.   Psychiatric: He has a normal mood and affect. His behavior is normal. Judgment and thought content normal.   Nursing note and vitals reviewed.      Vents:  Oxygen Concentration (%): 36 (08/20/18 0745)    Lines/Drains/Airways     Peripheral Intravenous Line                 Peripheral IV - Single Lumen 08/18/18 Left Hand 2 days                Significant Labs:    CBC/Anemia Profile:  Recent Labs   Lab   08/19/18   0425  08/20/18   0529   WBC  10.14  9.48   HGB  11.2*  11.1*   HCT  32.5*  32.2*   PLT  503*  528*   MCV  102*  102*   RDW  14.1  14.2        Chemistries:  Recent Labs   Lab  08/19/18   0425  08/19/18   1320  08/20/18   0529   NA  137  139  137   K  3.8  3.3*  3.1*   CL  101  101  101   CO2  23  24  23   BUN  58*  58*  55*   CREATININE  3.0*  3.0*  2.9*   CALCIUM  9.9  10.1  9.5   ALBUMIN  2.3*   --   2.3*   PROT  6.9  6.9  6.5   BILITOT  0.6   --   0.5   ALKPHOS  183*   --   159*   ALT  8*   --   9*   AST  11   --   10   MG   --    --   1.3*   PHOS   --    --   5.0*       ABGs:   Recent Labs   Lab  08/19/18   0758   PH  7.516*   PCO2  28.2*   HCO3  22.8*   POCSATURATED  92*   BE  0       Significant Imaging:    XR CHEST 1 VIEW: 08/19/18:   Comparison study 08/19/2018 at 1157 hours.  The previously seen tiny right-sided apical lateral pneumothorax is no longer identifiable.  Stable small right pleural effusion.  Again, diffuse interstitial coarsening, most notably involving the left lung, with new multifocal left lower chest air space opacities/consolidations concerning for worsening pulmonary edema.  Recommend continued close follow-up.

## 2018-08-20 NOTE — PLAN OF CARE
CM met with patient at the bedside to assess for discharge needs.  Patient lives at home alone and that his son and brother will be in town today.  He states that he came to the hospital because of shortness of breath.  He was at [facility] prior to this admission but he was discharging to home from that facility.  CM received a message from Esthela with [facility] that equipment and home health has already been arranged by their  and she will send me that information.  Patient is agreeable to services that were set up by [facility].  Choice form completed and placed in patient blue folder.  CM will request PT/OT orders from physician to assess ability to care for self.  CM provided a transitional care folder, information on advanced directives, information on pharmacy bedside delivery, and discharge planning begins on admission with contact information for any needs/questions.     D/C Plan:  Home health vs SNF    [facility]  [id number] [place] [address] [address]  [place] [address]  Phone: [phone] Fax: [phone]    Eddie Garcia MD  Payor: AETNA / Plan: [employer]ET[employer] MANAGED CHOICE POS / Product Type: PPO /       08/20/18 0946   Discharge Assessment   Assessment Type Discharge Planning Assessment   Confirmed/corrected address and phone number on facesheet? Yes   Assessment information obtained from? Patient;Medical Record   Expected Length of Stay (days) (TBD)   Communicated expected length of stay with patient/caregiver yes   Prior to hospitilization cognitive status: Alert/Oriented   Prior to hospitalization functional status: Assistive Equipment   Current cognitive status: Alert/Oriented   Current Functional Status: Assistive Equipment   Facility Arrived From: [facility]   Lives With alone   Able to Return to Prior Arrangements unable to determine at this time (comments)   Is patient able to care for self after discharge? Unable to determine at this time  "(comments)   Who are your caregiver(s) and their phone number(s)? Mathew Castillo, son [phone]   Patient's perception of discharge disposition home health   Readmission Within The Last 30 Days current reason for admission unrelated to previous admission   If yes, most recent facility name: [facility]   Patient currently being followed by outpatient case management? No   Patient currently receives any other outside agency services? No   Equipment Currently Used at Home walker, rolling;cane, straight;shower chair   Do you have any problems affording any of your prescribed medications? No   Is the patient taking medications as prescribed? yes   Does the patient have transportation home? Yes   Transportation Available family or friend will provide   Dialysis Name and Scheduled days NA   Does the patient receive services at the [facility]? (NA)   Discharge Plan A Home Health;Home   Discharge Plan B Skilled Nursing Facility   Patient/Family In Agreement With Plan yes   Readmission Questionnaire   At the time of your discharge, did someone talk to you about what your health problems were? Yes   At the time of discharge, did someone talk to you about what to watch out for regarding worsening of your health problem? Yes   At the time of discharge, did someone talk to you about what to do if you experienced worsening of your health problem? Yes   At the time of discharge, did someone talk to you about which medication to take when you left the hospital and which ones to stop taking? Yes   At the time of discharge, did someone talk to you about when and where to follow up with a doctor after you left the hospital? Yes   What do you believe caused you to be sick enough to be re-admitted? "short of breath"   How often do you need to have someone help you when you read instructions, pamphlets, or other written material from your doctor or pharmacy? Never   Do you have problems taking your medications as prescribed? " No   Do you have any problems affording any of  your prescribed medications? No   Do you have problems obtaining/receiving your medications? No   Does the patient have transportation to healthcare appointments? Yes   Living Arrangements house   Does the patient have family/friends to help with healtcare needs after discharge? other (comments)  (Patient states his brother and son will be here in town today)   Does your caregiver provide all the help you need? I don't know   Are you currently feeling confused? No   Are you currently having problems thinking? No   Are you currently having memory problems? No   In the last 7 days, my sleep quality was: fair

## 2018-08-20 NOTE — ASSESSMENT & PLAN NOTE
Optimize CHF regimen.  Recommend referral to CHF clinic.  Preload and afterload reduction.  Daily weighing.  Dietary salt restriction.  Alcohol and tobacco avoidance.

## 2018-08-20 NOTE — NURSING
Patient assessed for diabetes educational needs following chart review    He reports was diagnosed over 10 years ago  He does not have a home glucose monitor and has his glucose checked at every PCP visit  He is consistent with taking his diabetes med's at home  Reviewed info on target glucose values (according to the ADA guideline), hyper/hypoglycemia  He verbalizes understanding

## 2018-08-20 NOTE — PLAN OF CARE
Problem: Patient Care Overview  Goal: Plan of Care Review  Outcome: Ongoing (interventions implemented as appropriate)  Patient resting quietly in bed with no signs of distress. VSS, no c/o discomfort. No family at bedside. POC discussed.

## 2018-08-20 NOTE — SUBJECTIVE & OBJECTIVE
Review of Systems   Constitution: Positive for weakness and malaise/fatigue.   HENT: Negative.    Eyes: Negative.    Cardiovascular: Negative.    Respiratory: Positive for shortness of breath.    Endocrine: Negative.    Hematologic/Lymphatic: Bruises/bleeds easily.   Skin: Negative.    Musculoskeletal: Positive for arthritis. Negative for back pain.   Gastrointestinal: Negative.    Genitourinary: Negative.    Psychiatric/Behavioral: Negative.    Allergic/Immunologic: Negative.      Objective:     Vital Signs (Most Recent):  Temp: 97.7 °F (36.5 °C) (08/20/18 0800)  Pulse: 83 (08/20/18 0800)  Resp: 18 (08/20/18 0800)  BP: (!) 161/70 (08/20/18 0800)  SpO2: (!) 94 % (08/20/18 0800) Vital Signs (24h Range):  Temp:  [97.1 °F (36.2 °C)-98.4 °F (36.9 °C)] 97.7 °F (36.5 °C)  Pulse:  [72-85] 83  Resp:  [16-31] 18  SpO2:  [89 %-98 %] 94 %  BP: (134-170)/(50-73) 161/70     Weight: 57.7 kg (127 lb 3.3 oz)  Body mass index is 18.78 kg/m².     SpO2: (!) 94 %  O2 Device (Oxygen Therapy): nasal cannula      Intake/Output Summary (Last 24 hours) at 8/20/2018 1021  Last data filed at 8/20/2018 0100  Gross per 24 hour   Intake 360 ml   Output 545 ml   Net -185 ml       Lines/Drains/Airways     Peripheral Intravenous Line                 Peripheral IV - Single Lumen 08/18/18 Left Hand 2 days                Physical Exam   Constitutional: He is oriented to person, place, and time. He appears well-developed and well-nourished. No distress.   HENT:   Head: Normocephalic and atraumatic.   Eyes: Pupils are equal, round, and reactive to light. Right eye exhibits no discharge. Left eye exhibits no discharge.   Neck: Neck supple. JVD present. No thyromegaly present.   Cardiovascular: Normal rate, regular rhythm, S1 normal, S2 normal and normal heart sounds.   No murmur heard.  Pulmonary/Chest: No respiratory distress.   Diminished BS at bases   Abdominal: Soft. He exhibits no distension.   Musculoskeletal: He exhibits no edema.    Neurological: He is alert and oriented to person, place, and time.   Skin: Skin is warm and dry. He is not diaphoretic. No erythema.   Numerous scattered ecchymoses    Psychiatric: He has a normal mood and affect. His behavior is normal.   Nursing note and vitals reviewed.      Significant Labs:   CMP   Recent Labs   Lab  08/18/18   1200  08/19/18   0425  08/19/18   1320  08/20/18   0529   NA  132*  137  139  137   K  6.6*  3.8  3.3*  3.1*   CL  101  101  101  101   CO2  19*  23  24  23   GLU  179*  141*  191*  132*   BUN  57*  58*  58*  55*   CREATININE  2.8*  3.0*  3.0*  2.9*   CALCIUM  10.0  9.9  10.1  9.5   PROT  7.3  6.9  6.9  6.5   ALBUMIN  2.3*  2.3*   --   2.3*   BILITOT  0.8  0.6   --   0.5   ALKPHOS  204*  183*   --   159*   AST  16  11   --   10   ALT  9*  8*   --   9*   ANIONGAP  12  13  14  13   ESTGFRAFRICA  26*  24*  24*  25*   EGFRNONAA  22*  21*  21*  22*   , CBC   Recent Labs   Lab  08/18/18   1200  08/19/18   0425  08/20/18   0529   WBC  11.74  10.14  9.48   HGB  11.6*  11.2*  11.1*   HCT  33.4*  32.5*  32.2*   PLT  542*  503*  528*   , Troponin   Recent Labs   Lab  08/18/18   1200   TROPONINI  0.013    and All pertinent lab results from the last 24 hours have been reviewed.    Significant Imaging: Echocardiogram:   2D echo with color flow doppler:   Results for orders placed or performed during the hospital encounter of 07/18/18   2D echo with color flow doppler   Result Value Ref Range    EF 55 55 - 65    Aortic Valve Regurgitation MODERATE (A)     Est. PA Systolic Pressure 17.44    , EKG: Reviewed and X-Ray: CXR: X-Ray Chest 1 View (CXR):   Results for orders placed or performed during the hospital encounter of 08/18/18   X-Ray Chest 1 View    Narrative    EXAMINATION:  XR CHEST 1 VIEW    CLINICAL HISTORY:  Pleural effusion;    TECHNIQUE:  Single frontal view of the chest was performed.    COMPARISON:  08/19/2018    FINDINGS:  In comparison to the prior study, there is no adverse interval  changes      Impression    In comparison to the prior study, there is no adverse interval changes      Electronically signed by: Irwin Montemayor MD  Date:    08/20/2018  Time:    07:34    and X-Ray Chest PA and Lateral (CXR): No results found for this visit on 08/18/18.

## 2018-08-20 NOTE — PROGRESS NOTES
[facility] -   Cardiology  Progress Note    Patient Name: Lionel Castillo  MRN: [id number]  Admission Date: 8/18/2018  Hospital Length of Stay: 2 days  Code Status: Full Code   Attending Physician: Manuel Willis MD   Primary Care Physician: Eddie Garcia MD  Expected Discharge Date:   Principal Problem:Acute on chronic respiratory failure with hypoxia    Subjective:   HPI:  A 67 yo male with rt humerus fracture treated conservatively alcohol use admitted earlier this month with shortness of breath and diastolic chf was discharged to rehab after diuresis presents back with shortness of breath hypoxia and elevated bnp., he has  Been treated fro diastolic chf has pleural effusion on cxr and ct thoracentesis plans made. He ahs hypoxia resiratory failure. Unfortunately his creatinine is elevated at 3 he is uncomfortable sitting in bed. Perfusion scan being planned to rule out pe.he denies chest pain fevr chills nausea diaphoresis he has orthopnea no palpitation tia syncope near syncope urinary symptoms or bleeding weight loss    Hospital Course:   8/20/18-Patient seen and examined today, sitting up in bed, s/p right sided thoracentesis yesterday. Feels weak/fatigued. SOB improved. No chest pain. Labs reviewed. Creatinine 2.9, K slightly low.        Review of Systems   Constitution: Positive for weakness and malaise/fatigue.   HENT: Negative.    Eyes: Negative.    Cardiovascular: Negative.    Respiratory: Positive for shortness of breath.    Endocrine: Negative.    Hematologic/Lymphatic: Bruises/bleeds easily.   Skin: Negative.    Musculoskeletal: Positive for arthritis. Negative for back pain.   Gastrointestinal: Negative.    Genitourinary: Negative.    Psychiatric/Behavioral: Negative.    Allergic/Immunologic: Negative.      Objective:     Vital Signs (Most Recent):  Temp: 97.7 °F (36.5 °C) (08/20/18 0800)  Pulse: 83 (08/20/18 0800)  Resp: 18 (08/20/18 0800)  BP: (!) 161/70 (08/20/18  0800)  SpO2: (!) 94 % (08/20/18 0800) Vital Signs (24h Range):  Temp:  [97.1 °F (36.2 °C)-98.4 °F (36.9 °C)] 97.7 °F (36.5 °C)  Pulse:  [72-85] 83  Resp:  [16-31] 18  SpO2:  [89 %-98 %] 94 %  BP: (134-170)/(50-73) 161/70     Weight: 57.7 kg (127 lb 3.3 oz)  Body mass index is 18.78 kg/m².     SpO2: (!) 94 %  O2 Device (Oxygen Therapy): nasal cannula      Intake/Output Summary (Last 24 hours) at 8/20/2018 1021  Last data filed at 8/20/2018 0100  Gross per 24 hour   Intake 360 ml   Output 545 ml   Net -185 ml       Lines/Drains/Airways     Peripheral Intravenous Line                 Peripheral IV - Single Lumen 08/18/18 Left Hand 2 days                Physical Exam   Constitutional: He is oriented to person, place, and time. He appears well-developed and well-nourished. No distress.   HENT:   Head: Normocephalic and atraumatic.   Eyes: Pupils are equal, round, and reactive to light. Right eye exhibits no discharge. Left eye exhibits no discharge.   Neck: Neck supple. JVD present. No thyromegaly present.   Cardiovascular: Normal rate, regular rhythm, S1 normal, S2 normal and normal heart sounds.   No murmur heard.  Pulmonary/Chest: No respiratory distress.   Diminished BS at bases   Abdominal: Soft. He exhibits no distension.   Musculoskeletal: He exhibits no edema.   Neurological: He is alert and oriented to person, place, and time.   Skin: Skin is warm and dry. He is not diaphoretic. No erythema.   Numerous scattered ecchymoses    Psychiatric: He has a normal mood and affect. His behavior is normal.   Nursing note and vitals reviewed.      Significant Labs:   CMP   Recent Labs   Lab  08/18/18   1200  08/19/18   0425  08/19/18   1320  08/20/18   0529   NA  132*  137  139  137   K  6.6*  3.8  3.3*  3.1*   CL  101  101  101  101   CO2  19*  23  24  23   GLU  179*  141*  191*  132*   BUN  57*  58*  58*  55*   CREATININE  2.8*  3.0*  3.0*  2.9*   CALCIUM  10.0  9.9  10.1  9.5   PROT  7.3  6.9  6.9  6.5   ALBUMIN  2.3*   2.3*   --   2.3*   BILITOT  0.8  0.6   --   0.5   ALKPHOS  204*  183*   --   159*   AST  16  11   --   10   ALT  9*  8*   --   9*   ANIONGAP  12  13  14  13   ESTGFRAFRICA  26*  24*  24*  25*   EGFRNONAA  22*  21*  21*  22*   , CBC   Recent Labs   Lab  08/18/18   1200  08/19/18   0425  08/20/18   0529   WBC  11.74  10.14  9.48   HGB  11.6*  11.2*  11.1*   HCT  33.4*  32.5*  32.2*   PLT  542*  503*  528*   , Troponin   Recent Labs   Lab  08/18/18   1200   TROPONINI  0.013    and All pertinent lab results from the last 24 hours have been reviewed.    Significant Imaging: Echocardiogram:   2D echo with color flow doppler:   Results for orders placed or performed during the hospital encounter of 07/18/18   2D echo with color flow doppler   Result Value Ref Range    EF 55 55 - 65    Aortic Valve Regurgitation MODERATE (A)     Est. PA Systolic Pressure 17.44    , EKG: Reviewed and X-Ray: CXR: X-Ray Chest 1 View (CXR):   Results for orders placed or performed during the hospital encounter of 08/18/18   X-Ray Chest 1 View    Narrative    EXAMINATION:  XR CHEST 1 VIEW    CLINICAL HISTORY:  Pleural effusion;    TECHNIQUE:  Single frontal view of the chest was performed.    COMPARISON:  08/19/2018    FINDINGS:  In comparison to the prior study, there is no adverse interval changes      Impression    In comparison to the prior study, there is no adverse interval changes      Electronically signed by: Irwin Montemayor MD  Date:    08/20/2018  Time:    07:34    and X-Ray Chest PA and Lateral (CXR): No results found for this visit on 08/18/18.    Assessment and Plan:   Patient who presents with respiratory failure, right-sided pleural effusion, and elevated BNP. Improved s/p thoracentesis. Will diurese today and assess response.     * Acute on chronic respiratory failure with hypoxia    -Presented with recurrent right-sided pleural effusion, hypoxia/respiratory distress and markedly elevated BNP  -Improved s/p right-sided  thoracentesis; cytology pending  -Continue current meds  -Will diurese today and assess response        Recurrent right pleural effusion    -improved s/p right-sided thoracentesis        Acute on chronic diastolic congestive heart failure    -Discussed with nephrology and hospital medicine  -Will diurese with IV Lasix today and assess response        Mass of right lung    -Work up as per pulmonary        Tobacco use    -Counseled on smoking cessation        Type 2 diabetes mellitus, without long-term current use of insulin    On medical therapy        Essential hypertension    -Continue current meds        Acute on chronic renal failure    -Creatinine 2.9 this AM  -Nephrology on board, discussed plan of care, will give IV Lasix today and assess response  -Repeat labs in AM        Alcohol abuse    -Counseled on ETOH cessation            VTE Risk Mitigation (From admission, onward)        Ordered     heparin (porcine) injection 5,000 Units  Every 8 hours      08/19/18 1031          Lisset Valentin PA-C  Cardiology  [facility]    Chart reviewed. Dr. Krishnan examined patient and agrees with plan as outlined above.

## 2018-08-20 NOTE — PROGRESS NOTES
[facility]  Pulmonology  Progress Note    Patient Name: Lionel Castillo  MRN: [id number]  Admission Date: 8/18/2018  Hospital Length of Stay: 2 days  Code Status: Full Code  Attending Provider: Manuel Willis MD  Primary Care Provider: Eddie Garcia MD   Principal Problem: Acute on chronic respiratory failure with hypoxia    Subjective:     Interval History: Seen and examined at bedside. Hospital chart reviewed. No acute interval detrimental events noted. He reports that he  has moderately improved.      Review of Systems   Constitutional: Negative for chills and fever.   HENT: Negative for hearing loss and nosebleeds.    Eyes: Negative for discharge.   Respiratory: Positive for cough. Negative for shortness of breath.    Cardiovascular: Negative for chest pain and leg swelling.   Gastrointestinal: Negative for abdominal pain, blood in stool and vomiting.   Genitourinary: Negative for hematuria.   Musculoskeletal: Positive for joint pain.   Skin: Negative for rash.   Neurological: Negative for seizures, loss of consciousness and headaches.        Neuropathy     Endo/Heme/Allergies:        Diabetes mellitus on metformin at home   Psychiatric/Behavioral: The patient is not nervous/anxious.        Scheduled Meds:   amLODIPine  10 mg Oral Daily    chlordiazepoxide  10 mg Oral TID    citalopram  40 mg Oral Daily    heparin (porcine)  5,000 Units Subcutaneous Q8H    isosorbide-hydrALAZINE 20-37.5 mg  1 tablet Oral TID    mupirocin   Topical (Top) TID    pantoprazole  40 mg Oral Daily    polyethylene glycol  17 g Oral Daily    potassium chloride  20 mEq Oral TID     Continuous Infusions:  PRN Meds:.acetaminophen, albuterol-ipratropium, dextrose 50%, dextrose 50%, docusate sodium, glucagon (human recombinant), glucose, glucose, hydrALAZINE, HYDROcodone-acetaminophen, HYDROcodone-acetaminophen, insulin aspart U-100, lorazepam, ondansetron, ondansetron, pneumoc 13-lisseth conj-dip cr(PF),  promethazine (PHENERGAN) IVPB, ramelteon, sodium chloride 0.9%       Objective:     Vital Signs (Most Recent):  Temp: 97.7 °F (36.5 °C) (08/20/18 1133)  Pulse: 87 (08/20/18 1133)  Resp: 18 (08/20/18 0800)  BP: (!) 160/72 (08/20/18 1133)  SpO2: (!) 94 % (08/20/18 1133) Vital Signs (24h Range):  Temp:  [97.1 °F (36.2 °C)-98.4 °F (36.9 °C)] 97.7 °F (36.5 °C)  Pulse:  [72-87] 87  Resp:  [16-29] 18  SpO2:  [91 %-98 %] 94 %  BP: (134-163)/(53-73) 160/72     Weight: 57.7 kg (127 lb 3.3 oz)  Body mass index is 18.78 kg/m².      Intake/Output Summary (Last 24 hours) at 8/20/2018 1223  Last data filed at 8/20/2018 0100  Gross per 24 hour   Intake 240 ml   Output 345 ml   Net -105 ml       Physical Exam   Constitutional: He is oriented to person, place, and time. He appears well-developed and well-nourished. No distress.   HENT:   Head: Normocephalic.   Eyes: EOM are normal. Pupils are equal, round, and reactive to light.   Neck: Normal range of motion. Neck supple. No JVD present. No thyromegaly present.   Cardiovascular: Normal rate, regular rhythm, S1 normal, S2 normal and intact distal pulses. PMI is not displaced. Exam reveals no gallop and no friction rub.   Murmur heard.  Pulmonary/Chest: No respiratory distress. He has no wheezes. He has rales. He exhibits no tenderness.   Abdominal: He exhibits no distension and no mass. There is no hepatosplenomegaly. There is no tenderness. There is no rebound and no CVA tenderness. No hernia.   Musculoskeletal: Normal range of motion. He exhibits no edema or tenderness.   Muscle wasting and signs of malnutrition   Lymphadenopathy:     He has no cervical adenopathy.   Neurological: He is alert and oriented to person, place, and time. He has normal reflexes. He is not disoriented. He displays normal reflexes. No cranial nerve deficit. He exhibits normal muscle tone. Coordination normal.   Skin: Skin is warm and dry. No rash noted. No erythema.   Psychiatric: He has a normal mood and  affect. His behavior is normal. Judgment and thought content normal.   Nursing note and vitals reviewed.      Vents:  Oxygen Concentration (%): 36 (08/20/18 0745)    Lines/Drains/Airways     Peripheral Intravenous Line                 Peripheral IV - Single Lumen 08/18/18 Left Hand 2 days                Significant Labs:    CBC/Anemia Profile:  Recent Labs   Lab  08/19/18   0425  08/20/18   0529   WBC  10.14  9.48   HGB  11.2*  11.1*   HCT  32.5*  32.2*   PLT  503*  528*   MCV  102*  102*   RDW  14.1  14.2        Chemistries:  Recent Labs   Lab  08/19/18   0425  08/19/18   1320  08/20/18   0529   NA  137  139  137   K  3.8  3.3*  3.1*   CL  101  101  101   CO2  23  24  23   BUN  58*  58*  55*   CREATININE  3.0*  3.0*  2.9*   CALCIUM  9.9  10.1  9.5   ALBUMIN  2.3*   --   2.3*   PROT  6.9  6.9  6.5   BILITOT  0.6   --   0.5   ALKPHOS  183*   --   159*   ALT  8*   --   9*   AST  11   --   10   MG   --    --   1.3*   PHOS   --    --   5.0*       ABGs:   Recent Labs   Lab  08/19/18   0758   PH  7.516*   PCO2  28.2*   HCO3  22.8*   POCSATURATED  92*   BE  0       Significant Imaging:    XR CHEST 1 VIEW: 08/19/18:   Comparison study 08/19/2018 at 1157 hours.  The previously seen tiny right-sided apical lateral pneumothorax is no longer identifiable.  Stable small right pleural effusion.  Again, diffuse interstitial coarsening, most notably involving the left lung, with new multifocal left lower chest air space opacities/consolidations concerning for worsening pulmonary edema.  Recommend continued close follow-up.      Assessment/Plan:     * Acute on chronic respiratory failure with hypoxia    Supplement oxygenation. Keep SAO2 >= 92%. BIPAP QHS and PRN. Bronchodilators per orders        Electrolyte abnormality    Monitor and replete electrolytes as needed.         Recurrent right pleural effusion    S/P thoracentesis: Lymphocyte predominant exudate.         Centrilobular emphysema                                  Continue  with LABA, MIKEY and ICS.        Continue IV glucocorticoids        Continue nighttime BiPAP                                                   Acute on chronic diastolic congestive heart failure    Optimize CHF regimen.  Recommend referral to CHF clinic.  Preload and afterload reduction.  Daily weighing.  Dietary salt restriction.  Alcohol and tobacco avoidance.          Tobacco use    Encourage smoking cessation        Type 2 diabetes mellitus, without long-term current use of insulin    Diabetic diet. Sliding scale        Right humeral fracture    Right arm sling        Alcohol abuse    Start Thiamine, Folate and MVI. Continue to monitor.         Neuro    Contiue CITALOPRAM        ID    Monitor fever curve. panculture for temperatures greater than 101 degrees F. Source control: n/a        Hematology    Monitor hemogram. Transfuse as needed.               Deo Garcia MD  Pulmonology  [facility]

## 2018-08-20 NOTE — ASSESSMENT & PLAN NOTE
-Discussed with nephrology and hospital medicine  -Will diurese with IV Lasix today and assess response

## 2018-08-20 NOTE — PLAN OF CARE
Problem: Patient Care Overview  Goal: Plan of Care Review  Outcome: Ongoing (interventions implemented as appropriate)  PoC reviewed with patient. Patient refused size-wise bed. Pt educated on importance of the bed to prevent skin breakdown, requested regular hospital bed. Around 0600 bed was swapped out. Pt educated on need to reposition frequently, protect skin and watch for signs of break down. Preventative mepilex on heels and heels elevated off bed. Patient states he understands. VSS. Pain controlled. Will continue to monitor.

## 2018-08-20 NOTE — ASSESSMENT & PLAN NOTE
-Presented with recurrent right-sided pleural effusion, hypoxia/respiratory distress and markedly elevated BNP  -Improved s/p right-sided thoracentesis; cytology pending  -Continue current meds  -Will diurese today and assess response

## 2018-08-21 PROBLEM — J18.9 LEFT LOWER LOBE PNEUMONIA: Status: RESOLVED | Noted: 2018-08-18 | Resolved: 2018-08-21

## 2018-08-21 LAB
ALBUMIN SERPL BCP-MCNC: 2.3 G/DL
ALP SERPL-CCNC: 146 U/L
ALT SERPL W/O P-5'-P-CCNC: 8 U/L
ANION GAP SERPL CALC-SCNC: 12 MMOL/L
AST SERPL-CCNC: 9 U/L
BASOPHILS # BLD AUTO: 0.03 K/UL
BASOPHILS NFR BLD: 0.4 %
BILIRUB SERPL-MCNC: 0.5 MG/DL
BNP SERPL-MCNC: 1179 PG/ML
BUN SERPL-MCNC: 48 MG/DL
CALCIUM SERPL-MCNC: 9.7 MG/DL
CHLORIDE SERPL-SCNC: 100 MMOL/L
CHOLEST FLD-MCNC: 50 MG/DL
CO2 SERPL-SCNC: 22 MMOL/L
CREAT SERPL-MCNC: 2.7 MG/DL
CYSTATIN C SERPL-MCNC: 3.02 MG/L
DIFFERENTIAL METHOD: ABNORMAL
EOSINOPHIL # BLD AUTO: 0.2 K/UL
EOSINOPHIL NFR BLD: 2.8 %
ERYTHROCYTE [DISTWIDTH] IN BLOOD BY AUTOMATED COUNT: 14.2 %
EST. GFR  (AFRICAN AMERICAN): 27 ML/MIN/1.73 M^2
EST. GFR  (NON AFRICAN AMERICAN): 23 ML/MIN/1.73 M^2
GFR/BSA.PRED SERPLBLD CYS-BASED-ARV: 17 ML/MIN/BSA
GLUCOSE SERPL-MCNC: 128 MG/DL
HCT VFR BLD AUTO: 31.2 %
HGB BLD-MCNC: 10.7 G/DL
LYMPHOCYTES # BLD AUTO: 0.8 K/UL
LYMPHOCYTES NFR BLD: 11.1 %
MCH RBC QN AUTO: 34.9 PG
MCHC RBC AUTO-ENTMCNC: 34.3 G/DL
MCV RBC AUTO: 102 FL
MONOCYTES # BLD AUTO: 0.6 K/UL
MONOCYTES NFR BLD: 9.1 %
NEUTROPHILS # BLD AUTO: 5.4 K/UL
NEUTROPHILS NFR BLD: 76.6 %
PLATELET # BLD AUTO: 511 K/UL
PMV BLD AUTO: 8.3 FL
POCT GLUCOSE: 116 MG/DL (ref 70–110)
POCT GLUCOSE: 127 MG/DL (ref 70–110)
POCT GLUCOSE: 146 MG/DL (ref 70–110)
POCT GLUCOSE: 150 MG/DL (ref 70–110)
POTASSIUM SERPL-SCNC: 3.8 MMOL/L
PROT SERPL-MCNC: 6.5 G/DL
RBC # BLD AUTO: 3.07 M/UL
SODIUM SERPL-SCNC: 134 MMOL/L
SPECIMEN SOURCE: NORMAL
WBC # BLD AUTO: 7.03 K/UL

## 2018-08-21 PROCEDURE — 27000221 HC OXYGEN, UP TO 24 HOURS

## 2018-08-21 PROCEDURE — 97530 THERAPEUTIC ACTIVITIES: CPT

## 2018-08-21 PROCEDURE — 80053 COMPREHEN METABOLIC PANEL: CPT

## 2018-08-21 PROCEDURE — 63600175 PHARM REV CODE 636 W HCPCS: Performed by: INTERNAL MEDICINE

## 2018-08-21 PROCEDURE — 94799 UNLISTED PULMONARY SVC/PX: CPT

## 2018-08-21 PROCEDURE — 99900035 HC TECH TIME PER 15 MIN (STAT)

## 2018-08-21 PROCEDURE — 25000003 PHARM REV CODE 250: Performed by: INTERNAL MEDICINE

## 2018-08-21 PROCEDURE — 85025 COMPLETE CBC W/AUTO DIFF WBC: CPT

## 2018-08-21 PROCEDURE — 97165 OT EVAL LOW COMPLEX 30 MIN: CPT

## 2018-08-21 PROCEDURE — 63600175 PHARM REV CODE 636 W HCPCS: Performed by: NURSE PRACTITIONER

## 2018-08-21 PROCEDURE — 21400001 HC TELEMETRY ROOM

## 2018-08-21 PROCEDURE — 99233 SBSQ HOSP IP/OBS HIGH 50: CPT | Mod: ,,, | Performed by: INTERNAL MEDICINE

## 2018-08-21 PROCEDURE — G8978 MOBILITY CURRENT STATUS: HCPCS | Mod: CL

## 2018-08-21 PROCEDURE — 99232 SBSQ HOSP IP/OBS MODERATE 35: CPT | Mod: ,,, | Performed by: INTERNAL MEDICINE

## 2018-08-21 PROCEDURE — G8979 MOBILITY GOAL STATUS: HCPCS | Mod: CJ

## 2018-08-21 PROCEDURE — 94761 N-INVAS EAR/PLS OXIMETRY MLT: CPT

## 2018-08-21 PROCEDURE — 25000003 PHARM REV CODE 250: Performed by: NURSE PRACTITIONER

## 2018-08-21 PROCEDURE — 36415 COLL VENOUS BLD VENIPUNCTURE: CPT

## 2018-08-21 PROCEDURE — 97162 PT EVAL MOD COMPLEX 30 MIN: CPT

## 2018-08-21 PROCEDURE — 83880 ASSAY OF NATRIURETIC PEPTIDE: CPT

## 2018-08-21 PROCEDURE — 25000242 PHARM REV CODE 250 ALT 637 W/ HCPCS: Performed by: INTERNAL MEDICINE

## 2018-08-21 PROCEDURE — 94640 AIRWAY INHALATION TREATMENT: CPT

## 2018-08-21 RX ORDER — HYDRALAZINE HYDROCHLORIDE 20 MG/ML
10 INJECTION INTRAMUSCULAR; INTRAVENOUS ONCE
Status: COMPLETED | OUTPATIENT
Start: 2018-08-21 | End: 2018-08-21

## 2018-08-21 RX ORDER — LOSARTAN POTASSIUM AND HYDROCHLOROTHIAZIDE 25; 100 MG/1; MG/1
1 TABLET ORAL DAILY
Status: ON HOLD | COMMUNITY
End: 2018-08-24 | Stop reason: HOSPADM

## 2018-08-21 RX ORDER — CALCIUM CARBONATE 600 MG
600 TABLET ORAL DAILY
COMMUNITY

## 2018-08-21 RX ORDER — METFORMIN HYDROCHLORIDE 500 MG/1
500 TABLET, EXTENDED RELEASE ORAL NIGHTLY
Status: ON HOLD | COMMUNITY
End: 2018-08-22 | Stop reason: HOSPADM

## 2018-08-21 RX ORDER — FUROSEMIDE 10 MG/ML
40 INJECTION INTRAMUSCULAR; INTRAVENOUS ONCE
Status: COMPLETED | OUTPATIENT
Start: 2018-08-21 | End: 2018-08-21

## 2018-08-21 RX ORDER — ATORVASTATIN CALCIUM 20 MG/1
20 TABLET, FILM COATED ORAL DAILY
COMMUNITY

## 2018-08-21 RX ADMIN — MUPIROCIN: 20 OINTMENT TOPICAL at 05:08

## 2018-08-21 RX ADMIN — HYDRALAZINE HYDROCHLORIDE AND ISOSORBIDE DINITRATE 1 TABLET: 37.5; 2 TABLET, FILM COATED ORAL at 04:08

## 2018-08-21 RX ADMIN — HEPARIN SODIUM 5000 UNITS: 5000 INJECTION, SOLUTION INTRAVENOUS; SUBCUTANEOUS at 06:08

## 2018-08-21 RX ADMIN — MUPIROCIN: 20 OINTMENT TOPICAL at 09:08

## 2018-08-21 RX ADMIN — CITALOPRAM HYDROBROMIDE 40 MG: 20 TABLET ORAL at 09:08

## 2018-08-21 RX ADMIN — IPRATROPIUM BROMIDE AND ALBUTEROL SULFATE 3 ML: .5; 3 SOLUTION RESPIRATORY (INHALATION) at 09:08

## 2018-08-21 RX ADMIN — THERA TABS 1 TABLET: TAB at 09:08

## 2018-08-21 RX ADMIN — PANTOPRAZOLE SODIUM 40 MG: 40 TABLET, DELAYED RELEASE ORAL at 09:08

## 2018-08-21 RX ADMIN — Medication 100 MG: at 09:08

## 2018-08-21 RX ADMIN — HEPARIN SODIUM 5000 UNITS: 5000 INJECTION, SOLUTION INTRAVENOUS; SUBCUTANEOUS at 09:08

## 2018-08-21 RX ADMIN — HYDROCODONE BITARTRATE AND ACETAMINOPHEN 1 TABLET: 10; 325 TABLET ORAL at 05:08

## 2018-08-21 RX ADMIN — FUROSEMIDE 40 MG: 10 INJECTION, SOLUTION INTRAMUSCULAR; INTRAVENOUS at 09:08

## 2018-08-21 RX ADMIN — HYDRALAZINE HYDROCHLORIDE AND ISOSORBIDE DINITRATE 1 TABLET: 37.5; 2 TABLET, FILM COATED ORAL at 09:08

## 2018-08-21 RX ADMIN — POLYETHYLENE GLYCOL (3350) 17 G: 17 POWDER, FOR SOLUTION ORAL at 09:08

## 2018-08-21 RX ADMIN — HEPARIN SODIUM 5000 UNITS: 5000 INJECTION, SOLUTION INTRAVENOUS; SUBCUTANEOUS at 01:08

## 2018-08-21 RX ADMIN — CHLORDIAZEPOXIDE HYDROCHLORIDE 10 MG: 10 CAPSULE ORAL at 04:08

## 2018-08-21 RX ADMIN — FOLIC ACID 1 MG: 1 TABLET ORAL at 09:08

## 2018-08-21 RX ADMIN — CHLORDIAZEPOXIDE HYDROCHLORIDE 10 MG: 10 CAPSULE ORAL at 09:08

## 2018-08-21 RX ADMIN — HYDRALAZINE HYDROCHLORIDE 10 MG: 20 INJECTION INTRAMUSCULAR; INTRAVENOUS at 04:08

## 2018-08-21 RX ADMIN — AMLODIPINE BESYLATE 10 MG: 10 TABLET ORAL at 09:08

## 2018-08-21 RX ADMIN — HYDROCODONE BITARTRATE AND ACETAMINOPHEN 1 TABLET: 10; 325 TABLET ORAL at 09:08

## 2018-08-21 NOTE — PROGRESS NOTES
[facility] Hospital Medicine  Progress Note    Patient Name: Lionel Castillo  MRN: [id number]  Patient Class: IP- Inpatient   Admission Date: 8/18/2018  Length of Stay: 2 days  Attending Physician: Manuel Willis MD  Primary Care Provider: Eddie Garcia MD        Subjective:     Principal Problem:Acute on chronic respiratory failure with hypoxia    HPI:  Worsening shortness of breath since returning home after a recent admission.  He also has a non-productive cough.  Denies chest pain.  Denies nausea, vomiting, fevers, or chills.  He was admitted on 04 August with shortness of breath and diagnosed with a diastolic heart failure exacerbation.  He has a right humerus fracture from a fall from a syncopal episode on 18 July.  He had a complicated right sided pleural effusion and atelectasis with a non-specific lung mass discovered on his first admission.  Further workup was planned as an outpatient with Dr. Bailey of Pulmonary medicine.      Hospital Course:  Admitted for further evaluation and treatment of shortness of breath.  Hemodynamically stable in the ER.  He had a serum Potassium of 6.6 with peaked T-waves on EKG.  He receive 10 units regular insulin IV and an ampule of D50 in the ER.  He also received 15 g of Kayexalate.  Chest X-ray showed a new left basilar infiltrate.  Empirically started Moxifloxacin and Furosemide IV 80 mg.  WBC count was high normal with a left shift.  Lactic acid 2.1 and mild hyponatremia.  WBC count returned to normal and procalcitonin normal.  Stopped Moxifloxacin - Pneumonia ruled out.  Persistent hypoxemia that corrected well with continuous BiPAP.  Consulting Cardiology and Nephrology for assistance optimizing management.  Continuing diuresis.    Interval History:  No acute problems overnight.  Continuous BiPAP to maintain oxygen saturation.    Review of Systems   Constitutional: Negative.  Negative for chills and fever.   HENT: Negative.  Negative for  congestion and sore throat.    Eyes: Negative.  Negative for visual disturbance.   Respiratory: Positive for cough and shortness of breath. Negative for wheezing.    Cardiovascular: Negative.  Negative for chest pain.   Gastrointestinal: Negative for abdominal pain, diarrhea, nausea and vomiting.   Endocrine: Negative.    Genitourinary: Negative.    Musculoskeletal: Negative.  Negative for myalgias and neck stiffness.   Skin: Negative.  Negative for color change and pallor.   Allergic/Immunologic: Negative.    Neurological: Negative.    Hematological: Negative.    Psychiatric/Behavioral: Negative.    All other systems reviewed and are negative.    Objective:     Vital Signs (Most Recent):  Temp: 97.6 °F (36.4 °C) (08/20/18 1917)  Pulse: 93 (08/20/18 1917)  Resp: 18 (08/20/18 1917)  BP: (!) 155/70 (08/20/18 1917)  SpO2: (!) 94 % (08/20/18 1917) Vital Signs (24h Range):  Temp:  [97.1 °F (36.2 °C)-98 °F (36.7 °C)] 97.6 °F (36.4 °C)  Pulse:  [72-93] 93  Resp:  [16-18] 18  SpO2:  [92 %-98 %] 94 %  BP: (149-163)/(66-73) 155/70     Weight: 57.7 kg (127 lb 3.3 oz)  Body mass index is 18.78 kg/m².    Intake/Output Summary (Last 24 hours) at 8/20/2018 2147  Last data filed at 8/20/2018 1953  Gross per 24 hour   Intake 390 ml   Output 625 ml   Net -235 ml      Physical Exam   Constitutional: He is oriented to person, place, and time. He appears well-developed. No distress.   Frail and borderline cachectic   HENT:   Head: Normocephalic and atraumatic.   Mouth/Throat: Oropharynx is clear and moist.   Eyes: Conjunctivae and EOM are normal. Pupils are equal, round, and reactive to light.   Neck: No JVD present. No thyromegaly present.   Cardiovascular: Normal rate, regular rhythm and normal heart sounds. Exam reveals no gallop and no friction rub.   No murmur heard.  Pulmonary/Chest: Effort normal and breath sounds normal. No respiratory distress. He has no wheezes. He has no rales.   Upper and lower crackles left chest and  moderate crackles with reduced breath sounds on the right.   Abdominal: Soft. Bowel sounds are normal. He exhibits no distension. There is no tenderness. There is no rebound and no guarding.   Musculoskeletal: Normal range of motion. He exhibits no edema or tenderness.   Lymphadenopathy:     He has no cervical adenopathy.   Neurological: He is alert and oriented to person, place, and time. He has normal reflexes. He displays normal reflexes. No cranial nerve deficit.   Skin: Skin is warm and dry. No rash noted. He is not diaphoretic. No erythema.   Scattered ecchymosis forearms bilaterally   Psychiatric: He has a normal mood and affect. His behavior is normal. Judgment and thought content normal.       Significant Labs: All pertinent labs within the past 24 hours have been reviewed.    Significant Imaging: I have reviewed all pertinent imaging results/findings within the past 24 hours.    Assessment/Plan:      * Acute on chronic respiratory failure with hypoxia    Due to volume overload and heart failure.  Continuous BiPAP to maintain oxygen saturation above 88%.  Diuresis with IV Furosemide.  Persistent hypoxia after diuresis although urine output limited by acute renal failure.  Unable to perform CTA of the chest due to renal failure.  Ordered Lung  Perfusion scan.        Left lower lobe pneumonia    New infiltrate on CXR in left lower lobe.  Received Moxifloxacin.  Procalcitonin negative and WBC returned to normal afebrile.  Stopped Moxifloxacin and ruled out pneumonia.        Hyperkalemia    Potassium level 6.6 on admission with peaked T waves on EKG.  He received 10 units IV insulin and and ampule of D50 in the ED.  He had hypokalemia during his prior admission and was taking Potassium replacement.  Added Kayexalate 15 mg BID.  Unable to give IV fluids aggressively due to heart failure exacerbation.  Decreased to 3.8 after 24 hours.  Continue to monitor.        Acute on chronic diastolic congestive heart  failure    He received 80 mg Furosemide IV in the ER with a modest increase in urine output.  BNP very high over 4000.  Recent cardiac echo showed normal systolic function and diastolic dysfunction and structural abnormalities.  Consulting Cardiology.          Mass of right lung    Uncertain etiology but high risk for pulmonary malignancy.  Smoking history and signs of emphysema on CT chest.  There was an outpatient plan for further workup with Dr. Bailey.        Essential hypertension    Continuing Amlodipine.  Add Bidil.        Acute on chronic renal failure    Careful use of nephrotoxic medications.  Limited on fluid resuscitation due to heart failure.  Consult Nephrology.        Hyponatremia    Multifactorial due to use of loop diuretic, heart failure, and possible pulmonary malignancy.  Total water restriction to 1500 mL per day.  Careful use of diuretics.  Signs of pulmonary edema but no peripheral edema.        Alcohol abuse    Chlordiazepoxide 10 mg three times daily.  Lorazepam as needed.          VTE Risk Mitigation (From admission, onward)        Ordered     heparin (porcine) injection 5,000 Units  Every 8 hours      08/19/18 1031              Manuel Willis MD  Department of Hospital Medicine   [facility]

## 2018-08-21 NOTE — PT/OT/SLP EVAL
Physical Therapy Evaluation    Patient Name:  Lionel Castillo   MRN:  [id number]    Recommendations:     Discharge Recommendations:  nursing facility, skilled   Discharge Equipment Recommendations: none   Barriers to discharge: Decreased caregiver support    Assessment:     Lionel Castillo is a 68 y.o. male admitted with a medical diagnosis of Acute on chronic respiratory failure with hypoxia.  He presents with the following impairments/functional limitations:  weakness, impaired endurance, impaired functional mobilty, gait instability, impaired balance, decreased coordination, decreased safety awareness.    Rehab Prognosis:  FAIR; patient would benefit from acute skilled PT services to address these deficits and reach maximum level of function.      Recent Surgery: * No surgery found *     Plan:     During this hospitalization, patient to be seen 5 x/week to address the above listed problems via gait training, therapeutic activities, therapeutic exercises  · Plan of Care Expires:  08/28/18   Plan of Care Reviewed with: patient, son    Subjective     Communicated with NURSE JAIMES prior to session.  Patient found SUPINE upon PT entry to room, agreeable to evaluation.      Chief Complaint: SOB AND DIZZINESS WITH UPRIGHT ACTIVITY  Patient comments/goals: RETURN HOME  Pain/Comfort:  · Pain Rating 1: 10/10  · Location - Side 1: Left  · Location 1: hand    Patients cultural, spiritual, Hoahaoism conflicts given the current situation:     Living Environment:  PT LIVES ALONE 1 STORY HOUSE 3 STEPS TO ENTER WITH B RAIL, PT AMB HOUSEHOLD DISTANCES WITH SPC, NEIGHBORS DRIVE FOR HIM, PT REPORTS ABLE TO DRESS INDEP BUT REQUIRE ASSIST FOR BATHING.  PT CAME FROM  REHAB  Prior to admission, patients level of function was.  Patient has the following equipment: walker, rolling, rollator, raised toilet, cane, straight, grab bar, shower chair.  DME owned (not currently used): none.  Upon discharge, patient will have  assistance from ?.    Objective:     Patient found with: telemetry, peripheral IV, oxygen     General Precautions: Standard, fall   Orthopedic Precautions: NWB TO RUE  Braces: SLING AND SWATHE    Exams:  · Cognitive Exam:  Patient is oriented to Person, Place, Time and Situation  · Postural Exam:  Patient presented with the following abnormalities:    · -       Rounded shoulders  · -       Forward head  · Sensation:    · -       Impaired  light/touch BOTH FEET  · RLE ROM: WFL  · RLE Strength: GROSSLY 3+/5  · LLE ROM: WFL  · LLE Strength: GROSSLY 3+/5    Functional Mobility:  · Bed Mobility:     · Rolling Left:  moderate assistance  · Scooting: moderate assistance  · Supine to Sit: moderate assistance  · Transfers:     · Sit to Stand:  moderate assistance with no AD  · Bed to Chair: moderate assistance with  no AD  using  Stand Pivot  · Gait: UNABLE TO TOLERATE GAIT AT THIS TIME, C/O DIZZINESS AND SOB, 2.5L OF O2 CONNECTED  · Balance: POOR    AM-PAC 6 CLICK MOBILITY  Total Score:10     Therapeutic Activities and Exercises:   PT EDUCATED IN ROLE OF P.T. AND POC, PT C/O SOB AND DIZZINESS THROUGHOUT TX. RT CALLED FOR BREATHING TX., PT ASSISTED TO BEDSIDE CHAIR WITH MODA, PT VERY QUICK TO FATIGUE, PT EDUCATED IN BLE THEREX TO PERFORM WHILE SEATED IN CHAIR    Patient left up in chair with all lines intact, call button in reach and NURSE notified.    GOALS:   Multidisciplinary Problems     Physical Therapy Goals        Problem: Physical Therapy Goal    Goal Priority Disciplines Outcome Goal Variances Interventions   Physical Therapy Goal     PT, PT/OT      Description:  LTG'S TO BE MET IN 7 DAYS (8-28-18)  1. PT WILL REQUIRE DANO FOR BED MOBILITY  2. PT WILL REQUIRE DANO FOR TF'S  3. PT WILL ' WITH DANO  4. PT WILL DEMO P+ DYNAMIC BALANCE DURING GAIT                    History:     Past Medical History:   Diagnosis Date    Arthritis     Cancer     lung cancer    Diabetes     Foot fracture     Hand fracture,  left     Hand fracture, right     HTN (hypertension)     Humerus fracture 07/18/2018    Hyperlipidemia     Neuropathy     Renal disorder        Past Surgical History:   Procedure Laterality Date    ANKLE SURGERY Left     CHOLECYSTECTOMY      HUMERUS FRACTURE SURGERY         Clinical Decision Making:     History  Co-morbidities and personal factors that may impact the plan of care Examination  Body Structures and Functions, activity limitations and participation restrictions that may impact the plan of care Clinical Presentation   Decision Making/ Complexity Score   Co-morbidities:   [] Time since onset of injury / illness / exacerbation  [] Status of current condition  []Patient's cognitive status and safety concerns    [] Multiple Medical Problems (see med hx)  Personal Factors:   [] Patient's age  [] Prior Level of function   [] Patient's home situation (environment and family support)  [] Patient's level of motivation  [] Expected progression of patient      HISTORY:(criteria)    [] 04431 - no personal factors/history    [] 86681 - has 1-2 personal factor/comorbidity     [] 44529 - has >3 personal factor/comorbidity     Body Regions:  [] Objective examination findings  [] Head     []  Neck  [] Trunk   [] Upper Extremity  [] Lower Extremity    Body Systems:  [] For communication ability, affect, cognition, language, and learning style: the assessment of the ability to make needs known, consciousness, orientation (person, place, and time), expected emotional /behavioral responses, and learning preferences (eg, learning barriers, education  needs)  [] For the neuromuscular system: a general assessment of gross coordinated movement (eg, balance, gait, locomotion, transfers, and transitions) and motor function  (motor control and motor learning)  [] For the musculoskeletal system: the assessment of gross symmetry, gross range of motion, gross strength, height, and weight  [] For the integumentary system: the  assessment of pliability(texture), presence of scar formation, skin color, and skin integrity  [] For cardiovascular/pulmonary system: the assessment of heart rate, respiratory rate, blood pressure, and edema     Activity limitations:    [] Patient's cognitive status and saf ety concerns          [] Status of current condition      [] Weight bearing restriction  [] Cardiopulmunary Restriction    Participation Restrictions:   [] Goals and goal agreement with the patient     [] Rehab potential (prognosis) and probable outcome      Examination of Body System: (criteria)    [] 57383 - addressing 1-2 elements    [] 52982 - addressing a total of 3 or more elements     [] 67195 -  Addressing a total of 4 or more elements         Clinical Presentation: (criteria)  Choose one     On examination of body system using standardized tests and measures patient presents with (CHOOSE ONE) elements from any of the following: body structures and functions, activity limitations, and/or participation restrictions.  Leading to a clinical presentation that is considered (CHOOSE ONE)                              Clinical Decision Making  (Eval Complexity):  Choose One     Time Tracking:     PT Received On: 08/21/18  PT Start Time: 0800     PT Stop Time: 0830  PT Total Time (min): 30 min     Billable Minutes: Evaluation 15 and Therapeutic Activity 15     PT ENCOURAGED TO CALL FOR ASSISTANCE WITH ALL NEEDS DUE TO FALL RISK STATUS, PT AGREEABLE    Rachelle Sanchez, PT  08/21/2018

## 2018-08-21 NOTE — ASSESSMENT & PLAN NOTE
Improved after thoracentesis.  BNP and Cr improved  Continue BP control  Ok to hold lasix now  Keep I&O even  Ok for BB, hydralazine and Imdur for BP control  Pleural effusion lab pending

## 2018-08-21 NOTE — PROGRESS NOTES
[facility]   Pulmonology  Progress Note    Patient Name: Lionel Castillo  MRN: [id number]  Admission Date: 8/18/2018  Hospital Length of Stay: 3 days  Code Status: Full Code  Attending Provider: Manuel Willis MD  Primary Care Provider: Eddie Garcia MD   Principal Problem: Acute on chronic respiratory failure with hypoxia    Subjective:     Interval History: Seen and examined at bedside. Hospital chart reviewed. No acute interval detrimental events noted. He reports that he  has moderately improved    Objective:     Vital Signs (Most Recent):  Temp: 97.7 °F (36.5 °C) (08/21/18 1201)  Pulse: 87 (08/21/18 1201)  Resp: 18 (08/21/18 0905)  BP: (!) 177/75 (08/21/18 1201)  SpO2: (!) 92 % (08/21/18 1201) Vital Signs (24h Range):  Temp:  [97.5 °F (36.4 °C)-97.7 °F (36.5 °C)] 97.7 °F (36.5 °C)  Pulse:  [] 87  Resp:  [16-20] 18  SpO2:  [90 %-96 %] 92 %  BP: (104-182)/(55-77) 177/75     Weight: 63.2 kg (139 lb 5.3 oz)  Body mass index is 20.58 kg/m².      Intake/Output Summary (Last 24 hours) at 8/21/2018 1240  Last data filed at 8/21/2018 0830  Gross per 24 hour   Intake 720 ml   Output 600 ml   Net 120 ml       Physical Exam   Constitutional: He is oriented to person, place, and time. He appears well-developed and well-nourished. No distress.   HENT:   Head: Normocephalic.   Eyes: EOM are normal. Pupils are equal, round, and reactive to light.   Neck: Normal range of motion. Neck supple. No JVD present. No thyromegaly present.   Cardiovascular: Normal rate, regular rhythm, S1 normal, S2 normal and intact distal pulses. PMI is not displaced. Exam reveals no gallop and no friction rub.   Murmur heard.  Pulmonary/Chest: No respiratory distress. He has no wheezes. He has rales. He exhibits no tenderness.   Abdominal: He exhibits no distension and no mass. There is no hepatosplenomegaly. There is no tenderness. There is no rebound and no CVA tenderness. No hernia.   Musculoskeletal: Normal  range of motion. He exhibits no edema or tenderness.   Muscle wasting and signs of malnutrition   Lymphadenopathy:     He has no cervical adenopathy.   Neurological: He is alert and oriented to person, place, and time. He has normal reflexes. He is not disoriented. He displays normal reflexes (). No cranial nerve deficit. He exhibits normal muscle tone. Coordination normal.   Skin: Skin is warm and dry. No rash noted. No erythema.   Psychiatric: He has a normal mood and affect. His behavior is normal. Judgment and thought content normal.   Nursing note and vitals reviewed.      Vents:  Oxygen Concentration (%): 30 (08/21/18 0905)    Lines/Drains/Airways     Peripheral Intravenous Line                 Peripheral IV - Single Lumen 08/21/18 0932 Anterior;Left Forearm less than 1 day                Significant Labs:    CBC/Anemia Profile:  Recent Labs   Lab  08/20/18   0529  08/21/18   0459   WBC  9.48  7.03   HGB  11.1*  10.7*   HCT  32.2*  31.2*   PLT  528*  511*   MCV  102*  102*   RDW  14.2  14.2        Chemistries:  Recent Labs   Lab  08/19/18   1320  08/20/18   0529  08/21/18   0459   NA  139  137  134*   K  3.3*  3.1*  3.8   CL  101  101  100   CO2  24  23  22*   BUN  58*  55*  48*   CREATININE  3.0*  2.9*  2.7*   CALCIUM  10.1  9.5  9.7   ALBUMIN   --   2.3*  2.3*   PROT  6.9  6.5  6.5   BILITOT   --   0.5  0.5   ALKPHOS   --   159*  146*   ALT   --   9*  8*   AST   --   10  9*   MG   --   1.3*   --    PHOS   --   5.0*   --        Significant Imaging:    CXR: In comparison to the prior study, there is no adverse interval changes      Assessment/Plan:     * Acute on chronic respiratory failure with hypoxia    Supplement oxygenation. Keep SAO2 >= 92%. BIPAP QHS and PRN. Bronchodilators per orders        Recurrent right pleural effusion    S/P thoracentesis: Lymphocyte predominant exudate. Out patient follow up wit [facility]. MULAMULA        Centrilobular emphysema                                  Continue with LABA,  MIKEY and ICS.        Continue IV glucocorticoids        Continue nighttime BiPAP                                                   Acute on chronic diastolic congestive heart failure    Optimize CHF regimen.  Recommend referral to CHF clinic.  Preload and afterload reduction.  Daily weighing.  Dietary salt restriction.  Alcohol and tobacco avoidance.          Mass of right lung    Uncertain etiology but high risk for pulmonary malignancy.  Smoking history and signs of emphysema on CT chest. Has been documented since 2016. Outpatient follow up with DR. DEL VALLE.        Tobacco use    Encourage smoking cessation        Essential hypertension    Po  Amlodipine        Right humeral fracture    Right arm sling        Alcohol abuse    Continue Thiamine, Folate and MVI. Continue to monitor.           Will sign off for now. Please feel free to re consult if further pulmonary issues arise.     Deo Garcia MD  Pulmonology  [facility]

## 2018-08-21 NOTE — SUBJECTIVE & OBJECTIVE
ROS  Objective:     Vital Signs (Most Recent):  Temp: 97.6 °F (36.4 °C) (08/21/18 0832)  Pulse: 106 (08/21/18 0905)  Resp: 18 (08/21/18 0905)  BP: (!) 169/74 (08/21/18 0832)  SpO2: (!) 90 % (08/21/18 0905) Vital Signs (24h Range):  Temp:  [97.5 °F (36.4 °C)-97.7 °F (36.5 °C)] 97.6 °F (36.4 °C)  Pulse:  [] 106  Resp:  [16-20] 18  SpO2:  [90 %-96 %] 90 %  BP: (104-182)/(55-77) 169/74     Weight: 63.2 kg (139 lb 5.3 oz)  Body mass index is 20.58 kg/m².     SpO2: (!) 90 %  O2 Device (Oxygen Therapy): nasal cannula      Intake/Output Summary (Last 24 hours) at 8/21/2018 1136  Last data filed at 8/21/2018 0830  Gross per 24 hour   Intake 960 ml   Output 800 ml   Net 160 ml       Lines/Drains/Airways     Peripheral Intravenous Line                 Peripheral IV - Single Lumen 08/21/18 0932 Anterior;Left Forearm less than 1 day                Physical Exam   Constitutional: He is oriented to person, place, and time. He appears well-developed and well-nourished. No distress.   HENT:   Head: Normocephalic and atraumatic.   Eyes: Pupils are equal, round, and reactive to light. Right eye exhibits no discharge. Left eye exhibits no discharge.   Neck: Neck supple. JVD present. No thyromegaly present.   Cardiovascular: Normal rate, regular rhythm, S1 normal, S2 normal and normal heart sounds.   No murmur heard.  Pulmonary/Chest: No respiratory distress.   Diminished BS at bases   Abdominal: Soft. He exhibits no distension.   Musculoskeletal: He exhibits no edema.   Neurological: He is alert and oriented to person, place, and time.   Skin: Skin is warm and dry. He is not diaphoretic. No erythema.   Numerous scattered ecchymoses    Psychiatric: He has a normal mood and affect. His behavior is normal.   Nursing note and vitals reviewed.      Significant Labs:   ABG: No results for input(s): PH, PCO2, HCO3, POCSATURATED, BE in the last 48 hours., Blood Culture: No results for input(s): LABBLOO in the last 48 hours., BMP:    Recent Labs   Lab  08/19/18   1320  08/20/18   0529  08/21/18   0459   GLU  191*  132*  128*   NA  139  137  134*   K  3.3*  3.1*  3.8   CL  101  101  100   CO2  24  23  22*   BUN  58*  55*  48*   CREATININE  3.0*  2.9*  2.7*   CALCIUM  10.1  9.5  9.7   MG   --   1.3*   --    , CMP   Recent Labs   Lab  08/19/18   1320  08/20/18   0529  08/21/18   0459   NA  139  137  134*   K  3.3*  3.1*  3.8   CL  101  101  100   CO2  24  23  22*   GLU  191*  132*  128*   BUN  58*  55*  48*   CREATININE  3.0*  2.9*  2.7*   CALCIUM  10.1  9.5  9.7   PROT  6.9  6.5  6.5   ALBUMIN   --   2.3*  2.3*   BILITOT   --   0.5  0.5   ALKPHOS   --   159*  146*   AST   --   10  9*   ALT   --   9*  8*   ANIONGAP  14  13  12   ESTGFRAFRICA  24*  25*  27*   EGFRNONAA  21*  22*  23*   , CBC   Recent Labs   Lab  08/20/18   0529  08/21/18   0459   WBC  9.48  7.03   HGB  11.1*  10.7*   HCT  32.2*  31.2*   PLT  528*  511*   , INR No results for input(s): INR, PROTIME in the last 48 hours., Lipid Panel No results for input(s): CHOL, HDL, LDLCALC, TRIG, CHOLHDL in the last 48 hours. and Troponin No results for input(s): TROPONINI in the last 48 hours.    Significant Imaging: X-Ray: CXR: X-Ray Chest 1 View (CXR):   Results for orders placed or performed during the hospital encounter of 08/18/18   X-Ray Chest 1 View    Narrative    EXAMINATION:  XR CHEST 1 VIEW    CLINICAL HISTORY:  Pleural effusion;    TECHNIQUE:  Single frontal view of the chest was performed.    COMPARISON:  08/19/2018    FINDINGS:  In comparison to the prior study, there is no adverse interval changes      Impression    In comparison to the prior study, there is no adverse interval changes      Electronically signed by: Irwin Montemayor MD  Date:    08/20/2018  Time:    07:34

## 2018-08-21 NOTE — PROGRESS NOTES
[facility] -   Cardiology  Progress Note    Patient Name: Lionel Castillo  MRN: [id number]  Admission Date: 8/18/2018  Hospital Length of Stay: 3 days  Code Status: Full Code   Attending Physician: Manuel Willis MD   Primary Care Physician: Eddie Garcia MD  Expected Discharge Date:   Principal Problem:Acute on chronic respiratory failure with hypoxia    Subjective:     Hospital Course:   8/20/18-Patient seen and examined today, sitting up in bed, s/p right sided thoracentesis yesterday. Feels weak/fatigued. SOB improved. No chest pain. Labs reviewed. Creatinine 2.9, K slightly low.  08/21-dyspnea resolved. No chest pain.        ROS  Objective:     Vital Signs (Most Recent):  Temp: 97.6 °F (36.4 °C) (08/21/18 0832)  Pulse: 106 (08/21/18 0905)  Resp: 18 (08/21/18 0905)  BP: (!) 169/74 (08/21/18 0832)  SpO2: (!) 90 % (08/21/18 0905) Vital Signs (24h Range):  Temp:  [97.5 °F (36.4 °C)-97.7 °F (36.5 °C)] 97.6 °F (36.4 °C)  Pulse:  [] 106  Resp:  [16-20] 18  SpO2:  [90 %-96 %] 90 %  BP: (104-182)/(55-77) 169/74     Weight: 63.2 kg (139 lb 5.3 oz)  Body mass index is 20.58 kg/m².     SpO2: (!) 90 %  O2 Device (Oxygen Therapy): nasal cannula      Intake/Output Summary (Last 24 hours) at 8/21/2018 1136  Last data filed at 8/21/2018 0830  Gross per 24 hour   Intake 960 ml   Output 800 ml   Net 160 ml       Lines/Drains/Airways     Peripheral Intravenous Line                 Peripheral IV - Single Lumen 08/21/18 0932 Anterior;Left Forearm less than 1 day                Physical Exam   Constitutional: He is oriented to person, place, and time. He appears well-developed and well-nourished. No distress.   HENT:   Head: Normocephalic and atraumatic.   Eyes: Pupils are equal, round, and reactive to light. Right eye exhibits no discharge. Left eye exhibits no discharge.   Neck: Neck supple. JVD present. No thyromegaly present.   Cardiovascular: Normal rate, regular rhythm, S1 normal, S2 normal and  normal heart sounds.   No murmur heard.  Pulmonary/Chest: No respiratory distress.   Diminished BS at bases   Abdominal: Soft. He exhibits no distension.   Musculoskeletal: He exhibits no edema.   Neurological: He is alert and oriented to person, place, and time.   Skin: Skin is warm and dry. He is not diaphoretic. No erythema.   Numerous scattered ecchymoses    Psychiatric: He has a normal mood and affect. His behavior is normal.   Nursing note and vitals reviewed.      Significant Labs:   ABG: No results for input(s): PH, PCO2, HCO3, POCSATURATED, BE in the last 48 hours., Blood Culture: No results for input(s): LABBLOO in the last 48 hours., BMP:   Recent Labs   Lab  08/19/18   1320  08/20/18   0529  08/21/18   0459   GLU  191*  132*  128*   NA  139  137  134*   K  3.3*  3.1*  3.8   CL  101  101  100   CO2  24  23  22*   BUN  58*  55*  48*   CREATININE  3.0*  2.9*  2.7*   CALCIUM  10.1  9.5  9.7   MG   --   1.3*   --    , CMP   Recent Labs   Lab  08/19/18   1320  08/20/18   0529  08/21/18   0459   NA  139  137  134*   K  3.3*  3.1*  3.8   CL  101  101  100   CO2  24  23  22*   GLU  191*  132*  128*   BUN  58*  55*  48*   CREATININE  3.0*  2.9*  2.7*   CALCIUM  10.1  9.5  9.7   PROT  6.9  6.5  6.5   ALBUMIN   --   2.3*  2.3*   BILITOT   --   0.5  0.5   ALKPHOS   --   159*  146*   AST   --   10  9*   ALT   --   9*  8*   ANIONGAP  14  13  12   ESTGFRAFRICA  24*  25*  27*   EGFRNONAA  21*  22*  23*   , CBC   Recent Labs   Lab  08/20/18   0529  08/21/18   0459   WBC  9.48  7.03   HGB  11.1*  10.7*   HCT  32.2*  31.2*   PLT  528*  511*   , INR No results for input(s): INR, PROTIME in the last 48 hours., Lipid Panel No results for input(s): CHOL, HDL, LDLCALC, TRIG, CHOLHDL in the last 48 hours. and Troponin No results for input(s): TROPONINI in the last 48 hours.    Significant Imaging: X-Ray: CXR: X-Ray Chest 1 View (CXR):   Results for orders placed or performed during the hospital encounter of 08/18/18   X-Ray Chest  1 View    Narrative    EXAMINATION:  XR CHEST 1 VIEW    CLINICAL HISTORY:  Pleural effusion;    TECHNIQUE:  Single frontal view of the chest was performed.    COMPARISON:  08/19/2018    FINDINGS:  In comparison to the prior study, there is no adverse interval changes      Impression    In comparison to the prior study, there is no adverse interval changes      Electronically signed by: Irwin Montemayor MD  Date:    08/20/2018  Time:    07:34     Assessment and Plan:         * Acute on chronic respiratory failure with hypoxia    -Presented with recurrent right-sided pleural effusion, hypoxia/respiratory distress and markedly elevated BNP  -Improved s/p right-sided thoracentesis; cytology pending  -Continue current meds  -Will diurese today and assess response        Recurrent right pleural effusion    -improved s/p right-sided thoracentesis        Acute on chronic diastolic congestive heart failure    Improved after thoracentesis.  BNP and Cr improved  Continue BP control  Ok to hold lasix now  Keep I&O even  Ok for BB, hydralazine and Imdur for BP control  Pleural effusion lab pending              Mass of right lung    -Work up as per pulmonary        Tobacco use    -Counseled on smoking cessation        Type 2 diabetes mellitus, without long-term current use of insulin    On medical therapy        Essential hypertension    -Continue current meds        Acute on chronic renal failure    -Creatinine 2.9 this AM  -Nephrology on board, discussed plan of care, will give IV Lasix today and assess response  -Repeat labs in AM        Alcohol abuse    -Counseled on ETOH cessation            VTE Risk Mitigation (From admission, onward)        Ordered     heparin (porcine) injection 5,000 Units  Every 8 hours      08/19/18 1031          Elmo Krishnan MD  Cardiology  [facility] [place]

## 2018-08-21 NOTE — SUBJECTIVE & OBJECTIVE
Interval History: Seen and examined at bedside. Hospital chart reviewed. No acute interval detrimental events noted. He reports that he  has moderately improved    Objective:     Vital Signs (Most Recent):  Temp: 97.7 °F (36.5 °C) (08/21/18 1201)  Pulse: 87 (08/21/18 1201)  Resp: 18 (08/21/18 0905)  BP: (!) 177/75 (08/21/18 1201)  SpO2: (!) 92 % (08/21/18 1201) Vital Signs (24h Range):  Temp:  [97.5 °F (36.4 °C)-97.7 °F (36.5 °C)] 97.7 °F (36.5 °C)  Pulse:  [] 87  Resp:  [16-20] 18  SpO2:  [90 %-96 %] 92 %  BP: (104-182)/(55-77) 177/75     Weight: 63.2 kg (139 lb 5.3 oz)  Body mass index is 20.58 kg/m².      Intake/Output Summary (Last 24 hours) at 8/21/2018 1240  Last data filed at 8/21/2018 0830  Gross per 24 hour   Intake 720 ml   Output 600 ml   Net 120 ml       Physical Exam   Constitutional: He is oriented to person, place, and time. He appears well-developed and well-nourished. No distress.   HENT:   Head: Normocephalic.   Eyes: EOM are normal. Pupils are equal, round, and reactive to light.   Neck: Normal range of motion. Neck supple. No JVD present. No thyromegaly present.   Cardiovascular: Normal rate, regular rhythm, S1 normal, S2 normal and intact distal pulses. PMI is not displaced. Exam reveals no gallop and no friction rub.   Murmur heard.  Pulmonary/Chest: No respiratory distress. He has no wheezes. He has rales. He exhibits no tenderness.   Abdominal: He exhibits no distension and no mass. There is no hepatosplenomegaly. There is no tenderness. There is no rebound and no CVA tenderness. No hernia.   Musculoskeletal: Normal range of motion. He exhibits no edema or tenderness.   Muscle wasting and signs of malnutrition   Lymphadenopathy:     He has no cervical adenopathy.   Neurological: He is alert and oriented to person, place, and time. He has normal reflexes. He is not disoriented. He displays normal reflexes (). No cranial nerve deficit. He exhibits normal muscle tone. Coordination normal.    Skin: Skin is warm and dry. No rash noted. No erythema.   Psychiatric: He has a normal mood and affect. His behavior is normal. Judgment and thought content normal.   Nursing note and vitals reviewed.      Vents:  Oxygen Concentration (%): 30 (08/21/18 0905)    Lines/Drains/Airways     Peripheral Intravenous Line                 Peripheral IV - Single Lumen 08/21/18 0932 Anterior;Left Forearm less than 1 day                Significant Labs:    CBC/Anemia Profile:  Recent Labs   Lab  08/20/18   0529  08/21/18   0459   WBC  9.48  7.03   HGB  11.1*  10.7*   HCT  32.2*  31.2*   PLT  528*  511*   MCV  102*  102*   RDW  14.2  14.2        Chemistries:  Recent Labs   Lab  08/19/18   1320  08/20/18   0529  08/21/18   0459   NA  139  137  134*   K  3.3*  3.1*  3.8   CL  101  101  100   CO2  24  23  22*   BUN  58*  55*  48*   CREATININE  3.0*  2.9*  2.7*   CALCIUM  10.1  9.5  9.7   ALBUMIN   --   2.3*  2.3*   PROT  6.9  6.5  6.5   BILITOT   --   0.5  0.5   ALKPHOS   --   159*  146*   ALT   --   9*  8*   AST   --   10  9*   MG   --   1.3*   --    PHOS   --   5.0*   --        Significant Imaging:    CXR: In comparison to the prior study, there is no adverse interval changes

## 2018-08-21 NOTE — ASSESSMENT & PLAN NOTE
Uncertain etiology but high risk for pulmonary malignancy.  Smoking history and signs of emphysema on CT chest. Has been documented since 2016. Outpatient follow up with DR. DE LVALLE.

## 2018-08-21 NOTE — SUBJECTIVE & OBJECTIVE
Interval History:  No acute problems overnight.  Continuous BiPAP to maintain oxygen saturation.    Review of Systems   Constitutional: Negative.  Negative for chills and fever.   HENT: Negative.  Negative for congestion and sore throat.    Eyes: Negative.  Negative for visual disturbance.   Respiratory: Positive for cough and shortness of breath. Negative for wheezing.    Cardiovascular: Negative.  Negative for chest pain.   Gastrointestinal: Negative for abdominal pain, diarrhea, nausea and vomiting.   Endocrine: Negative.    Genitourinary: Negative.    Musculoskeletal: Negative.  Negative for myalgias and neck stiffness.   Skin: Negative.  Negative for color change and pallor.   Allergic/Immunologic: Negative.    Neurological: Negative.    Hematological: Negative.    Psychiatric/Behavioral: Negative.    All other systems reviewed and are negative.    Objective:     Vital Signs (Most Recent):  Temp: 97.6 °F (36.4 °C) (08/20/18 1917)  Pulse: 93 (08/20/18 1917)  Resp: 18 (08/20/18 1917)  BP: (!) 155/70 (08/20/18 1917)  SpO2: (!) 94 % (08/20/18 1917) Vital Signs (24h Range):  Temp:  [97.1 °F (36.2 °C)-98 °F (36.7 °C)] 97.6 °F (36.4 °C)  Pulse:  [72-93] 93  Resp:  [16-18] 18  SpO2:  [92 %-98 %] 94 %  BP: (149-163)/(66-73) 155/70     Weight: 57.7 kg (127 lb 3.3 oz)  Body mass index is 18.78 kg/m².    Intake/Output Summary (Last 24 hours) at 8/20/2018 2147  Last data filed at 8/20/2018 1953  Gross per 24 hour   Intake 390 ml   Output 625 ml   Net -235 ml      Physical Exam   Constitutional: He is oriented to person, place, and time. He appears well-developed. No distress.   Frail and borderline cachectic   HENT:   Head: Normocephalic and atraumatic.   Mouth/Throat: Oropharynx is clear and moist.   Eyes: Conjunctivae and EOM are normal. Pupils are equal, round, and reactive to light.   Neck: No JVD present. No thyromegaly present.   Cardiovascular: Normal rate, regular rhythm and normal heart sounds. Exam reveals no  gallop and no friction rub.   No murmur heard.  Pulmonary/Chest: Effort normal and breath sounds normal. No respiratory distress. He has no wheezes. He has no rales.   Upper and lower crackles left chest and moderate crackles with reduced breath sounds on the right.   Abdominal: Soft. Bowel sounds are normal. He exhibits no distension. There is no tenderness. There is no rebound and no guarding.   Musculoskeletal: Normal range of motion. He exhibits no edema or tenderness.   Lymphadenopathy:     He has no cervical adenopathy.   Neurological: He is alert and oriented to person, place, and time. He has normal reflexes. He displays normal reflexes. No cranial nerve deficit.   Skin: Skin is warm and dry. No rash noted. He is not diaphoretic. No erythema.   Scattered ecchymosis forearms bilaterally   Psychiatric: He has a normal mood and affect. His behavior is normal. Judgment and thought content normal.       Significant Labs: All pertinent labs within the past 24 hours have been reviewed.    Significant Imaging: I have reviewed all pertinent imaging results/findings within the past 24 hours.

## 2018-08-21 NOTE — PLAN OF CARE
Problem: Patient Care Overview  Goal: Plan of Care Review  Outcome: Ongoing (interventions implemented as appropriate)  POC reviewed, verbalized understanding. Pt remained free from falls, fall precautions in place. Pt is NSR on monitor, 70-80s. VSS. PRN medication given for pain & HTN. No other c/o at this time. R arm in sling. PIV intact. Call bell and personal belongings within reach. Hourly rounding complete. Reminded to call for assistance. Will continue to monitor.

## 2018-08-21 NOTE — PROGRESS NOTES
Clinical Pharmacy Progress Note: Telemetry Pharmacist Rounding     Patient was counseled by pharmacist on the telemetry pharmacist availability to discuss new medications, side effects, and reasons for changes in medication during admission.   Asked if patient had any medication questions at this time.   Instructed patient to use call light with any questions or concerns to discuss with pharmacy during the admission.   Offered bedside medication delivery to patient.   Patient expressed understanding and had no further questions.    Thank you for allowing us to participate in this patient's care.    Bhavya March, PharmD 8/21/2018 1:59 PM

## 2018-08-21 NOTE — PT/OT/SLP EVAL
Occupational Therapy   Evaluation    Name: Lionel Castillo  MRN: [id number]  Admitting Diagnosis:  Acute on chronic respiratory failure with hypoxia      Recommendations:     Discharge Recommendations: nursing facility, skilled  Discharge Equipment Recommendations:  none  Barriers to discharge:  Decreased caregiver support    History:     Occupational Profile:  Living Environment: lives alone  Will have 24 hour assistance for a few days  Previous level of function: assistance with adl's and mod (i) with functional mobility  Roles and Routines: occupational  Therapy  Equipment Used at Home:  walker, rolling, rollator, cane, straight, shower chair, grab bar  Assistance upon Discharge:     Past Medical History:   Diagnosis Date    Arthritis     Cancer     lung cancer    Diabetes     Foot fracture     Hand fracture, left     Hand fracture, right     HTN (hypertension)     Humerus fracture 07/18/2018    Hyperlipidemia     Neuropathy     Renal disorder        Past Surgical History:   Procedure Laterality Date    ANKLE SURGERY Left     CHOLECYSTECTOMY      HUMERUS FRACTURE SURGERY         Subjective     Chief Complaint: DEBILITY AND GENERALIZED WEAKNESS  Patient/Family Comments/goals:     Pain/Comfort:  · Pain Rating 1: 10/10  · Location - Orientation 1: upper  · Location 1: hand(iv site)    Patients cultural, spiritual, [employer] conflicts given the current situation:      Objective:     Communicated with:  prior to session.  Patient found all lines intact, call button in reach and NURSE AND RESPIRATORY THERAPIST notified and   upon OT entry to room.    General Precautions: Standard,     Orthopedic Precautions:    Braces:       Occupational Performance:    Bed Mobility:    · Patient completed Rolling/Turning to Left with  minimum assistance  · Patient completed Rolling/Turning to Right with minimum assistance  · Patient completed Scooting/Bridging with minimum assistance  · Patient completed Supine to Sit  with minimum assistance  · Patient completed Sit to Supine with minimum assistance    Functional Mobility/Transfers:  · Patient completed Sit <> Stand Transfer with moderate assistance  with  hand-held assist   · Patient completed Bed <> Chair Transfer using Stand Pivot technique with moderate assistance with hand-held assist    Activities of Daily Living:  · Upper Body Dressing: total assistance and witth slingsling .  · Lower Body Dressing: maximal assistance .    Cognitive/Visual Perceptual:  Cognitive/Psychosocial Skills:     -       Oriented to: Person, Place, Time and Situation   -       Follows Commands/attention:Follows two-step commands  -       Communication: clear/fluent  -       Memory: No Deficits noted  -       Safety awareness/insight to disability: impaired   Visual/Perceptual:      -Intact .    Physical Exam:  Upper Extremity Range of Motion:     -       Right Upper Extremity: not tested wfl  -       Left Upper Extremity: WFL . .  Upper Extremity Strength:    -       Right Upper Extremity: not tested  -       Left Upper Extremity: mmt: 3/5 grossly .   Strength:    -       Left Upper Extremity: mmt: 3/5 grossly .    AMPAC 6 Click ADL:  AMPA Total Score:      Treatment & Education:  Education:    Patient left up in chair with all lines intact, call button in reach, nurse Mimi notified and son present    Assessment:     Lionel Castillo is a 68 y.o. male with a medical diagnosis of Acute on chronic respiratory failure with hypoxia.  He presents with the following performance deficits affecting function: impaired functional mobilty, weakness, decreased safety awareness, impaired endurance, gait instability, decreased coordination, impaired balance, impaired self care skills, decreased lower extremity function.      Rehab Prognosis: Good; patient would benefit from acute skilled OT services to address these deficits and reach maximum level of function.         Clinical Decision Makin.   "OT Low:  "Pt evaluation falls under low complexity for evaluation coding due to performance deficits noted in 1-3 areas as stated above and 0 co-morbities affecting current functional status. Data obtained from problem focused assessments. No modifications or assistance was required for completion of evaluation. Only brief occupational profile and history review completed."     Plan:     Patient to be seen 3 x/week to address the above listed problems via    · Plan of Care Expires: 08/28/18  · Plan of Care Reviewed with: patient    This Plan of care has been discussed with the patient who was involved in its development and understands and is in agreement with the identified goals and treatment plan    GOALS:   Multidisciplinary Problems     Occupational Therapy Goals        Problem: Occupational Therapy Goal    Goal Priority Disciplines Outcome Interventions   Occupational Therapy Goal     OT, PT/OT     Description:  Goals to be met by: 8-27-18    Patient will increase functional independence with ADLs by performing:    UE Dressing with Minimal Assistance.  LE Dressing with Minimal Assistance.  Toilet transfer to bedside commode with Minimal Assistance.                      Time Tracking:     OT Date of Treatment: 08/21/18  OT Start Time: 0835  OT Stop Time: 0900  OT Total Time (min): 25 min    Billable Minutes:Evaluation 10 minutes  Therapeutic Activity 15 minutes    Freda Marrero OT  8/21/2018    "

## 2018-08-22 VITALS
WEIGHT: 139.31 LBS | OXYGEN SATURATION: 94 % | HEART RATE: 92 BPM | SYSTOLIC BLOOD PRESSURE: 159 MMHG | DIASTOLIC BLOOD PRESSURE: 71 MMHG | HEIGHT: 69 IN | BODY MASS INDEX: 20.63 KG/M2 | TEMPERATURE: 96 F | RESPIRATION RATE: 18 BRPM

## 2018-08-22 PROBLEM — J90 RECURRENT RIGHT PLEURAL EFFUSION: Status: RESOLVED | Noted: 2018-08-19 | Resolved: 2018-08-22

## 2018-08-22 PROBLEM — R91.8 MASS OF RIGHT LUNG: Status: RESOLVED | Noted: 2018-07-20 | Resolved: 2018-08-22

## 2018-08-22 PROBLEM — E87.1 HYPONATREMIA: Status: RESOLVED | Noted: 2018-07-18 | Resolved: 2018-08-22

## 2018-08-22 PROBLEM — N17.9 ACUTE ON CHRONIC RENAL FAILURE: Status: RESOLVED | Noted: 2018-07-18 | Resolved: 2018-08-22

## 2018-08-22 PROBLEM — I50.33 ACUTE ON CHRONIC DIASTOLIC CONGESTIVE HEART FAILURE: Status: RESOLVED | Noted: 2018-08-04 | Resolved: 2018-08-22

## 2018-08-22 PROBLEM — N17.9 AKI (ACUTE KIDNEY INJURY): Status: RESOLVED | Noted: 2018-08-19 | Resolved: 2018-08-22

## 2018-08-22 PROBLEM — E87.5 HYPERKALEMIA: Status: RESOLVED | Noted: 2018-08-18 | Resolved: 2018-08-22

## 2018-08-22 PROBLEM — S42.294A: Status: RESOLVED | Noted: 2018-07-20 | Resolved: 2018-08-22

## 2018-08-22 PROBLEM — F10.10 ALCOHOL ABUSE: Chronic | Status: RESOLVED | Noted: 2018-07-18 | Resolved: 2018-08-22

## 2018-08-22 PROBLEM — E43 SEVERE MALNUTRITION: Status: RESOLVED | Noted: 2018-08-20 | Resolved: 2018-08-22

## 2018-08-22 PROBLEM — J96.21 ACUTE ON CHRONIC RESPIRATORY FAILURE WITH HYPOXIA: Status: RESOLVED | Noted: 2018-08-18 | Resolved: 2018-08-22

## 2018-08-22 PROBLEM — I50.32 CHRONIC DIASTOLIC CONGESTIVE HEART FAILURE: Status: ACTIVE | Noted: 2018-08-04

## 2018-08-22 PROBLEM — N18.9 ACUTE ON CHRONIC RENAL FAILURE: Status: RESOLVED | Noted: 2018-07-18 | Resolved: 2018-08-22

## 2018-08-22 PROBLEM — E87.8 ELECTROLYTE ABNORMALITY: Status: RESOLVED | Noted: 2018-08-20 | Resolved: 2018-08-22

## 2018-08-22 PROBLEM — S42.301A RIGHT HUMERAL FRACTURE: Status: RESOLVED | Noted: 2018-07-18 | Resolved: 2018-08-22

## 2018-08-22 LAB
ALBUMIN SERPL BCP-MCNC: 2.3 G/DL
ALP SERPL-CCNC: 145 U/L
ALT SERPL W/O P-5'-P-CCNC: 10 U/L
ANCA AB TITR SER IF: NORMAL TITER
ANION GAP SERPL CALC-SCNC: 16 MMOL/L
AST SERPL-CCNC: 10 U/L
BASOPHILS # BLD AUTO: 0.03 K/UL
BASOPHILS NFR BLD: 0.4 %
BILIRUB SERPL-MCNC: 0.6 MG/DL
BUN SERPL-MCNC: 42 MG/DL
CALCIUM SERPL-MCNC: 9.7 MG/DL
CHLORIDE SERPL-SCNC: 98 MMOL/L
CO2 SERPL-SCNC: 20 MMOL/L
CREAT SERPL-MCNC: 2.5 MG/DL
DIFFERENTIAL METHOD: ABNORMAL
EOSINOPHIL # BLD AUTO: 0.2 K/UL
EOSINOPHIL NFR BLD: 3.2 %
ERYTHROCYTE [DISTWIDTH] IN BLOOD BY AUTOMATED COUNT: 14 %
EST. GFR  (AFRICAN AMERICAN): 30 ML/MIN/1.73 M^2
EST. GFR  (NON AFRICAN AMERICAN): 26 ML/MIN/1.73 M^2
GLUCOSE SERPL-MCNC: 117 MG/DL
HCT VFR BLD AUTO: 31.6 %
HGB BLD-MCNC: 10.9 G/DL
LYMPHOCYTES # BLD AUTO: 0.6 K/UL
LYMPHOCYTES NFR BLD: 8.4 %
MCH RBC QN AUTO: 34.8 PG
MCHC RBC AUTO-ENTMCNC: 34.5 G/DL
MCV RBC AUTO: 101 FL
MONOCYTES # BLD AUTO: 0.7 K/UL
MONOCYTES NFR BLD: 10.9 %
NEUTROPHILS # BLD AUTO: 5.2 K/UL
NEUTROPHILS NFR BLD: 77.1 %
P-ANCA TITR SER IF: NORMAL TITER
PLATELET # BLD AUTO: 483 K/UL
PMV BLD AUTO: 8.2 FL
POCT GLUCOSE: 101 MG/DL (ref 70–110)
POCT GLUCOSE: 105 MG/DL (ref 70–110)
POTASSIUM SERPL-SCNC: 3.7 MMOL/L
PROT SERPL-MCNC: 6.6 G/DL
RBC # BLD AUTO: 3.13 M/UL
SODIUM SERPL-SCNC: 134 MMOL/L
WBC # BLD AUTO: 6.8 K/UL

## 2018-08-22 PROCEDURE — 25000003 PHARM REV CODE 250: Performed by: INTERNAL MEDICINE

## 2018-08-22 PROCEDURE — 85025 COMPLETE CBC W/AUTO DIFF WBC: CPT

## 2018-08-22 PROCEDURE — 63600175 PHARM REV CODE 636 W HCPCS: Performed by: INTERNAL MEDICINE

## 2018-08-22 PROCEDURE — 94660 CPAP INITIATION&MGMT: CPT

## 2018-08-22 PROCEDURE — 36415 COLL VENOUS BLD VENIPUNCTURE: CPT

## 2018-08-22 PROCEDURE — 99900035 HC TECH TIME PER 15 MIN (STAT)

## 2018-08-22 PROCEDURE — 97530 THERAPEUTIC ACTIVITIES: CPT

## 2018-08-22 PROCEDURE — 94799 UNLISTED PULMONARY SVC/PX: CPT

## 2018-08-22 PROCEDURE — 25000003 PHARM REV CODE 250: Performed by: NURSE PRACTITIONER

## 2018-08-22 PROCEDURE — 94761 N-INVAS EAR/PLS OXIMETRY MLT: CPT

## 2018-08-22 PROCEDURE — 80053 COMPREHEN METABOLIC PANEL: CPT

## 2018-08-22 PROCEDURE — 99232 SBSQ HOSP IP/OBS MODERATE 35: CPT | Mod: ,,, | Performed by: INTERNAL MEDICINE

## 2018-08-22 PROCEDURE — 97110 THERAPEUTIC EXERCISES: CPT

## 2018-08-22 PROCEDURE — 27000221 HC OXYGEN, UP TO 24 HOURS

## 2018-08-22 PROCEDURE — 97803 MED NUTRITION INDIV SUBSEQ: CPT

## 2018-08-22 RX ORDER — HYDROCODONE BITARTRATE AND ACETAMINOPHEN 5; 325 MG/1; MG/1
1 TABLET ORAL EVERY 6 HOURS PRN
Qty: 20 TABLET | Refills: 0 | Status: ON HOLD | OUTPATIENT
Start: 2018-08-22 | End: 2018-08-24 | Stop reason: SDUPTHER

## 2018-08-22 RX ORDER — IPRATROPIUM BROMIDE AND ALBUTEROL SULFATE 2.5; .5 MG/3ML; MG/3ML
3 SOLUTION RESPIRATORY (INHALATION) EVERY 6 HOURS PRN
Qty: 90 ML | Refills: 0 | Status: SHIPPED | OUTPATIENT
Start: 2018-08-22 | End: 2018-09-17

## 2018-08-22 RX ORDER — ISOSORBIDE DINITRATE AND HYDRALAZINE HYDROCHLORIDE 37.5; 2 MG/1; MG/1
1 TABLET ORAL 3 TIMES DAILY
Qty: 90 TABLET | Refills: 11 | Status: SHIPPED | OUTPATIENT
Start: 2018-08-22 | End: 2019-08-22

## 2018-08-22 RX ORDER — METOPROLOL TARTRATE 25 MG/1
25 TABLET, FILM COATED ORAL 2 TIMES DAILY
Status: DISCONTINUED | OUTPATIENT
Start: 2018-08-22 | End: 2018-08-22 | Stop reason: HOSPADM

## 2018-08-22 RX ADMIN — PANTOPRAZOLE SODIUM 40 MG: 40 TABLET, DELAYED RELEASE ORAL at 08:08

## 2018-08-22 RX ADMIN — HEPARIN SODIUM 5000 UNITS: 5000 INJECTION, SOLUTION INTRAVENOUS; SUBCUTANEOUS at 05:08

## 2018-08-22 RX ADMIN — HYDROCODONE BITARTRATE AND ACETAMINOPHEN 1 TABLET: 10; 325 TABLET ORAL at 08:08

## 2018-08-22 RX ADMIN — POLYETHYLENE GLYCOL (3350) 17 G: 17 POWDER, FOR SOLUTION ORAL at 08:08

## 2018-08-22 RX ADMIN — AMLODIPINE BESYLATE 10 MG: 10 TABLET ORAL at 08:08

## 2018-08-22 RX ADMIN — HYDRALAZINE HYDROCHLORIDE AND ISOSORBIDE DINITRATE 1 TABLET: 37.5; 2 TABLET, FILM COATED ORAL at 08:08

## 2018-08-22 RX ADMIN — THERA TABS 1 TABLET: TAB at 08:08

## 2018-08-22 RX ADMIN — FOLIC ACID 1 MG: 1 TABLET ORAL at 08:08

## 2018-08-22 RX ADMIN — MUPIROCIN: 20 OINTMENT TOPICAL at 08:08

## 2018-08-22 RX ADMIN — Medication 100 MG: at 08:08

## 2018-08-22 RX ADMIN — CHLORDIAZEPOXIDE HYDROCHLORIDE 10 MG: 10 CAPSULE ORAL at 08:08

## 2018-08-22 RX ADMIN — HYDRALAZINE HYDROCHLORIDE 10 MG: 20 INJECTION INTRAMUSCULAR; INTRAVENOUS at 03:08

## 2018-08-22 RX ADMIN — CITALOPRAM HYDROBROMIDE 40 MG: 20 TABLET ORAL at 08:08

## 2018-08-22 NOTE — PLAN OF CARE
Problem: Patient Care Overview  Goal: Plan of Care Review  Outcome: Ongoing (interventions implemented as appropriate)  Recommendations    Recommendation/Intervention:   1. Recommend Low Na/Diabetic 2000 diet w/ fluid restriction per MD.   2. Recommend Boost Glucose Control - Chocolate once daily  3. Will continue to monitor, encourage PO intake    Goals: Meet >85% EEN/EPN this admit  Nutrition Goal Status: progressing toward goal  Communication of RD Recs: (POC review, sticky note)

## 2018-08-22 NOTE — PROGRESS NOTES
[facility] -   Adult Nutrition  Progress Note    SUMMARY     Recommendations    Recommendation/Intervention:   1. Recommend Low Na/Diabetic 2000 diet w/ fluid restriction per MD.   2. Recommend Boost Glucose Control - Chocolate once daily  3. Will continue to monitor, encourage PO intake    Goals: Meet >85% EEN/EPN this admit  Nutrition Goal Status: progressing toward goal  Communication of RD Recs: (POC review, sticky note)    Reason for Assessment    Reason for Assessment: RD f/u    Dx:  1. Acute on chronic respiratory failure with hypoxia    2. Respiratory failure    3. Pneumonia of left lower lobe due to infectious organism    4. Acute congestive heart failure, unspecified heart failure type    5. QT prolongation    6. Right-sided congestive heart failure secondary to left-sided congestive heart failure    7. CHF (congestive heart failure)      Past Medical History:   Diagnosis Date    Arthritis     Cancer     lung cancer    Diabetes     Foot fracture     Hand fracture, left     Hand fracture, right     HTN (hypertension)     Humerus fracture 07/18/2018    Hyperlipidemia     Neuropathy     Renal disorder      General Information Comments: Pt reports recent unintentional wt loss, unsure of amount. Per EPIC records, pt weighed 65 kg on 7/20/18, 57.7kg on 8/20/18 (-11.2% body wt in 1 month). Pt reports decreased appetite due to food preferences, not liking hospital food. Pt has a fair appetite at home (intake ~75% EEN). Moderate muscle/fat wasting noted. RD encouraged PO intake, reinforced sodium/fluid restrictions.     8.22.18- Pt tolerating regular diet. Reports he was previously told to follow cardiac diet. RD provided brief education about this, encouraged salt/fluid restrictions. Pt states he would try Boost supplements, will only drink chocolate flavor.     Nutrition Discharge Planning: Low Na/Diabetic diet & fluid restriction per MD    Nutrition Risk Screen    Nutrition Risk  "Screen: no indicators present    Nutrition/Diet History    Patient Reported Diet/Restrictions/Preferences: low salt  Do you have any cultural, spiritual, Anabaptism conflicts, given your current situation?: none  Food Allergies: NKFA    Anthropometrics    Temp: 96.3 °F (35.7 °C)  Height Method: Stated  Height: 5' 9" (175.3 cm)  Height (inches): 69 in  Weight Method: Bed Scale  Weight: 63.2 kg (139 lb 5.3 oz)  Weight (lb): 139.33 lb  Ideal Body Weight (IBW), Male: 160 lb  % Ideal Body Weight, Male (lb): 79.51 lb  BMI (Calculated): 18.8  BMI Grade: 18.5-24.9 - normal       Lab/Procedures/Meds    Pertinent Labs Reviewed: reviewed  BMP  Lab Results   Component Value Date     (L) 08/22/2018    K 3.7 08/22/2018    CL 98 08/22/2018    CO2 20 (L) 08/22/2018    BUN 42 (H) 08/22/2018    CREATININE 2.5 (H) 08/22/2018    CALCIUM 9.7 08/22/2018    ANIONGAP 16 08/22/2018    ESTGFRAFRICA 30 (A) 08/22/2018    EGFRNONAA 26 (A) 08/22/2018     Lab Results   Component Value Date    CALCIUM 9.7 08/22/2018    PHOS 5.0 (H) 08/20/2018     Lab Results   Component Value Date    ALBUMIN 2.3 (L) 08/22/2018     Recent Labs   Lab  08/22/18   0546   POCTGLUCOSE  105     Lab Results   Component Value Date    HGBA1C 4.8 07/19/2018     Lab Results   Component Value Date    URICACID 12.2 (H) 08/19/2018     Pertinent Medications Reviewed: reviewed  Scheduled Meds:   amLODIPine  10 mg Oral Daily    chlordiazepoxide  10 mg Oral TID    citalopram  40 mg Oral Daily    folic acid  1 mg Oral Daily    heparin (porcine)  5,000 Units Subcutaneous Q8H    isosorbide-hydrALAZINE 20-37.5 mg  1 tablet Oral TID    multivitamin  1 tablet Oral Daily    mupirocin   Topical (Top) TID    pantoprazole  40 mg Oral Daily    polyethylene glycol  17 g Oral Daily    thiamine  100 mg Oral Daily     Continuous Infusions:  PRN Meds:.acetaminophen, albuterol-ipratropium, dextrose 50%, dextrose 50%, docusate sodium, glucagon (human recombinant), glucose, glucose, " hydrALAZINE, HYDROcodone-acetaminophen, HYDROcodone-acetaminophen, insulin aspart U-100, lorazepam, ondansetron, ondansetron, pneumoc 13-lisseth conj-dip cr(PF), promethazine (PHENERGAN) IVPB, ramelteon, sodium chloride 0.9%    Physical Findings/Assessment    Overall Physical Appearance: loss of muscle mass, loss of subcutaneous fat, on oxygen therapy  Oral/Mouth Cavity: tooth/teeth missing  Skin: (Micky=19)    Estimated/Assessed Needs    Weight Used For Calorie Calculations: 57.7 kg (127 lb 3.3 oz)  Energy Calorie Requirements (kcal): 2224-8417 kcal/day  Energy Need Method: Kcal/kg(30-35)  Protein Requirements: 58-69 gm/day  Weight Used For Protein Calculations: 57.7 kg (127 lb 3.3 oz)(x1-1.2 g/kg)     Fluid Need Method: (1500 mL per MD orders)  RDA Method (mL): 1731  CHO Requirement: 50% EEN      Nutrition Prescription Ordered    Current Diet Order: Regular, 1500 mL fluid    Evaluation of Received Nutrient/Fluid Intake    Intake/Output Summary (Last 24 hours) at 8/22/2018 1154  Last data filed at 8/22/2018 0857  Gross per 24 hour   Intake 720 ml   Output 2150 ml   Net -1430 ml          % Intake of Estimated Energy Needs: 25 - 50 %  % Meal Intake: 25 - 50 %    Nutrition Risk    Level of Risk/Frequency of Follow-up: (F/u x2 weekly)     Assessment and Plan    Severe malnutrition    Malnutrition in the context of Acute Illness/Injury    Related to (etiology):  Increased energy/protein needs s/p humerus fx, decreased PO intake    Signs and Symptoms (as evidenced by):  Energy Intake: <75% of estimated energy requirement for 1 month  Body Fat Depletion: moderate depletion of triceps   Muscle Mass Depletion: moderate depletion of temples and clavicle region   Weight Loss: -11.2% x 1 month     Interventions/Recommendations (treatment strategy):  Low Na diet  RD encouraged meal/snack intake, discussed ways to safely increase calorie intake    Nutrition Diagnosis Status:  Continues                 Monitor and Evaluation    Food  and Nutrient Intake: energy intake, food and beverage intake  Food and Nutrient Adminstration: diet order  Physical Activity and Function: nutrition-related ADLs and IADLs  Anthropometric Measurements: weight  Biochemical Data, Medical Tests and Procedures: electrolyte and renal panel  Nutrition-Focused Physical Findings: overall appearance, extremities, muscles and bones     Nutrition Follow-Up    RD Follow-up?: Yes

## 2018-08-22 NOTE — PROGRESS NOTES
[facility] -   Cardiology  Progress Note    Patient Name: Lionel Castillo  MRN: [id number]  Admission Date: 8/18/2018  Hospital Length of Stay: 4 days  Code Status: Full Code   Attending Physician: Nemo Livingston MD   Primary Care Physician: Eddie Garcia MD  Expected Discharge Date: 8/22/2018  Principal Problem:Acute on chronic respiratory failure with hypoxia    Subjective:     Hospital Course:   8/20/18-Patient seen and examined today, sitting up in bed, s/p right sided thoracentesis yesterday. Feels weak/fatigued. SOB improved. No chest pain. Labs reviewed. Creatinine 2.9, K slightly low.  08/21-dyspnea resolved. No chest pain.  08/22 NO C/O quan AND CHEST PAIN      ROS  Objective:     Vital Signs (Most Recent):  Temp: 96.3 °F (35.7 °C) (08/22/18 1135)  Pulse: 99 (08/22/18 1135)  Resp: 18 (08/22/18 1135)  BP: (!) 159/71 (08/22/18 1135)  SpO2: (!) 94 % (08/22/18 1151) Vital Signs (24h Range):  Temp:  [96.2 °F (35.7 °C)-98.2 °F (36.8 °C)] 96.3 °F (35.7 °C)  Pulse:  [] 99  Resp:  [18] 18  SpO2:  [90 %-99 %] 94 %  BP: (138-183)/(62-77) 159/71     Weight: 63.2 kg (139 lb 5.3 oz)  Body mass index is 20.58 kg/m².     SpO2: (!) 94 %  O2 Device (Oxygen Therapy): nasal cannula      Intake/Output Summary (Last 24 hours) at 8/22/2018 1601  Last data filed at 8/22/2018 0857  Gross per 24 hour   Intake 480 ml   Output 1800 ml   Net -1320 ml       Lines/Drains/Airways     Peripheral Intravenous Line                 Peripheral IV - Single Lumen 08/21/18 0932 Anterior;Left Forearm 1 day                Physical Exam   Constitutional: He is oriented to person, place, and time. He appears well-developed and well-nourished. No distress.   HENT:   Head: Normocephalic and atraumatic.   Eyes: Pupils are equal, round, and reactive to light. Right eye exhibits no discharge. Left eye exhibits no discharge.   Neck: Neck supple. JVD present. No thyromegaly present.   Cardiovascular: Normal rate, regular rhythm, S1  normal, S2 normal and normal heart sounds.   No murmur heard.  Pulmonary/Chest: No respiratory distress.   Diminished BS at bases   Abdominal: Soft. He exhibits no distension.   Musculoskeletal: He exhibits no edema.   Neurological: He is alert and oriented to person, place, and time.   Skin: Skin is warm and dry. He is not diaphoretic. No erythema.   Numerous scattered ecchymoses    Psychiatric: He has a normal mood and affect. His behavior is normal.   Nursing note and vitals reviewed.      Significant Labs:   CMP   Recent Labs   Lab  08/21/18   0459  08/22/18   0507   NA  134*  134*   K  3.8  3.7   CL  100  98   CO2  22*  20*   GLU  128*  117*   BUN  48*  42*   CREATININE  2.7*  2.5*   CALCIUM  9.7  9.7   PROT  6.5  6.6   ALBUMIN  2.3*  2.3*   BILITOT  0.5  0.6   ALKPHOS  146*  145*   AST  9*  10   ALT  8*  10   ANIONGAP  12  16   ESTGFRAFRICA  27*  30*   EGFRNONAA  23*  26*    and CBC   Recent Labs   Lab  08/21/18   0459  08/22/18   0507   WBC  7.03  6.80   HGB  10.7*  10.9*   HCT  31.2*  31.6*   PLT  511*  483*       Significant Imaging: X-Ray: CXR: X-Ray Chest 1 View (CXR):   Results for orders placed or performed during the hospital encounter of 08/18/18   X-Ray Chest 1 View    Narrative    EXAMINATION:  XR CHEST 1 VIEW    CLINICAL HISTORY:  Pleural effusion;    TECHNIQUE:  Single frontal view of the chest was performed.    COMPARISON:  08/19/2018    FINDINGS:  In comparison to the prior study, there is no adverse interval changes      Impression    In comparison to the prior study, there is no adverse interval changes      Electronically signed by: Irwin Montemayor MD  Date:    08/20/2018  Time:    07:34     Assessment and Plan:       Chronic diastolic congestive heart failure    Improved after thoracentesis.  BNP and Cr improved  REPEAT bnp IN am  Continue BP control  Ok to hold lasix now  Keep I&O even  Ok for BB, hydralazine and Imdur for BP control  Pleural effusion lab pending              Tobacco use     -Counseled on smoking cessation        Type 2 diabetes mellitus, without long-term current use of insulin    On medical therapy        Essential hypertension    -Continue AMLODIPINE AND ISODIL  -ADD LOPRESSOR 25 MG BID            VTE Risk Mitigation (From admission, onward)        Ordered     heparin (porcine) injection 5,000 Units  Every 8 hours      08/19/18 1031          Elmo Krishnan MD  Cardiology  [facility]

## 2018-08-22 NOTE — PLAN OF CARE
CM spoke to [employer] @[phone].  Oxygen will be delivered to the bedside in approximately 30 minutes.

## 2018-08-22 NOTE — PROGRESS NOTES
[facility] Hospital Medicine  Progress Note    Patient Name: Lionel Castillo  MRN: [id number]  Patient Class: IP- Inpatient   Admission Date: 8/18/2018  Length of Stay: 3 days  Attending Physician: Manuel Willis MD  Primary Care Provider: Eddie Garcia MD        Subjective:     Principal Problem:Acute on chronic respiratory failure with hypoxia    HPI:  Worsening shortness of breath since returning home after a recent admission.  He also has a non-productive cough.  Denies chest pain.  Denies nausea, vomiting, fevers, or chills.  He was admitted on 04 August with shortness of breath and diagnosed with a diastolic heart failure exacerbation.  He has a right humerus fracture from a fall from a syncopal episode on 18 July.  He had a complicated right sided pleural effusion and atelectasis with a non-specific lung mass discovered on his first admission.  Further workup was planned as an outpatient with Dr. Bailey of Pulmonary medicine.      Hospital Course:  Admitted for further evaluation and treatment of shortness of breath.  Hemodynamically stable in the ER.  He had a serum Potassium of 6.6 with peaked T-waves on EKG.  He receive 10 units regular insulin IV and an ampule of D50 in the ER.  He also received 15 g of Kayexalate.  Chest X-ray showed a new left basilar infiltrate.  Empirically started Moxifloxacin and Furosemide IV 80 mg.  WBC count was high normal with a left shift.  Lactic acid 2.1 and mild hyponatremia.  WBC count returned to normal and procalcitonin normal.  Stopped Moxifloxacin - Pneumonia ruled out.  Persistent hypoxemia that corrected well with continuous BiPAP.  Consulting Cardiology and Nephrology for assistance optimizing management.  Continuing diuresis.  Added Thiamine supplementation.    Interval History:  No acute problems overnight.  Continuous BiPAP to maintain oxygen saturation.    Review of Systems   Constitutional: Negative.  Negative for chills and fever.    HENT: Negative.  Negative for congestion and sore throat.    Eyes: Negative.  Negative for visual disturbance.   Respiratory: Positive for cough and shortness of breath. Negative for wheezing.    Cardiovascular: Negative.  Negative for chest pain.   Gastrointestinal: Negative for abdominal pain, diarrhea, nausea and vomiting.   Endocrine: Negative.    Genitourinary: Negative.    Musculoskeletal: Negative.  Negative for myalgias and neck stiffness.   Skin: Negative.  Negative for color change and pallor.   Allergic/Immunologic: Negative.    Neurological: Negative.    Hematological: Negative.    Psychiatric/Behavioral: Negative.    All other systems reviewed and are negative.    Objective:     Vital Signs (Most Recent):  Temp: 98.2 °F (36.8 °C) (08/21/18 1605)  Pulse: 97 (08/21/18 1605)  Resp: 18 (08/21/18 0905)  BP: (!) 177/74 (08/21/18 1605)  SpO2: (!) 94 % (08/21/18 1605) Vital Signs (24h Range):  Temp:  [97.5 °F (36.4 °C)-98.2 °F (36.8 °C)] 98.2 °F (36.8 °C)  Pulse:  [] 97  Resp:  [16-20] 18  SpO2:  [90 %-96 %] 94 %  BP: (104-182)/(55-77) 177/74     Weight: 63.2 kg (139 lb 5.3 oz)  Body mass index is 20.58 kg/m².    Intake/Output Summary (Last 24 hours) at 8/21/2018 1914  Last data filed at 8/21/2018 1700  Gross per 24 hour   Intake 960 ml   Output 1150 ml   Net -190 ml      Physical Exam   Constitutional: He is oriented to person, place, and time. He appears well-developed. No distress.   Frail and borderline cachectic   HENT:   Head: Normocephalic and atraumatic.   Mouth/Throat: Oropharynx is clear and moist.   Eyes: Conjunctivae and EOM are normal. Pupils are equal, round, and reactive to light.   Neck: No JVD present. No thyromegaly present.   Cardiovascular: Normal rate, regular rhythm and normal heart sounds. Exam reveals no gallop and no friction rub.   No murmur heard.  Pulmonary/Chest: Effort normal and breath sounds normal. No respiratory distress. He has no wheezes. He has no rales.   Upper and  lower crackles left chest and moderate crackles with reduced breath sounds on the right.   Abdominal: Soft. Bowel sounds are normal. He exhibits no distension. There is no tenderness. There is no rebound and no guarding.   Musculoskeletal: Normal range of motion. He exhibits no edema or tenderness.   Lymphadenopathy:     He has no cervical adenopathy.   Neurological: He is alert and oriented to person, place, and time. He has normal reflexes. He displays normal reflexes. No cranial nerve deficit.   Skin: Skin is warm and dry. No rash noted. He is not diaphoretic. No erythema.   Scattered ecchymosis forearms bilaterally   Psychiatric: He has a normal mood and affect. His behavior is normal. Judgment and thought content normal.       Significant Labs: All pertinent labs within the past 24 hours have been reviewed.    Significant Imaging: I have reviewed all pertinent imaging results/findings within the past 24 hours.    Assessment/Plan:      * Acute on chronic respiratory failure with hypoxia    Due to volume overload and heart failure.  Continuous BiPAP to maintain oxygen saturation above 88%.  Diuresis with IV Furosemide.  Persistent hypoxia after diuresis although urine output limited by acute renal failure.  Unable to perform CTA of the chest due to renal failure.  V/Q scan low/intermediate probability PE.        Hyperkalemia    Potassium level 6.6 on admission with peaked T waves on EKG.  He received 10 units IV insulin and and ampule of D50 in the ED.  He had hypokalemia during his prior admission and was taking Potassium replacement.  Added Kayexalate 15 mg BID.  Unable to give IV fluids aggressively due to heart failure exacerbation.  Decreased to 3.8 after 24 hours.  Continue to monitor.        Acute on chronic diastolic congestive heart failure    He received 80 mg Furosemide IV in the ER with a modest increase in urine output.  BNP very high over 4000.  Recent cardiac echo showed normal systolic function  and diastolic dysfunction and structural abnormalities.  Consulting Cardiology.          Mass of right lung    Uncertain etiology but high risk for pulmonary malignancy.  Smoking history and signs of emphysema on CT chest.  There was an outpatient plan for further workup with Dr. Bailey.        Essential hypertension    Continuing Amlodipine.  Add Bidil.        Acute on chronic renal failure    Careful use of nephrotoxic medications.  Limited on fluid resuscitation due to heart failure.  Consult Nephrology.        Hyponatremia    Multifactorial due to use of loop diuretic, heart failure, and possible pulmonary malignancy.  Total water restriction to 1500 mL per day.  Careful use of diuretics.  Signs of pulmonary edema but no peripheral edema.        Alcohol abuse    Chlordiazepoxide 10 mg three times daily.  Lorazepam as needed.          VTE Risk Mitigation (From admission, onward)        Ordered     heparin (porcine) injection 5,000 Units  Every 8 hours      08/19/18 1031              Manuel Willis MD  Department of Hospital Medicine   [facility]

## 2018-08-22 NOTE — ASSESSMENT & PLAN NOTE
Due to volume overload and heart failure.  Continuous BiPAP to maintain oxygen saturation above 88%.  Diuresis with IV Furosemide.  Persistent hypoxia after diuresis although urine output limited by acute renal failure.  Unable to perform CTA of the chest due to renal failure.  V/Q scan low/intermediate probability PE.

## 2018-08-22 NOTE — SUBJECTIVE & OBJECTIVE
Interval History:  No acute problems overnight.  Continuous BiPAP to maintain oxygen saturation.    Review of Systems   Constitutional: Negative.  Negative for chills and fever.   HENT: Negative.  Negative for congestion and sore throat.    Eyes: Negative.  Negative for visual disturbance.   Respiratory: Positive for cough and shortness of breath. Negative for wheezing.    Cardiovascular: Negative.  Negative for chest pain.   Gastrointestinal: Negative for abdominal pain, diarrhea, nausea and vomiting.   Endocrine: Negative.    Genitourinary: Negative.    Musculoskeletal: Negative.  Negative for myalgias and neck stiffness.   Skin: Negative.  Negative for color change and pallor.   Allergic/Immunologic: Negative.    Neurological: Negative.    Hematological: Negative.    Psychiatric/Behavioral: Negative.    All other systems reviewed and are negative.    Objective:     Vital Signs (Most Recent):  Temp: 98.2 °F (36.8 °C) (08/21/18 1605)  Pulse: 97 (08/21/18 1605)  Resp: 18 (08/21/18 0905)  BP: (!) 177/74 (08/21/18 1605)  SpO2: (!) 94 % (08/21/18 1605) Vital Signs (24h Range):  Temp:  [97.5 °F (36.4 °C)-98.2 °F (36.8 °C)] 98.2 °F (36.8 °C)  Pulse:  [] 97  Resp:  [16-20] 18  SpO2:  [90 %-96 %] 94 %  BP: (104-182)/(55-77) 177/74     Weight: 63.2 kg (139 lb 5.3 oz)  Body mass index is 20.58 kg/m².    Intake/Output Summary (Last 24 hours) at 8/21/2018 1914  Last data filed at 8/21/2018 1700  Gross per 24 hour   Intake 960 ml   Output 1150 ml   Net -190 ml      Physical Exam   Constitutional: He is oriented to person, place, and time. He appears well-developed. No distress.   Frail and borderline cachectic   HENT:   Head: Normocephalic and atraumatic.   Mouth/Throat: Oropharynx is clear and moist.   Eyes: Conjunctivae and EOM are normal. Pupils are equal, round, and reactive to light.   Neck: No JVD present. No thyromegaly present.   Cardiovascular: Normal rate, regular rhythm and normal heart sounds. Exam reveals no  gallop and no friction rub.   No murmur heard.  Pulmonary/Chest: Effort normal and breath sounds normal. No respiratory distress. He has no wheezes. He has no rales.   Upper and lower crackles left chest and moderate crackles with reduced breath sounds on the right.   Abdominal: Soft. Bowel sounds are normal. He exhibits no distension. There is no tenderness. There is no rebound and no guarding.   Musculoskeletal: Normal range of motion. He exhibits no edema or tenderness.   Lymphadenopathy:     He has no cervical adenopathy.   Neurological: He is alert and oriented to person, place, and time. He has normal reflexes. He displays normal reflexes. No cranial nerve deficit.   Skin: Skin is warm and dry. No rash noted. He is not diaphoretic. No erythema.   Scattered ecchymosis forearms bilaterally   Psychiatric: He has a normal mood and affect. His behavior is normal. Judgment and thought content normal.       Significant Labs: All pertinent labs within the past 24 hours have been reviewed.    Significant Imaging: I have reviewed all pertinent imaging results/findings within the past 24 hours.

## 2018-08-22 NOTE — PT/OT/SLP PROGRESS
Physical Therapy  Treatment    Lionel Castillo   MRN: [id number]   Admitting Diagnosis: Acute on chronic respiratory failure with hypoxia    PT Received On: 18  PT Start Time: 815     PT Stop Time: 840    PT Total Time (min): 25 min       Billable Minutes:  Therapeutic Activity 15 and Therapeutic Exercise 10    Treatment Type: Treatment  PT/PTA: PT       General Precautions: Standard, respiratory, fall  Orthopedic Precautions: RUE non weight bearing   Braces: Sling and swathe    Subjective:  Communicated with NURSE PITTMAN prior to session.  Pain/Comfort  Pain Rating 1: 0/10    Objective:   Patient found with: telemetry, oxygen, peripheral IV    Functional Mobility:  Therapeutic Activities and Exercises:  PT AGREEABLE TO P.T. TX THIS AM WITH MAX ENCOURAGEMENT, PT C/O DISCOMFORT AND SOB, PT ASSISTED TO EOB WITH MODA, C/O DIZZINESS SO PT IMMEDIATELY LAYED BACK DOWN, PT ASSISTED BACK TO SITTING AT EOB WITH MOD AND EXTRA TIME, BP TAKEN IN SITTIN/72.  NURSE TOYA AND RT PRESENT, PT DECLINED SITTING IN CHAIR AT THIS TIME DUE TO RESPIRATORY DISCOMFORT, ASKING FOR BIPAP TO BE DONNED, PT ASSISTED BACK TO SUPINE WITH MODA, MODA X 2 FOR SUPINE SCOOT TOWARD HEAD OF BED, PT ENCOURAGED TO HELP WITH BLE, PT EDUCATED IN AND PERFORMED BLE THERE X 15 REPS AROM WITH REST AS NEEDED     AM-PAC 6 CLICK MOBILITY  How much help from another person does this patient currently need?   1 = Unable, Total/Dependent Assistance  2 = A lot, Maximum/Moderate Assistance  3 = A little, Minimum/Contact Guard/Supervision  4 = None, Modified [place]/Independent    Turning over in bed (including adjusting bedclothes, sheets and blankets)?: 2  Sitting down on and standing up from a chair with arms (e.g., wheelchair, bedside commode, etc.): 1  Moving from lying on back to sitting on the side of the bed?: 2  Moving to and from a bed to a chair (including a wheelchair)?: 1  Need to walk in hospital room?: 1  Climbing 3-5 steps with a  railing?: 1  Basic Mobility Total Score: 8    AM-PAC Raw Score CMS G-Code Modifier Level of Impairment Assistance   6 % Total / Unable   7 - 9 CM 80 - 100% Maximal Assist   10 - 14 CL 60 - 80% Moderate Assist   15 - 19 CK 40 - 60% Moderate Assist   20 - 22 CJ 20 - 40% Minimal Assist   23 CI 1-20% SBA / CGA   24 CH 0% Independent/ Mod I     Patient left supine with all lines intact, call button in reach, NURSE notified and RT AND NURSE present.    Assessment:  Lionel Castillo is a 68 y.o. male with a medical diagnosis of Acute on chronic respiratory failure with hypoxia and presents with IMPAIRED FUNCTIONAL MOBILITY. PT WILL BENEFIT FROM CONT. SKILLED P.T. TO ADDRESS IMPAIRMENTS    Rehab identified problem list/impairments: Rehab identified problem list/impairments: weakness, impaired endurance, impaired functional mobilty, gait instability, decreased safety awareness, orthopedic precautions, decreased upper extremity function, decreased lower extremity function, impaired balance    Rehab potential is fair.    Activity tolerance: Fair    Discharge recommendations: Discharge Facility/Level Of Care Needs: nursing facility, skilled     Barriers to discharge:     Equipment recommendations: Equipment Needed After Discharge: none     GOALS:   Multidisciplinary Problems     Physical Therapy Goals        Problem: Physical Therapy Goal    Goal Priority Disciplines Outcome Goal Variances Interventions   Physical Therapy Goal     PT, PT/OT Ongoing (interventions implemented as appropriate)     Description:  LTG'S TO BE MET IN 7 DAYS (8-28-18)  1. PT WILL REQUIRE DANO FOR BED MOBILITY  2. PT WILL REQUIRE DANO FOR TF'S  3. PT WILL ' WITH DANO  4. PT WILL DEMO P+ DYNAMIC BALANCE DURING GAIT                    PLAN:    Patient to be seen 5 x/week  to address the above listed problems via gait training, therapeutic activities, therapeutic exercises  Plan of Care expires: 08/28/18  Plan of Care reviewed with:  patient    PT ENCOURAGED TO CALL FOR ASSISTANCE WITH ALL NEEDS DUE TO FALL RISK STATUS, PT AGREEABLE    Rachelle Sanchez, PT  08/22/2018

## 2018-08-22 NOTE — ASSESSMENT & PLAN NOTE
Improved after thoracentesis.  BNP and Cr improved  REPEAT bnp IN am  Continue BP control  Ok to hold lasix now  Keep I&O even  Ok for BB, hydralazine and Imdur for BP control  Pleural effusion lab pending

## 2018-08-22 NOTE — PROGRESS NOTES
Home Oxygen Evaluation    Date Performed: 2018    1) Patient's Home O2 Sat on room air, while at rest: 88%        If O2 sats on room air at rest are 88% or below, patient qualifies. No additional testing needed. Document N/A in steps 2 and 3. If 89% or above, complete steps 2.      2) Patient's O2 Sat on room air while exercisin%          If O2 sats on room air while exercising remain 89% or above patient does not qualify, no further testing needed Document N/A in step 3. If O2 sats on room air while exercising are 88% or below, continue to step 3.      3) Patient's O2 Sat while exercising on O2: at LPM         (Must show improvement from #2 for patients to qualify)    If O2 sats improve on oxygen, patient qualifies for portable oxygen. If not, the patient does not qualify.

## 2018-08-22 NOTE — SUBJECTIVE & OBJECTIVE
ROS  Objective:     Vital Signs (Most Recent):  Temp: 96.3 °F (35.7 °C) (08/22/18 1135)  Pulse: 99 (08/22/18 1135)  Resp: 18 (08/22/18 1135)  BP: (!) 159/71 (08/22/18 1135)  SpO2: (!) 94 % (08/22/18 1151) Vital Signs (24h Range):  Temp:  [96.2 °F (35.7 °C)-98.2 °F (36.8 °C)] 96.3 °F (35.7 °C)  Pulse:  [] 99  Resp:  [18] 18  SpO2:  [90 %-99 %] 94 %  BP: (138-183)/(62-77) 159/71     Weight: 63.2 kg (139 lb 5.3 oz)  Body mass index is 20.58 kg/m².     SpO2: (!) 94 %  O2 Device (Oxygen Therapy): nasal cannula      Intake/Output Summary (Last 24 hours) at 8/22/2018 1601  Last data filed at 8/22/2018 0857  Gross per 24 hour   Intake 480 ml   Output 1800 ml   Net -1320 ml       Lines/Drains/Airways     Peripheral Intravenous Line                 Peripheral IV - Single Lumen 08/21/18 0932 Anterior;Left Forearm 1 day                Physical Exam   Constitutional: He is oriented to person, place, and time. He appears well-developed and well-nourished. No distress.   HENT:   Head: Normocephalic and atraumatic.   Eyes: Pupils are equal, round, and reactive to light. Right eye exhibits no discharge. Left eye exhibits no discharge.   Neck: Neck supple. JVD present. No thyromegaly present.   Cardiovascular: Normal rate, regular rhythm, S1 normal, S2 normal and normal heart sounds.   No murmur heard.  Pulmonary/Chest: No respiratory distress.   Diminished BS at bases   Abdominal: Soft. He exhibits no distension.   Musculoskeletal: He exhibits no edema.   Neurological: He is alert and oriented to person, place, and time.   Skin: Skin is warm and dry. He is not diaphoretic. No erythema.   Numerous scattered ecchymoses    Psychiatric: He has a normal mood and affect. His behavior is normal.   Nursing note and vitals reviewed.      Significant Labs:   CMP   Recent Labs   Lab  08/21/18   0459  08/22/18   0507   NA  134*  134*   K  3.8  3.7   CL  100  98   CO2  22*  20*   GLU  128*  117*   BUN  48*  42*   CREATININE  2.7*  2.5*    CALCIUM  9.7  9.7   PROT  6.5  6.6   ALBUMIN  2.3*  2.3*   BILITOT  0.5  0.6   ALKPHOS  146*  145*   AST  9*  10   ALT  8*  10   ANIONGAP  12  16   ESTGFRAFRICA  27*  30*   EGFRNONAA  23*  26*    and CBC   Recent Labs   Lab  08/21/18   0459  08/22/18   0507   WBC  7.03  6.80   HGB  10.7*  10.9*   HCT  31.2*  31.6*   PLT  511*  483*       Significant Imaging: X-Ray: CXR: X-Ray Chest 1 View (CXR):   Results for orders placed or performed during the hospital encounter of 08/18/18   X-Ray Chest 1 View    Narrative    EXAMINATION:  XR CHEST 1 VIEW    CLINICAL HISTORY:  Pleural effusion;    TECHNIQUE:  Single frontal view of the chest was performed.    COMPARISON:  08/19/2018    FINDINGS:  In comparison to the prior study, there is no adverse interval changes      Impression    In comparison to the prior study, there is no adverse interval changes      Electronically signed by: Irwin Montemayor MD  Date:    08/20/2018  Time:    07:34

## 2018-08-22 NOTE — PLAN OF CARE
Problem: Physical Therapy Goal  Goal: Physical Therapy Goal  LTG'S TO BE MET IN 7 DAYS (8-28-18)  1. PT WILL REQUIRE DANO FOR BED MOBILITY  2. PT WILL REQUIRE DANO FOR TF'S  3. PT WILL ' WITH DANO  4. PT WILL DEMO P+ DYNAMIC BALANCE DURING GAIT   Outcome: Ongoing (interventions implemented as appropriate)  PT REQUIRED MODA FOR SUP<>SIT TF, LIMITED BY DIZZINESS AND SOB, CALL RT TO BEE BIPAP PER PT'S REQUEST, DECLINED TF TO CHAIR AT THIS TIME, TOLERATED SUPINE BLE THEREX

## 2018-08-22 NOTE — PLAN OF CARE
Problem: Patient Care Overview  Goal: Plan of Care Review  Outcome: Ongoing (interventions implemented as appropriate)  POC reviewed, verbalized understanding. Pt remained free from falls, fall precautions in place. Pt is NSR on monitor, 80-90s. BP monitored. PRN medication given for pain. No other c/o at this time. PIV intact, IV lasix given. I&Os. R arm in sling. Blood glucose monitored. Call bell and personal belongings within reach. Hourly rounding complete. Reminded to call for assistance. Will continue to monitor.

## 2018-08-22 NOTE — PLAN OF CARE
Problem: Patient Care Overview  Goal: Plan of Care Review  Outcome: Ongoing (interventions implemented as appropriate)  Dx resp distress  Poc instructed on dbc 10 x q1h wa, instructed on turning. q2h . Instructed on fall precautions and risk. Bed alarm on. Water, phone and call light in reach. Instructed on fluid restriction. Instructed on blood sugar ac and hs. Instructed on pain management. Scale and meds. o2 at 3.5l nc. Accurate I&O. Pt getting OT and PT. Up with assist.

## 2018-08-23 ENCOUNTER — HOSPITAL ENCOUNTER (OUTPATIENT)
Facility: HOSPITAL | Age: 68
Discharge: NURSING, REHAB OR LONG-TERM CARE | End: 2018-08-24
Attending: EMERGENCY MEDICINE | Admitting: EMERGENCY MEDICINE
Payer: MEDICARE

## 2018-08-23 DIAGNOSIS — C34.90 MALIGNANT NEOPLASM OF LUNG, UNSPECIFIED LATERALITY, UNSPECIFIED PART OF LUNG: Primary | ICD-10-CM

## 2018-08-23 DIAGNOSIS — R06.00 DYSPNEA, UNSPECIFIED TYPE: ICD-10-CM

## 2018-08-23 PROBLEM — E78.5 HYPERLIPIDEMIA: Status: ACTIVE | Noted: 2018-08-23

## 2018-08-23 PROBLEM — R53.1 WEAKNESS GENERALIZED: Status: ACTIVE | Noted: 2018-08-23

## 2018-08-23 PROBLEM — J96.11 CHRONIC RESPIRATORY FAILURE WITH HYPOXIA: Status: ACTIVE | Noted: 2018-08-04

## 2018-08-23 LAB
BACTERIA BLD CULT: NORMAL
BACTERIA BLD CULT: NORMAL
POCT GLUCOSE: 109 MG/DL (ref 70–110)
POCT GLUCOSE: 158 MG/DL (ref 70–110)

## 2018-08-23 PROCEDURE — 63600175 PHARM REV CODE 636 W HCPCS: Performed by: NURSE PRACTITIONER

## 2018-08-23 PROCEDURE — G0378 HOSPITAL OBSERVATION PER HR: HCPCS

## 2018-08-23 PROCEDURE — 25000003 PHARM REV CODE 250: Performed by: NURSE PRACTITIONER

## 2018-08-23 PROCEDURE — 25000003 PHARM REV CODE 250: Performed by: HOSPITALIST

## 2018-08-23 PROCEDURE — 99285 EMERGENCY DEPT VISIT HI MDM: CPT

## 2018-08-23 PROCEDURE — 82962 GLUCOSE BLOOD TEST: CPT

## 2018-08-23 RX ORDER — IBUPROFEN 200 MG
16 TABLET ORAL
Status: DISCONTINUED | OUTPATIENT
Start: 2018-08-23 | End: 2018-08-24 | Stop reason: HOSPADM

## 2018-08-23 RX ORDER — FOLIC ACID 1 MG/1
1 TABLET ORAL DAILY
Status: DISCONTINUED | OUTPATIENT
Start: 2018-08-24 | End: 2018-08-24 | Stop reason: HOSPADM

## 2018-08-23 RX ORDER — TEMAZEPAM 7.5 MG/1
30 CAPSULE ORAL NIGHTLY PRN
Status: DISCONTINUED | OUTPATIENT
Start: 2018-08-23 | End: 2018-08-24 | Stop reason: HOSPADM

## 2018-08-23 RX ORDER — IPRATROPIUM BROMIDE AND ALBUTEROL SULFATE 2.5; .5 MG/3ML; MG/3ML
3 SOLUTION RESPIRATORY (INHALATION) EVERY 6 HOURS PRN
Status: DISCONTINUED | OUTPATIENT
Start: 2018-08-23 | End: 2018-08-24 | Stop reason: HOSPADM

## 2018-08-23 RX ORDER — METOPROLOL TARTRATE 25 MG/1
25 TABLET, FILM COATED ORAL 2 TIMES DAILY
Status: DISCONTINUED | OUTPATIENT
Start: 2018-08-23 | End: 2018-08-24 | Stop reason: HOSPADM

## 2018-08-23 RX ORDER — ACETAMINOPHEN 325 MG/1
650 TABLET ORAL EVERY 8 HOURS PRN
Status: DISCONTINUED | OUTPATIENT
Start: 2018-08-23 | End: 2018-08-24 | Stop reason: HOSPADM

## 2018-08-23 RX ORDER — ISOSORBIDE DINITRATE AND HYDRALAZINE HYDROCHLORIDE 37.5; 2 MG/1; MG/1
1 TABLET ORAL 3 TIMES DAILY
Status: DISCONTINUED | OUTPATIENT
Start: 2018-08-23 | End: 2018-08-24 | Stop reason: HOSPADM

## 2018-08-23 RX ORDER — INSULIN ASPART 100 [IU]/ML
0-5 INJECTION, SOLUTION INTRAVENOUS; SUBCUTANEOUS
Status: DISCONTINUED | OUTPATIENT
Start: 2018-08-23 | End: 2018-08-24 | Stop reason: HOSPADM

## 2018-08-23 RX ORDER — CHLORDIAZEPOXIDE HYDROCHLORIDE 10 MG/1
10 CAPSULE, GELATIN COATED ORAL 3 TIMES DAILY
Status: DISCONTINUED | OUTPATIENT
Start: 2018-08-23 | End: 2018-08-24 | Stop reason: HOSPADM

## 2018-08-23 RX ORDER — THIAMINE HCL 100 MG
100 TABLET ORAL DAILY
Status: DISCONTINUED | OUTPATIENT
Start: 2018-08-24 | End: 2018-08-24 | Stop reason: HOSPADM

## 2018-08-23 RX ORDER — GLUCAGON 1 MG
1 KIT INJECTION
Status: DISCONTINUED | OUTPATIENT
Start: 2018-08-23 | End: 2018-08-24 | Stop reason: HOSPADM

## 2018-08-23 RX ORDER — IBUPROFEN 200 MG
24 TABLET ORAL
Status: DISCONTINUED | OUTPATIENT
Start: 2018-08-23 | End: 2018-08-24 | Stop reason: HOSPADM

## 2018-08-23 RX ORDER — AMLODIPINE BESYLATE 10 MG/1
10 TABLET ORAL DAILY
Status: DISCONTINUED | OUTPATIENT
Start: 2018-08-24 | End: 2018-08-24 | Stop reason: HOSPADM

## 2018-08-23 RX ORDER — POLYETHYLENE GLYCOL 3350 17 G/17G
17 POWDER, FOR SOLUTION ORAL DAILY
Status: DISCONTINUED | OUTPATIENT
Start: 2018-08-24 | End: 2018-08-24 | Stop reason: HOSPADM

## 2018-08-23 RX ORDER — ONDANSETRON 2 MG/ML
4 INJECTION INTRAMUSCULAR; INTRAVENOUS EVERY 12 HOURS PRN
Status: DISCONTINUED | OUTPATIENT
Start: 2018-08-23 | End: 2018-08-24 | Stop reason: HOSPADM

## 2018-08-23 RX ORDER — HEPARIN SODIUM 5000 [USP'U]/ML
5000 INJECTION, SOLUTION INTRAVENOUS; SUBCUTANEOUS EVERY 8 HOURS
Status: DISCONTINUED | OUTPATIENT
Start: 2018-08-23 | End: 2018-08-24 | Stop reason: HOSPADM

## 2018-08-23 RX ORDER — PANTOPRAZOLE SODIUM 40 MG/1
40 TABLET, DELAYED RELEASE ORAL DAILY
Status: DISCONTINUED | OUTPATIENT
Start: 2018-08-24 | End: 2018-08-24 | Stop reason: HOSPADM

## 2018-08-23 RX ORDER — HYDROCODONE BITARTRATE AND ACETAMINOPHEN 5; 325 MG/1; MG/1
1 TABLET ORAL EVERY 6 HOURS PRN
Status: DISCONTINUED | OUTPATIENT
Start: 2018-08-23 | End: 2018-08-24 | Stop reason: HOSPADM

## 2018-08-23 RX ADMIN — HEPARIN SODIUM 5000 UNITS: 5000 INJECTION, SOLUTION INTRAVENOUS; SUBCUTANEOUS at 09:08

## 2018-08-23 RX ADMIN — HYDRALAZINE HYDROCHLORIDE AND ISOSORBIDE DINITRATE 1 TABLET: 37.5; 2 TABLET, FILM COATED ORAL at 09:08

## 2018-08-23 RX ADMIN — HYDROCODONE BITARTRATE AND ACETAMINOPHEN 1 TABLET: 5; 325 TABLET ORAL at 07:08

## 2018-08-23 RX ADMIN — METOPROLOL TARTRATE 25 MG: 25 TABLET, FILM COATED ORAL at 09:08

## 2018-08-23 RX ADMIN — TEMAZEPAM 30 MG: 7.5 CAPSULE ORAL at 11:08

## 2018-08-23 RX ADMIN — CHLORDIAZEPOXIDE HYDROCHLORIDE 10 MG: 10 CAPSULE ORAL at 09:08

## 2018-08-23 NOTE — ED PROVIDER NOTES
SCRIBE #1 NOTE: I, Zandra Patterson, am scribing for, and in the presence of, Aurora Boggs MD. I have scribed the entire note.      History      Chief Complaint   Patient presents with    Shortness of Breath     pt just prescribed home O2 and tank is ran empty. 4L at home       Review of patient's allergies indicates:   Allergen Reactions    Penicillins         HPI   HPI    8/23/2018, 2:02 AM   History obtained from the patient      History of Present Illness: Lionel Castillo is a 68 y.o. male patient with a PMHx of Lung CA, HTN, DM, Hyperlipidemia, who presents to the Emergency Department for further evaluation after his O2 tank ran out 2 hours PTA. He was recently prescribed 4L home O2 after being discharged from the hospital yesterday. He reports an O2 concentrator will be installed at his home this AM. Pt is asymptomatic. Patient denies any SOB, CP, cough, n/v, fever, chills, and all other sxs at this time. No further complaints or concerns at this time.       Arrival mode: EMS    PCP: Eddie Garcia MD       Past Medical History:  Past Medical History:   Diagnosis Date    Arthritis     Cancer     lung cancer    Diabetes     Foot fracture     Hand fracture, left     Hand fracture, right     HTN (hypertension)     Humerus fracture 07/18/2018    Hyperlipidemia     Neuropathy     Renal disorder        Past Surgical History:  Past Surgical History:   Procedure Laterality Date    ANKLE SURGERY Left     CHOLECYSTECTOMY      HUMERUS FRACTURE SURGERY           Family History:  Family History   Problem Relation Age of Onset    Heart disease Father     Miscarriages / Stillbirths Paternal Uncle     Heart disease Paternal Grandfather     Miscarriages / Stillbirths Paternal Grandfather        Social History:  Social History     Tobacco Use    Smoking status: Current Every Day Smoker     Packs/day: 1.00    Smokeless tobacco: Never Used   Substance and Sexual Activity    Alcohol use: Yes      "Comment: 6 beers per day    Drug use: No    Sexual activity: unknown       ROS   Review of Systems   Constitutional: Negative for chills and fever.   HENT: Negative for sore throat.    Respiratory: Negative for cough and shortness of breath.    Cardiovascular: Negative for chest pain.   Gastrointestinal: Negative for nausea and vomiting.   Genitourinary: Negative for dysuria.   Musculoskeletal: Negative for back pain.   Skin: Negative for rash.   Neurological: Negative for weakness and numbness.   Hematological: Does not bruise/bleed easily.   All other systems reviewed and are negative.    Physical Exam      Initial Vitals [08/23/18 0203]   BP Pulse Resp Temp SpO2   (!) 155/68 68 (!) 22 97.6 °F (36.4 °C) 97 %      MAP       --          Physical Exam  Nursing Notes and Vital Signs Reviewed.  Constitutional: Patient is in no acute distress. Well-developed. Cachectic.   Head: Atraumatic. Normocephalic.  Eyes: PERRL. EOM intact. Conjunctivae are not pale. No scleral icterus.  ENT: Mucous membranes are moist. Oropharynx is clear and symmetric.    Neck: Supple. Full ROM. No lymphadenopathy.  Cardiovascular: Regular rate. Regular rhythm. No murmurs, rubs, or gallops. Distal pulses are 2+ and symmetric.  Pulmonary/Chest: No respiratory distress. Clear to auscultation bilaterally. No wheezing or rales.  Musculoskeletal: Moves all extremities. No obvious deformities. No edema. No calf tenderness.  Skin: Warm and dry.  Neurological:  Alert, awake, and appropriate.  Normal speech.  No acute focal neurological deficits are appreciated.  Psychiatric: Normal affect. Good eye contact. Appropriate in content.    ED Course    Procedures  ED Vital Signs:  Vitals:    08/23/18 0203 08/23/18 0222 08/23/18 0247 08/23/18 0402   BP: (!) 155/68  (!) 152/66 (!) 144/65   Pulse: 68  68 64   Resp: (!) 22      Temp: 97.6 °F (36.4 °C)      TempSrc: Oral      SpO2: 97%  100% 95%   Weight:  52.8 kg (116 lb 8 oz)     Height: 5' 6" (1.676 m)    "            The Emergency Provider reviewed the vital signs and test results, which are outlined above.    ED Discussion     5:39 AM:  Discussed with pt all pertinent ED information. Discussed pt dx and plan of tx. He has someone installing an O2 concentator in his home today. Social work will evaluate pt and help arrange transportation when they arrive. Gave pt all f/u and return to the ED instructions. All questions and concerns were addressed at this time. Pt expresses understanding of information and instructions, and is comfortable with plan to discharge. Pt is stable for discharge after social worked consult.    1:21 PM  The patient was discharged from department early this morning.  I arrived at 10:00 a.m. for my shift.  In the interim the patient was not able be placed at home.  The nursing concerns I went in to reassess the patient.  He is unable to walk or take care of himself.  He was sitting in his own feces in the bed.  He is alert and oriented to person place and time he is lucid and does understand that discharge home could result in his death.  We have offered skilled nursing to him and Jamaal with social Work is here evaluating him for possible placement.  The patient is considering skilled nursing at this time.  Patient does not have a medical diagnoses requiring admission to the hospital itself current clean per she is on with hospital medicine.  We are attempting to discuss with the family who all live out of town in either [place] or [place].  He has had issues with this in the past as well.  If placement is unable to be obtained, will have a tele psych consult evaluate him for ability to make decisions on his own.  If he is found to be competent, will have to abide by the patient's wishes would over the may be at that time for discharge. If not will consider involuntary admission to [facility] for further treatment.    4:22 PM  The patient is amenable to skilled nursing placement.  This will  not be available until tomorrow per social work.  Will consult Hospital Medicine for possible observation admission overnight for placement tomorrow.  Jamaal ANTONIO is aware of current plan of care and is aggressively following it.  She is on his been contact with hospital medicine.  She is contacted case management talk arise admission to the hospital.  ED Medication(s):  Medications - No data to display    New Prescriptions    No medications on file       Follow-up Information     Eddie Garcia MD In 4 days.    Specialty:  Internal Medicine  Contact information:  [address] [facility] [facility] [address] [phone]             [facility]    Specialty:  Emergency Medicine  Why:  As needed, If symptoms worsen  Contact information:  [address]  [facility] [address] [phone]                   Medical Decision Making              Scribe Attestation:   Scribe #1: I performed the above scribed service and the documentation accurately describes the services I performed. I attest to the accuracy of the note.    Attending:   Physician Attestation Statement for Scribe #1: I, Aurora Boggs MD, personally performed the services described in this documentation, as scribed by Zandra Patterson, in my presence, and it is both accurate and complete.          Clinical Impression       ICD-10-CM ICD-9-CM   1. Malignant neoplasm of lung, unspecified laterality, unspecified part of lung C34.90 162.9   2. Dyspnea, unspecified type R06.00 786.09       Disposition:   Disposition: Discharged  Condition: Stable         Jerald Clancy Jr., MD  08/25/18 3661

## 2018-08-23 NOTE — ASSESSMENT & PLAN NOTE
-Patient counseled on cessation.   -Libirum for DT prophylaxis.   -MVI, thiamine and folic acid.

## 2018-08-23 NOTE — ED NOTES
Pt escorted to bedside commode by Mohan CLEMENTS. NAD noted. Pt remains attached to oxygen. Will continue to monitor.

## 2018-08-23 NOTE — ED NOTES
PASRR and LOCET completed for SNF placement.    JADIEL spoke with Jimmie at [facility] [phone], Jimmie  informed JADIEL that he will not be able to obtain authorization until tomorrow morning (8/24/2018).    Patient is in agreement with skilled placement. JADIEL and ED MD spoke with patient. Patient choice form signed.

## 2018-08-23 NOTE — ED NOTES
Cleaned of incontinence, pt unable to care for self at home and states family not there to help. Attempted to ambulate with 2 staff members but pt unable. Pt states he uses walker at home but obvious pt can not do so.

## 2018-08-23 NOTE — ED NOTES
Spoke with patients neighbor, Rolando Henry ([phone]), who states he will be out of town for the next week and will not be able to check on Jonathan Mendoza. Rolando states that he feels the patient is not able to take care of himself at home.

## 2018-08-23 NOTE — ED NOTES
Pt resting in bed with eyes closed. NAD noted. RR e/u, airway open and patent. Pt remains on 4 L NC. Will continue to monitor.

## 2018-08-23 NOTE — ED NOTES
Patient gave  permission to contact his brother  Neri Castillo [phone] (left message),  Mr Marshall, his neighbor (aware of pending admit and snf placement) and his son Mathew [phone] (no answer).

## 2018-08-23 NOTE — PLAN OF CARE
08/23/18 1733   JOSHUA Message   Medicare Outpatient and Observation Notification regarding financial responsibility Given to patient/caregiver;Explained to patient/caregiver;Signed/date by patient/caregiver   Date JOSHUA was signed 08/23/18   Time JOSHUA was signed 1731

## 2018-08-23 NOTE — SUBJECTIVE & OBJECTIVE
Past Medical History:   Diagnosis Date    Arthritis     Cancer     lung cancer    Diabetes     Foot fracture     Hand fracture, left     Hand fracture, right     HTN (hypertension)     Humerus fracture 07/18/2018    Hyperlipidemia     Neuropathy     Renal disorder        Past Surgical History:   Procedure Laterality Date    ANKLE SURGERY Left     CHOLECYSTECTOMY      HUMERUS FRACTURE SURGERY         Review of patient's allergies indicates:   Allergen Reactions    Penicillins        Current Facility-Administered Medications on File Prior to Encounter   Medication    [DISCONTINUED] acetaminophen tablet 500 mg    [DISCONTINUED] albuterol-ipratropium 2.5 mg-0.5 mg/3 mL nebulizer solution 3 mL    [DISCONTINUED] amLODIPine tablet 10 mg    [DISCONTINUED] chlordiazepoxide capsule 10 mg    [DISCONTINUED] citalopram tablet 40 mg    [DISCONTINUED] dextrose 50% injection 12.5 g    [DISCONTINUED] dextrose 50% injection 25 g    [DISCONTINUED] docusate sodium capsule 100 mg    [DISCONTINUED] folic acid tablet 1 mg    [DISCONTINUED] glucagon (human recombinant) injection 1 mg    [DISCONTINUED] glucose chewable tablet 16 g    [DISCONTINUED] glucose chewable tablet 24 g    [DISCONTINUED] heparin (porcine) injection 5,000 Units    [DISCONTINUED] hydrALAZINE injection 10 mg    [DISCONTINUED] HYDROcodone-acetaminophen  mg per tablet 1 tablet    [DISCONTINUED] HYDROcodone-acetaminophen 5-325 mg per tablet 1 tablet    [DISCONTINUED] insulin aspart U-100 pen 0-5 Units    [DISCONTINUED] isosorbide-hydrALAZINE 20-37.5 mg per tablet 1 tablet    [DISCONTINUED] lorazepam (ATIVAN) injection 1 mg    [DISCONTINUED] metoprolol tartrate (LOPRESSOR) tablet 25 mg    [DISCONTINUED] multivitamin tablet 1 tablet    [DISCONTINUED] mupirocin 2 % ointment    [DISCONTINUED] ondansetron disintegrating tablet 8 mg    [DISCONTINUED] ondansetron injection 4 mg    [DISCONTINUED] pantoprazole EC tablet 40 mg     [DISCONTINUED] polyethylene glycol packet 17 g    [DISCONTINUED] promethazine (PHENERGAN) 6.25 mg in dextrose 5 % 50 mL IVPB    [DISCONTINUED] ramelteon tablet 8 mg    [DISCONTINUED] sodium chloride 0.9% flush 5 mL    [DISCONTINUED] thiamine tablet 100 mg     Current Outpatient Medications on File Prior to Encounter   Medication Sig    albuterol-ipratropium (DUO-NEB) 2.5 mg-0.5 mg/3 mL nebulizer solution Take 3 mLs by nebulization every 6 (six) hours as needed for Wheezing. Rescue    amlodipine (NORVASC) 10 MG tablet Take 10 mg by mouth once daily.    aspirin 81 MG Chew Take 81 mg by mouth once daily.    atorvastatin (LIPITOR) 20 MG tablet Take 20 mg by mouth once daily.    calcium carbonate (OS-DALLAS) 600 mg calcium (1,500 mg) Tab Take 600 mg by mouth once daily.    citalopram (CELEXA) 40 MG tablet Take 40 mg by mouth once daily.    docusate sodium (COLACE) 100 MG capsule Take 1 capsule (100 mg total) by mouth 2 (two) times daily as needed for Constipation.    folic acid (FOLVITE) 1 MG tablet Take 1 tablet (1 mg total) by mouth once daily.    furosemide (LASIX) 40 MG tablet Take 1 tablet (40 mg total) by mouth 2 (two) times daily. (Patient taking differently: Take 80 mg by mouth 2 (two) times daily. )    HYDROcodone-acetaminophen (NORCO) 5-325 mg per tablet Take 1 tablet by mouth every 6 (six) hours as needed.    insulin regular 100 unit/mL Inj injection Inject into the skin 3 (three) times daily before meals.    isosorbide-hydrALAZINE 20-37.5 mg (BIDIL) 20-37.5 mg Tab Take 1 tablet by mouth 3 (three) times daily.    losartan-hydrochlorothiazide 100-25 mg (HYZAAR) 100-25 mg per tablet Take 1 tablet by mouth once daily.    metoprolol tartrate (LOPRESSOR) 50 MG tablet Take 1 tablet (50 mg total) by mouth 2 (two) times daily.    multivitamin (THERAGRAN) tablet Take 1 tablet by mouth once daily.    mupirocin (BACTROBAN) 2 % ointment Apply topically 3 (three) times daily.    nicotine (NICODERM CQ) 21  mg/24 hr Place 1 patch onto the skin every 24 hours.    pantoprazole (PROTONIX) 40 MG tablet Take 40 mg by mouth once daily.    polyethylene glycol (GLYCOLAX) 17 gram/dose powder Take 17 g by mouth once daily. Take daily to keep your bowel movements regular while taking pain medicines    potassium chloride SA (K-DUR,KLOR-CON) 20 MEQ tablet Take 1 tablet (20 mEq total) by mouth once daily.    temazepam (RESTORIL) 30 mg capsule Take 30 mg by mouth nightly as needed for Insomnia.    thiamine 100 MG tablet Take 1 tablet (100 mg total) by mouth once daily.     Family History     Problem Relation (Age of Onset)    Heart disease Father, Paternal Grandfather    Miscarriages / Stillbirths Paternal Uncle, Paternal Grandfather        Tobacco Use    Smoking status: Current Every Day Smoker     Packs/day: 1.00    Smokeless tobacco: Never Used   Substance and Sexual Activity    Alcohol use: Yes     Comment: 6 beers per day    Drug use: No    Sexual activity: Not on file     Review of Systems   Constitutional: Positive for activity change, fatigue and unexpected weight change. Negative for chills, diaphoresis and fever.   HENT: Negative for congestion, postnasal drip, sore throat and trouble swallowing.    Eyes: Negative for redness, itching and visual disturbance.   Respiratory: Positive for shortness of breath.    Cardiovascular: Negative for chest pain and palpitations.   Gastrointestinal: Negative for abdominal distention, abdominal pain, diarrhea, nausea and vomiting.   Endocrine: Negative for cold intolerance and heat intolerance.   Genitourinary: Negative for difficulty urinating, dysuria, flank pain, frequency and urgency.   Musculoskeletal: Positive for arthralgias and gait problem.   Skin: Negative for color change and wound.   Allergic/Immunologic: Negative for environmental allergies and food allergies.   Neurological: Positive for weakness. Negative for dizziness and headaches.   Hematological: Does not  bruise/bleed easily.   Psychiatric/Behavioral: Negative for agitation, confusion and sleep disturbance. The patient is not nervous/anxious.      Objective:     Vital Signs (Most Recent):  Temp: 97.6 °F (36.4 °C) (08/23/18 0203)  Pulse: 66 (08/23/18 1630)  Resp: 18 (08/23/18 1630)  BP: (!) 161/70 (08/23/18 1630)  SpO2: 96 % (08/23/18 1630) Vital Signs (24h Range):  Temp:  [97.6 °F (36.4 °C)] 97.6 °F (36.4 °C)  Pulse:  [64-75] 66  Resp:  [18-22] 18  SpO2:  [95 %-100 %] 96 %  BP: (144-161)/(65-70) 161/70     Weight: 52.8 kg (116 lb 8 oz)  Body mass index is 18.8 kg/m².    Physical Exam   Constitutional: He is oriented to person, place, and time. He appears cachectic.   HENT:   Head: Normocephalic.   Nose: Nose normal.   Eyes: Conjunctivae are normal.   Neck: Normal range of motion. Neck supple.   Cardiovascular: Normal rate, regular rhythm, normal heart sounds and intact distal pulses.   Pulmonary/Chest: Effort normal and breath sounds normal. No respiratory distress. He has no wheezes. He has no rales.   Abdominal: Soft. Bowel sounds are normal. He exhibits no distension. There is no tenderness.   Genitourinary:   Genitourinary Comments: Deferred   Musculoskeletal:   Generalized weakness   Neurological: He is alert and oriented to person, place, and time.   Skin: Skin is warm and dry.   Ecchymosis to BUE and RUE   Psychiatric: He has a normal mood and affect. His behavior is normal. Thought content normal.           Significant Labs:   CBC:   Recent Labs   Lab  08/22/18   0507   WBC  6.80   HGB  10.9*   HCT  31.6*   PLT  483*     CMP:   Recent Labs   Lab  08/22/18   0507   NA  134*   K  3.7   CL  98   CO2  20*   GLU  117*   BUN  42*   CREATININE  2.5*   CALCIUM  9.7   PROT  6.6   ALBUMIN  2.3*   BILITOT  0.6   ALKPHOS  145*   AST  10   ALT  10   ANIONGAP  16   EGFRNONAA  26*       Significant Imaging:   Imaging Results    None

## 2018-08-23 NOTE — ED NOTES
Asked Dr. Clancy to reassess the patient at this time due to his bed bound status and no family support system.

## 2018-08-23 NOTE — PLAN OF CARE
Aug29 New Patient with Jessica Gallo MD   Wednesday Aug 29, 2018 10:15 AM  [facility]   [address] [facility] [address]   [phone]    Sep10 Go to Manuel Zarco MD   Monday Sep 10, 2018   at 2:45 pm  [address] [facility]   3rd Floor   [facility] [address]   [phone]         08/23/18 0721   Final Note   Assessment Type Final Discharge Note   Discharge Disposition Home-Health   Right Care Referral Info   Post Acute Recommendation Home-care   Facility Name [facility]

## 2018-08-23 NOTE — HPI
Lionel Castillo is a 68 year old male with CKD, DM II and hypertension who presented to the ED with complaints of SOB. The patient was hospitalized here from 8/18/18 to 8/22/18 during which time, he was treated for acute on chronic respiratory failure with hypoxia, hyperkalemia, acute on chronic diastolic congestive heart failure, hyponatremia, acute on chronic renal failure and alcohol abuse. Upon discharge, SNF placement was recommended for patient. The patient declined SNF placement and was discharged home with plans for home oxygen concentrator delivery. The patient did not answer the door for the concentrator delivery and presented to the ED after his oxygen ran out. His SOB resolved with oxygen administration. In the ED, the patient was unable to walk and is now agreeable to SNF placement. Vital signs remain stable on nasal cannula oxygen. Pt denies smoking and use of ETOH.  Hospital Medicine contacted for admission.

## 2018-08-23 NOTE — ED NOTES
Calling [facility], they are awaiting prior authorization from patients insurance. They will call DON to see if they can see patient without prior authorization.

## 2018-08-23 NOTE — ED NOTES
Spoke with Uyen at [facility], they can see patient tomorrow if they have gotten prior authorization.

## 2018-08-23 NOTE — H&P
[facility] Hospital Medicine  History & Physical    Patient Name: Lionel Castillo  MRN: [id number]  Admission Date: 8/23/2018  Attending Physician: Dr. Nemo Livingston  Primary Care Provider: Eddie Garcia MD         Patient information was obtained from patient, past medical records and ER records.     Subjective:     Principal Problem:Weakness generalized    Chief Complaint:   Chief Complaint   Patient presents with    Shortness of Breath     pt just prescribed home O2 and tank is ran empty. 4L at home        HPI: Lionel Castillo is a 68 year old male with CKD, DM II and hypertension who presented to the ED with complaints of SOB. The patient was hospitalized here from 8/18/18 to 8/22/18 during which time, he was treated for acute on chronic respiratory failure with hypoxia, hyperkalemia, acute on chronic diastolic congestive heart failure, hyponatremia, acute on chronic renal failure and alcohol abuse. Upon discharge, SNF placement was recommended for patient. The patient declined SNF placement and was discharged home with plans for home oxygen concentrator delivery. The patient did not answer the door for the concentrator delivery and presented to the ED after his oxygen ran out. His SOB resolved with oxygen administration. In the ED, the patient was unable to walk and is now agreeable to SNF placement. Vital signs remain stable on nasal cannula oxygen. Pt denies smoking and use of ETOH.  Hospital Medicine contacted for admission.      Past Medical History:   Diagnosis Date    Arthritis     Cancer     lung cancer    Diabetes     Foot fracture     Hand fracture, left     Hand fracture, right     HTN (hypertension)     Humerus fracture 07/18/2018    Hyperlipidemia     Neuropathy     Renal disorder        Past Surgical History:   Procedure Laterality Date    ANKLE SURGERY Left     CHOLECYSTECTOMY      HUMERUS FRACTURE SURGERY         Review of patient's allergies indicates:    Allergen Reactions    Penicillins        Current Facility-Administered Medications on File Prior to Encounter   Medication    [DISCONTINUED] acetaminophen tablet 500 mg    [DISCONTINUED] albuterol-ipratropium 2.5 mg-0.5 mg/3 mL nebulizer solution 3 mL    [DISCONTINUED] amLODIPine tablet 10 mg    [DISCONTINUED] chlordiazepoxide capsule 10 mg    [DISCONTINUED] citalopram tablet 40 mg    [DISCONTINUED] dextrose 50% injection 12.5 g    [DISCONTINUED] dextrose 50% injection 25 g    [DISCONTINUED] docusate sodium capsule 100 mg    [DISCONTINUED] folic acid tablet 1 mg    [DISCONTINUED] glucagon (human recombinant) injection 1 mg    [DISCONTINUED] glucose chewable tablet 16 g    [DISCONTINUED] glucose chewable tablet 24 g    [DISCONTINUED] heparin (porcine) injection 5,000 Units    [DISCONTINUED] hydrALAZINE injection 10 mg    [DISCONTINUED] HYDROcodone-acetaminophen  mg per tablet 1 tablet    [DISCONTINUED] HYDROcodone-acetaminophen 5-325 mg per tablet 1 tablet    [DISCONTINUED] insulin aspart U-100 pen 0-5 Units    [DISCONTINUED] isosorbide-hydrALAZINE 20-37.5 mg per tablet 1 tablet    [DISCONTINUED] lorazepam (ATIVAN) injection 1 mg    [DISCONTINUED] metoprolol tartrate (LOPRESSOR) tablet 25 mg    [DISCONTINUED] multivitamin tablet 1 tablet    [DISCONTINUED] mupirocin 2 % ointment    [DISCONTINUED] ondansetron disintegrating tablet 8 mg    [DISCONTINUED] ondansetron injection 4 mg    [DISCONTINUED] pantoprazole EC tablet 40 mg    [DISCONTINUED] polyethylene glycol packet 17 g    [DISCONTINUED] promethazine (PHENERGAN) 6.25 mg in dextrose 5 % 50 mL IVPB    [DISCONTINUED] ramelteon tablet 8 mg    [DISCONTINUED] sodium chloride 0.9% flush 5 mL    [DISCONTINUED] thiamine tablet 100 mg     Current Outpatient Medications on File Prior to Encounter   Medication Sig    albuterol-ipratropium (DUO-NEB) 2.5 mg-0.5 mg/3 mL nebulizer solution Take 3 mLs by nebulization every 6 (six) hours as  needed for Wheezing. Rescue    amlodipine (NORVASC) 10 MG tablet Take 10 mg by mouth once daily.    aspirin 81 MG Chew Take 81 mg by mouth once daily.    atorvastatin (LIPITOR) 20 MG tablet Take 20 mg by mouth once daily.    calcium carbonate (OS-DALLAS) 600 mg calcium (1,500 mg) Tab Take 600 mg by mouth once daily.    citalopram (CELEXA) 40 MG tablet Take 40 mg by mouth once daily.    docusate sodium (COLACE) 100 MG capsule Take 1 capsule (100 mg total) by mouth 2 (two) times daily as needed for Constipation.    folic acid (FOLVITE) 1 MG tablet Take 1 tablet (1 mg total) by mouth once daily.    furosemide (LASIX) 40 MG tablet Take 1 tablet (40 mg total) by mouth 2 (two) times daily. (Patient taking differently: Take 80 mg by mouth 2 (two) times daily. )    HYDROcodone-acetaminophen (NORCO) 5-325 mg per tablet Take 1 tablet by mouth every 6 (six) hours as needed.    insulin regular 100 unit/mL Inj injection Inject into the skin 3 (three) times daily before meals.    isosorbide-hydrALAZINE 20-37.5 mg (BIDIL) 20-37.5 mg Tab Take 1 tablet by mouth 3 (three) times daily.    losartan-hydrochlorothiazide 100-25 mg (HYZAAR) 100-25 mg per tablet Take 1 tablet by mouth once daily.    metoprolol tartrate (LOPRESSOR) 50 MG tablet Take 1 tablet (50 mg total) by mouth 2 (two) times daily.    multivitamin (THERAGRAN) tablet Take 1 tablet by mouth once daily.    mupirocin (BACTROBAN) 2 % ointment Apply topically 3 (three) times daily.    nicotine (NICODERM CQ) 21 mg/24 hr Place 1 patch onto the skin every 24 hours.    pantoprazole (PROTONIX) 40 MG tablet Take 40 mg by mouth once daily.    polyethylene glycol (GLYCOLAX) 17 gram/dose powder Take 17 g by mouth once daily. Take daily to keep your bowel movements regular while taking pain medicines    potassium chloride SA (K-DUR,KLOR-CON) 20 MEQ tablet Take 1 tablet (20 mEq total) by mouth once daily.    temazepam (RESTORIL) 30 mg capsule Take 30 mg by mouth nightly  as needed for Insomnia.    thiamine 100 MG tablet Take 1 tablet (100 mg total) by mouth once daily.     Family History     Problem Relation (Age of Onset)    Heart disease Father, Paternal Grandfather    Miscarriages / Stillbirths Paternal Uncle, Paternal Grandfather        Tobacco Use    Smoking status: Current Every Day Smoker     Packs/day: 1.00    Smokeless tobacco: Never Used   Substance and Sexual Activity    Alcohol use: Yes     Comment: 6 beers per day    Drug use: No    Sexual activity: Not on file     Review of Systems   Constitutional: Positive for activity change, fatigue and unexpected weight change. Negative for chills, diaphoresis and fever.   HENT: Negative for congestion, postnasal drip, sore throat and trouble swallowing.    Eyes: Negative for redness, itching and visual disturbance.   Respiratory: Positive for shortness of breath.    Cardiovascular: Negative for chest pain and palpitations.   Gastrointestinal: Negative for abdominal distention, abdominal pain, diarrhea, nausea and vomiting.   Endocrine: Negative for cold intolerance and heat intolerance.   Genitourinary: Negative for difficulty urinating, dysuria, flank pain, frequency and urgency.   Musculoskeletal: Positive for arthralgias and gait problem.   Skin: Negative for color change and wound.   Allergic/Immunologic: Negative for environmental allergies and food allergies.   Neurological: Positive for weakness. Negative for dizziness and headaches.   Hematological: Does not bruise/bleed easily.   Psychiatric/Behavioral: Negative for agitation, confusion and sleep disturbance. The patient is not nervous/anxious.      Objective:     Vital Signs (Most Recent):  Temp: 97.6 °F (36.4 °C) (08/23/18 0203)  Pulse: 66 (08/23/18 1630)  Resp: 18 (08/23/18 1630)  BP: (!) 161/70 (08/23/18 1630)  SpO2: 96 % (08/23/18 1630) Vital Signs (24h Range):  Temp:  [97.6 °F (36.4 °C)] 97.6 °F (36.4 °C)  Pulse:  [64-75] 66  Resp:  [18-22] 18  SpO2:  [95  %-100 %] 96 %  BP: (144-161)/(65-70) 161/70     Weight: 52.8 kg (116 lb 8 oz)  Body mass index is 18.8 kg/m².    Physical Exam   Constitutional: He is oriented to person, place, and time. He appears cachectic.   HENT:   Head: Normocephalic.   Nose: Nose normal.   Eyes: Conjunctivae are normal.   Neck: Normal range of motion. Neck supple.   Cardiovascular: Normal rate, regular rhythm, normal heart sounds and intact distal pulses.   Pulmonary/Chest: Effort normal and breath sounds normal. No respiratory distress. He has no wheezes. He has no rales.   Abdominal: Soft. Bowel sounds are normal. He exhibits no distension. There is no tenderness.   Genitourinary:   Genitourinary Comments: Deferred   Musculoskeletal:   Generalized weakness   Neurological: He is alert and oriented to person, place, and time.   Skin: Skin is warm and dry.   Ecchymosis to BUE and RUE   Psychiatric: He has a normal mood and affect. His behavior is normal. Thought content normal.           Significant Labs:   CBC:   Recent Labs   Lab  08/22/18   0507   WBC  6.80   HGB  10.9*   HCT  31.6*   PLT  483*     CMP:   Recent Labs   Lab  08/22/18   0507   NA  134*   K  3.7   CL  98   CO2  20*   GLU  117*   BUN  42*   CREATININE  2.5*   CALCIUM  9.7   PROT  6.6   ALBUMIN  2.3*   BILITOT  0.6   ALKPHOS  145*   AST  10   ALT  10   ANIONGAP  16   EGFRNONAA  26*       Significant Imaging:   Imaging Results    None       Assessment/Plan:     * Weakness generalized    SNF placement pending authorization.           Hyperlipidemia    Continue statin.           Centrilobular emphysema    Continue inhaled medications.           Chronic respiratory failure with hypoxia    Continue nasal cannula oxygen therapy.           Chronic diastolic congestive heart failure    -Appears compensated.   -Continue Lasix and metoprolol.           Type 2 diabetes mellitus, without long-term current use of insulin    -NISS.   -ADA diet.           Essential hypertension    Continue  Norvasc, Lasix, losartan-HCTZ, Bidil and metoprolol.         Alcohol abuse    -Patient counseled on cessation.   -Libirum for DT prophylaxis.   -MVI, thiamine and folic acid.             VTE Risk Mitigation (From admission, onward)        Ordered     heparin (porcine) injection 5,000 Units  Every 8 hours      08/23/18 1823     IP VTE HIGH RISK PATIENT  Once      08/23/18 1649     Place DELLA hose  Until discontinued      08/23/18 1649             Kandace Michel NP  Department of Hospital Medicine   [facility]

## 2018-08-23 NOTE — ED NOTES
Pt states that he wears 4L NC O2 daily and missed his supplemental oxygen drop off yesterday. Pt states that he was without supplemental oxygen for 15 min before he called fire department. Denies SOB. States that he requested to be brought to the hospital until he can call in the morning to get supplemental oxygen dropped off during the day. RR e/u, airway open and patent. Pt placed on 4 L NC. Pt resting comfortably in bed. Pt eyes closed when RN initially entered room. MD Boggs notified.

## 2018-08-24 ENCOUNTER — TELEPHONE (OUTPATIENT)
Dept: NEPHROLOGY | Facility: CLINIC | Age: 68
End: 2018-08-24

## 2018-08-24 VITALS
BODY MASS INDEX: 18.72 KG/M2 | HEART RATE: 63 BPM | RESPIRATION RATE: 18 BRPM | WEIGHT: 116.5 LBS | DIASTOLIC BLOOD PRESSURE: 61 MMHG | OXYGEN SATURATION: 96 % | TEMPERATURE: 98 F | HEIGHT: 66 IN | SYSTOLIC BLOOD PRESSURE: 131 MMHG

## 2018-08-24 PROBLEM — F10.10 ALCOHOL ABUSE: Chronic | Status: RESOLVED | Noted: 2018-07-18 | Resolved: 2018-08-24

## 2018-08-24 LAB
POCT GLUCOSE: 137 MG/DL (ref 70–110)
POCT GLUCOSE: 95 MG/DL (ref 70–110)

## 2018-08-24 PROCEDURE — 25000003 PHARM REV CODE 250: Performed by: NURSE PRACTITIONER

## 2018-08-24 PROCEDURE — G0378 HOSPITAL OBSERVATION PER HR: HCPCS

## 2018-08-24 PROCEDURE — 96372 THER/PROPH/DIAG INJ SC/IM: CPT

## 2018-08-24 PROCEDURE — 27000221 HC OXYGEN, UP TO 24 HOURS

## 2018-08-24 PROCEDURE — 63600175 PHARM REV CODE 636 W HCPCS: Performed by: NURSE PRACTITIONER

## 2018-08-24 PROCEDURE — 94761 N-INVAS EAR/PLS OXIMETRY MLT: CPT

## 2018-08-24 RX ORDER — FUROSEMIDE 40 MG/1
40 TABLET ORAL DAILY
Qty: 60 TABLET | Refills: 0
Start: 2018-08-24 | End: 2018-09-17

## 2018-08-24 RX ORDER — FUROSEMIDE 40 MG/1
40 TABLET ORAL 2 TIMES DAILY
Qty: 60 TABLET | Refills: 0
Start: 2018-08-24 | End: 2018-08-24 | Stop reason: SDUPTHER

## 2018-08-24 RX ORDER — TEMAZEPAM 30 MG/1
30 CAPSULE ORAL NIGHTLY PRN
Qty: 10 CAPSULE | Refills: 0 | Status: SHIPPED | OUTPATIENT
Start: 2018-08-24

## 2018-08-24 RX ORDER — POTASSIUM CHLORIDE 750 MG/1
10 TABLET, EXTENDED RELEASE ORAL DAILY
Start: 2018-08-24 | End: 2018-09-17

## 2018-08-24 RX ORDER — HYDROCODONE BITARTRATE AND ACETAMINOPHEN 5; 325 MG/1; MG/1
1 TABLET ORAL EVERY 6 HOURS PRN
Qty: 20 TABLET | Refills: 0 | Status: SHIPPED | OUTPATIENT
Start: 2018-08-24

## 2018-08-24 RX ADMIN — AMLODIPINE BESYLATE 10 MG: 10 TABLET ORAL at 08:08

## 2018-08-24 RX ADMIN — HYDRALAZINE HYDROCHLORIDE AND ISOSORBIDE DINITRATE 1 TABLET: 37.5; 2 TABLET, FILM COATED ORAL at 02:08

## 2018-08-24 RX ADMIN — CHLORDIAZEPOXIDE HYDROCHLORIDE 10 MG: 10 CAPSULE ORAL at 08:08

## 2018-08-24 RX ADMIN — METOPROLOL TARTRATE 25 MG: 25 TABLET, FILM COATED ORAL at 08:08

## 2018-08-24 RX ADMIN — CHLORDIAZEPOXIDE HYDROCHLORIDE 10 MG: 10 CAPSULE ORAL at 02:08

## 2018-08-24 RX ADMIN — HEPARIN SODIUM 5000 UNITS: 5000 INJECTION, SOLUTION INTRAVENOUS; SUBCUTANEOUS at 05:08

## 2018-08-24 RX ADMIN — HEPARIN SODIUM 5000 UNITS: 5000 INJECTION, SOLUTION INTRAVENOUS; SUBCUTANEOUS at 02:08

## 2018-08-24 RX ADMIN — FOLIC ACID 1 MG: 1 TABLET ORAL at 08:08

## 2018-08-24 RX ADMIN — HYDRALAZINE HYDROCHLORIDE AND ISOSORBIDE DINITRATE 1 TABLET: 37.5; 2 TABLET, FILM COATED ORAL at 08:08

## 2018-08-24 RX ADMIN — Medication 100 MG: at 08:08

## 2018-08-24 RX ADMIN — PANTOPRAZOLE SODIUM 40 MG: 40 TABLET, DELAYED RELEASE ORAL at 08:08

## 2018-08-24 RX ADMIN — HYDROCODONE BITARTRATE AND ACETAMINOPHEN 1 TABLET: 5; 325 TABLET ORAL at 08:08

## 2018-08-24 RX ADMIN — THERA TABS 1 TABLET: TAB at 08:08

## 2018-08-24 NOTE — CONSULTS
Oxygen:  HOWIE spoke with Paula Mc and was told an attempt made to deliver oxygen but no one answered the door. States this has happened several times after previous discharges. HOWIE informed her that patient will not be returning to his residence upon d/c. Alexandro will arrange  with patient's family of tank and regulator previously delivered.     SNF:  HOWIE spoke with Dennis at [facility]. Request for authorization submitted, awaiting approval, she will let me know once auth obtained. She has spoken with his brother and everyone is in agreement with dc plan. Plan is to be accepted today.

## 2018-08-24 NOTE — PROGRESS NOTES
Report called into Geraldine at [facility]. She is to call me back with a  time.

## 2018-08-24 NOTE — PLAN OF CARE
CM met with patient at bedside. Patient reports he never received oxygen delivery from [employer] after d/c yesterday and was afraid he would run out so he returned to ER.     Oxygen:  CM spoke with Paula @ [employer] and was told an attempt made to deliver oxygen but no one answered the door. States this has happened several times after previous discharges. HOWIE informed her that patient will not be returning to his residence upon d/c. [employer] will arrange  with patient's family of tank and regulator previously delivered.     SNF:  HOWIE spoke with Dennis at [facility]. Request for authorization submitted, awaiting approval, she will let me know once auth obtained. She has spoken with his brother and everyone is in agreement with dc plan. Plan is to be accepted today.     @4360 Patient auth and accepted to [facility]. D/c order and PASRR faxed as requested.     Report can be called to [phone]. Awaiting  time.     @1432 -  told  time will be @3:00pm.

## 2018-08-24 NOTE — PROGRESS NOTES
[facility] to [facility] to facility. AVS, other paperwork and Printed scripts sent with patient. Pt dressed by staff. IV removed, tele monitor removed.

## 2018-08-24 NOTE — NURSING
Patient assessed for diabetes educational needs following chart review on 8/20/18 on previous hospital stay.      He reports was diagnosed over 10 years ago  He does not have a home glucose monitor and has his glucose checked at every PCP visit  He is consistent with taking his diabetes med's at home  Reviewed info on target glucose values (according to the ADA guideline), hyper/hypoglycemia  He verbalizes understanding

## 2018-08-24 NOTE — DISCHARGE SUMMARY
[facility] Hospital Medicine  Discharge Summary      Patient Name: Lionel Castillo  MRN: [id number]  Admission Date: 8/23/2018  Hospital Length of Stay: 0 days  Discharge Date and Time:  08/24/2018 5:54 PM  Attending Physician: Dr. Livingston  Discharging Provider: Uyen Moya NP  Primary Care Provider: Eddie Garcia MD      HPI:   Lionel Castillo is a 68 year old male with CKD, DM II and hypertension who presented to the ED with complaints of SOB. The patient was hospitalized here from 8/18/18 to 8/22/18 during which time, he was treated for acute on chronic respiratory failure with hypoxia, hyperkalemia, acute on chronic diastolic congestive heart failure, hyponatremia, acute on chronic renal failure and alcohol abuse. Upon discharge, SNF placement was recommended for patient. The patient declined SNF placement and was discharged home with plans for home oxygen concentrator delivery. The patient did not answer the door for the concentrator delivery and presented to the ED after his oxygen ran out. His SOB resolved with oxygen administration. In the ED, the patient was unable to walk and is now agreeable to SNF placement. Vital signs remain stable on nasal cannula oxygen. Pt denies smoking and use of ETOH.  Hospital Medicine contacted for admission.      * No surgery found *      Hospital Course:   The pt was placed in observation for acute on chronic hypoxic respiratory failure. Pt was unable to walk and set up home oxygen. PT/OT evaluated pt and recommended SNF. Pt was discharged to the [facility] SNF.      Consults:   Consults (From admission, onward)        Status Ordering Provider     Inpatient consult to Social Work  Once     Provider:  (Not yet assigned)    Completed MANPREET STILL            Final Active Diagnoses:    Diagnosis Date Noted POA    PRINCIPAL PROBLEM:  Acute on chronic respiratory failure with hypoxia [J96.21] 08/18/2018 Yes    Weakness generalized [R53.1] 08/23/2018  Yes    Hyperlipidemia [E78.5] 08/23/2018 Yes    Centrilobular emphysema [J43.2] 08/08/2018 Yes    Chronic diastolic congestive heart failure [I50.32] 08/04/2018 Yes    Chronic respiratory failure with hypoxia [J96.11] 08/04/2018 Yes    Essential hypertension [I10] 07/19/2018 Yes     Chronic    Type 2 diabetes mellitus, without long-term current use of insulin [E11.9] 07/19/2018 Yes     Chronic      Problems Resolved During this Admission:    Diagnosis Date Noted Date Resolved POA    Alcohol abuse [F10.10] 07/18/2018 08/24/2018 Yes     Chronic       Discharged Condition: stable    Disposition: Skilled Nursing Facility    Follow Up:  Follow-up Information     Eddie Garcia MD In 4 days.    Specialty:  Internal Medicine  Contact information:  [address] [facility] [address] [phone]             [facility].    Specialty:  Emergency Medicine  Why:  As needed, If symptoms worsen  Contact information:  [address]  [facility] [address] [phone]           Eddie Garcia MD In 3 days.    Specialty:  Internal Medicine  Contact information:  [address] [facility] [address] [facility] [address] [phone]             Please follow up.    Why:  Repeat BMP in 3 days           [facility].    Specialty:  Nursing Home Agency  Contact information:  [address] [facility] [address] [phone]                 Patient Instructions:      Diet Cardiac     Diet diabetic     Diet renal   Scheduling Instructions: Low potassium diet     Activity as tolerated       Significant Diagnostic Studies:   Imaging Results    None         Pending Diagnostic Studies:     None         Medications:  Reconciled Home Medications:      Medication List      START taking these medications    furosemide 40 MG tablet  Commonly known as:  LASIX  Take 1 tablet (40 mg total) by mouth once daily.        CHANGE how you take these  medications    potassium chloride SA 10 MEQ tablet  Commonly known as:  K-DUR,KLOR-CON  Take 1 tablet (10 mEq total) by mouth once daily.  What changed:    · medication strength  · how much to take        CONTINUE taking these medications    albuterol-ipratropium 2.5 mg-0.5 mg/3 mL nebulizer solution  Commonly known as:  DUO-NEB  Take 3 mLs by nebulization every 6 (six) hours as needed for Wheezing. Rescue     amLODIPine 10 MG tablet  Commonly known as:  NORVASC  Take 10 mg by mouth once daily.     aspirin 81 MG Chew  Take 81 mg by mouth once daily.     atorvastatin 20 MG tablet  Commonly known as:  LIPITOR  Take 20 mg by mouth once daily.     calcium carbonate 600 mg calcium (1,500 mg) Tab  Commonly known as:  OS-DALLAS  Take 600 mg by mouth once daily.     citalopram 40 MG tablet  Commonly known as:  CELEXA  Take 40 mg by mouth once daily.     docusate sodium 100 MG capsule  Commonly known as:  COLACE  Take 1 capsule (100 mg total) by mouth 2 (two) times daily as needed for Constipation.     folic acid 1 MG tablet  Commonly known as:  FOLVITE  Take 1 tablet (1 mg total) by mouth once daily.     HYDROcodone-acetaminophen 5-325 mg per tablet  Commonly known as:  NORCO  Take 1 tablet by mouth every 6 (six) hours as needed.     isosorbide-hydrALAZINE 20-37.5 mg 20-37.5 mg Tab  Commonly known as:  BIDIL  Take 1 tablet by mouth 3 (three) times daily.     metoprolol tartrate 50 MG tablet  Commonly known as:  LOPRESSOR  Take 1 tablet (50 mg total) by mouth 2 (two) times daily.     multivitamin tablet  Commonly known as:  THERAGRAN  Take 1 tablet by mouth once daily.     mupirocin 2 % ointment  Commonly known as:  BACTROBAN  Apply topically 3 (three) times daily.     nicotine 21 mg/24 hr  Commonly known as:  NICODERM CQ  Place 1 patch onto the skin every 24 hours.     pantoprazole 40 MG tablet  Commonly known as:  PROTONIX  Take 40 mg by mouth once daily.     polyethylene glycol 17 gram/dose powder  Commonly known as:   GLYCOLAX  Take 17 g by mouth once daily. Take daily to keep your bowel movements regular while taking pain medicines     temazepam 30 mg capsule  Commonly known as:  RESTORIL  Take 1 capsule (30 mg total) by mouth nightly as needed for Insomnia.     thiamine 100 MG tablet  Take 1 tablet (100 mg total) by mouth once daily.        STOP taking these medications    insulin regular 100 unit/mL injection  Commonly known as:  Humulin R     losartan-hydrochlorothiazide 100-25 mg 100-25 mg per tablet  Commonly known as:  HYZAAR            Indwelling Lines/Drains at time of discharge:   Lines/Drains/Airways          None          Time spent on the discharge of patient: 43 minutes  Patient was seen and examined on the date of discharge and determined to be suitable for discharge.         Uyen Moya NP  Department of Hospital Medicine  [facility]

## 2018-08-24 NOTE — ED NOTES
Nurse unable to take report at this time, will give at bedside.  Transported to room 254 with portable monitor, and RN assist.

## 2018-08-24 NOTE — HOSPITAL COURSE
The pt was placed in observation for acute on chronic hypoxic respiratory failure. Pt was unable to walk and set up home oxygen. PT/OT evaluated pt and recommended SNF. [facility].

## 2018-09-10 NOTE — DISCHARGE SUMMARY
[facility] Hospital Medicine  Discharge Summary      Patient Name: Lionel Castillo  MRN: [id number]  Admission Date: 8/18/2018  Hospital Length of Stay: 4 days  Discharge Date and Time:  09/10/2018 8:09 AM  Attending Physician: No att. providers found   Discharging Provider: Nemo Livingston MD  Primary Care Provider: Eddie Garcia MD      HPI:   Worsening shortness of breath since returning home after a recent admission.  He also has a non-productive cough.  Denies chest pain.  Denies nausea, vomiting, fevers, or chills.  He was admitted on 04 August with shortness of breath and diagnosed with a diastolic heart failure exacerbation.  He has a right humerus fracture from a fall from a syncopal episode on 18 July.  He had a complicated right sided pleural effusion and atelectasis with a non-specific lung mass discovered on his first admission.  Further workup was planned as an outpatient with Dr. Bailey of Pulmonary medicine.      * No surgery found *      Hospital Course:   Admitted for further evaluation and treatment of shortness of breath.  Hemodynamically stable in the ER.  He had a serum Potassium of 6.6 with peaked T-waves on EKG.  He receive 10 units regular insulin IV and an ampule of D50 in the ER.  He also received 15 g of Kayexalate.  Chest X-ray showed a new left basilar infiltrate.  Empirically started Moxifloxacin and Furosemide IV 80 mg.  WBC count was high normal with a left shift.  Lactic acid 2.1 and mild hyponatremia.  WBC count returned to normal and procalcitonin normal.  Stopped Moxifloxacin - Pneumonia ruled out.  Persistent hypoxemia that corrected well with continuous BiPAP.  Consulting Cardiology and Nephrology for assistance optimizing management.  Continuing diuresis.  Added Thiamine supplementation. Pt continue to improve. He underwent thoracentesis which was a transudate. No malignancy seen. He received PT/OT with good results. He qualified for Home O2 and Home PT  was arranged via [facility]. He has continued to improve here, eating drinking well. He received dietary instructions about salt and fluid restrictions as well as for the diabetic diet which he understands and accepts. He was seen and examined today and deemed stable for discharge home today.     Consults:   Consults (From admission, onward)        Status Ordering Provider     Inpatient consult to Cardiology  Once     Provider:  Luke Gallo MD    Completed ALANNAH ROBISON     Inpatient consult to Registered Dietitian/Nutritionist  Once     Provider:  (Not yet assigned)    Completed ALANNAH ROBISON     IP consult to case management  Once     Provider:  (Not yet assigned)    Completed ALANNAH ROBISON          No new Assessment & Plan notes have been filed under this hospital service since the last note was generated.  Service: Hospital Medicine    Final Active Diagnoses:    Diagnosis Date Noted POA    PRINCIPAL PROBLEM:  Acute on chronic respiratory failure with hypoxia [J96.21] 08/18/2018 Yes    Centrilobular emphysema [J43.2] 08/08/2018 Yes    Chronic diastolic congestive heart failure [I50.32] 08/04/2018 Yes    Tobacco use [Z72.0] 07/19/2018 Yes     Chronic    Essential hypertension [I10] 07/19/2018 Yes     Chronic    Type 2 diabetes mellitus, without long-term current use of insulin [E11.9] 07/19/2018 Yes     Chronic      Problems Resolved During this Admission:    Diagnosis Date Noted Date Resolved POA    Severe malnutrition [E43] 08/20/2018 08/22/2018 Yes    Electrolyte abnormality [E87.8] 08/20/2018 08/22/2018 Yes    Cancer [C80.1]  08/22/2018 Yes    Recurrent right pleural effusion [J90] 08/19/2018 08/22/2018 Yes    KATRIN (acute kidney injury) [N17.9] 08/19/2018 08/22/2018 Yes    Hyperkalemia [E87.5] 08/18/2018 08/22/2018 Yes    Left lower lobe pneumonia [J18.1] 08/18/2018 08/21/2018 Yes    Mass of right lung [R91.8] 07/20/2018 08/22/2018 Yes    Other nondisplaced fracture of  upper end of right humerus, initial encounter for closed fracture [S42.294A] 07/20/2018 08/22/2018 Yes    Hyponatremia [E87.1] 07/18/2018 08/22/2018 Yes    Acute on chronic renal failure [N17.9, N18.9] 07/18/2018 08/22/2018 Yes    Alcohol abuse [F10.10] 07/18/2018 08/24/2018 Yes     Chronic    Right humeral fracture [S42.301A] 07/18/2018 08/22/2018 Yes       Discharged Condition: stable    Disposition: Home-Health Care [facility] [place] [place]    Follow Up:  Follow-up Information     Schedule an appointment as soon as possible for a visit with José Miguel Daniel MD.    Specialty:  Orthopedic Surgery  Why:  Call for appoinment as soon as possible  Contact information:  [address]  [address]  [facility] [address]  [phone]             Schedule an appointment as soon as possible for a visit with Eddie Garcia MD.    Specialty:  Internal Medicine  Contact information:  [address] [facility] [address]  [phone]             Manuel Zarco MD. Go on 9/10/2018.    Specialty:  Vascular Surgery  Why:  at 2:45 pm  Contact information:  [address]  [address]  [place] [facility] [address]  [phone]             [facility].    Specialty:  Home Health Services  Contact information:  [address]  [address]  [place] [facility] [address]  [phone]             Kurt Canseco MD In 3 days.    Specialty:  Pulmonary Disease  Contact information:  [address]  [place] [address]  [phone]             [facility].    Specialty:  DME Provider  Why:  home oxygen  Contact information:  [address]  [address]  [facility] [address]  [phone]             Eddie Garcia MD. Schedule an appointment as soon as possible for a visit in 3 days.    Specialty:  Internal Medicine  Why:  Hospital follow up  Contact information:  [address] [facility] [address]  [phone]              "ADAM Tai. Schedule an appointment as soon as possible for a visit in 1 week.    Specialty:  Cardiology  Why:  Hospital follow up for chf  Contact information:  [address] [address] [phone]                 Patient Instructions:      OXYGEN FOR HOME USE     Order Specific Question Answer Comments   Liter Flow 2    Duration Continuous    Qualifying SpO2: 87    Testing done at: Rest    Route nasal cannula    Portable mode: pulse dose acceptable    Device home concentrator with portable unit    Length of need (in months): 99 mos    Patient condition with qualifying saturation CHF    Height: 5' 9" (1.753 m)    Weight: 63.2 kg (139 lb 5.3 oz)    Does patient have medical equipment at home? walker, rolling    Does patient have medical equipment at home? rollator    Does patient have medical equipment at home? raised toilet    Does patient have medical equipment at home? cane, straight    Does patient have medical equipment at home? grab bar    Does patient have medical equipment at home? shower chair    Alternative treatment measures have been tried or considered and deemed clinically ineffective. Yes      Ambulatory referral to Home Health   Referral Priority: Routine Referral Type: Home Health   Referral Reason: Specialty Services Required   Requested Specialty: Home Health Services   Number of Visits Requested: 1     Diet Cardiac     Diet diabetic     Activity as tolerated       Significant Diagnostic Studies: Labs:     Lab Results   Component Value Date    INR 0.9 07/19/2018    INR 0.9 01/26/2018   , Lipid Panel   Lab Results   Component Value Date    CHOL 198 07/19/2018     (H) 07/19/2018    LDLCALC 59.4 (L) 07/19/2018    TRIG 53 07/19/2018    CHOLHDL 64.6 (H) 07/19/2018   , Troponin No results for input(s): TROPONINI in the last 168 hours., A1C:   Recent Labs   Lab  07/19/18   0435   HGBA1C  4.8   All labs within the past 24 hours have been reviewed  Radiology: X-Ray: CXR: " X-Ray Chest 1 View (CXR):   Results for orders placed or performed during the hospital encounter of 08/18/18   X-Ray Chest 1 View    Narrative    EXAMINATION:  XR CHEST 1 VIEW    CLINICAL HISTORY:  Pleural effusion;    TECHNIQUE:  Single frontal view of the chest was performed.    COMPARISON:  08/19/2018    FINDINGS:  In comparison to the prior study, there is no adverse interval changes      Impression    In comparison to the prior study, there is no adverse interval changes      Electronically signed by: Irwin Montemayor MD  Date:    08/20/2018  Time:    07:34     Cardiac Graphics: Echocardiogram:   2D echo with color flow doppler:   Results for orders placed or performed during the hospital encounter of 07/18/18   2D echo with color flow doppler   Result Value Ref Range    EF 55 55 - 65    Aortic Valve Regurgitation MODERATE (A)     Est. PA Systolic Pressure 17.44        Pending Diagnostic Studies:     None         Medications:  Reconciled Home Medications:      Medication List      START taking these medications    albuterol-ipratropium 2.5 mg-0.5 mg/3 mL nebulizer solution  Commonly known as:  DUO-NEB  Take 3 mLs by nebulization every 6 (six) hours as needed for Wheezing. Rescue     isosorbide-hydrALAZINE 20-37.5 mg 20-37.5 mg Tab  Commonly known as:  BIDIL  Take 1 tablet by mouth 3 (three) times daily.     multivitamin tablet  Commonly known as:  THERAGRAN  Take 1 tablet by mouth once daily.        CONTINUE taking these medications    amLODIPine 10 MG tablet  Commonly known as:  NORVASC  Take 10 mg by mouth once daily.     aspirin 81 MG Chew  Take 81 mg by mouth once daily.     atorvastatin 20 MG tablet  Commonly known as:  LIPITOR  Take 20 mg by mouth once daily.     calcium carbonate 600 mg calcium (1,500 mg) Tab  Commonly known as:  OS-DALLAS  Take 600 mg by mouth once daily.     citalopram 40 MG tablet  Commonly known as:  CELEXA  Take 40 mg by mouth once daily.     docusate sodium 100 MG capsule  Commonly known  as:  COLACE  Take 1 capsule (100 mg total) by mouth 2 (two) times daily as needed for Constipation.     folic acid 1 MG tablet  Commonly known as:  FOLVITE  Take 1 tablet (1 mg total) by mouth once daily.     metoprolol tartrate 50 MG tablet  Commonly known as:  LOPRESSOR  Take 1 tablet (50 mg total) by mouth 2 (two) times daily.     mupirocin 2 % ointment  Commonly known as:  BACTROBAN  Apply topically 3 (three) times daily.     nicotine 21 mg/24 hr  Commonly known as:  NICODERM CQ  Place 1 patch onto the skin every 24 hours.     pantoprazole 40 MG tablet  Commonly known as:  PROTONIX  Take 40 mg by mouth once daily.     polyethylene glycol 17 gram/dose powder  Commonly known as:  GLYCOLAX  Take 17 g by mouth once daily. Take daily to keep your bowel movements regular while taking pain medicines     thiamine 100 MG tablet  Take 1 tablet (100 mg total) by mouth once daily.        STOP taking these medications    enoxaparin 150 mg/mL Syrg  Commonly known as:  LOVENOX     HYDROcodone-acetaminophen  mg per tablet  Commonly known as:  NORCO     insulin regular 100 unit/mL injection  Commonly known as:  Humulin R     losartan-hydrochlorothiazide 100-25 mg 100-25 mg per tablet  Commonly known as:  HYZAAR     metFORMIN 500 MG 24 hr tablet  Commonly known as:  GLUCOPHAGE-XR            Indwelling Lines/Drains at time of discharge:   Lines/Drains/Airways          None          Time spent on the discharge of patient: 35 minutes  Patient was seen and examined on the date of discharge and determined to be suitable for discharge.         Nemo Livingston MD  Department of Hospital Medicine  [facility]

## 2018-09-11 ENCOUNTER — OFFICE VISIT (OUTPATIENT)
Dept: HEMATOLOGY/ONCOLOGY | Facility: CLINIC | Age: 68
End: 2018-09-11
Payer: COMMERCIAL

## 2018-09-11 VITALS
HEART RATE: 58 BPM | WEIGHT: 122.13 LBS | OXYGEN SATURATION: 91 % | DIASTOLIC BLOOD PRESSURE: 53 MMHG | SYSTOLIC BLOOD PRESSURE: 118 MMHG | BODY MASS INDEX: 17.49 KG/M2 | TEMPERATURE: 97 F | RESPIRATION RATE: 19 BRPM | HEIGHT: 70 IN

## 2018-09-11 DIAGNOSIS — R91.8 MASS OF LOWER LOBE OF RIGHT LUNG: Primary | ICD-10-CM

## 2018-09-11 PROCEDURE — 99999 PR PBB SHADOW E&M-EST. PATIENT-LVL III: CPT | Mod: PBBFAC,,, | Performed by: INTERNAL MEDICINE

## 2018-09-11 PROCEDURE — 99205 OFFICE O/P NEW HI 60 MIN: CPT | Mod: S$GLB,,, | Performed by: INTERNAL MEDICINE

## 2018-09-11 RX ORDER — SODIUM, POTASSIUM,MAG SULFATES 17.5-3.13G
SOLUTION, RECONSTITUTED, ORAL ORAL
Refills: 0 | COMMUNITY
Start: 2018-06-12 | End: 2018-09-11 | Stop reason: ALTCHOICE

## 2018-09-11 NOTE — PROGRESS NOTES
Hematology/Oncology Office Note    Reason for referral:  Lung mass    CC:  Lung mass    Referred by:  Self, Aaareferral    Diagnosis:  COPD/chronic respiratory failure on home O2  Congestive heart failure  Diabetes mellitus  Chronic kidney disease      History of present illness:  68-year-old gentleman with multiple medical conditions as noted above who was discovered to have a mass within the right lower lobe that measures 10 x 9 x 4 cm on CT scan performed 07/19/2018.  The patient suffered right humeral fracture status post syncopal episode in 7/2018 at which time lung mass was discovered.  Patient continues to have chronic shortness of breath and he remains non compliant with oxygen.  He was in clinic today without oxygen and reported being shortness of breath.  He he is noncompliant with his home O2 for reasons that are not clear to me.  He denies pain, hemoptysis, dizziness, palpitations, or other neurological symptoms at this time.            Past Medical History:   Diagnosis Date    Arthritis     Cancer     lung cancer    Diabetes     Foot fracture     Hand fracture, left     Hand fracture, right     HTN (hypertension)     Humerus fracture 07/18/2018    Hyperlipidemia     Neuropathy     Renal disorder          Social History:  Polysubstance abuse--alcohol, tobacco, marijuana        Family History: family history includes Heart disease in his father and paternal grandfather; Miscarriages / Stillbirths in his paternal grandfather and paternal uncle.                HPI  Review of Systems   Constitutional: Positive for activity change, appetite change and fatigue. Negative for fever and unexpected weight change.   HENT: Negative for congestion, facial swelling, nosebleeds, rhinorrhea, sinus pressure, sneezing, sore throat, trouble swallowing and voice change.    Eyes: Negative for photophobia, pain, discharge, itching and visual disturbance.   Respiratory: Positive for shortness of breath and wheezing.  "Negative for apnea, cough, choking and chest tightness.    Cardiovascular: Negative for chest pain, palpitations and leg swelling.   Gastrointestinal: Negative for abdominal distention, abdominal pain, constipation, diarrhea, nausea and vomiting.   Endocrine: Negative for cold intolerance, heat intolerance, polydipsia, polyphagia and polyuria.   Genitourinary: Negative for decreased urine volume, difficulty urinating, frequency, genital sores, hematuria, testicular pain and urgency.   Musculoskeletal: Negative for arthralgias, back pain, gait problem, joint swelling, myalgias, neck pain and neck stiffness.   Skin: Negative for color change, pallor, rash and wound.   Allergic/Immunologic: Negative for environmental allergies, food allergies and immunocompromised state.   Neurological: Positive for weakness. Negative for dizziness, tremors, syncope, speech difficulty, light-headedness, numbness and headaches.   Hematological: Negative for adenopathy. Does not bruise/bleed easily.   Psychiatric/Behavioral: Negative for agitation, confusion and hallucinations. The patient is not nervous/anxious and is not hyperactive.        Objective:       Vitals:    09/11/18 1014   BP: (!) 118/53   Pulse: (!) 58   Resp: 19   Temp: 96.9 °F (36.1 °C)   SpO2: (!) 91%   Weight: 55.4 kg (122 lb 2.2 oz)   Height: 5' 9.5" (1.765 m)       Physical Exam   Constitutional: He is oriented to person, place, and time. He appears cachectic. No distress.   HENT:   Head: Normocephalic and atraumatic.   Right Ear: External ear normal.   Left Ear: External ear normal.   Nose: Nose normal.   Mouth/Throat: Oropharynx is clear and moist. No oropharyngeal exudate.   Eyes: Conjunctivae and EOM are normal. Pupils are equal, round, and reactive to light. Right eye exhibits no discharge. Left eye exhibits no discharge. No scleral icterus.   Neck: Normal range of motion. Neck supple. No JVD present. No tracheal deviation present. No thyromegaly present. "   Cardiovascular: Normal rate and regular rhythm.   No murmur heard.  Pulmonary/Chest: Effort normal. No accessory muscle usage or stridor. No respiratory distress. He has decreased breath sounds. He has no wheezes. He has rhonchi. He has no rales. He exhibits no tenderness.   Abdominal: Soft. Bowel sounds are normal. He exhibits no distension and no mass. There is no tenderness. There is no rebound and no guarding.   Musculoskeletal: Normal range of motion. He exhibits no edema.   Lymphadenopathy:     He has no cervical adenopathy.   Neurological: He is alert and oriented to person, place, and time. No cranial nerve deficit. Coordination normal.   Skin: Skin is warm and dry. Capillary refill takes less than 2 seconds. No abrasion, no bruising and no rash noted. He is not diaphoretic. No erythema. No pallor.   Psychiatric: He has a normal mood and affect. Thought content normal.   Vitals reviewed.      Lab Results   Component Value Date    WBC 6.80 08/22/2018    HGB 10.9 (L) 08/22/2018    HCT 31.6 (L) 08/22/2018     (H) 08/22/2018     (H) 08/22/2018     Lab Results   Component Value Date    CREATININE 2.5 (H) 08/22/2018    BUN 42 (H) 08/22/2018     (L) 08/22/2018    K 3.7 08/22/2018    CL 98 08/22/2018    CO2 20 (L) 08/22/2018     Lab Results   Component Value Date    ALT 10 08/22/2018    AST 10 08/22/2018    ALKPHOS 145 (H) 08/22/2018    BILITOT 0.6 08/22/2018         Assessment:       68-year-old gentleman with a history of polysubstance abuse who has been referred for right lower lobe lung mass identified on CT scan performed on 07/20/2018.  We will proceed with CT scan to further evaluate the mass and the possibility of metastatic disease.  He will follow up after PET scan to discuss results and plan biopsy for tissue diagnosis.      Right lower lobe lung mass:  --PET scan  --follow up after PET scan to discuss results

## 2018-09-14 ENCOUNTER — OFFICE VISIT (OUTPATIENT)
Dept: CARDIOLOGY | Facility: CLINIC | Age: 68
End: 2018-09-14
Payer: MEDICARE

## 2018-09-14 VITALS
HEIGHT: 69 IN | BODY MASS INDEX: 18.13 KG/M2 | HEART RATE: 68 BPM | DIASTOLIC BLOOD PRESSURE: 56 MMHG | WEIGHT: 122.38 LBS | SYSTOLIC BLOOD PRESSURE: 106 MMHG | RESPIRATION RATE: 20 BRPM

## 2018-09-14 DIAGNOSIS — I65.23 CAROTID STENOSIS, BILATERAL: ICD-10-CM

## 2018-09-14 DIAGNOSIS — C34.90 MALIGNANT NEOPLASM OF LUNG, UNSPECIFIED LATERALITY, UNSPECIFIED PART OF LUNG: ICD-10-CM

## 2018-09-14 DIAGNOSIS — J96.21 ACUTE ON CHRONIC RESPIRATORY FAILURE WITH HYPOXIA: ICD-10-CM

## 2018-09-14 DIAGNOSIS — I50.32 CHRONIC DIASTOLIC CONGESTIVE HEART FAILURE: Primary | ICD-10-CM

## 2018-09-14 DIAGNOSIS — E11.22 TYPE 2 DIABETES MELLITUS WITH STAGE 3 CHRONIC KIDNEY DISEASE, WITHOUT LONG-TERM CURRENT USE OF INSULIN: Chronic | ICD-10-CM

## 2018-09-14 DIAGNOSIS — J96.11 CHRONIC RESPIRATORY FAILURE WITH HYPOXIA: ICD-10-CM

## 2018-09-14 DIAGNOSIS — I10 ESSENTIAL HYPERTENSION: Chronic | ICD-10-CM

## 2018-09-14 DIAGNOSIS — I44.7 LBBB (LEFT BUNDLE BRANCH BLOCK): ICD-10-CM

## 2018-09-14 DIAGNOSIS — E78.5 HYPERLIPIDEMIA, UNSPECIFIED HYPERLIPIDEMIA TYPE: ICD-10-CM

## 2018-09-14 DIAGNOSIS — J43.2 CENTRILOBULAR EMPHYSEMA: ICD-10-CM

## 2018-09-14 DIAGNOSIS — F19.10 POLYSUBSTANCE ABUSE: ICD-10-CM

## 2018-09-14 DIAGNOSIS — Z72.0 TOBACCO USE: Chronic | ICD-10-CM

## 2018-09-14 DIAGNOSIS — N18.30 TYPE 2 DIABETES MELLITUS WITH STAGE 3 CHRONIC KIDNEY DISEASE, WITHOUT LONG-TERM CURRENT USE OF INSULIN: Chronic | ICD-10-CM

## 2018-09-14 PROCEDURE — 99214 OFFICE O/P EST MOD 30 MIN: CPT | Mod: S$PBB,,, | Performed by: INTERNAL MEDICINE

## 2018-09-14 PROCEDURE — 3044F HG A1C LEVEL LT 7.0%: CPT | Mod: CPTII,,, | Performed by: INTERNAL MEDICINE

## 2018-09-14 PROCEDURE — 99214 OFFICE O/P EST MOD 30 MIN: CPT | Mod: PBBFAC,PO | Performed by: INTERNAL MEDICINE

## 2018-09-14 PROCEDURE — 3078F DIAST BP <80 MM HG: CPT | Mod: CPTII,,, | Performed by: INTERNAL MEDICINE

## 2018-09-14 PROCEDURE — 3074F SYST BP LT 130 MM HG: CPT | Mod: CPTII,,, | Performed by: INTERNAL MEDICINE

## 2018-09-14 PROCEDURE — 99999 PR PBB SHADOW E&M-EST. PATIENT-LVL IV: CPT | Mod: PBBFAC,,, | Performed by: INTERNAL MEDICINE

## 2018-09-14 PROCEDURE — 1101F PT FALLS ASSESS-DOCD LE1/YR: CPT | Mod: CPTII,,, | Performed by: INTERNAL MEDICINE

## 2018-09-14 NOTE — PROGRESS NOTES
Subjective:   Patient ID:  Lionel Castillo is a 68 y.o. male who presents for follow up of Congestive Heart Failure      HPI  A 69 yo male well known to me from hospital [facility] the aptient had syncope rt humerus fracture thought to have diastolic chf  Had thoracenbtesis went to rehab readmitted with respiuratoiry failutre had findings of rt lung mass being worked up for malignancy he is using his o2 now taking fluid pills and is compliant with slat. He is still short of breath. Has no legs welling.  Past Medical History:   Diagnosis Date    Arthritis     Cancer     lung cancer    Diabetes     Foot fracture     Hand fracture, left     Hand fracture, right     HTN (hypertension)     Humerus fracture 07/18/2018    Hyperlipidemia     Neuropathy     Renal disorder        Past Surgical History:   Procedure Laterality Date    ANKLE SURGERY Left     CHOLECYSTECTOMY      HUMERUS FRACTURE SURGERY         Social History     Tobacco Use    Smoking status: Current Every Day Smoker     Packs/day: 1.00    Smokeless tobacco: Never Used   Substance Use Topics    Alcohol use: Yes     Comment: 6 beers per day    Drug use: No       Family History   Problem Relation Age of Onset    Heart disease Father     Miscarriages / Stillbirths Paternal Uncle     Heart disease Paternal Grandfather     Miscarriages / Stillbirths Paternal Grandfather        Current Outpatient Medications   Medication Sig    albuterol-ipratropium (DUO-NEB) 2.5 mg-0.5 mg/3 mL nebulizer solution Take 3 mLs by nebulization every 6 (six) hours as needed for Wheezing. Rescue    amlodipine (NORVASC) 10 MG tablet Take 10 mg by mouth once daily.    aspirin 81 MG Chew Take 81 mg by mouth once daily.    atorvastatin (LIPITOR) 20 MG tablet Take 20 mg by mouth once daily.    calcium carbonate (OS-DALLAS) 600 mg calcium (1,500 mg) Tab Take 600 mg by mouth once daily.    citalopram (CELEXA) 40 MG tablet Take 40 mg by mouth once daily.    docusate sodium  (COLACE) 100 MG capsule Take 1 capsule (100 mg total) by mouth 2 (two) times daily as needed for Constipation.    folic acid (FOLVITE) 1 MG tablet Take 1 tablet (1 mg total) by mouth once daily.    furosemide (LASIX) 40 MG tablet Take 1 tablet (40 mg total) by mouth once daily.    HYDROcodone-acetaminophen (NORCO) 5-325 mg per tablet Take 1 tablet by mouth every 6 (six) hours as needed.    isosorbide-hydrALAZINE 20-37.5 mg (BIDIL) 20-37.5 mg Tab Take 1 tablet by mouth 3 (three) times daily.    metoprolol tartrate (LOPRESSOR) 50 MG tablet Take 1 tablet (50 mg total) by mouth 2 (two) times daily.    multivitamin (THERAGRAN) tablet Take 1 tablet by mouth once daily.    mupirocin (BACTROBAN) 2 % ointment Apply topically 3 (three) times daily.    nicotine (NICODERM CQ) 21 mg/24 hr Place 1 patch onto the skin every 24 hours.    pantoprazole (PROTONIX) 40 MG tablet Take 40 mg by mouth once daily.    polyethylene glycol (GLYCOLAX) 17 gram/dose powder Take 17 g by mouth once daily. Take daily to keep your bowel movements regular while taking pain medicines    potassium chloride SA (K-DUR,KLOR-CON) 10 MEQ tablet Take 1 tablet (10 mEq total) by mouth once daily.    temazepam (RESTORIL) 30 mg capsule Take 1 capsule (30 mg total) by mouth nightly as needed for Insomnia.    thiamine 100 MG tablet Take 1 tablet (100 mg total) by mouth once daily.     No current facility-administered medications for this visit.      Current Outpatient Medications on File Prior to Visit   Medication Sig    albuterol-ipratropium (DUO-NEB) 2.5 mg-0.5 mg/3 mL nebulizer solution Take 3 mLs by nebulization every 6 (six) hours as needed for Wheezing. Rescue    amlodipine (NORVASC) 10 MG tablet Take 10 mg by mouth once daily.    aspirin 81 MG Chew Take 81 mg by mouth once daily.    atorvastatin (LIPITOR) 20 MG tablet Take 20 mg by mouth once daily.    calcium carbonate (OS-DALLAS) 600 mg calcium (1,500 mg) Tab Take 600 mg by mouth once  daily.    citalopram (CELEXA) 40 MG tablet Take 40 mg by mouth once daily.    docusate sodium (COLACE) 100 MG capsule Take 1 capsule (100 mg total) by mouth 2 (two) times daily as needed for Constipation.    folic acid (FOLVITE) 1 MG tablet Take 1 tablet (1 mg total) by mouth once daily.    furosemide (LASIX) 40 MG tablet Take 1 tablet (40 mg total) by mouth once daily.    HYDROcodone-acetaminophen (NORCO) 5-325 mg per tablet Take 1 tablet by mouth every 6 (six) hours as needed.    isosorbide-hydrALAZINE 20-37.5 mg (BIDIL) 20-37.5 mg Tab Take 1 tablet by mouth 3 (three) times daily.    metoprolol tartrate (LOPRESSOR) 50 MG tablet Take 1 tablet (50 mg total) by mouth 2 (two) times daily.    multivitamin (THERAGRAN) tablet Take 1 tablet by mouth once daily.    mupirocin (BACTROBAN) 2 % ointment Apply topically 3 (three) times daily.    nicotine (NICODERM CQ) 21 mg/24 hr Place 1 patch onto the skin every 24 hours.    pantoprazole (PROTONIX) 40 MG tablet Take 40 mg by mouth once daily.    polyethylene glycol (GLYCOLAX) 17 gram/dose powder Take 17 g by mouth once daily. Take daily to keep your bowel movements regular while taking pain medicines    potassium chloride SA (K-DUR,KLOR-CON) 10 MEQ tablet Take 1 tablet (10 mEq total) by mouth once daily.    temazepam (RESTORIL) 30 mg capsule Take 1 capsule (30 mg total) by mouth nightly as needed for Insomnia.    thiamine 100 MG tablet Take 1 tablet (100 mg total) by mouth once daily.     No current facility-administered medications on file prior to visit.      Review of patient's allergies indicates:   Allergen Reactions    Penicillins        Review of Systems   Constitution: Negative for weakness and malaise/fatigue.   Eyes: Negative for blurred vision.   Cardiovascular: Positive for dyspnea on exertion. Negative for chest pain, claudication, cyanosis, irregular heartbeat, leg swelling, near-syncope, orthopnea, palpitations and paroxysmal nocturnal dyspnea.    Respiratory: Positive for shortness of breath. Negative for cough and hemoptysis.    Hematologic/Lymphatic: Negative for bleeding problem. Does not bruise/bleed easily.   Skin: Negative for dry skin and itching.   Musculoskeletal: Negative for falls, muscle weakness and myalgias.   Gastrointestinal: Negative for abdominal pain, diarrhea, heartburn, hematemesis, hematochezia and melena.   Genitourinary: Negative for flank pain and hematuria.   Neurological: Negative for dizziness, focal weakness, headaches, light-headedness, numbness, paresthesias and seizures.   Psychiatric/Behavioral: Negative for altered mental status and memory loss. The patient is not nervous/anxious.    Allergic/Immunologic: Negative for hives.       Objective:   Physical Exam   Constitutional: He is oriented to person, place, and time. He appears well-developed. No distress.   emaciated   HENT:   Head: Normocephalic and atraumatic.   Eyes: EOM are normal. Pupils are equal, round, and reactive to light. Right eye exhibits no discharge. Left eye exhibits no discharge.   Neck: Neck supple. No JVD present. No thyromegaly present.   Cardiovascular: Normal rate, regular rhythm, normal heart sounds and intact distal pulses. Exam reveals no gallop and no friction rub.   No murmur heard.  Pulmonary/Chest: Effort normal and breath sounds normal. No respiratory distress. He has no wheezes. He has no rales. He exhibits no tenderness.   Abdominal: Soft. Bowel sounds are normal. He exhibits no distension. There is no tenderness.   Musculoskeletal: Normal range of motion. He exhibits no edema.   Limited range of motion rt arm   Neurological: He is alert and oriented to person, place, and time. No cranial nerve deficit.   Skin: Skin is warm and dry. No rash noted. He is not diaphoretic. No erythema.   Psychiatric: He has a normal mood and affect. His behavior is normal.   Nursing note and vitals reviewed.    Vitals:    09/14/18 1410   BP: (!) 106/56   BP  "Location: Left arm   Patient Position: Sitting   Pulse: 68   Resp: 20   Weight: 55.5 kg (122 lb 5.7 oz)   Height: 5' 9" (1.753 m)     Lab Results   Component Value Date    CHOL 198 07/19/2018     Lab Results   Component Value Date     (H) 07/19/2018     Lab Results   Component Value Date    LDLCALC 59.4 (L) 07/19/2018     Lab Results   Component Value Date    TRIG 53 07/19/2018     Lab Results   Component Value Date    CHOLHDL 64.6 (H) 07/19/2018       Chemistry        Component Value Date/Time     (L) 08/22/2018 0507    K 3.7 08/22/2018 0507    CL 98 08/22/2018 0507    CO2 20 (L) 08/22/2018 0507    BUN 42 (H) 08/22/2018 0507    CREATININE 2.5 (H) 08/22/2018 0507     (H) 08/22/2018 0507        Component Value Date/Time    CALCIUM 9.7 08/22/2018 0507    ALKPHOS 145 (H) 08/22/2018 0507    AST 10 08/22/2018 0507    ALT 10 08/22/2018 0507    BILITOT 0.6 08/22/2018 0507    ESTGFRAFRICA 30 (A) 08/22/2018 0507    EGFRNONAA 26 (A) 08/22/2018 0507          Lab Results   Component Value Date    TSH 1.165 07/18/2018     Lab Results   Component Value Date    INR 0.9 07/19/2018    INR 0.9 01/26/2018     Lab Results   Component Value Date    WBC 6.80 08/22/2018    HGB 10.9 (L) 08/22/2018    HCT 31.6 (L) 08/22/2018     (H) 08/22/2018     (H) 08/22/2018     BMP  Sodium   Date Value Ref Range Status   08/22/2018 134 (L) 136 - 145 mmol/L Final     Potassium   Date Value Ref Range Status   08/22/2018 3.7 3.5 - 5.1 mmol/L Final     Chloride   Date Value Ref Range Status   08/22/2018 98 95 - 110 mmol/L Final     CO2   Date Value Ref Range Status   08/22/2018 20 (L) 23 - 29 mmol/L Final     BUN, Bld   Date Value Ref Range Status   08/22/2018 42 (H) 8 - 23 mg/dL Final     Creatinine   Date Value Ref Range Status   08/22/2018 2.5 (H) 0.5 - 1.4 mg/dL Final     Calcium   Date Value Ref Range Status   08/22/2018 9.7 8.7 - 10.5 mg/dL Final     Anion Gap   Date Value Ref Range Status   08/22/2018 16 8 - 16 " mmol/L Final     eGFR if    Date Value Ref Range Status   08/22/2018 30 (A) >60 mL/min/1.73 m^2 Final     eGFR if non    Date Value Ref Range Status   08/22/2018 26 (A) >60 mL/min/1.73 m^2 Final     Comment:     Calculation used to obtain the estimated glomerular filtration  rate (eGFR) is the CKD-EPI equation.        CrCl cannot be calculated (Patient's most recent lab result is older than the maximum 7 days allowed.).    Assessment:     1. Chronic diastolic congestive heart failure    2. Essential hypertension    3. Type 2 diabetes mellitus with stage 3 chronic kidney disease, without long-term current use of insulin    4. Tobacco use    5. Carotid stenosis, bilateral    6. LBBB (left bundle branch block)    7. Polysubstance abuse    8. Chronic respiratory failure with hypoxia    9. Centrilobular emphysema    10. Acute on chronic respiratory failure with hypoxia    11. Malignant neoplasm of lung, unspecified laterality, unspecified part of lung    12. Hyperlipidemia, unspecified hyperlipidemia type      Stable cardiac wise he is on appropriate therapy. Malignancy w/u in [place].  Plan:     Continue current therapy  Cardiac low salt diet.  Risk factor modification and excercise program.  F/u in 1 months in chf clinic.

## 2018-09-17 ENCOUNTER — OFFICE VISIT (OUTPATIENT)
Dept: PULMONOLOGY | Facility: CLINIC | Age: 68
End: 2018-09-17
Payer: MEDICARE

## 2018-09-17 VITALS
OXYGEN SATURATION: 87 % | DIASTOLIC BLOOD PRESSURE: 50 MMHG | HEIGHT: 69 IN | SYSTOLIC BLOOD PRESSURE: 130 MMHG | WEIGHT: 124 LBS | BODY MASS INDEX: 18.37 KG/M2 | RESPIRATION RATE: 20 BRPM | HEART RATE: 62 BPM

## 2018-09-17 DIAGNOSIS — J43.2 CENTRILOBULAR EMPHYSEMA: ICD-10-CM

## 2018-09-17 DIAGNOSIS — J96.21 ACUTE ON CHRONIC RESPIRATORY FAILURE WITH HYPOXIA: ICD-10-CM

## 2018-09-17 DIAGNOSIS — I50.32 CHRONIC DIASTOLIC CONGESTIVE HEART FAILURE: ICD-10-CM

## 2018-09-17 DIAGNOSIS — J96.11 CHRONIC RESPIRATORY FAILURE WITH HYPOXIA: Primary | ICD-10-CM

## 2018-09-17 DIAGNOSIS — R91.8 RIGHT LOWER LOBE LUNG MASS: ICD-10-CM

## 2018-09-17 DIAGNOSIS — R06.02 SOB (SHORTNESS OF BREATH): ICD-10-CM

## 2018-09-17 PROCEDURE — 99999 PR PBB SHADOW E&M-EST. PATIENT-LVL III: CPT | Mod: PBBFAC,,, | Performed by: INTERNAL MEDICINE

## 2018-09-17 PROCEDURE — 3078F DIAST BP <80 MM HG: CPT | Mod: CPTII,,, | Performed by: INTERNAL MEDICINE

## 2018-09-17 PROCEDURE — 1101F PT FALLS ASSESS-DOCD LE1/YR: CPT | Mod: CPTII,,, | Performed by: INTERNAL MEDICINE

## 2018-09-17 PROCEDURE — 99215 OFFICE O/P EST HI 40 MIN: CPT | Mod: S$PBB,,, | Performed by: INTERNAL MEDICINE

## 2018-09-17 PROCEDURE — 3075F SYST BP GE 130 - 139MM HG: CPT | Mod: CPTII,,, | Performed by: INTERNAL MEDICINE

## 2018-09-17 PROCEDURE — 99213 OFFICE O/P EST LOW 20 MIN: CPT | Mod: PBBFAC | Performed by: INTERNAL MEDICINE

## 2018-09-17 RX ORDER — FUROSEMIDE 40 MG/1
40 TABLET ORAL DAILY
Qty: 60 TABLET | Refills: 11 | Status: SHIPPED | OUTPATIENT
Start: 2018-09-17

## 2018-09-17 RX ORDER — POTASSIUM CHLORIDE 750 MG/1
10 TABLET, EXTENDED RELEASE ORAL DAILY
Qty: 30 TABLET | Refills: 11 | Status: SHIPPED | OUTPATIENT
Start: 2018-09-17

## 2018-09-17 RX ORDER — IPRATROPIUM BROMIDE AND ALBUTEROL SULFATE 2.5; .5 MG/3ML; MG/3ML
3 SOLUTION RESPIRATORY (INHALATION) EVERY 4 HOURS PRN
Qty: 270 ML | Refills: 11 | Status: SHIPPED | OUTPATIENT
Start: 2018-09-17 | End: 2019-09-17

## 2018-09-17 NOTE — PROGRESS NOTES
Subjective:       Patient ID: Lionel Castillo is a 68 y.o. male.    Chief Complaint: No chief complaint on file.    Presently staying at the [facility]    HPI COPD  He presents for evaluation and treatment of COPD. The patient is not currently have symptoms / an exacerbation. The patient has COPD for approximately 2 months. Symptoms in previous episodes have included dyspnea, cough and wheezing, and typically last 2 weeks. Previous episodes have been exacerbated by bicycling. Current treatment includes albuterol nebulizer and oxygen, which generally provides some relief of symptoms.   He uses 1 pillows at night. Patient currently is on oxygen at 2 L/min per nasal cannula.. The patient is having no constitutional symptoms, denying fever, chills, anorexia, or weight loss. The patient has been hospitalized for this condition before. He has a history of 46 pack years. The patient is experiencing exercise intolerance (difficulty walking 1 blocks on flat ground).    Pleural Effusion and Lung Mass:    He   presents for evaluation and treatment of pleural effusion and chronic resp failure after being discharged from the hospital  1  month ago. Since discharge symptoms have unchanged course since that time. Patient denies fever or chills. Symptoms are aggravated by activity. Symptoms improve with rest.  Respiratory: positive for cough and dyspnea on exertion; Cardiovascular: no chest pain or palpitations.  Patient currently is on oxygen at 2 L/min per nasal cannula..    MEDICAL RECORDS FROM THE HOSPITAL REVIEWED:    Hx of fall and FX of multiple ribs on the right:  ? Rounded atelectesis?  Hx of nondiagnostic thoracentesis      Past Medical History:   Diagnosis Date    Arthritis     Cancer     lung cancer    Diabetes     Foot fracture     Hand fracture, left     Hand fracture, right     HTN (hypertension)     Humerus fracture 07/18/2018    Hyperlipidemia     Neuropathy     Renal disorder      Past Surgical  History:   Procedure Laterality Date    ANKLE SURGERY Left     CHOLECYSTECTOMY      HUMERUS FRACTURE SURGERY       Social History     Socioeconomic History    Marital status:      Spouse name: Not on file    Number of children: Not on file    Years of education: Not on file    Highest education level: Not on file   Social Needs    Financial resource strain: Not on file    Food insecurity - worry: Not on file    Food insecurity - inability: Not on file    Transportation needs - medical: Not on file    Transportation needs - non-medical: Not on file   Occupational History    Not on file   Tobacco Use    Smoking status: Former Smoker     Packs/day: 1.00     Years: 46.00     Pack years: 46.00     Start date: 1972     Last attempt to quit: 2018     Years since quittin.1    Smokeless tobacco: Never Used   Substance and Sexual Activity    Alcohol use: Yes     Comment: 6 beers per day    Drug use: No    Sexual activity: Not on file   Other Topics Concern    Not on file   Social History Narrative    Not on file     Review of Systems   Constitutional: Positive for weight loss and fatigue. Negative for fever.   HENT: Positive for postnasal drip, rhinorrhea and congestion.    Eyes: Negative for redness and itching.   Respiratory: Positive for cough, sputum production, shortness of breath, dyspnea on extertion, use of rescue inhaler and Paroxysmal Nocturnal Dyspnea.    Cardiovascular: Negative for chest pain, palpitations and leg swelling.   Genitourinary: Negative for difficulty urinating and hematuria.   Endocrine: Negative for cold intolerance and heat intolerance.    Skin: Negative for rash.   Gastrointestinal: Negative for nausea and abdominal pain.   Neurological: Negative for dizziness, syncope, weakness and light-headedness.   Hematological: Negative for adenopathy. Does not bruise/bleed easily.   Psychiatric/Behavioral: Negative for sleep disturbance. The patient is not  nervous/anxious.        Objective:      Physical Exam   Constitutional: He is oriented to person, place, and time. He appears well-developed and well-nourished.   HENT:   Head: Normocephalic and atraumatic.   Eyes: Conjunctivae are normal. Pupils are equal, round, and reactive to light.   Neck: Neck supple. JVD present. No tracheal deviation present. No thyromegaly present.   Cardiovascular: Normal rate. A regularly irregular rhythm present. PMI is displaced.   Murmur heard.   Systolic murmur is present with a grade of 2/6.  Pulmonary/Chest: Effort normal. He has decreased breath sounds. He has rales in the right lower field and the left lower field.   Abdominal: Soft.   Musculoskeletal: Normal range of motion. He exhibits edema.   Lymphadenopathy:     He has no cervical adenopathy.   Neurological: He is alert and oriented to person, place, and time.   Skin: Skin is warm and dry.   Nursing note and vitals reviewed.    Personal Diagnostic Review  Chest X-Ray: I personally reviewed the films and findings are:, air trapping/emphysema, mass: lower lobe on the right, measuring 4 cm  Pulmonary function tests:  Na    Pulmonary Studies Review 9/17/2018 9/14/2018 9/11/2018 8/24/2018 8/24/2018 8/24/2018 8/24/2018   SpO2 87 - 91 96 96 95 95   Height 69.000 69.000 69.500 - - - -   Weight 1984 1957.68 1954.16 - - - -   BMI (Calculated) 18.3 18.1 17.8 - - - -   Predicted Distance 426.3 427.42 429.1 - - - -   Predicted Distance Meters (Calculated) 587.71 589.02 598.28 - - - -     No flowsheet data found.      Assessment:       1. Chronic respiratory failure with hypoxia    2. Chronic diastolic congestive heart failure    3. Centrilobular emphysema    4. Acute on chronic respiratory failure with hypoxia    5. Right lower lobe lung mass        Outpatient Encounter Medications as of 9/17/2018   Medication Sig Dispense Refill    albuterol-ipratropium (DUO-NEB) 2.5 mg-0.5 mg/3 mL nebulizer solution Take 3 mLs by nebulization every 4  (four) hours as needed for Wheezing or Shortness of Breath. Rescue 270 mL 11    amlodipine (NORVASC) 10 MG tablet Take 10 mg by mouth once daily.      aspirin 81 MG Chew Take 81 mg by mouth once daily.      atorvastatin (LIPITOR) 20 MG tablet Take 20 mg by mouth once daily.      calcium carbonate (OS-DALLAS) 600 mg calcium (1,500 mg) Tab Take 600 mg by mouth once daily.      citalopram (CELEXA) 40 MG tablet Take 40 mg by mouth once daily.      docusate sodium (COLACE) 100 MG capsule Take 1 capsule (100 mg total) by mouth 2 (two) times daily as needed for Constipation. 30 capsule 0    folic acid (FOLVITE) 1 MG tablet Take 1 tablet (1 mg total) by mouth once daily. 30 tablet 0    furosemide (LASIX) 40 MG tablet Take 1 tablet (40 mg total) by mouth once daily. 60 tablet 11    HYDROcodone-acetaminophen (NORCO) 5-325 mg per tablet Take 1 tablet by mouth every 6 (six) hours as needed. 20 tablet 0    isosorbide-hydrALAZINE 20-37.5 mg (BIDIL) 20-37.5 mg Tab Take 1 tablet by mouth 3 (three) times daily. 90 tablet 11    metoprolol tartrate (LOPRESSOR) 50 MG tablet Take 1 tablet (50 mg total) by mouth 2 (two) times daily. 60 tablet 11    multivitamin (THERAGRAN) tablet Take 1 tablet by mouth once daily.      mupirocin (BACTROBAN) 2 % ointment Apply topically 3 (three) times daily. 30 g 0    nicotine (NICODERM CQ) 21 mg/24 hr Place 1 patch onto the skin every 24 hours.      pantoprazole (PROTONIX) 40 MG tablet Take 40 mg by mouth once daily.      polyethylene glycol (GLYCOLAX) 17 gram/dose powder Take 17 g by mouth once daily. Take daily to keep your bowel movements regular while taking pain medicines 289 g 0    potassium chloride SA (K-DUR,KLOR-CON) 10 MEQ tablet Take 1 tablet (10 mEq total) by mouth once daily. 30 tablet 11    temazepam (RESTORIL) 30 mg capsule Take 1 capsule (30 mg total) by mouth nightly as needed for Insomnia. 10 capsule 0    thiamine 100 MG tablet Take 1 tablet (100 mg total) by mouth once  "daily.      [DISCONTINUED] albuterol-ipratropium (DUO-NEB) 2.5 mg-0.5 mg/3 mL nebulizer solution Take 3 mLs by nebulization every 6 (six) hours as needed for Wheezing. Rescue 90 mL 0    [DISCONTINUED] furosemide (LASIX) 40 MG tablet Take 1 tablet (40 mg total) by mouth once daily. 60 tablet 0    [DISCONTINUED] potassium chloride SA (K-DUR,KLOR-CON) 10 MEQ tablet Take 1 tablet (10 mEq total) by mouth once daily.       No facility-administered encounter medications on file as of 9/17/2018.      Orders Placed This Encounter   Procedures    NEBULIZER FOR HOME USE     [facility] for CPAP/Oxygen/Nebulizer supplies.  Customer Service: [phone]  Call: [phone]  Fax: [phone]  Billing Inquiries: [phone] or [phone]       Order Specific Question:   Height:     Answer:   5' 9" (1.753 m)     Order Specific Question:   Weight:     Answer:   56.2 kg (124 lb)     Order Specific Question:   Length of need (1-99 months):     Answer:   99    NEBULIZER KIT (SUPPLIES) FOR HOME USE     Order Specific Question:   Height:     Answer:   5' 9" (1.753 m)     Order Specific Question:   Weight:     Answer:   56.2 kg (124 lb)     Order Specific Question:   Length of need (1-99 months):     Answer:   99     Order Specific Question:   Mask or Mouthpiece?     Answer:   Mouthpiece     Plan:       Requested Prescriptions     Signed Prescriptions Disp Refills    albuterol-ipratropium (DUO-NEB) 2.5 mg-0.5 mg/3 mL nebulizer solution 270 mL 11     Sig: Take 3 mLs by nebulization every 4 (four) hours as needed for Wheezing or Shortness of Breath. Rescue    furosemide (LASIX) 40 MG tablet 60 tablet 11     Sig: Take 1 tablet (40 mg total) by mouth once daily.    potassium chloride SA (K-DUR,KLOR-CON) 10 MEQ tablet 30 tablet 11     Sig: Take 1 tablet (10 mEq total) by mouth once daily.     Chronic respiratory failure with hypoxia    Chronic diastolic congestive heart failure  -     furosemide (LASIX) 40 MG tablet; Take 1 " tablet (40 mg total) by mouth once daily.  Dispense: 60 tablet; Refill: 11  -     potassium chloride SA (K-DUR,KLOR-CON) 10 MEQ tablet; Take 1 tablet (10 mEq total) by mouth once daily.  Dispense: 30 tablet; Refill: 11    Centrilobular emphysema  -     albuterol-ipratropium (DUO-NEB) 2.5 mg-0.5 mg/3 mL nebulizer solution; Take 3 mLs by nebulization every 4 (four) hours as needed for Wheezing or Shortness of Breath. Rescue  Dispense: 270 mL; Refill: 11  -     NEBULIZER FOR HOME USE  -     NEBULIZER KIT (SUPPLIES) FOR HOME USE    Acute on chronic respiratory failure with hypoxia    Right lower lobe lung mass  Comments:  - lung cancer vs rounded atelectesis . PET scan pending      Follow-up in about 4 weeks (around 10/15/2018) for Review CXR.      Review of medical records from hospital. Medical records from hospital were reviewed during office visit - these included but were not limited to review of radiographic studies and /or radiologists reports, laboratory studies, discharge summaries, procedure notes, consultations and other transcribed notes.  MEDICAL DECISION MAKING: Moderate to high complexity.  Overall, the multiple problems listed are of moderate to high severity that may impact quality of life and activities of daily living. Side effects of medications, treatment plan as well as options and alternatives reviewed and discussed with patient. There was counseling of patient concerning these issues.    Total time spent in face to face counseling and coordination of care - 40 minutes over 50% of time was used in discussion of prognosis, risks, benefits of treatment, instructions and compliance with regimen . Discussion with other physicians or health care providers (homehealth, durable medical equipment providers).

## 2018-09-18 ENCOUNTER — TELEPHONE (OUTPATIENT)
Dept: RADIOLOGY | Facility: HOSPITAL | Age: 68
End: 2018-09-18

## 2018-09-18 LAB — FUNGUS SPEC CULT: NORMAL

## 2018-09-19 ENCOUNTER — HOSPITAL ENCOUNTER (OUTPATIENT)
Dept: RADIOLOGY | Facility: HOSPITAL | Age: 68
Discharge: HOME | End: 2018-09-19
Attending: INTERNAL MEDICINE
Payer: MEDICARE

## 2018-09-19 DIAGNOSIS — R91.8 MASS OF LOWER LOBE OF RIGHT LUNG: ICD-10-CM

## 2018-09-19 PROCEDURE — A9552 F18 FDG: HCPCS

## 2018-09-19 PROCEDURE — 78815 PET IMAGE W/CT SKULL-THIGH: CPT | Mod: TC,PI

## 2018-09-19 PROCEDURE — 78815 PET IMAGE W/CT SKULL-THIGH: CPT | Mod: 26,PI,, | Performed by: RADIOLOGY

## 2018-09-21 ENCOUNTER — OFFICE VISIT (OUTPATIENT)
Dept: HEMATOLOGY/ONCOLOGY | Facility: CLINIC | Age: 68
End: 2018-09-21
Payer: MEDICARE

## 2018-09-21 VITALS
TEMPERATURE: 97 F | DIASTOLIC BLOOD PRESSURE: 57 MMHG | HEART RATE: 58 BPM | RESPIRATION RATE: 18 BRPM | OXYGEN SATURATION: 98 % | WEIGHT: 124 LBS | BODY MASS INDEX: 17.75 KG/M2 | HEIGHT: 70 IN | SYSTOLIC BLOOD PRESSURE: 115 MMHG

## 2018-09-21 DIAGNOSIS — D64.9 NORMOCYTIC ANEMIA: ICD-10-CM

## 2018-09-21 DIAGNOSIS — Z79.899 OTHER LONG TERM (CURRENT) DRUG THERAPY: ICD-10-CM

## 2018-09-21 DIAGNOSIS — R91.8 RIGHT LOWER LOBE LUNG MASS: Primary | ICD-10-CM

## 2018-09-21 PROCEDURE — 99214 OFFICE O/P EST MOD 30 MIN: CPT | Mod: S$PBB,,, | Performed by: INTERNAL MEDICINE

## 2018-09-21 PROCEDURE — 1101F PT FALLS ASSESS-DOCD LE1/YR: CPT | Mod: CPTII,,, | Performed by: INTERNAL MEDICINE

## 2018-09-21 PROCEDURE — 3078F DIAST BP <80 MM HG: CPT | Mod: CPTII,,, | Performed by: INTERNAL MEDICINE

## 2018-09-21 PROCEDURE — 99213 OFFICE O/P EST LOW 20 MIN: CPT | Mod: PBBFAC | Performed by: INTERNAL MEDICINE

## 2018-09-21 PROCEDURE — 3074F SYST BP LT 130 MM HG: CPT | Mod: CPTII,,, | Performed by: INTERNAL MEDICINE

## 2018-09-21 PROCEDURE — 99999 PR PBB SHADOW E&M-EST. PATIENT-LVL III: CPT | Mod: PBBFAC,,, | Performed by: INTERNAL MEDICINE

## 2018-09-21 NOTE — PROGRESS NOTES
Hematology/Oncology Office Note    Reason for referral:  Lung mass    CC:  Lung mass    Referred by:  No ref. provider found    Diagnosis:  COPD/chronic respiratory failure on home O2  Congestive heart failure  Diabetes mellitus  Chronic kidney disease      History of present illness:  68-year-old gentleman with multiple medical conditions as noted above who was discovered to have a mass within the right lower lobe that measures 10 x 9 x 4 cm on CT scan performed 07/19/2018.  The patient suffered right humeral fracture status post syncopal episode in 7/2018 at which time lung mass was discovered.  Patient continues to have chronic shortness of breath and he remains non compliant with oxygen.  He was in clinic today without oxygen and reported being shortness of breath.  He he is noncompliant with his home O2 for reasons that are not clear to me.  He denies pain, hemoptysis, dizziness, palpitations, or other neurological symptoms at this time.    I have reviewed and updated the HPI, ROS, PMHx, Social Hx, Family Hx and treatment history.    Today's visit:  Patient is without new complaints today.  He presents today to discuss results of PET scan.              Past Medical History:   Diagnosis Date    Arthritis     Cancer     lung cancer    Diabetes     Foot fracture     Hand fracture, left     Hand fracture, right     HTN (hypertension)     Humerus fracture 07/18/2018    Hyperlipidemia     Neuropathy     Renal disorder          Social History:  Polysubstance abuse--alcohol, tobacco, marijuana        Family History: family history includes Heart disease in his father and paternal grandfather; Miscarriages / Stillbirths in his paternal grandfather and paternal uncle.                HPI    Review of Systems   Constitutional: Positive for activity change, appetite change and fatigue. Negative for fever and unexpected weight change.   HENT: Negative for congestion, facial swelling, nosebleeds, rhinorrhea, sinus  "pressure, sneezing, sore throat, trouble swallowing and voice change.    Eyes: Negative for photophobia, pain, discharge, itching and visual disturbance.   Respiratory: Positive for shortness of breath and wheezing. Negative for apnea, cough, choking and chest tightness.    Cardiovascular: Negative for chest pain, palpitations and leg swelling.   Gastrointestinal: Negative for abdominal distention, abdominal pain, constipation, diarrhea, nausea and vomiting.   Endocrine: Negative for cold intolerance, heat intolerance, polydipsia, polyphagia and polyuria.   Genitourinary: Negative for decreased urine volume, difficulty urinating, frequency, genital sores, hematuria, testicular pain and urgency.   Musculoskeletal: Negative for arthralgias, back pain, gait problem, joint swelling, myalgias, neck pain and neck stiffness.   Skin: Negative for color change, pallor, rash and wound.   Allergic/Immunologic: Negative for environmental allergies, food allergies and immunocompromised state.   Neurological: Positive for weakness. Negative for dizziness, tremors, syncope, speech difficulty, light-headedness, numbness and headaches.   Hematological: Negative for adenopathy. Does not bruise/bleed easily.   Psychiatric/Behavioral: Negative for agitation, confusion and hallucinations. The patient is not nervous/anxious and is not hyperactive.        Objective:       Vitals:    09/21/18 1047   BP: (!) 115/57   Pulse: (!) 58   Resp: 18   Temp: 97 °F (36.1 °C)   TempSrc: Oral   SpO2: 98%   Weight: 56.2 kg (124 lb)   Height: 5' 9.5" (1.765 m)       Physical Exam   Constitutional: He is oriented to person, place, and time. He appears cachectic. No distress.   HENT:   Head: Normocephalic and atraumatic.   Right Ear: External ear normal.   Left Ear: External ear normal.   Nose: Nose normal.   Mouth/Throat: Oropharynx is clear and moist. No oropharyngeal exudate.   Eyes: Conjunctivae and EOM are normal. Pupils are equal, round, and reactive " to light. Right eye exhibits no discharge. Left eye exhibits no discharge. No scleral icterus.   Neck: Normal range of motion. Neck supple. No JVD present. No tracheal deviation present. No thyromegaly present.   Cardiovascular: Normal rate and regular rhythm.   No murmur heard.  Pulmonary/Chest: Effort normal. No accessory muscle usage or stridor. No respiratory distress. He has decreased breath sounds. He has no wheezes. He has rhonchi. He has no rales. He exhibits no tenderness.   Abdominal: Soft. Bowel sounds are normal. He exhibits no distension and no mass. There is no tenderness. There is no rebound and no guarding.   Musculoskeletal: Normal range of motion. He exhibits no edema.   Lymphadenopathy:     He has no cervical adenopathy.   Neurological: He is alert and oriented to person, place, and time. No cranial nerve deficit. Coordination normal.   Skin: Skin is warm and dry. Capillary refill takes less than 2 seconds. No abrasion, no bruising and no rash noted. He is not diaphoretic. No erythema. No pallor.   Psychiatric: He has a normal mood and affect. Thought content normal.   Vitals reviewed.      Lab Results   Component Value Date    WBC 6.80 08/22/2018    HGB 10.9 (L) 08/22/2018    HCT 31.6 (L) 08/22/2018     (H) 08/22/2018     (H) 08/22/2018     Lab Results   Component Value Date    CREATININE 2.5 (H) 08/22/2018    BUN 42 (H) 08/22/2018     (L) 08/22/2018    K 3.7 08/22/2018    CL 98 08/22/2018    CO2 20 (L) 08/22/2018     Lab Results   Component Value Date    ALT 10 08/22/2018    AST 10 08/22/2018    ALKPHOS 145 (H) 08/22/2018    BILITOT 0.6 08/22/2018         Assessment:       68-year-old gentleman with a history of polysubstance abuse who has been referred for right lower lobe lung mass identified on CT scan performed on 07/20/2018.  PET scan performed on 09/20/2018 demonstrated no significant activity within the right lower lobe with findings likely representing round  atelectasis.  In addition there is no other significant FDG activity noted on PET scan.  We will arrange repeat CT scan to be performed in approximately 3 months.  The patient will follow up after CT scan to discuss results.  I have also encouraged the patient to continue to follow in pulmonary      Right lower lobe lung mass:  Negative PET scan and likely representing round atelectasis  --follow-up in 3 months with repeat CT scan of the chest  --continue to monitor and follow in pulmonary clinic    Normocytic anemia likely secondary to chronic kidney disease  --check iron studies at followup in 3 months

## 2018-10-21 LAB
ACID FAST MOD KINY STN SPEC: NORMAL
MYCOBACTERIUM SPEC QL CULT: NORMAL

## 2018-10-30 ENCOUNTER — TELEPHONE (OUTPATIENT)
Dept: NEPHROLOGY | Facility: CLINIC | Age: 68
End: 2018-10-30

## 2018-10-30 ENCOUNTER — TELEPHONE (OUTPATIENT)
Dept: CARDIOLOGY | Facility: CLINIC | Age: 68
End: 2018-10-30

## 2018-10-30 ENCOUNTER — TELEPHONE (OUTPATIENT)
Dept: PULMONOLOGY | Facility: CLINIC | Age: 68
End: 2018-10-30

## 2018-10-30 NOTE — TELEPHONE ENCOUNTER
----- Message from Imelda Bates sent at 10/30/2018  6:42 AM CDT -----  Contact: Patients sister in law-Nathalia Castillo  Called to inform of patients passing on 10/15/18, all appointments have been cancelled. Thank you

## 2023-04-30 NOTE — PROGRESS NOTES
"[facility] Hospital Medicine  Progress Note    Patient Name: Lionel Castillo  MRN: [id number]  Patient Class: IP- Inpatient   Admission Date: 7/18/2018  Length of Stay: 0 days  Attending Physician: Kit Cohn MD  Primary Care Provider: Eddie Garcia MD        Subjective:     Principal Problem:Syncope    HPI:  Mr. Castillo is a 69yo male with a PMHx of HTN, HLD, DM II, CKD, and EtOH abuse, who presented to the ED with c/o right shoulder pain after suffering a syncopal event PTA.  Patient states he stood from chair, walked "a few feet", and next thing he remembers is waking up on floor with right shoulder pain.  He reports hitting his head on the floor during fall.  No associated symptoms.  No aggravating or alleviating factors.  Denies any HA, AMS, visual disturbance, lightheadedness/dizziness, focal deficits, palpitations, CP, SOB, KATZ, edema, ABD pain, N/V/D, dysuria, fever, chills, or diaphoresis.  Patient admits to drinking alcohol and smoking marijuana prior to event.  Initial work-up in ED resulted Na 124, K 2.9, BUN 26, cr. 2.4, serum alcohol 83, UDS (+) for benzo and THC, UA negative.  EKG unrevealing.  CT head negative for acute process.  Right shoulder XR showed comminuted right humeral neck fracture with slight angulation.  Hospital Medicine was called for admission.      Hospital Course:  The pt is a 69 yo male with alcohol abuse, DM, and CKD presented to ED with syncope. + serum alcohol and THC. Pt has a Comminuted right humeral neck fracture with slight angulation. Labs showed hyponatremia, ARF/CKD3, hypokalemia, CXR showed possible right pleural effusion. CT head showed nothing acute. Carotid U/S showed greater than 79% stenosis of the proximal right internal carotid artery and 50-69% stenosis involving the left internal carotid artery. Vascular surgery will evaluate pt. Orthopedics has been consulted.   EKG showed incomplete LBB and prolonged QT. Trop 0.027> 0.024>0.024. " Cardiac echo pending. Cardiology consulted regarding syncope with abnormal EKG.   Overall, the pt complains of pain to right shoulder. He is unable to get out of bed for orthostatics due to the pain and generalized weakness         Review of Systems   Constitutional: Positive for activity change. Negative for appetite change, chills, diaphoresis, fatigue and fever.   HENT: Negative for congestion, nosebleeds, sore throat and trouble swallowing.    Eyes: Negative for pain, discharge and visual disturbance.   Respiratory: Negative for apnea, cough, chest tightness, shortness of breath, wheezing and stridor.    Cardiovascular: Negative for chest pain, palpitations and leg swelling.   Gastrointestinal: Negative for abdominal distention, abdominal pain, blood in stool, constipation, diarrhea, nausea and vomiting.   Endocrine: Negative for cold intolerance and heat intolerance.   Genitourinary: Negative for difficulty urinating, dysuria, flank pain, frequency and urgency.   Musculoskeletal: Positive for arthralgias and gait problem (Frequent falls ). Negative for back pain, joint swelling, myalgias, neck pain and neck stiffness.   Skin: Negative for rash and wound.   Allergic/Immunologic: Negative for food allergies and immunocompromised state.   Neurological: Positive for dizziness and syncope. Negative for seizures, facial asymmetry, weakness, light-headedness and headaches.   Hematological: Negative for adenopathy.   Psychiatric/Behavioral: Negative for agitation, behavioral problems and confusion. The patient is not nervous/anxious.      Objective:     Vital Signs (Most Recent):  Temp: 97.3 °F (36.3 °C) (07/19/18 0747)  Pulse: (!) 55 (07/19/18 0925)  Resp: 18 (07/19/18 0747)  BP: (!) 174/78 (07/19/18 0827)  SpO2: (!) 92 % (07/19/18 0747) Vital Signs (24h Range):  Temp:  [97.3 °F (36.3 °C)-98.8 °F (37.1 °C)] 97.3 °F (36.3 °C)  Pulse:  [55-94] 55  Resp:  [16-45] 18  SpO2:  [92 %-96 %] 92 %  BP: (131-214)/() 174/78      Weight: 64.9 kg (143 lb)  Body mass index is 21.12 kg/m².    Intake/Output Summary (Last 24 hours) at 07/19/18 1026  Last data filed at 07/19/18 0800   Gross per 24 hour   Intake          1291.25 ml   Output              660 ml   Net           631.25 ml      Physical Exam   Constitutional: He is oriented to person, place, and time. No distress.   Thin, frail appearing    HENT:   Head: Normocephalic.   Nose: Nose normal.   Mouth/Throat: Oropharynx is clear and moist.   Superficial abrasion to forehead and nose    Eyes: Conjunctivae and EOM are normal. No scleral icterus.   Neck: Normal range of motion. Neck supple.   Cardiovascular: Normal rate, regular rhythm, normal heart sounds and intact distal pulses.  Exam reveals no gallop and no friction rub.    No murmur heard.  Pulmonary/Chest: Effort normal and breath sounds normal. No stridor. No respiratory distress. He has no wheezes. He has no rales. He exhibits no tenderness.   Abdominal: Soft. Bowel sounds are normal. He exhibits no distension. There is no tenderness. There is no rebound and no guarding.   Musculoskeletal: He exhibits tenderness. He exhibits no edema or deformity.   Pain to right upper arm with movement, decreased ROM  Neurovascular check intact to distal right fingers   Sling in place    Neurological: He is alert and oriented to person, place, and time. He has normal reflexes. No cranial nerve deficit. He exhibits normal muscle tone. Coordination normal.   +fine tremors    Skin: Skin is warm and dry. Capillary refill takes less than 2 seconds. No rash noted. He is not diaphoretic. No erythema. No pallor.   Multiple ecchymotic area at different stages of healing to arms    Psychiatric: He has a normal mood and affect. His behavior is normal. Thought content normal.   Nursing note and vitals reviewed.      Significant Labs:   BMP:   Recent Labs  Lab 07/18/18  1721  07/19/18  0820   GLU 97  < > 108   *  < > 126*   K 2.9*  < > 4.1   CL 89*  <  > 94*   CO2 18*  < > 23   BUN 26*  < > 25*   CREATININE 2.4*  < > 1.9*   CALCIUM 8.6*  < > 8.7   MG 1.7  --   --    < > = values in this interval not displayed.  CBC:   Recent Labs  Lab 07/18/18  1721 07/19/18  0820   WBC 8.09 8.77   HGB 12.5* 12.9*   HCT 33.7* 35.0*    263     CMP:   Recent Labs  Lab 07/18/18  1721 07/19/18  0044 07/19/18  0435 07/19/18  0820   * 126* 125* 126*   K 2.9* 3.4* 3.7 4.1   CL 89* 92* 93* 94*   CO2 18* 22* 20* 23   GLU 97 106 116* 108   BUN 26* 24* 24* 25*   CREATININE 2.4* 2.1* 2.0* 1.9*   CALCIUM 8.6* 8.4* 8.4* 8.7   PROT 5.6*  --   --   --    ALBUMIN 2.3*  --   --   --    BILITOT 0.4  --   --   --    ALKPHOS 88  --   --   --    AST 15  --   --   --    ALT 11  --   --   --    ANIONGAP 17* 12 12 9   EGFRNONAA 27* 32* 34* 36*     All pertinent labs within the past 24 hours have been reviewed.    Significant Imaging: \  Imaging Results          US Carotid Bilateral (Final result)  Result time 07/18/18 21:04:56    Final result by Junito Kee MD (07/18/18 21:04:56)                 Impression:      Marked atherosclerotic plaque noted bilaterally.  Findings consistent with a greater than 79% stenosis of the proximal right internal carotid artery and 50-69% stenosis involving the left internal carotid artery.      Electronically signed by: Junito Kee MD  Date:    07/18/2018  Time:    21:04             Narrative:    EXAMINATION:  US CAROTID BILATERAL    CLINICAL HISTORY:  Syncope and collapse    COMPARISON:  None    FINDINGS:  Sonographic evaluation of the carotid systems was performed.    Marked atherosclerotic plaquing noted the carotid bulb and proximal internal carotid arteries bilaterally.    The peak systolic velocity in the right internal carotid artery was approximately 409 cm/sec.    The peak systolic velocity in the left internal carotid artery was [id number] cm/sec.    Antegrade flow noted in both vertebral arteries.    Stenosis percentage validated velocity  measurements with angiographic measurements, velocity criteria are extrapolated from diameter data as defined by the Society of Radiologists in Ultrasound Consensus Conference Radiology 2003; 229;340-346.                               CT Head Without Contrast (Final result)  Result time 07/18/18 18:02:30    Final result by Junito Kee MD (07/18/18 18:02:30)                 Impression:      No acute findings.    All CT scans at this facility are performed  using dose modulation techniques as appropriate to performed exam including the following:  automated exposure control; adjustment of mA and/or kV according to the patients size (this includes techniques or standardized protocols for targeted exams where dose is matched to indication/reason for exam: i.e. extremities or head);  iterative reconstruction technique.      Electronically signed by: Junito Kee MD  Date:    07/18/2018  Time:    18:02             Narrative:    EXAMINATION:  CT HEAD WITHOUT CONTRAST    CLINICAL HISTORY:  syncope;    COMPARISON:  05/16/2017.    FINDINGS:  No intracranial hemorrhage or acute findings are demonstrated.  Mild cerebral atrophy.  Low-density changes in the periventricular matter suggests chronic small vessel ischemic change.  The visualized paranasal sinuses are clear. The calvarium is intact.  Intracranial atherosclerosis.                               X-Ray Shoulder Trauma Right (Final result)  Result time 07/18/18 17:41:47    Final result by Junito Kee MD (07/18/18 17:41:47)                 Impression:      Comminuted right humeral neck fracture with slight angulation.      Electronically signed by: Junito Kee MD  Date:    07/18/2018  Time:    17:41             Narrative:    EXAMINATION:  XR SHOULDER TRAUMA 3 VIEW RIGHT    CLINICAL HISTORY:  Pain in unspecified shoulder    TECHNIQUE:  Standard radiography performed.    COMPARISON:  None    FINDINGS:  Comminuted fracture involving the right humeral neck with slight  varus angulation.  Mild arthritic change involving the AC joint.                               X-Ray Chest AP Portable (Final result)  Result time 07/18/18 17:43:02    Final result by Junito Kee MD (07/18/18 17:43:02)                 Impression:      Multiple right-sided rib fractures which may be old with scarring or atelectasis in the right lung base along with a right pleural fluid collection unknown chronicity.  This finding was not identified on previous study 06/05/2017.      Electronically signed by: Junito Kee MD  Date:    07/18/2018  Time:    17:43             Narrative:    EXAMINATION:  XR CHEST AP PORTABLE    CLINICAL HISTORY:  syncope;    COMPARISON:  06/05/2017    FINDINGS:  Scarring right lung base with multiple right-sided rib fractures.  Right pleural fluid collection may be the sequelae of prior trauma.  This was now however evident on prior study dated above.  The left lung appears clear.                              Assessment/Plan:      * Syncope    Syncope likely multifactorial  Pt has abnormal EKG with LBB and prolonged QT, Carotid stenosis, Hyponatremia, alcohol and polysubstance abuse, dehydration/ARF  Await cardiology and vascular surgery consultations   Pt will liekly need both a right shoulder surgery and carotid surgery in the near future.   Cont IV hydration   DT ppx          Carotid stenosis, bilateral    Await vascular surgery eval  Likely op surgery           Acute renal failure superimposed on stage 3 chronic kidney disease    - Initial cr. 2.4 (baseline 2), likely secondary to IVVD.  - IV hydration as above.  - Avoid nephrotoxic agents.  Hold HCTZ, ARB, and metformin for now.  - Follow BMP.        Polysubstance abuse               Tobacco use    - Counseled on cessation.  - Nicotine patch.        Type 2 diabetes mellitus, without long-term current use of insulin    - Hold metformin, will resume at [place].  - Accuchecks with SSI.  - Diabetic diet.  - Check HbA1c.         Essential hypertension    - Hypertensive in ED, likely due to pain.  - Continue CCB and BB.  ARB and diuretic on hold as above.  - Pain control.  - Will monitor BP trends and optimize regimen if BP remains elevated once pain adequately controlled.        Right humeral fracture    - Continue sling + swathe.  - Analgesics PRN.  Orthopedic evaluation         Hypokalemia    - Initial K 2.9, likely secondary to EtOH abuse + dehydration.  - Replete to keep K >/=4.  - Monitor telemetry for arrhythmias.  - Follow BMP.        Hyponatremia    - Likely secondary to EtOH abuse +/- HCTZ.  - 0.9% NS @ 75mL/hr for gradual Na correction.  - Hold HCTZ.  - Strict I&O's.  - Neuro checks.  - Follow serial BMP.        Alcohol abuse    - Counseled on cessation.  - Thiamine and folic acid supplementation.  - Monitor for withdrawal/DT's; Ativan PRN.  - Fall, seizure, aspiration precautions.           VTE Risk Mitigation         Ordered     heparin (porcine) injection 5,000 Units  Every 8 hours      07/19/18 0013     IP VTE LOW RISK PATIENT  Once      07/18/18 1934     Place sequential compression device  Until discontinued      07/18/18 1934              Uyen Moya NP  Department of Hospital Medicine   [facility]   initiation of breastfeeding/breast milk feeding

## 2024-01-08 NOTE — PLAN OF CARE
Jul23 Consult with Kurt Canseco MD   Monday Jul 23, 2018 10:20 AM  [facility]   [address] [place] [address]   [phone]         07/22/18 1551   Final Note   Assessment Type Final Discharge Note   Discharge Disposition Home   Hospital Follow Up  Appt(s) scheduled? Yes   Right Care Referral Info   Post Acute Recommendation No Care      English

## 2024-04-10 NOTE — PLAN OF CARE
Problem: Patient Care Overview  Goal: Plan of Care Review  Outcome: Ongoing (interventions implemented as appropriate)  Pt on O2 tolerating well. No distress noted at this time.        dark stool/DECREASED EATING/DRINKING